# Patient Record
Sex: FEMALE | URBAN - NONMETROPOLITAN AREA
[De-identification: names, ages, dates, MRNs, and addresses within clinical notes are randomized per-mention and may not be internally consistent; named-entity substitution may affect disease eponyms.]

---

## 2017-03-03 ENCOUNTER — APPOINTMENT (RX ONLY)
Dept: URBAN - NONMETROPOLITAN AREA CLINIC 1 | Facility: CLINIC | Age: 82
Setting detail: DERMATOLOGY
End: 2017-03-03

## 2017-03-03 VITALS — HEIGHT: 63 IN | WEIGHT: 130 LBS

## 2017-03-03 DIAGNOSIS — L57.0 ACTINIC KERATOSIS: ICD-10-CM

## 2017-03-03 DIAGNOSIS — D485 NEOPLASM OF UNCERTAIN BEHAVIOR OF SKIN: ICD-10-CM

## 2017-03-03 DIAGNOSIS — L85.3 XEROSIS CUTIS: ICD-10-CM

## 2017-03-03 DIAGNOSIS — Z85.828 PERSONAL HISTORY OF OTHER MALIGNANT NEOPLASM OF SKIN: ICD-10-CM

## 2017-03-03 DIAGNOSIS — L21.8 OTHER SEBORRHEIC DERMATITIS: ICD-10-CM

## 2017-03-03 PROBLEM — F32.9 MAJOR DEPRESSIVE DISORDER, SINGLE EPISODE, UNSPECIFIED: Status: ACTIVE | Noted: 2017-03-03

## 2017-03-03 PROBLEM — D48.5 NEOPLASM OF UNCERTAIN BEHAVIOR OF SKIN: Status: ACTIVE | Noted: 2017-03-03

## 2017-03-03 PROBLEM — I10 ESSENTIAL (PRIMARY) HYPERTENSION: Status: ACTIVE | Noted: 2017-03-03

## 2017-03-03 PROCEDURE — 17003 DESTRUCT PREMALG LES 2-14: CPT

## 2017-03-03 PROCEDURE — ? BIOPSY BY PUNCH METHOD

## 2017-03-03 PROCEDURE — 99214 OFFICE O/P EST MOD 30 MIN: CPT | Mod: 25

## 2017-03-03 PROCEDURE — ? REFERRAL CORRESPONDENCE

## 2017-03-03 PROCEDURE — ? COUNSELING

## 2017-03-03 PROCEDURE — 11100: CPT | Mod: 59

## 2017-03-03 PROCEDURE — 17000 DESTRUCT PREMALG LESION: CPT

## 2017-03-03 PROCEDURE — ? LIQUID NITROGEN

## 2017-03-03 PROCEDURE — ? PRESCRIPTION

## 2017-03-03 RX ORDER — KETOCONAZOLE 20.5 MG/ML
SHAMPOO, SUSPENSION TOPICAL
Qty: 1 | Refills: 3 | Status: ERX | COMMUNITY
Start: 2017-03-03

## 2017-03-03 RX ADMIN — KETOCONAZOLE 1 APPLICATION: 20.5 SHAMPOO, SUSPENSION TOPICAL at 00:00

## 2017-03-03 ASSESSMENT — LOCATION DETAILED DESCRIPTION DERM
LOCATION DETAILED: RIGHT MID TEMPLE
LOCATION DETAILED: RIGHT INFERIOR CENTRAL MALAR CHEEK
LOCATION DETAILED: LEFT MEDIAL FRONTAL SCALP
LOCATION DETAILED: LEFT INFERIOR LATERAL MALAR CHEEK
LOCATION DETAILED: LEFT CENTRAL EYEBROW
LOCATION DETAILED: LEFT PROXIMAL DORSAL FOREARM
LOCATION DETAILED: RIGHT MEDIAL SUPERIOR CHEST
LOCATION DETAILED: LEFT DISTAL DORSAL FOREARM
LOCATION DETAILED: MIDDLE STERNUM
LOCATION DETAILED: LEFT ANTERIOR DISTAL UPPER ARM
LOCATION DETAILED: LEFT ANTERIOR PROXIMAL UPPER ARM
LOCATION DETAILED: RIGHT SUPERIOR LATERAL MALAR CHEEK
LOCATION DETAILED: EPIGASTRIC SKIN
LOCATION DETAILED: LEFT LATERAL SUPERIOR CHEST

## 2017-03-03 ASSESSMENT — LOCATION ZONE DERM
LOCATION ZONE: ARM
LOCATION ZONE: SCALP
LOCATION ZONE: TRUNK
LOCATION ZONE: FACE

## 2017-03-03 ASSESSMENT — LOCATION SIMPLE DESCRIPTION DERM
LOCATION SIMPLE: LEFT UPPER ARM
LOCATION SIMPLE: LEFT CHEEK
LOCATION SIMPLE: ABDOMEN
LOCATION SIMPLE: LEFT EYEBROW
LOCATION SIMPLE: LEFT SCALP
LOCATION SIMPLE: RIGHT CHEEK
LOCATION SIMPLE: LEFT FOREARM
LOCATION SIMPLE: RIGHT TEMPLE
LOCATION SIMPLE: CHEST

## 2017-03-03 NOTE — PROCEDURE: MIPS QUALITY
Quality 111:Pneumonia Vaccination Status For Older Adults: Pneumococcal Vaccination Previously Received
Quality 110: Preventive Care And Screening: Influenza Immunization: Influenza Immunization previously received during influenza season
Detail Level: Generalized

## 2017-03-03 NOTE — PROCEDURE: BIOPSY BY PUNCH METHOD
Body Location Override (Optional - Billing Will Still Be Based On Selected Body Map Location If Applicable): L forearm
Epidermal Sutures: 4-0 Prolene
Consent: Written consent was obtained and risks were reviewed including but not limited to scarring, infection, bleeding, scabbing, incomplete removal, nerve damage and allergy to anesthesia.
Suture Removal: 14 days
Render Post-Care Instructions In Note?: yes
Anesthesia Type: 1% lidocaine with epinephrine and a 1:10 solution of 8.4% sodium bicarbonate
Post-Care Instructions: I reviewed with the patient in detail post-care instructions. Patient is to keep the biopsy site dry overnight, and then apply Polysporin twice daily until healed. Patient may apply hydrogen peroxide soaks to remove any crusting.
Detail Level: Detailed
Lab Facility: [id number]
Bill For Surgical Tray: no
Home Suture Removal Text: Patient was provided a home suture removal kit and will remove their sutures at home.  If they have any questions or difficulties they will call the office.
Anesthesia Volume In Cc: 1
Billing Type: Third-Party Bill
X Depth Of Punch In Cm (Optional): 0
Punch Size In Mm: 5
Size Of Lesion In Cm (Optional): 0.5
Wound Care: Polysporin ointment
Lab: [id number]
Hemostasis: None
Biopsy Type: H and E
Dressing: bandage
Notification Instructions: Patient will be notified of biopsy results. However, patient instructed to call the office if not contacted within 2 weeks.

## 2017-03-03 NOTE — PROCEDURE: LIQUID NITROGEN
Consent: The patient's consent was obtained including but not limited to risks of crusting, scabbing, blistering, scarring, darker or lighter pigmentary change, recurrence, incomplete removal and infection.
Post-Care Instructions: I reviewed with the patient in detail post-care instructions. Patient is to wear sunprotection, and avoid picking at any of the treated lesions. Pt may apply Vaseline to crusted or scabbing areas.
Detail Level: Simple
Render Post-Care Instructions In Note?: yes
Duration Of Freeze Thaw-Cycle (Seconds): 0

## 2017-03-17 ENCOUNTER — APPOINTMENT (RX ONLY)
Dept: URBAN - NONMETROPOLITAN AREA CLINIC 1 | Facility: CLINIC | Age: 82
Setting detail: DERMATOLOGY
End: 2017-03-17

## 2017-03-17 VITALS — WEIGHT: 130 LBS | HEIGHT: 63 IN

## 2017-03-17 DIAGNOSIS — Z48.02 ENCOUNTER FOR REMOVAL OF SUTURES: ICD-10-CM

## 2017-03-17 PROCEDURE — ? SUTURE REMOVAL (GLOBAL PERIOD)

## 2017-03-17 ASSESSMENT — LOCATION DETAILED DESCRIPTION DERM: LOCATION DETAILED: LEFT PROXIMAL DORSAL FOREARM

## 2017-03-17 ASSESSMENT — LOCATION ZONE DERM: LOCATION ZONE: ARM

## 2017-03-17 ASSESSMENT — LOCATION SIMPLE DESCRIPTION DERM: LOCATION SIMPLE: LEFT FOREARM

## 2017-03-17 NOTE — PROCEDURE: SUTURE REMOVAL (GLOBAL PERIOD)
Add 39408 Cpt? (Important Note: In 2017 The Use Of 76815 Is Being Tracked By Cms To Determine Future Global Period Reimbursement For Global Periods): no
Detail Level: Simple

## 2017-09-14 ENCOUNTER — APPOINTMENT (RX ONLY)
Dept: URBAN - NONMETROPOLITAN AREA CLINIC 1 | Facility: CLINIC | Age: 82
Setting detail: DERMATOLOGY
End: 2017-09-14

## 2017-09-14 DIAGNOSIS — I87.2 VENOUS INSUFFICIENCY (CHRONIC) (PERIPHERAL): ICD-10-CM

## 2017-09-14 DIAGNOSIS — L81.4 OTHER MELANIN HYPERPIGMENTATION: ICD-10-CM

## 2017-09-14 DIAGNOSIS — L81.7 PIGMENTED PURPURIC DERMATOSIS: ICD-10-CM

## 2017-09-14 DIAGNOSIS — L57.0 ACTINIC KERATOSIS: ICD-10-CM

## 2017-09-14 DIAGNOSIS — L82.1 OTHER SEBORRHEIC KERATOSIS: ICD-10-CM

## 2017-09-14 DIAGNOSIS — D69.2 OTHER NONTHROMBOCYTOPENIC PURPURA: ICD-10-CM

## 2017-09-14 PROBLEM — I83.10 VARICOSE VEINS OF UNSPECIFIED LOWER EXTREMITY WITH INFLAMMATION: Status: ACTIVE | Noted: 2017-09-14

## 2017-09-14 PROCEDURE — ? OBSERVATION

## 2017-09-14 PROCEDURE — ? DEFER

## 2017-09-14 PROCEDURE — 99213 OFFICE O/P EST LOW 20 MIN: CPT | Mod: 25

## 2017-09-14 PROCEDURE — ? PRESCRIPTION

## 2017-09-14 PROCEDURE — 17003 DESTRUCT PREMALG LES 2-14: CPT

## 2017-09-14 PROCEDURE — ? COUNSELING

## 2017-09-14 PROCEDURE — 17000 DESTRUCT PREMALG LESION: CPT

## 2017-09-14 PROCEDURE — ? LIQUID NITROGEN

## 2017-09-14 RX ORDER — TRIAMCINOLONE ACETONIDE 1 MG/G
CREAM TOPICAL BID
Qty: 1 | Refills: 3 | Status: ERX | COMMUNITY
Start: 2017-09-14

## 2017-09-14 RX ADMIN — TRIAMCINOLONE ACETONIDE: 1 CREAM TOPICAL at 21:06

## 2017-09-14 ASSESSMENT — LOCATION DETAILED DESCRIPTION DERM
LOCATION DETAILED: LEFT INFERIOR FOREHEAD
LOCATION DETAILED: RIGHT SUPERIOR UPPER BACK
LOCATION DETAILED: LEFT SUPERIOR UPPER BACK
LOCATION DETAILED: RIGHT PROXIMAL RADIAL DORSAL FOREARM
LOCATION DETAILED: RIGHT CENTRAL EYEBROW

## 2017-09-14 ASSESSMENT — LOCATION SIMPLE DESCRIPTION DERM
LOCATION SIMPLE: RIGHT FOREARM
LOCATION SIMPLE: LEFT UPPER BACK
LOCATION SIMPLE: LEFT FOREHEAD
LOCATION SIMPLE: RIGHT EYEBROW
LOCATION SIMPLE: RIGHT UPPER BACK

## 2017-09-14 ASSESSMENT — LOCATION ZONE DERM
LOCATION ZONE: FACE
LOCATION ZONE: ARM
LOCATION ZONE: TRUNK

## 2017-09-14 NOTE — PROCEDURE: LIQUID NITROGEN
Duration Of Freeze Thaw-Cycle (Seconds): 8
Number Of Freeze-Thaw Cycles: 1 freeze-thaw cycle
Render Post-Care Instructions In Note?: no
Post-Care Instructions: I reviewed with the patient in detail post-care instructions. Patient is to wear sunprotection, and avoid picking at any of the treated lesions. Pt may apply Vaseline to crusted or scabbing areas.
Detail Level: Simple
Consent: The patient's consent was obtained including but not limited to risks of crusting, scabbing, blistering, scarring, darker or lighter pigmentary change, recurrence, incomplete removal and infection.

## 2019-03-25 ENCOUNTER — APPOINTMENT (RX ONLY)
Dept: URBAN - NONMETROPOLITAN AREA CLINIC 1 | Facility: CLINIC | Age: 84
Setting detail: DERMATOLOGY
End: 2019-03-25

## 2019-03-25 DIAGNOSIS — L81.7 PIGMENTED PURPURIC DERMATOSIS: ICD-10-CM

## 2019-03-25 DIAGNOSIS — L81.4 OTHER MELANIN HYPERPIGMENTATION: ICD-10-CM

## 2019-03-25 DIAGNOSIS — D18.0 HEMANGIOMA: ICD-10-CM

## 2019-03-25 DIAGNOSIS — L82.1 OTHER SEBORRHEIC KERATOSIS: ICD-10-CM

## 2019-03-25 DIAGNOSIS — D22 MELANOCYTIC NEVI: ICD-10-CM

## 2019-03-25 DIAGNOSIS — L57.0 ACTINIC KERATOSIS: ICD-10-CM

## 2019-03-25 PROBLEM — D22.5 MELANOCYTIC NEVI OF TRUNK: Status: ACTIVE | Noted: 2019-03-25

## 2019-03-25 PROBLEM — D18.01 HEMANGIOMA OF SKIN AND SUBCUTANEOUS TISSUE: Status: ACTIVE | Noted: 2019-03-25

## 2019-03-25 PROCEDURE — ? DEFER

## 2019-03-25 PROCEDURE — 17003 DESTRUCT PREMALG LES 2-14: CPT

## 2019-03-25 PROCEDURE — 99214 OFFICE O/P EST MOD 30 MIN: CPT | Mod: 25

## 2019-03-25 PROCEDURE — 17000 DESTRUCT PREMALG LESION: CPT

## 2019-03-25 PROCEDURE — ? LIQUID NITROGEN

## 2019-03-25 PROCEDURE — ? OBSERVATION

## 2019-03-25 PROCEDURE — ? COUNSELING

## 2019-03-25 ASSESSMENT — LOCATION DETAILED DESCRIPTION DERM
LOCATION DETAILED: RIGHT PROXIMAL PRETIBIAL REGION
LOCATION DETAILED: LEFT INFERIOR CENTRAL MALAR CHEEK
LOCATION DETAILED: RIGHT SUPERIOR UPPER BACK
LOCATION DETAILED: RIGHT CLAVICULAR SKIN
LOCATION DETAILED: RIGHT ANTERIOR PROXIMAL THIGH
LOCATION DETAILED: EPIGASTRIC SKIN
LOCATION DETAILED: LEFT DISTAL DORSAL FOREARM
LOCATION DETAILED: RIGHT PROXIMAL DORSAL FOREARM
LOCATION DETAILED: RIGHT SUPERIOR LATERAL BUCCAL CHEEK
LOCATION DETAILED: LEFT PROXIMAL PRETIBIAL REGION
LOCATION DETAILED: RIGHT DISTAL PRETIBIAL REGION
LOCATION DETAILED: RIGHT SUPERIOR MEDIAL BUCCAL CHEEK
LOCATION DETAILED: LEFT ANTERIOR PROXIMAL THIGH
LOCATION DETAILED: RIGHT DISTAL DORSAL FOREARM
LOCATION DETAILED: LEFT MEDIAL SUPERIOR CHEST
LOCATION DETAILED: LEFT MEDIAL SUPERIOR CHEST

## 2019-03-25 ASSESSMENT — LOCATION SIMPLE DESCRIPTION DERM
LOCATION SIMPLE: RIGHT CHEEK
LOCATION SIMPLE: LEFT CHEEK
LOCATION SIMPLE: RIGHT PRETIBIAL REGION
LOCATION SIMPLE: RIGHT UPPER BACK
LOCATION SIMPLE: LEFT THIGH
LOCATION SIMPLE: CHEST
LOCATION SIMPLE: LEFT PRETIBIAL REGION
LOCATION SIMPLE: RIGHT FOREARM
LOCATION SIMPLE: CHEST
LOCATION SIMPLE: RIGHT THIGH
LOCATION SIMPLE: LEFT FOREARM
LOCATION SIMPLE: RIGHT CLAVICULAR SKIN
LOCATION SIMPLE: ABDOMEN

## 2019-03-25 ASSESSMENT — LOCATION ZONE DERM
LOCATION ZONE: ARM
LOCATION ZONE: LEG
LOCATION ZONE: FACE
LOCATION ZONE: TRUNK
LOCATION ZONE: TRUNK

## 2021-09-29 PROBLEM — M20.42 HAMMERTOE OF LEFT FOOT: Status: ACTIVE | Noted: 2021-09-29

## 2022-05-24 PROBLEM — M48.061 LUMBAR STENOSIS: Status: ACTIVE | Noted: 2022-05-24

## 2023-04-19 PROBLEM — I48.0 PAROXYSMAL ATRIAL FIBRILLATION (HCC): Status: ACTIVE | Noted: 2023-04-19

## 2023-04-19 PROBLEM — Z95.0 PACEMAKER: Status: ACTIVE | Noted: 2023-04-19

## 2023-04-19 PROBLEM — Z98.890 S/P BREAST RECONSTRUCTION, LEFT: Status: ACTIVE | Noted: 2023-04-19

## 2023-04-19 PROBLEM — M48.07 SPINAL STENOSIS OF LUMBOSACRAL REGION: Status: ACTIVE | Noted: 2022-05-24

## 2023-04-19 PROBLEM — K57.92 DIVERTICULITIS: Status: ACTIVE | Noted: 2023-04-19

## 2023-04-19 PROBLEM — M51.37 DDD (DEGENERATIVE DISC DISEASE), LUMBOSACRAL: Status: ACTIVE | Noted: 2023-04-19

## 2023-04-19 PROBLEM — E03.9 ACQUIRED HYPOTHYROIDISM: Status: ACTIVE | Noted: 2023-04-19
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
## 2023-04-19 PROBLEM — Z96.651 S/P TOTAL KNEE REPLACEMENT, RIGHT: Status: ACTIVE | Noted: 2023-04-19

## 2023-04-19 PROBLEM — D68.318 CIRCULATING ANTICOAGULANT DISORDER (HCC): Status: ACTIVE | Noted: 2023-04-19

## 2023-04-19 PROBLEM — G62.9 NEUROPATHY: Status: ACTIVE | Noted: 2023-04-19

## 2023-04-19 PROBLEM — Z85.3 HISTORY OF BREAST CANCER: Status: ACTIVE | Noted: 2023-04-19

## 2023-04-19 PROBLEM — R41.89 COGNITIVE IMPAIRMENT: Status: ACTIVE | Noted: 2023-04-19

## 2023-04-19 PROBLEM — I10 PRIMARY HYPERTENSION: Status: ACTIVE | Noted: 2023-04-19

## 2023-04-19 PROBLEM — Z90.12 STATUS POST LEFT MASTECTOMY: Status: ACTIVE | Noted: 2023-04-19

## 2023-04-19 PROBLEM — F34.1 PERSISTENT DEPRESSIVE DISORDER: Status: ACTIVE | Noted: 2023-04-19

## 2023-05-05 ENCOUNTER — APPOINTMENT (OUTPATIENT)
Dept: RADIOLOGY | Facility: MEDICAL CENTER | Age: 88
End: 2023-05-05
Attending: EMERGENCY MEDICINE
Payer: MEDICARE

## 2023-05-05 ENCOUNTER — HOSPITAL ENCOUNTER (EMERGENCY)
Facility: MEDICAL CENTER | Age: 88
End: 2023-05-05
Attending: EMERGENCY MEDICINE
Payer: MEDICARE

## 2023-05-05 VITALS
WEIGHT: 165 LBS | HEIGHT: 64 IN | SYSTOLIC BLOOD PRESSURE: 186 MMHG | TEMPERATURE: 97.6 F | BODY MASS INDEX: 28.17 KG/M2 | RESPIRATION RATE: 16 BRPM | OXYGEN SATURATION: 92 % | DIASTOLIC BLOOD PRESSURE: 79 MMHG | HEART RATE: 65 BPM

## 2023-05-05 DIAGNOSIS — S09.90XA CLOSED HEAD INJURY, INITIAL ENCOUNTER: ICD-10-CM

## 2023-05-05 DIAGNOSIS — M25.562 ACUTE PAIN OF LEFT KNEE: ICD-10-CM

## 2023-05-05 DIAGNOSIS — S61.412A SKIN TEAR OF LEFT HAND WITHOUT COMPLICATION, INITIAL ENCOUNTER: ICD-10-CM
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
## 2023-05-05 PROCEDURE — 70450 CT HEAD/BRAIN W/O DYE: CPT

## 2023-05-05 PROCEDURE — 73562 X-RAY EXAM OF KNEE 3: CPT | Mod: RT

## 2023-05-05 PROCEDURE — 99284 EMERGENCY DEPT VISIT MOD MDM: CPT

## 2023-05-05 PROCEDURE — 700102 HCHG RX REV CODE 250 W/ 637 OVERRIDE(OP): Performed by: EMERGENCY MEDICINE

## 2023-05-05 PROCEDURE — A9270 NON-COVERED ITEM OR SERVICE: HCPCS | Performed by: EMERGENCY MEDICINE

## 2023-05-05 RX ORDER — ACETAMINOPHEN 500 MG
1000 TABLET ORAL ONCE
Status: COMPLETED | OUTPATIENT
Start: 2023-05-05 | End: 2023-05-05

## 2023-05-05 RX ADMIN — ACETAMINOPHEN 1000 MG: 500 TABLET, FILM COATED ORAL at 17:14

## 2023-05-05 ASSESSMENT — FIBROSIS 4 INDEX: FIB4 SCORE: 4.87

## 2023-05-05 NOTE — ED PROVIDER NOTES
ED Provider Note    CHIEF COMPLAINT  tbi      EXTERNAL RECORDS REVIEWED  Inpatient Notes [facility] ER on 3- seen for nausea vomiting, had blood test done and was given Zofran    HPI/ROS  LIMITATION TO HISTORY   Select: : None  OUTSIDE HISTORIAN(S):  EMS mechanical fall    Debra Rodrigues is a 88 y.o. female who presents to the ED as a mechanical fall.  The patient was walking with her walker and her walker came out, she fell back hit the left posterior aspect of her head, did not lose consciousness, she is on Eliquis for stroke.  Patient denies any numbness, tingling, weakness, chest pain, shortness of breath.  She does have a small laceration/skin tear on the left hand.  No other pain or injuries    PAST MEDICAL HISTORY   has a past medical history of Acid reflux disease, Acquired hypothyroidism (4/19/2023), Allergy, Anxiety, Arrhythmia (01/01/2009), Arthritis, At risk for sleep apnea, Blood transfusion without reported diagnosis, Bowel habit changes, Breath shortness, Cancer ([facility]) (01/01/2009), CATARACT, Depression, GERD (gastroesophageal reflux disease), Head ache, Heart burn, Heart valve disease, Hemorrhagic disorder ([facility]), Hypertension, IBD (inflammatory bowel disease), Macular degeneration, Muscle disorder, Pacemaker (01/01/2009), Stroke ([facility]), Unspecified urinary incontinence, Urinary bladder disorder, and Urinary incontinence.    SURGICAL HISTORY   has a past surgical history that includes tonsillectomy and adenoidectomy (1942); mastoidectomy (1943); hysterectomy, total abdominal (1972); knee arthroscopy (1995); julio by laparoscopy (1995); pacemaker insertion (2009); mastectomy (5/13/2009); cataract extraction with iol; breast reconstruction (4/20/2010); tissue expander place/remove (4/20/2010); breast reconstruction (11/23/2010); tissue expander place/remove (11/23/2010); and mastopexy (11/23/2010).    FAMILY HISTORY  Family History   Problem Relation Age of Onset    Heart Disease Unknown   "   Hypertension Unknown     Cancer Unknown        SOCIAL HISTORY  Social History     Tobacco Use    Smoking status: Never    Smokeless tobacco: Never   Vaping Use    Vaping Use: Never used   Substance and Sexual Activity    Alcohol use: Yes     Alcohol/week: 3.5 oz     Types: 7 Standard drinks or equivalent per week     Comment: one per day    Drug use: No    Sexual activity: Not on file       CURRENT MEDICATIONS  Home Medications    **Home medications have not yet been reviewed for this encounter**         ALLERGIES  Allergies   Allergen Reactions    Other Misc Anaphylaxis     x2 in 2005,pt tested,Cause unknown    Amoxicillin     Hydrocodone-Acetaminophen Vomiting    Sulfa Drugs Rash       PHYSICAL EXAM  VITAL SIGNS: BP (!) 186/79   Pulse 65   Temp 36.4 °C (97.6 °F) (Temporal)   Resp 16   Ht 1.613 m (5' 3.5\")   Wt 74.8 kg (165 lb)   SpO2 92%   BMI 28.77 kg/m²    Well-developed well-nourished in mild distress  The patient has slight tenderness palpation behind the left ear, no big hematoma, no bleeding  No C-spine tenderness  No chest wall tenderness, clear to auscultation  Regular rate and rhythm  Abdomen soft nontender  Extremities no deformity seen, she has a skin tear on the left dorsal thumb  Neuro awake alert speech is clear good muscle strength throughout    DIAGNOSTIC STUDIES / PROCEDURES    RADIOLOGY  I have independently interpreted the diagnostic imaging associated with this visit and am waiting the final reading from the radiologist.   My preliminary interpretation is as follows: No blood, encephalomalacia on the right temporal area from previous stroke 2:58 PM    Radiologist interpretation:   DX-KNEE 3 VIEWS RIGHT   Final Result      1.  Decreased osseous mineralization without evidence of displaced fracture.   2.  Knee arthroplasty without evidence of hardware complication.      CT-HEAD W/O   Final Result      1.  No acute intracranial abnormality.   2.  Right insular lobe encephalomalacia.   3. "  White matter disease likely representing microvascular ischemic change.   4.  Bilateral maxillary sinus disease.   5.  Atrophy. Features somewhat suggestive of normal pressure hydrocephalus. Correlate with symptoms               COURSE & MEDICAL DECISION MAKING    ED Observation Status? Yes; I am placing the patient in to an observation status due to a diagnostic uncertainty as well as therapeutic intensity. Patient placed in observation status at 3:19 PM, 5/5/2023.     Observation plan is as follows: Monitor the patient's mental status, reassess for other injuries    Upon Reevaluation, the patient's condition has: Improved; and will be discharged.    Patient discharged from ED Observation status at 400 (Time) 5/5/2023 (Date).     INITIAL ASSESSMENT, COURSE AND PLAN  Care Narrative: Status post fall, head injury, left skin tear on her hand.  We will get a CT scan of the head because the patient is on Eliquis    3:19 PM  CT scan is negative, she does have pain in the right knee, no obvious trauma, will get an x-ray    Patient is feeling improved, we Steri-Stripped the patient's skin tear.  The patient will be discharged home, have the patient return with worsening symptoms.    DISPOSITION AND DISCUSSIONS  Escalation of care considered, and ultimately not performed:blood analysis    Decision tools and prescription drugs considered including, but not limited to: Pain Medications   .    FINAL DIAGNOSIS  1. Fall, initial encounter    2. Closed head injury, initial encounter    3. Skin tear of left hand without complication, initial encounter    4. Acute pain of left knee           Electronically signed by: Gabriel Sprague M.D., 5/5/2023 2:42 PM

## 2023-05-05 NOTE — ED NOTES
Pt BIB EMS from [facility].  Pt was walking with her walker and was reaching into her closet and lost balance and fell.  Pt hit her head with pain to left side of head, and skin tear to left hand, pt also c/o right knee pain.  No LOC, on blood thinners (eliquis).  GCS 15.  Pt seen at charge desk for activation of TBI.  Pt to CT than to [facility].  Bedside report given to DAMIAN Jay.

## 2023-05-06 NOTE — ED NOTES
Son was called states he is on his way to  pt, son states to have pt in a wheel chair waiting for him at the lobby.

## 2023-08-02 PROBLEM — S91.001A OPEN WOUND OF RIGHT ANKLE: Status: ACTIVE | Noted: 2023-08-02

## 2023-08-02 PROBLEM — H35.3290 MACULAR DEGENERATION, AGE RELATED, EXUDATIVE (HCC): Status: ACTIVE | Noted: 2017-04-27

## 2023-08-02 PROBLEM — S41.111S: Status: ACTIVE | Noted: 2023-08-02

## 2023-08-02 PROBLEM — R60.0 LOWER LEG EDEMA: Status: ACTIVE | Noted: 2023-08-02

## 2023-09-13 PROBLEM — N30.00 ACUTE CYSTITIS: Status: ACTIVE | Noted: 2023-09-13

## 2023-09-14 PROBLEM — G47.09 OTHER INSOMNIA: Status: ACTIVE | Noted: 2023-09-14

## 2023-09-14 PROBLEM — N39.41 URGE INCONTINENCE OF URINE: Status: ACTIVE | Noted: 2023-09-14

## 2023-10-25 PROBLEM — Z74.09 IMPAIRED MOBILITY: Status: ACTIVE | Noted: 2023-10-25

## 2023-11-30 PROBLEM — F33.41 MDD (MAJOR DEPRESSIVE DISORDER), RECURRENT, IN PARTIAL REMISSION (HCC): Status: ACTIVE | Noted: 2023-04-19

## 2023-11-30 PROBLEM — D68.69 OTHER THROMBOPHILIA (HCC): Status: ACTIVE | Noted: 2023-04-19

## 2023-11-30 PROBLEM — G63 POLYNEUROPATHY IN OTHER DISEASES CLASSIFIED ELSEWHERE (HCC): Status: ACTIVE | Noted: 2023-04-19

## 2023-11-30 PROBLEM — G31.9 CEREBRAL ATROPHY (HCC): Status: ACTIVE | Noted: 2023-11-30

## 2023-12-08 PROBLEM — S41.111S: Status: RESOLVED | Noted: 2023-08-02 | Resolved: 2023-12-08

## 2023-12-08 PROBLEM — N30.00 ACUTE CYSTITIS: Status: RESOLVED | Noted: 2023-09-13 | Resolved: 2023-12-08

## 2023-12-08 PROBLEM — L30.9 DERMATITIS: Status: ACTIVE | Noted: 2023-12-08

## 2024-01-02 ENCOUNTER — APPOINTMENT (OUTPATIENT)
Dept: RADIOLOGY | Facility: MEDICAL CENTER | Age: 89
DRG: 193 | End: 2024-01-02
Attending: EMERGENCY MEDICINE
Payer: MEDICARE

## 2024-01-02 ENCOUNTER — HOSPITAL ENCOUNTER (INPATIENT)
Facility: MEDICAL CENTER | Age: 89
LOS: 3 days | DRG: 193 | End: 2024-01-05
Attending: EMERGENCY MEDICINE | Admitting: HOSPITALIST
Payer: MEDICARE

## 2024-01-02 ENCOUNTER — APPOINTMENT (OUTPATIENT)
Dept: RADIOLOGY | Facility: MEDICAL CENTER | Age: 89
DRG: 193 | End: 2024-01-02
Payer: MEDICARE

## 2024-01-02 DIAGNOSIS — R09.02 HYPOXIA: ICD-10-CM

## 2024-01-02 DIAGNOSIS — I50.9 CONGESTIVE HEART FAILURE, UNSPECIFIED HF CHRONICITY, UNSPECIFIED HEART FAILURE TYPE (HCC): ICD-10-CM

## 2024-01-02 DIAGNOSIS — J22 LOWER RESPIRATORY INFECTION (E.G., BRONCHITIS, PNEUMONIA, PNEUMONITIS, PULMONITIS): ICD-10-CM

## 2024-01-02 DIAGNOSIS — J96.01 ACUTE RESPIRATORY FAILURE WITH HYPOXIA (HCC): ICD-10-CM

## 2024-01-02 PROBLEM — J81.0 ACUTE PULMONARY EDEMA (HCC): Status: ACTIVE | Noted: 2024-01-02

## 2024-01-02 PROBLEM — J18.9 PNEUMONIA DUE TO INFECTIOUS ORGANISM: Status: ACTIVE | Noted: 2024-01-02

## 2024-01-02 LAB
ALBUMIN SERPL BCP-MCNC: 3.3 G/DL (ref 3.2–4.9)
ALBUMIN/GLOB SERPL: 0.8 G/DL
ALP SERPL-CCNC: 122 U/L (ref 30–99)
ALT SERPL-CCNC: 13 U/L (ref 2–50)
ANION GAP SERPL CALC-SCNC: 12 MMOL/L (ref 7–16)
APPEARANCE UR: CLEAR
AST SERPL-CCNC: 23 U/L (ref 12–45)
BACTERIA #/AREA URNS HPF: NEGATIVE /HPF
BASOPHILS # BLD AUTO: 0.4 % (ref 0–1.8)
BASOPHILS # BLD: 0.06 K/UL (ref 0–0.12)
BILIRUB SERPL-MCNC: 0.9 MG/DL (ref 0.1–1.5)
BILIRUB UR QL STRIP.AUTO: NEGATIVE
BUN SERPL-MCNC: 15 MG/DL (ref 8–22)
CALCIUM ALBUM COR SERPL-MCNC: 9.4 MG/DL (ref 8.5–10.5)
CALCIUM SERPL-MCNC: 8.8 MG/DL (ref 8.5–10.5)
CHLORIDE SERPL-SCNC: 99 MMOL/L (ref 96–112)
CO2 SERPL-SCNC: 24 MMOL/L (ref 20–33)
COLOR UR: ABNORMAL
CREAT SERPL-MCNC: 0.76 MG/DL (ref 0.5–1.4)
EKG IMPRESSION: NORMAL
EOSINOPHIL # BLD AUTO: 0 K/UL (ref 0–0.51)
EOSINOPHIL NFR BLD: 0 % (ref 0–6.9)
EPI CELLS #/AREA URNS HPF: NEGATIVE /HPF
ERYTHROCYTE [DISTWIDTH] IN BLOOD BY AUTOMATED COUNT: 46.2 FL (ref 35.9–50)
FLUAV RNA SPEC QL NAA+PROBE: NEGATIVE
FLUBV RNA SPEC QL NAA+PROBE: NEGATIVE
GFR SERPLBLD CREATININE-BSD FMLA CKD-EPI: 75 ML/MIN/1.73 M 2
GLOBULIN SER CALC-MCNC: 4.1 G/DL (ref 1.9–3.5)
GLUCOSE SERPL-MCNC: 130 MG/DL (ref 65–99)
GLUCOSE UR STRIP.AUTO-MCNC: NEGATIVE MG/DL
HCT VFR BLD AUTO: 46.1 % (ref 37–47)
HGB BLD-MCNC: 15.3 G/DL (ref 12–16)
HYALINE CASTS #/AREA URNS LPF: ABNORMAL /LPF
IMM GRANULOCYTES # BLD AUTO: 0.07 K/UL (ref 0–0.11)
IMM GRANULOCYTES NFR BLD AUTO: 0.4 % (ref 0–0.9)
KETONES UR STRIP.AUTO-MCNC: ABNORMAL MG/DL
LACTATE SERPL-SCNC: 1.3 MMOL/L (ref 0.5–2)
LACTATE SERPL-SCNC: 2.1 MMOL/L (ref 0.5–2)
LEUKOCYTE ESTERASE UR QL STRIP.AUTO: ABNORMAL
LYMPHOCYTES # BLD AUTO: 0.75 K/UL (ref 1–4.8)
LYMPHOCYTES NFR BLD: 4.5 % (ref 22–41)
MAGNESIUM SERPL-MCNC: 2 MG/DL (ref 1.5–2.5)
MCH RBC QN AUTO: 30.5 PG (ref 27–33)
MCHC RBC AUTO-ENTMCNC: 33.2 G/DL (ref 32.2–35.5)
MCV RBC AUTO: 91.8 FL (ref 81.4–97.8)
MICRO URNS: ABNORMAL
MONOCYTES # BLD AUTO: 1.39 K/UL (ref 0–0.85)
MONOCYTES NFR BLD AUTO: 8.4 % (ref 0–13.4)
NEUTROPHILS # BLD AUTO: 14.33 K/UL (ref 1.82–7.42)
NEUTROPHILS NFR BLD: 86.3 % (ref 44–72)
NITRITE UR QL STRIP.AUTO: NEGATIVE
NRBC # BLD AUTO: 0 K/UL
NRBC BLD-RTO: 0 /100 WBC (ref 0–0.2)
NT-PROBNP SERPL IA-MCNC: 1597 PG/ML (ref 0–125)
PH UR STRIP.AUTO: 6.5 [PH] (ref 5–8)
PLATELET # BLD AUTO: 243 K/UL (ref 164–446)
PMV BLD AUTO: 10.5 FL (ref 9–12.9)
POTASSIUM SERPL-SCNC: 4.9 MMOL/L (ref 3.6–5.5)
PROCALCITONIN SERPL-MCNC: 0.2 NG/ML
PROT SERPL-MCNC: 7.4 G/DL (ref 6–8.2)
PROT UR QL STRIP: 30 MG/DL
RBC # BLD AUTO: 5.02 M/UL (ref 4.2–5.4)
RBC # URNS HPF: ABNORMAL /HPF
RBC UR QL AUTO: NEGATIVE
RSV RNA SPEC QL NAA+PROBE: NEGATIVE
SARS-COV-2 RNA RESP QL NAA+PROBE: NOTDETECTED
SODIUM SERPL-SCNC: 135 MMOL/L (ref 135–145)
SP GR UR STRIP.AUTO: 1.02
TROPONIN T SERPL-MCNC: 17 NG/L (ref 6–19)
UROBILINOGEN UR STRIP.AUTO-MCNC: 1 MG/DL
WBC # BLD AUTO: 16.6 K/UL (ref 4.8–10.8)
WBC #/AREA URNS HPF: ABNORMAL /HPF

## 2024-01-02 PROCEDURE — 87086 URINE CULTURE/COLONY COUNT: CPT

## 2024-01-02 PROCEDURE — 96374 THER/PROPH/DIAG INJ IV PUSH: CPT

## 2024-01-02 PROCEDURE — A9270 NON-COVERED ITEM OR SERVICE: HCPCS | Performed by: HOSPITALIST

## 2024-01-02 PROCEDURE — 93005 ELECTROCARDIOGRAM TRACING: CPT | Performed by: EMERGENCY MEDICINE

## 2024-01-02 PROCEDURE — 71045 X-RAY EXAM CHEST 1 VIEW: CPT

## 2024-01-02 PROCEDURE — 700102 HCHG RX REV CODE 250 W/ 637 OVERRIDE(OP): Performed by: EMERGENCY MEDICINE

## 2024-01-02 PROCEDURE — 99223 1ST HOSP IP/OBS HIGH 75: CPT | Mod: A1 | Performed by: HOSPITALIST

## 2024-01-02 PROCEDURE — 770020 HCHG ROOM/CARE - TELE (206)

## 2024-01-02 PROCEDURE — 87449 NOS EACH ORGANISM AG IA: CPT

## 2024-01-02 PROCEDURE — 83605 ASSAY OF LACTIC ACID: CPT | Mod: 91

## 2024-01-02 PROCEDURE — 84484 ASSAY OF TROPONIN QUANT: CPT

## 2024-01-02 PROCEDURE — 96375 TX/PRO/DX INJ NEW DRUG ADDON: CPT

## 2024-01-02 PROCEDURE — 36415 COLL VENOUS BLD VENIPUNCTURE: CPT

## 2024-01-02 PROCEDURE — 700111 HCHG RX REV CODE 636 W/ 250 OVERRIDE (IP): Mod: JZ | Performed by: EMERGENCY MEDICINE

## 2024-01-02 PROCEDURE — 71275 CT ANGIOGRAPHY CHEST: CPT

## 2024-01-02 PROCEDURE — 99285 EMERGENCY DEPT VISIT HI MDM: CPT

## 2024-01-02 PROCEDURE — 81001 URINALYSIS AUTO W/SCOPE: CPT

## 2024-01-02 PROCEDURE — 93005 ELECTROCARDIOGRAM TRACING: CPT

## 2024-01-02 PROCEDURE — 87040 BLOOD CULTURE FOR BACTERIA: CPT | Mod: 91

## 2024-01-02 PROCEDURE — 0241U HCHG SARS-COV-2 COVID-19 NFCT DS RESP RNA 4 TRGT ED POC: CPT

## 2024-01-02 PROCEDURE — 700117 HCHG RX CONTRAST REV CODE 255: Performed by: EMERGENCY MEDICINE

## 2024-01-02 PROCEDURE — 80053 COMPREHEN METABOLIC PANEL: CPT

## 2024-01-02 PROCEDURE — 83735 ASSAY OF MAGNESIUM: CPT

## 2024-01-02 PROCEDURE — A9270 NON-COVERED ITEM OR SERVICE: HCPCS | Performed by: EMERGENCY MEDICINE

## 2024-01-02 PROCEDURE — 83880 ASSAY OF NATRIURETIC PEPTIDE: CPT

## 2024-01-02 PROCEDURE — 85025 COMPLETE CBC W/AUTO DIFF WBC: CPT

## 2024-01-02 PROCEDURE — 84145 PROCALCITONIN (PCT): CPT

## 2024-01-02 PROCEDURE — 700102 HCHG RX REV CODE 250 W/ 637 OVERRIDE(OP): Performed by: HOSPITALIST

## 2024-01-02 RX ORDER — TRAZODONE HYDROCHLORIDE 100 MG/1
50 TABLET ORAL
Status: DISCONTINUED | OUTPATIENT
Start: 2024-01-02 | End: 2024-01-05 | Stop reason: HOSPADM

## 2024-01-02 RX ORDER — LEVOTHYROXINE SODIUM 0.07 MG/1
75 TABLET ORAL DAILY
Status: DISCONTINUED | OUTPATIENT
Start: 2024-01-03 | End: 2024-01-05 | Stop reason: HOSPADM

## 2024-01-02 RX ORDER — ACETAMINOPHEN 325 MG/1
650 TABLET ORAL EVERY 6 HOURS PRN
Status: DISCONTINUED | OUTPATIENT
Start: 2024-01-02 | End: 2024-01-05 | Stop reason: HOSPADM

## 2024-01-02 RX ORDER — FAMOTIDINE 20 MG/1
20 TABLET, FILM COATED ORAL EVERY 12 HOURS
Status: DISCONTINUED | OUTPATIENT
Start: 2024-01-02 | End: 2024-01-05 | Stop reason: HOSPADM

## 2024-01-02 RX ORDER — DOXYCYCLINE 100 MG/1
100 TABLET ORAL ONCE
Status: COMPLETED | OUTPATIENT
Start: 2024-01-02 | End: 2024-01-02

## 2024-01-02 RX ORDER — METOPROLOL SUCCINATE 25 MG/1
25 TABLET, EXTENDED RELEASE ORAL DAILY
Status: DISCONTINUED | OUTPATIENT
Start: 2024-01-03 | End: 2024-01-05 | Stop reason: HOSPADM

## 2024-01-02 RX ORDER — GABAPENTIN 100 MG/1
100 CAPSULE ORAL EVERY 12 HOURS
Status: DISCONTINUED | OUTPATIENT
Start: 2024-01-02 | End: 2024-01-05 | Stop reason: HOSPADM

## 2024-01-02 RX ORDER — ONDANSETRON 2 MG/ML
4 INJECTION INTRAMUSCULAR; INTRAVENOUS EVERY 4 HOURS PRN
Status: DISCONTINUED | OUTPATIENT
Start: 2024-01-02 | End: 2024-01-05 | Stop reason: HOSPADM

## 2024-01-02 RX ORDER — ACETAMINOPHEN 325 MG/1
650 TABLET ORAL ONCE
Status: COMPLETED | OUTPATIENT
Start: 2024-01-02 | End: 2024-01-02

## 2024-01-02 RX ORDER — FUROSEMIDE 10 MG/ML
40 INJECTION INTRAMUSCULAR; INTRAVENOUS ONCE
Status: COMPLETED | OUTPATIENT
Start: 2024-01-02 | End: 2024-01-02

## 2024-01-02 RX ORDER — FLECAINIDE ACETATE 100 MG/1
100 TABLET ORAL DAILY
Status: DISCONTINUED | OUTPATIENT
Start: 2024-01-03 | End: 2024-01-05 | Stop reason: HOSPADM

## 2024-01-02 RX ORDER — GUAIFENESIN/DEXTROMETHORPHAN 100-10MG/5
10 SYRUP ORAL EVERY 6 HOURS PRN
Status: DISCONTINUED | OUTPATIENT
Start: 2024-01-02 | End: 2024-01-05 | Stop reason: HOSPADM

## 2024-01-02 RX ORDER — CEFTRIAXONE 2 G/1
2000 INJECTION, POWDER, FOR SOLUTION INTRAMUSCULAR; INTRAVENOUS ONCE
Status: COMPLETED | OUTPATIENT
Start: 2024-01-02 | End: 2024-01-02

## 2024-01-02 RX ORDER — NITROGLYCERIN 0.4 MG/1
0.4 TABLET SUBLINGUAL ONCE
Status: COMPLETED | OUTPATIENT
Start: 2024-01-02 | End: 2024-01-02

## 2024-01-02 RX ORDER — LOSARTAN POTASSIUM 50 MG/1
50 TABLET ORAL EVERY 12 HOURS
Status: DISCONTINUED | OUTPATIENT
Start: 2024-01-02 | End: 2024-01-05 | Stop reason: HOSPADM

## 2024-01-02 RX ORDER — PAROXETINE HYDROCHLORIDE 20 MG/1
10 TABLET, FILM COATED ORAL DAILY
Status: DISCONTINUED | OUTPATIENT
Start: 2024-01-03 | End: 2024-01-05 | Stop reason: HOSPADM

## 2024-01-02 RX ORDER — ONDANSETRON 4 MG/1
4 TABLET, ORALLY DISINTEGRATING ORAL EVERY 4 HOURS PRN
Status: DISCONTINUED | OUTPATIENT
Start: 2024-01-02 | End: 2024-01-05 | Stop reason: HOSPADM

## 2024-01-02 RX ORDER — DOXYCYCLINE 100 MG/1
100 TABLET ORAL EVERY 12 HOURS
Status: DISCONTINUED | OUTPATIENT
Start: 2024-01-03 | End: 2024-01-05 | Stop reason: HOSPADM

## 2024-01-02 RX ADMIN — APIXABAN 5 MG: 5 TABLET, FILM COATED ORAL at 21:38

## 2024-01-02 RX ADMIN — DOXYCYCLINE 100 MG: 100 TABLET, FILM COATED ORAL at 19:50

## 2024-01-02 RX ADMIN — ACETAMINOPHEN 650 MG: 325 TABLET, FILM COATED ORAL at 16:24

## 2024-01-02 RX ADMIN — FAMOTIDINE 20 MG: 20 TABLET ORAL at 21:39

## 2024-01-02 RX ADMIN — IOHEXOL 60 ML: 350 INJECTION, SOLUTION INTRAVENOUS at 19:15

## 2024-01-02 RX ADMIN — GABAPENTIN 100 MG: 100 CAPSULE ORAL at 21:39

## 2024-01-02 RX ADMIN — TRAZODONE HYDROCHLORIDE 50 MG: 100 TABLET ORAL at 21:39

## 2024-01-02 RX ADMIN — FUROSEMIDE 40 MG: 10 INJECTION, SOLUTION INTRAVENOUS at 19:50

## 2024-01-02 RX ADMIN — CEFTRIAXONE SODIUM 2000 MG: 2 INJECTION, POWDER, FOR SOLUTION INTRAMUSCULAR; INTRAVENOUS at 17:12

## 2024-01-02 RX ADMIN — LOSARTAN POTASSIUM 50 MG: 50 TABLET, FILM COATED ORAL at 21:39

## 2024-01-02 RX ADMIN — NITROGLYCERIN 0.4 MG: 0.4 TABLET, ORALLY DISINTEGRATING SUBLINGUAL at 19:51

## 2024-01-02 ASSESSMENT — ENCOUNTER SYMPTOMS
BRUISES/BLEEDS EASILY: 0
PHOTOPHOBIA: 0
HEARTBURN: 0
FEVER: 1
POLYDIPSIA: 0
TINGLING: 0
DIZZINESS: 0
TREMORS: 0
SINUS PAIN: 0
BLOOD IN STOOL: 0
DIARRHEA: 0
HEADACHES: 0
STRIDOR: 0
COUGH: 1
VOMITING: 0
SORE THROAT: 0
BACK PAIN: 0
PALPITATIONS: 0
EYE PAIN: 0
WEAKNESS: 0
FLANK PAIN: 0
WHEEZING: 0
HALLUCINATIONS: 0
DOUBLE VISION: 0
FALLS: 0
CHILLS: 1
ABDOMINAL PAIN: 0
HEMOPTYSIS: 0
SHORTNESS OF BREATH: 1
ORTHOPNEA: 0
NECK PAIN: 0
DEPRESSION: 0
CONSTIPATION: 0
MYALGIAS: 0
NAUSEA: 0
DIAPHORESIS: 0
PND: 0
BLURRED VISION: 0
SPUTUM PRODUCTION: 0
CLAUDICATION: 0

## 2024-01-02 ASSESSMENT — LIFESTYLE VARIABLES: SUBSTANCE_ABUSE: 0

## 2024-01-02 ASSESSMENT — CHA2DS2 SCORE
AGE 75 OR GREATER: YES
CHF OR LEFT VENTRICULAR DYSFUNCTION: NO
VASCULAR DISEASE: NO
HYPERTENSION: YES
AGE 65 TO 74: NO
CHA2DS2 VASC SCORE: 4
SEX: FEMALE
DIABETES: NO
PRIOR STROKE OR TIA OR THROMBOEMBOLISM: NO

## 2024-01-02 ASSESSMENT — FIBROSIS 4 INDEX
FIB4 SCORE: 2.31
FIB4 SCORE: 4.87

## 2024-01-02 ASSESSMENT — PAIN DESCRIPTION - PAIN TYPE: TYPE: ACUTE PAIN

## 2024-01-02 NOTE — ED TRIAGE NOTES
Chief Complaint   Patient presents with    Weakness    Flu Like Symptoms     Cough, congestion, N/V/D x 3 days     Pt BIB EMS from [facility] assisted living for above complaints. Pt staff EMS stating pt was weaker than normal and slightly confused. On ED arrival pt A/Ox3 but following commands and answering questions appropriately. RA saturation 82% and oral temp 100.6F.     Pt sepsis score of 4,  protocol ordered.

## 2024-01-02 NOTE — ED PROVIDER NOTES
ER Provider Note    Scribed for Lia Rossi M.d. by Rina Ye. 1/2/2024  3:18 PM    Primary Care Provider: Onur Musa P.A.-C.    CHIEF COMPLAINT  Chief Complaint   Patient presents with    Weakness    Flu Like Symptoms     Cough, congestion, N/V/D x 3 days     EXTERNAL RECORDS REVIEWED  Outpatient Notes shows that the patient was seen at [facility] on 11/30/23 for an annual wellness exam. She is on Eliquis for atrial fibrillation. She has a pacemaker. She has some cognitive decline. History of hypothyroidism.  She had an oxygen saturation of 90% when she was seen by her PCP on 12/6/23.     HPI/ROS  LIMITATION TO HISTORY   Select: : None  OUTSIDE HISTORIAN(S):  EMS , who was able to help contribute to the patient's history    Debra Rodrigues is a 88 y.o. female who presents to the ED via EMS complaining of acute generalized weakness with cough and congestion onset three days ago. Patient is from [facility]. She has been sick with cold like symptoms, such as a dry cough and congestion. Staff noticed today that the patient was weaker than usually with some mild confusion. EMS was called, and presented the patient to the ED today for further evaluation. Upon arrival to the ED, patient was found to be 82% on room air with a temperature of 100.6 °F. She was placed on 5 liters of oxygen, and is now satting at 91%. Currently in the ED, patient denies any pain at this time. Denies any shortness of breath, chest pain, nausea, vomiting, or diarrhea.  Patient reports some mild swelling in her legs but does not think it is any different than normal.  No alleviating or exacerbating factors reported. Drug allergies to Amoxicillin, Hydrocodone-acetaminophen, and Sulfa Drugs.     PAST MEDICAL HISTORY  Past Medical History:   Diagnosis Date    Acid reflux disease     Acquired hypothyroidism 4/19/2023    Allergy     Anxiety     Arrhythmia 01/01/2009    Atrial fibrillation, resolved     "Arthritis     At risk for sleep apnea     Blood transfusion without reported diagnosis     Bowel habit changes     diarrhea    Breath shortness     Cancer ([facility]) 01/01/2009    left breast s/p mastectomy    CATARACT     surgically corrected, bilateral    Depression     GERD (gastroesophageal reflux disease)     Head ache     Heart burn     Heart valve disease     MVP    Hemorrhagic disorder ([facility])     anticoagulated on eliquis    Hypertension     IBD (inflammatory bowel disease)     Macular degeneration     Muscle disorder     Pacemaker 01/01/2009    Stroke ([facility])     2019 - no deficits    Unspecified urinary incontinence     \"my bladder leaks when I lay down\"    Urinary bladder disorder     Urinary incontinence        SURGICAL HISTORY  Past Surgical History:   Procedure Laterality Date    BREAST RECONSTRUCTION  11/23/2010    Performed by ANNITA KELLEY at SURGERY [facility]    TISSUE EXPANDER PLACE/REMOVE  11/23/2010    Performed by ANNITA KELLEY at SURGERY [facility]    MASTOPEXY  11/23/2010    Performed by ANINTA KELLEY at SURGERY [facility]    BREAST RECONSTRUCTION  4/20/2010    Performed by ANNITA KELLEY at SURGERY [facility]    TISSUE EXPANDER PLACE/REMOVE  4/20/2010    Performed by ANNITA KELLEY at SURGERY [facility]    MASTECTOMY  5/13/2009    left    PACEMAKER INSERTION  2009    KNEE ARTHROSCOPY  1995    right    RAYNA BY LAPAROSCOPY  1995    HYSTERECTOMY, TOTAL ABDOMINAL  1972    BSO    MASTOIDECTOMY  1943    left    TONSILLECTOMY AND ADENOIDECTOMY  1942    CATARACT EXTRACTION WITH IOL      bilateral       FAMILY HISTORY  Family History   Problem Relation Age of Onset    Heart Disease Unknown     Hypertension Unknown     Cancer Unknown        SOCIAL HISTORY   reports that she has never smoked. She has never used smokeless tobacco. She reports current alcohol use of about 3.5 oz of alcohol per week. She reports that she does not use drugs.    CURRENT " "MEDICATIONS  Previous Medications    ACETAMINOPHEN (TYLENOL) 325 MG TAB    Take 2 Tablets by mouth every four hours as needed for Mild Pain or Moderate Pain.    ASCORBIC ACID (ASCORBIC ACID) 500 MG TAB    Take 1 Tablet by mouth every day.    ELIQUIS 5 MG TAB    Take 1 Tablet by mouth 2 times a day.    FAMOTIDINE (PEPCID) 20 MG TAB    Take 1 Tablet by mouth 2 times a day.    FLECAINIDE (TAMBOCOR) 100 MG TAB    Take 1 Tablet by mouth every day.    FLUTICASONE (FLONASE) 50 MCG/ACT NASAL SPRAY    Administer 2 Sprays into affected nostril(S) every day.    GABAPENTIN (NEURONTIN) 100 MG CAP    Take 1 Capsule by mouth 2 (two) times a day. In morning and early afternoon.    GABAPENTIN (NEURONTIN) 300 MG CAP    Take 1 Capsule by mouth every evening.    LEVOTHYROXINE (SYNTHROID) 75 MCG TAB    Take 1 Tablet by mouth every day.    LOSARTAN (COZAAR) 50 MG TAB    Take 1 Tablet by mouth 2 times a day.    MAGNESIUM OXIDE 400 (240 MG) MG TAB    Take 1 Tablet by mouth every day.    METOPROLOL SR (TOPROL XL) 25 MG TABLET SR 24 HR    Take 1 Tablet by mouth every day.    MULTIPLE VITAMINS-MINERALS (PRESERVISION AREDS 2) CAP    Take 1 Capsule by mouth 2 times a day for 360 days.    NYSTATIN (MYCOSTATIN) 518497 UNIT/GM CREAM TOPICAL CREAM    Apply 1 g topically 2 times a day. Apply under breasts and abdominal folds 2 times daily    PAROXETINE (PAXIL) 10 MG TAB    Take 1 Tablet by mouth every day.    POTASSIUM CHLORIDE ER (KLOR-CON) 10 MEQ TABLET    Take 1 Tablet by mouth 2 times a day.    TRAZODONE (DESYREL) 50 MG TAB    Take 1 Tablet by mouth at bedtime.    VITAMIN D3 (CHOLECALCIFEROL) 1000 UNIT (25 MCG) TAB    Take 2 Tablets by mouth every day.       ALLERGIES  Other misc, Amoxicillin, Hydrocodone-acetaminophen, and Sulfa drugs    PHYSICAL EXAM  BP (!) 181/94   Pulse 65   Temp (!) 38.1 °C (100.6 °F) (Temporal)   Resp (!) 36   Ht 1.6 m (5' 3\")   Wt 74.8 kg (165 lb)   SpO2 91%   BMI 29.23 kg/m²   Constitutional: Well developed, well " nourished; No acute distress; Non-toxic appearance. Elevated BMI.   HENT: Normocephalic, atraumatic; Bilateral external ears normal; Very dry mucous membranes;    Eyes: PERRL, EOMI, Conjunctiva normal. No discharge.   Neck:  Supple, nontender midline; No stridor; No nuchal rigidity.   Lymphatic: No cervical lymphadenopathy noted.   Cardiovascular: Regular rate and rhythm without murmurs, rubs, or gallop.   Thorax & Lungs: Decreased breath sounds at the bases, scattered crackles bilateral bases.  Nontender chest wall. No crepitus or subcutaneous air  Abdomen: Soft, nontender, bowel sounds normal. No obvious masses; No pulsatile masses; no rebound, guarding, or peritoneal signs.   Skin: Good color; warm and dry without rash or petechia.  Back: Nontender, No CVA tenderness.   Extremities: Distal radial, dorsalis pedis, posterior tibial pulses are equal bilaterally; 1+ pitting edema with right greater than left; Nontender calves or saphenous, No cyanosis, No clubbing.   Musculoskeletal: Good range of motion in all major joints. No tenderness to palpation or major deformities noted.   Neurologic: Alert & oriented x 4, clear speech    DIAGNOSTIC STUDIES    Labs:   Results for orders placed or performed during the hospital encounter of 01/02/24   Lactic acid (lactate)   Result Value Ref Range    Lactic Acid 2.1 (H) 0.5 - 2.0 mmol/L   Lactic acid (lactate): Repeat if initial lactic acid result is greater than 2   Result Value Ref Range    Lactic Acid 1.3 0.5 - 2.0 mmol/L   CBC With Differential   Result Value Ref Range    WBC 16.6 (H) 4.8 - 10.8 K/uL    RBC 5.02 4.20 - 5.40 M/uL    Hemoglobin 15.3 12.0 - 16.0 g/dL    Hematocrit 46.1 37.0 - 47.0 %    MCV 91.8 81.4 - 97.8 fL    MCH 30.5 27.0 - 33.0 pg    MCHC 33.2 32.2 - 35.5 g/dL    RDW 46.2 35.9 - 50.0 fL    Platelet Count 243 164 - 446 K/uL    MPV 10.5 9.0 - 12.9 fL    Neutrophils-Polys 86.30 (H) 44.00 - 72.00 %    Lymphocytes 4.50 (L) 22.00 - 41.00 %    Monocytes 8.40  0.00 - 13.40 %    Eosinophils 0.00 0.00 - 6.90 %    Basophils 0.40 0.00 - 1.80 %    Immature Granulocytes 0.40 0.00 - 0.90 %    Nucleated RBC 0.00 0.00 - 0.20 /100 WBC    Neutrophils (Absolute) 14.33 (H) 1.82 - 7.42 K/uL    Lymphs (Absolute) 0.75 (L) 1.00 - 4.80 K/uL    Monos (Absolute) 1.39 (H) 0.00 - 0.85 K/uL    Eos (Absolute) 0.00 0.00 - 0.51 K/uL    Baso (Absolute) 0.06 0.00 - 0.12 K/uL    Immature Granulocytes (abs) 0.07 0.00 - 0.11 K/uL    NRBC (Absolute) 0.00 K/uL   Urinalysis    Specimen: Urine   Result Value Ref Range    Color DK Yellow     Character Clear     Specific Gravity 1.024 <1.035    Ph 6.5 5.0 - 8.0    Glucose Negative Negative mg/dL    Ketones Trace (A) Negative mg/dL    Protein 30 (A) Negative mg/dL    Bilirubin Negative Negative    Urobilinogen, Urine 1.0 Negative    Nitrite Negative Negative    Leukocyte Esterase Trace (A) Negative    Occult Blood Negative Negative    Micro Urine Req Microscopic    proBrain Natriuretic Peptide, NT   Result Value Ref Range    NT-proBNP 1597 (H) 0 - 125 pg/mL   Comp Metabolic Panel   Result Value Ref Range    Sodium 135 135 - 145 mmol/L    Potassium 4.9 3.6 - 5.5 mmol/L    Chloride 99 96 - 112 mmol/L    Co2 24 20 - 33 mmol/L    Anion Gap 12.0 7.0 - 16.0    Glucose 130 (H) 65 - 99 mg/dL    Bun 15 8 - 22 mg/dL    Creatinine 0.76 0.50 - 1.40 mg/dL    Calcium 8.8 8.5 - 10.5 mg/dL    Correct Calcium 9.4 8.5 - 10.5 mg/dL    AST(SGOT) 23 12 - 45 U/L    ALT(SGPT) 13 2 - 50 U/L    Alkaline Phosphatase 122 (H) 30 - 99 U/L    Total Bilirubin 0.9 0.1 - 1.5 mg/dL    Albumin 3.3 3.2 - 4.9 g/dL    Total Protein 7.4 6.0 - 8.2 g/dL    Globulin 4.1 (H) 1.9 - 3.5 g/dL    A-G Ratio 0.8 g/dL   URINE MICROSCOPIC (W/UA)   Result Value Ref Range    WBC 0-2 /hpf    RBC 2-5 (A) /hpf    Bacteria Negative None /hpf    Epithelial Cells Negative /hpf    Hyaline Cast 0-2 /lpf   ESTIMATED GFR   Result Value Ref Range    GFR (CKD-EPI) 75 >60 mL/min/1.73 m 2   TROPONIN   Result Value Ref Range     Troponin T 17 6 - 19 ng/L   EKG   Result Value Ref Range    Report       [facility] Emergency Dept.    Test Date:  2024  Pt Name:    CHRISTINE LANDA              Department: ER  MRN:        [id number]                      Room:       RD 05  Gender:     Female                       Technician: [id number]  :        [id number]                   Requested By:ER TRIAGE PROTOCOL  Order #:    [id number]                    Reading MD: Lia Rossi    Measurements  Intervals                                Axis  Rate:       65                           P:          0  AZ:         53                           QRS:        -60  QRSD:       109                          T:          -7  QT:         436  QTc:        454    Interpretive Statements  Atrial-paced rhythm rate 65  Left axis deviation  T waves inverted III and AVF and flat V2-V6 (unchanged)  Minimal ST depression V5-V6 (unchanged)  No ST elevation or depression  Inferior infarct, old  Anterior infarct, old  Compared to ECG 2010 10:02:45  Left anterior fascicular block no longer present  Sorin cardial infarct finding still present  Electronically Signed On 2024 19:48:22 PST by Lia Rossi     POC CoV-2, FLU A/B, RSV by PCR   Result Value Ref Range    POC Influenza A RNA, PCR Negative Negative    POC Influenza B RNA, PCR Negative Negative    POC RSV, by PCR Negative Negative    POC SARS-CoV-2, PCR NotDetected         EKG:   I have independently interpreted this EKG above     Radiology:   The attending emergency physician has independently interpreted the diagnostic imaging associated with this visit and am waiting the final reading from the radiologist.     Preliminary interpretation is a follows: ER MD is reviewed the patient's chest x-ray.  She appears to have some tiny pleural effusion and some increased interstitial markings.  ER MD is also reviewed the patient's CT chest.  There appears to be some density in the left mid lung  field.    Radiologist interpretation:   CT-CTA CHEST PULMONARY ARTERY W/ RECONS   Final Result      1.  There is no CT evidence of acute pulmonary embolism.   2.  There is mosaic attenuation with minimal dependent groundglass opacity possibly representing mild edema with trace amounts of pleural fluid.   3.  Dependent lingular airspace disease could be atelectasis or pneumonitis.   4.  Nonspecific lymph nodes in the mediastinum, probably reactive.            DX-CHEST-PORTABLE (1 VIEW)   Final Result         1.  Moderate cardiomegaly.      2.  Slight blunting of the costophrenic angles suspicious for small pleural effusions.           COURSE & MEDICAL DECISION MAKING     ED Observation Status? No; Patient does not meet criteria for ED Observation.     INITIAL ASSESSMENT, COURSE AND PLAN  Care Narrative: Patient presents to the ER complaining of cough and congestion for the last 3 days.  She also complains of feeling generally weak.  She denies chest pain.  Denies headache.  Denies abdominal pain.  She presents with a low-grade temperature of 100.6.  For this she was given Tylenol.  She was hypoxic at 82% on room air.  Review of the records reveal she has no history of underlying lung disease and recent primary care office visits revealed that she has O2 sats in the 90 to 92% range typically on room air.  Patient is coughing here in the ER.  She has decreased breath sounds throughout with some crackles at the bases.  She has known atrial fibrillation and pacemaker.  Last EF on echocardiogram couple years ago was 50 to 55%.  She has an elevated BNP and what looks like some pulmonary edema and small pleural effusion on CT and chest x-ray.  There is no PE.  She is negative for COVID, flu and RSV.  EKG does not reveal any acute ST elevation or depression.  Initial troponin is normal.  At this time no notes for STEMI or non-STEMI.  She is placed on 5 L and she is saturating in the low 90s.  She is not in any respiratory  distress.  Nursing home reported she seemed a little bit more confused than normal.  However, she answers most of my questions questions accurately except she thought it was 2014.  She knows she is at the hospital here in [place].  This time I think patient likely has CHF.  She probably will need echocardiogram to further evaluate.  I gave her some Lasix and some nitro here in the ER.  Since she did have low-grade fever with an elevated white count I did some cultures and gave her dose of Rocephin and doxycycline presuming possible bacterial bronchitis/pneumonitis.  Plan is to hospitalize her as she is hypoxic with cough and congestion and what looks like CHF on imaging and lab evaluation.  Will speak with the hospitalist regarding hospitalization.    3:18 PM - Patient was seen and evaluated at bedside. Patient presents to the ED for generalized weakness for the past three days. After my exam, I discussed with the patient the plan of care, which includes treating the patient with medication for their symptoms, as well as obtaining lab work and imaging for further evaluation. Patient understands and verbalizes agreement to plan of care.     1950: [place] hospitalist    2000: Patient is on a 5 L OxyMask.  She is not in any distress.  She is breathing comfortably.  She denies shortness of breath.  Denies chest pain.  Denies abdominal pain.  She says she has been coughing for about a week.  She knows she is at [facility].  She knows it is 2024.  She denies any ill contacts.  She says she had a stroke about 10 years ago without any residual deficits.  She has no known heart failure.  She confirms she does have atrial fibrillation.    2015: Discussed with Dr. Mireles, hospitalist on-call.  He will kindly evaluate the patient hospitalization.    CRITICAL CARE  The very real possibility of a deterioration of this patient's condition required the highest level of my preparedness for sudden, emergent intervention.  I provided critical  care services, which included medication orders, frequent reevaluations of the patient's condition and response to treatment, ordering and reviewing test results, and discussing the case with various consultants.  The critical care time associated with the care of the patient was 35 minutes. Review chart for interventions. This time is exclusive of any other billable procedures.      ADDITIONAL PROBLEM LIST  Problem #1: Cough and congestion x 3 days.    DISPOSITION AND DISCUSSIONS  I have discussed management of the patient with the following physicians and JOSE ANTONIO's:  None    Discussion of management with other QHP or appropriate source(s): None     Escalation of care considered, and ultimately not performed: IV fluids.  Patient is not hypotensive.  She is not tachycardic.  She appears to be fluid overloaded.  At this time we will be judicious in any fluid in this duration.  I do not think she needs IV fluids per sepsis protocol at this time as I do not want to overload her anymore than she already is.    Barriers to care at this time, including but not limited to:  None .     Decision tools and prescription drugs considered including, but not limited to: Antibiotics patient was given Rocephin and doxycycline for what might be a bacterial bronchitis versus pneumonitis given white count of 16,000, cough, congestion and negative COVID, flu and RSV. .    FINAL DIAGNOSIS  1. Hypoxia Acute   2. Congestive heart failure, unspecified HF chronicity, unspecified heart failure type (HCC) Acute   3. Lower respiratory infection (e.g., bronchitis, pneumonia, pneumonitis, pulmonitis) Acute   The critical care time associated with the care of the patient was 35 minutes.    This dictation has been created using voice recognition software. The accuracy of the dictation is limited by the abilities of the software. I expect there may be some errors of grammar and possibly content. I made every attempt to manually correct the errors  within my dictation. However, errors related to voice recognition software may still exist and should be interpreted within the appropriate context.      I, Rina Ye (Scribe), am scribing for, and in the presence of, Lia Rossi M.D..    Electronically signed by: Rina Ye (Scribmegan), 1/2/2024    Lia BLANCA M.D. personally performed the services described in this documentation, as scribed by Rina Ye in my presence, and it is both accurate and complete.      The note accurately reflects work and decisions made by me.  Lia Rossi M.D.  1/2/2024  7:57 PM

## 2024-01-03 LAB
ALBUMIN SERPL BCP-MCNC: 3.1 G/DL (ref 3.2–4.9)
ALBUMIN/GLOB SERPL: 0.9 G/DL
ALP SERPL-CCNC: 111 U/L (ref 30–99)
ALT SERPL-CCNC: 8 U/L (ref 2–50)
ANION GAP SERPL CALC-SCNC: 13 MMOL/L (ref 7–16)
AST SERPL-CCNC: 14 U/L (ref 12–45)
BASOPHILS # BLD AUTO: 0.4 % (ref 0–1.8)
BASOPHILS # BLD: 0.07 K/UL (ref 0–0.12)
BILIRUB SERPL-MCNC: 0.8 MG/DL (ref 0.1–1.5)
BUN SERPL-MCNC: 15 MG/DL (ref 8–22)
CALCIUM ALBUM COR SERPL-MCNC: 9.2 MG/DL (ref 8.5–10.5)
CALCIUM SERPL-MCNC: 8.5 MG/DL (ref 8.5–10.5)
CHLORIDE SERPL-SCNC: 99 MMOL/L (ref 96–112)
CO2 SERPL-SCNC: 23 MMOL/L (ref 20–33)
CREAT SERPL-MCNC: 0.75 MG/DL (ref 0.5–1.4)
EOSINOPHIL # BLD AUTO: 0.03 K/UL (ref 0–0.51)
EOSINOPHIL NFR BLD: 0.2 % (ref 0–6.9)
ERYTHROCYTE [DISTWIDTH] IN BLOOD BY AUTOMATED COUNT: 47 FL (ref 35.9–50)
GFR SERPLBLD CREATININE-BSD FMLA CKD-EPI: 76 ML/MIN/1.73 M 2
GLOBULIN SER CALC-MCNC: 3.6 G/DL (ref 1.9–3.5)
GLUCOSE SERPL-MCNC: 120 MG/DL (ref 65–99)
HCT VFR BLD AUTO: 45.1 % (ref 37–47)
HGB BLD-MCNC: 14.6 G/DL (ref 12–16)
IMM GRANULOCYTES # BLD AUTO: 0.07 K/UL (ref 0–0.11)
IMM GRANULOCYTES NFR BLD AUTO: 0.4 % (ref 0–0.9)
LYMPHOCYTES # BLD AUTO: 1.49 K/UL (ref 1–4.8)
LYMPHOCYTES NFR BLD: 9.1 % (ref 22–41)
MCH RBC QN AUTO: 30.5 PG (ref 27–33)
MCHC RBC AUTO-ENTMCNC: 32.4 G/DL (ref 32.2–35.5)
MCV RBC AUTO: 94.4 FL (ref 81.4–97.8)
MONOCYTES # BLD AUTO: 1.87 K/UL (ref 0–0.85)
MONOCYTES NFR BLD AUTO: 11.4 % (ref 0–13.4)
NEUTROPHILS # BLD AUTO: 12.89 K/UL (ref 1.82–7.42)
NEUTROPHILS NFR BLD: 78.5 % (ref 44–72)
NRBC # BLD AUTO: 0 K/UL
NRBC BLD-RTO: 0 /100 WBC (ref 0–0.2)
PLATELET # BLD AUTO: 230 K/UL (ref 164–446)
PMV BLD AUTO: 9.8 FL (ref 9–12.9)
POTASSIUM SERPL-SCNC: 3.9 MMOL/L (ref 3.6–5.5)
PROT SERPL-MCNC: 6.7 G/DL (ref 6–8.2)
RBC # BLD AUTO: 4.78 M/UL (ref 4.2–5.4)
SODIUM SERPL-SCNC: 135 MMOL/L (ref 135–145)
WBC # BLD AUTO: 16.4 K/UL (ref 4.8–10.8)

## 2024-01-03 PROCEDURE — A9270 NON-COVERED ITEM OR SERVICE: HCPCS

## 2024-01-03 PROCEDURE — 700102 HCHG RX REV CODE 250 W/ 637 OVERRIDE(OP)

## 2024-01-03 PROCEDURE — 80053 COMPREHEN METABOLIC PANEL: CPT

## 2024-01-03 PROCEDURE — 97162 PT EVAL MOD COMPLEX 30 MIN: CPT

## 2024-01-03 PROCEDURE — 700111 HCHG RX REV CODE 636 W/ 250 OVERRIDE (IP): Mod: JZ

## 2024-01-03 PROCEDURE — 700105 HCHG RX REV CODE 258

## 2024-01-03 PROCEDURE — 770020 HCHG ROOM/CARE - TELE (206)

## 2024-01-03 PROCEDURE — 99232 SBSQ HOSP IP/OBS MODERATE 35: CPT | Mod: GC | Performed by: INTERNAL MEDICINE

## 2024-01-03 PROCEDURE — A9270 NON-COVERED ITEM OR SERVICE: HCPCS | Performed by: HOSPITALIST

## 2024-01-03 PROCEDURE — 700102 HCHG RX REV CODE 250 W/ 637 OVERRIDE(OP): Performed by: HOSPITALIST

## 2024-01-03 PROCEDURE — 85025 COMPLETE CBC W/AUTO DIFF WBC: CPT

## 2024-01-03 PROCEDURE — 36415 COLL VENOUS BLD VENIPUNCTURE: CPT

## 2024-01-03 RX ORDER — FUROSEMIDE 10 MG/ML
20 INJECTION INTRAMUSCULAR; INTRAVENOUS ONCE
Status: COMPLETED | OUTPATIENT
Start: 2024-01-03 | End: 2024-01-03

## 2024-01-03 RX ORDER — NYSTATIN 100000 [USP'U]/G
POWDER TOPICAL 3 TIMES DAILY
Status: DISCONTINUED | OUTPATIENT
Start: 2024-01-03 | End: 2024-01-05 | Stop reason: HOSPADM

## 2024-01-03 RX ORDER — HYDRALAZINE HYDROCHLORIDE 20 MG/ML
10 INJECTION INTRAMUSCULAR; INTRAVENOUS EVERY 4 HOURS PRN
Status: DISCONTINUED | OUTPATIENT
Start: 2024-01-03 | End: 2024-01-05 | Stop reason: HOSPADM

## 2024-01-03 RX ADMIN — APIXABAN 5 MG: 5 TABLET, FILM COATED ORAL at 16:46

## 2024-01-03 RX ADMIN — APIXABAN 5 MG: 5 TABLET, FILM COATED ORAL at 04:22

## 2024-01-03 RX ADMIN — LOSARTAN POTASSIUM 50 MG: 50 TABLET, FILM COATED ORAL at 04:22

## 2024-01-03 RX ADMIN — GABAPENTIN 100 MG: 100 CAPSULE ORAL at 16:46

## 2024-01-03 RX ADMIN — DOXYCYCLINE 100 MG: 100 TABLET, FILM COATED ORAL at 05:26

## 2024-01-03 RX ADMIN — NYSTATIN: 100000 POWDER TOPICAL at 16:46

## 2024-01-03 RX ADMIN — GABAPENTIN 100 MG: 100 CAPSULE ORAL at 04:22

## 2024-01-03 RX ADMIN — FUROSEMIDE 20 MG: 10 INJECTION, SOLUTION INTRAVENOUS at 14:40

## 2024-01-03 RX ADMIN — DOXYCYCLINE 100 MG: 100 TABLET, FILM COATED ORAL at 16:46

## 2024-01-03 RX ADMIN — FAMOTIDINE 20 MG: 20 TABLET ORAL at 16:46

## 2024-01-03 RX ADMIN — FAMOTIDINE 20 MG: 20 TABLET ORAL at 04:22

## 2024-01-03 RX ADMIN — LEVOTHYROXINE SODIUM 75 MCG: 0.07 TABLET ORAL at 05:26

## 2024-01-03 RX ADMIN — AMPICILLIN AND SULBACTAM 3 G: 1; 2 INJECTION, POWDER, FOR SOLUTION INTRAMUSCULAR; INTRAVENOUS at 16:48

## 2024-01-03 RX ADMIN — METOPROLOL SUCCINATE 25 MG: 25 TABLET, EXTENDED RELEASE ORAL at 05:26

## 2024-01-03 RX ADMIN — TRAZODONE HYDROCHLORIDE 50 MG: 100 TABLET ORAL at 20:43

## 2024-01-03 RX ADMIN — LOSARTAN POTASSIUM 50 MG: 50 TABLET, FILM COATED ORAL at 16:46

## 2024-01-03 RX ADMIN — PAROXETINE HYDROCHLORIDE 10 MG: 20 TABLET, FILM COATED ORAL at 04:25

## 2024-01-03 RX ADMIN — FLECAINIDE ACETATE 100 MG: 100 TABLET ORAL at 05:26

## 2024-01-03 ASSESSMENT — PATIENT HEALTH QUESTIONNAIRE - PHQ9
2. FEELING DOWN, DEPRESSED, IRRITABLE, OR HOPELESS: NOT AT ALL
1. LITTLE INTEREST OR PLEASURE IN DOING THINGS: NOT AT ALL
SUM OF ALL RESPONSES TO PHQ9 QUESTIONS 1 AND 2: 0

## 2024-01-03 ASSESSMENT — ENCOUNTER SYMPTOMS
WHEEZING: 0
COUGH: 1
NAUSEA: 0
FEVER: 0
DIARRHEA: 0
INSOMNIA: 0
MEMORY LOSS: 0
FOCAL WEAKNESS: 0
SPUTUM PRODUCTION: 0
CHILLS: 0
SHORTNESS OF BREATH: 0
ORTHOPNEA: 0
VOMITING: 0
ABDOMINAL PAIN: 0

## 2024-01-03 ASSESSMENT — COGNITIVE AND FUNCTIONAL STATUS - GENERAL
SUGGESTED CMS G CODE MODIFIER DAILY ACTIVITY: CL
CLIMB 3 TO 5 STEPS WITH RAILING: TOTAL
DRESSING REGULAR LOWER BODY CLOTHING: A LOT
MOVING TO AND FROM BED TO CHAIR: UNABLE
SUGGESTED CMS G CODE MODIFIER MOBILITY: CL
PERSONAL GROOMING: A LOT
HELP NEEDED FOR BATHING: A LOT
EATING MEALS: A LITTLE
SUGGESTED CMS G CODE MODIFIER MOBILITY: CM
CLIMB 3 TO 5 STEPS WITH RAILING: A LOT
MOVING FROM LYING ON BACK TO SITTING ON SIDE OF FLAT BED: A LOT
MOVING TO AND FROM BED TO CHAIR: A LOT
TURNING FROM BACK TO SIDE WHILE IN FLAT BAD: A LOT
TURNING FROM BACK TO SIDE WHILE IN FLAT BAD: A LOT
DAILY ACTIVITIY SCORE: 13
WALKING IN HOSPITAL ROOM: A LOT
STANDING UP FROM CHAIR USING ARMS: A LOT
TOILETING: A LOT
MOVING FROM LYING ON BACK TO SITTING ON SIDE OF FLAT BED: UNABLE
DRESSING REGULAR UPPER BODY CLOTHING: A LOT
STANDING UP FROM CHAIR USING ARMS: A LOT
MOBILITY SCORE: 9
MOBILITY SCORE: 12
WALKING IN HOSPITAL ROOM: A LOT

## 2024-01-03 ASSESSMENT — GAIT ASSESSMENTS
GAIT LEVEL OF ASSIST: MODERATE ASSIST
DISTANCE (FEET): 5
ASSISTIVE DEVICE: FRONT WHEEL WALKER
DEVIATION: BRADYKINETIC;DECREASED BASE OF SUPPORT

## 2024-01-03 ASSESSMENT — FIBROSIS 4 INDEX: FIB4 SCORE: 1.89

## 2024-01-03 ASSESSMENT — PAIN DESCRIPTION - PAIN TYPE: TYPE: ACUTE PAIN

## 2024-01-03 NOTE — PROGRESS NOTES
4 Eyes Skin Assessment Completed by Veronica RN and Vance RN.    Head WDL  Ears WDL  Nose WDL  Mouth WDL  Neck WDL  Breast/Chest Redness and Scar L mastectomy scar and redness under Bilat breasts   Shoulder Blades WDL  Spine WDL  (R) Arm/Elbow/Hand Bruising  (L) Arm/Elbow/Hand Redness, Bruising, and Abrasion L upper extremity large scab and bruise  Abdomen Redness MASD   Groin Redness  Scrotum/Coccyx/Buttocks Redness, Non-Blanching, and Discoloration MASD  (R) Leg Redness Lower extremity redness  (L) Leg Redness lower extremity redness  (R) Heel/Foot/Toe Redness and Blanching  (L) Heel/Foot/Toe Redness and Blanching          Devices In Places Tele Box, Blood Pressure Cuff, and Pulse Ox      Interventions In Place NC W/Ear Foams, Sacral Mepilex, Waffle Overlay, Pillows, Q2 Turns, and Low Air Loss Mattress    Possible Skin Injury Yes    Pictures Uploaded Into Epic Yes  Wound Consult Placed Yes  RN Wound Prevention Protocol Ordered Yes

## 2024-01-03 NOTE — CARE PLAN
Problem: Knowledge Deficit - Standard  Goal: Patient and family/care givers will demonstrate understanding of plan of care, disease process/condition, diagnostic tests and medications  1/3/2024 0306 by Veronica Valero R.N.  Outcome: Progressing  1/3/2024 0306 by Veronica Valero R.N.  Outcome: Progressing     Problem: Skin Integrity  Goal: Skin integrity is maintained or improved  1/3/2024 0306 by AYDEN LoveN.  Outcome: Progressing  1/3/2024 0306 by AYDEN LoveN.  Outcome: Progressing     Problem: Fall Risk  Goal: Patient will remain free from falls  1/3/2024 0306 by Veronica Valero R.N.  Outcome: Progressing  1/3/2024 0306 by AYDEN LoveN.  Outcome: Progressing     Problem: Pain - Standard  Goal: Alleviation of pain or a reduction in pain to the patient’s comfort goal  Outcome: Progressing   The patient is Watcher - Medium risk of patient condition declining or worsening         Progress made toward(s) clinical / shift goals:  wean o2    Patient is not progressing towards the following goals:

## 2024-01-03 NOTE — ED NOTES
Med rec complete per medication list from [facility]  Last doses unknown, nobody answering phone at facility    Kayy Jerez CPhT

## 2024-01-03 NOTE — WOUND TEAM
[facility]  Inpatient Services  Wound and Skin Care Brief Evaluation    Admission Date: 1/2/2024     Last order of IP CONSULT TO WOUND CARE was found on 1/3/2024 from Hospital Encounter on 1/2/2024     HPI, PMH, SH: Reviewed    Chief Complaint   Patient presents with    Weakness    Flu Like Symptoms     Cough, congestion, N/V/D x 3 days     Diagnosis: Pneumonia due to infectious organism [J18.9]    Unit where seen by Wound Team: S161/00     Wound consult placed regarding breasts, pannus, sacrum, and KAROL abrasion. Chart and images reviewed. This clinician in to assess patient. Patient pleasant and agreeable. Breasts and pannus with MASD, interdry applied. KAROL with traumatic abrasion, now scabbed, mepitel one applied to protect area.     No pressure injuries or advanced wound care needs identified. Wound consult completed. No further follow up unless indicated and consulted.     Moisture Associated Skin Damage 01/02/24 Panus;Groin;Breast (Active)   First Observed Date/First Observed Time: 01/02/24 2300   Present on Original Admission: Yes  Laterality: Bilateral  Wound Location : Panus;Groin;Breast      Assessments 1/3/2024  2:00 PM   Drainage Amount None   Periwound Assessment Red   Periwound Protectant Interdry   IAD Containment Device Purewick   WOUND NURSE ONLY - Time Spent with Patient (mins) 20       PREVENTATIVE INTERVENTIONS:    Q shift Larry - performed per nursing policy  Q shift pressure point assessments - performed per nursing policy    Mobilization      Up to chair

## 2024-01-03 NOTE — PROGRESS NOTES
2230- pt arrived to S161 with this RN. Report received from DAMIAN Wilson. Pt a&ox4. No complaints of pain 2L NC satting 93%. On tele, atrial paced PPM. VSS. Call light within reach, water provided. No new orders at this time. CHG bath and full linen change. Skin assessment completed and wound consult placed per protocol, see additional skin notes.

## 2024-01-03 NOTE — ASSESSMENT & PLAN NOTE
Repeat echocardiogram normal for age LVEF 55-60%.    -TTE overall normal for age  -lasix 20 mg IV x1 on admission, currently euvolemic we will continue to monitor volume status

## 2024-01-03 NOTE — H&P
Hospital Medicine History & Physical Note    Date of Service  1/2/2024    Primary Care Physician  Onur Musa P.A.-C.    Consultants      Specialist Names:     Code Status  Full Code    Chief Complaint  Chief Complaint   Patient presents with    Weakness    Flu Like Symptoms     Cough, congestion, N/V/D x 3 days       History of Presenting Illness  Debra Rodrigues is a 88 y.o. female who presented 1/2/2024 with past medical history of pacemaker, atrial fibrillation, hypothyroidism who presents to the hospital for cough and shortness of breath for the past 4 to 5 days.  It is associated with sore throat and nasal congestion.  Overall the patient has generalized weakness.  It is associated with fevers and chills.  She denies any nausea, vomiting or diarrhea.  The patient does complain about increased frequency of urination.  She lives in an assisted living facility.  She denies any difficulty swallowing after eating food.    Chest x-ray interpreted by me found bilateral pleural effusions and a right-sided infiltrate  CTA of the chest found bilateral pulmonary edema, bilateral pleural effusions.  It seems like small loculated effusion on the left.  EKG interpreted by me found atrial paced rhythm    I discussed the plan of care with patient.    Review of Systems  Review of Systems   Constitutional:  Positive for chills and fever. Negative for diaphoresis and malaise/fatigue.   HENT:  Negative for congestion, ear discharge, ear pain, hearing loss, nosebleeds, sinus pain, sore throat and tinnitus.    Eyes:  Negative for blurred vision, double vision, photophobia and pain.   Respiratory:  Positive for cough and shortness of breath. Negative for hemoptysis, sputum production, wheezing and stridor.    Cardiovascular:  Negative for chest pain, palpitations, orthopnea, claudication, leg swelling and PND.   Gastrointestinal:  Negative for abdominal pain, blood in stool, constipation, diarrhea, heartburn, melena, nausea and  vomiting.   Genitourinary:  Negative for dysuria, flank pain, frequency, hematuria and urgency.   Musculoskeletal:  Negative for back pain, falls, joint pain, myalgias and neck pain.   Skin:  Negative for itching and rash.   Neurological:  Negative for dizziness, tingling, tremors, weakness and headaches.   Endo/Heme/Allergies:  Negative for environmental allergies and polydipsia. Does not bruise/bleed easily.   Psychiatric/Behavioral:  Negative for depression, hallucinations, substance abuse and suicidal ideas.        Past Medical History   has a past medical history of Acid reflux disease, Acquired hypothyroidism (4/19/2023), Allergy, Anxiety, Arrhythmia (01/01/2009), Arthritis, At risk for sleep apnea, Blood transfusion without reported diagnosis, Bowel habit changes, Breath shortness, Cancer ([facility]) (01/01/2009), CATARACT, Depression, GERD (gastroesophageal reflux disease), Head ache, Heart burn, Heart valve disease, Hemorrhagic disorder ([facility]), Hypertension, IBD (inflammatory bowel disease), Macular degeneration, Muscle disorder, Pacemaker (01/01/2009), Pneumonia due to infectious organism (1/2/2024), Stroke ([facility]), Unspecified urinary incontinence, Urinary bladder disorder, and Urinary incontinence.    Surgical History   has a past surgical history that includes tonsillectomy and adenoidectomy (1942); mastoidectomy (1943); hysterectomy, total abdominal (1972); knee arthroscopy (1995); julio by laparoscopy (1995); pacemaker insertion (2009); mastectomy (5/13/2009); cataract extraction with iol; breast reconstruction (4/20/2010); tissue expander place/remove (4/20/2010); breast reconstruction (11/23/2010); tissue expander place/remove (11/23/2010); and mastopexy (11/23/2010).     Family History  family history includes Cancer in her unknown relative; Heart Disease in her unknown relative; Hypertension in her unknown relative.   Family history reviewed with patient. There is no family history that is pertinent to the  chief complaint.     Social History   reports that she has never smoked. She has never used smokeless tobacco. She reports current alcohol use of about 3.5 oz of alcohol per week. She reports that she does not use drugs.    Allergies  Allergies   Allergen Reactions    Other Misc Anaphylaxis     x2 in 2005,pt tested,Cause unknown    Amoxicillin     Hydrocodone-Acetaminophen Vomiting    Sulfa Drugs Rash       Medications  Prior to Admission Medications   Prescriptions Last Dose Informant Patient Reported? Taking?   ELIQUIS 5 MG Tab l  No No   Sig: Take 1 Tablet by mouth 2 times a day.   Multiple Vitamins-Minerals (PRESERVISION AREDS 2) Cap l  No No   Sig: Take 1 Capsule by mouth 2 times a day for 360 days.   PARoxetine (PAXIL) 10 MG Tab l  No No   Sig: Take 1 Tablet by mouth every day.   acetaminophen (TYLENOL) 325 MG Tab   No No   Sig: Take 2 Tablets by mouth every four hours as needed for Mild Pain or Moderate Pain.   ascorbic acid (ASCORBIC ACID) 500 MG Tab l  No No   Sig: Take 1 Tablet by mouth every day.   famotidine (PEPCID) 20 MG Tab l  No No   Sig: Take 1 Tablet by mouth 2 times a day.   flecainide (TAMBOCOR) 100 MG Tab l  No No   Sig: Take 1 Tablet by mouth every day.   fluticasone (FLONASE) 50 MCG/ACT nasal spray   No No   Sig: Administer 2 Sprays into affected nostril(S) every day.   gabapentin (NEURONTIN) 100 MG Cap l  No No   Sig: Take 1 Capsule by mouth 2 (two) times a day. In morning and early afternoon.   gabapentin (NEURONTIN) 300 MG Cap l  No No   Sig: Take 1 Capsule by mouth every evening.   levothyroxine (SYNTHROID) 75 MCG Tab l  No No   Sig: Take 1 Tablet by mouth every day.   losartan (COZAAR) 50 MG Tab l  No No   Sig: Take 1 Tablet by mouth 2 times a day.   magnesium oxide 400 (240 Mg) MG Tab l  No No   Sig: Take 1 Tablet by mouth every day.   metoprolol SR (TOPROL XL) 25 MG TABLET SR 24 HR l  No No   Sig: Take 1 Tablet by mouth every day.   nystatin (MYCOSTATIN) 195572 UNIT/GM Cream topical  cream   No No   Sig: Apply 1 g topically 2 times a day. Apply under breasts and abdominal folds 2 times daily   potassium chloride ER (KLOR-CON) 10 MEQ tablet l  No No   Sig: Take 1 Tablet by mouth 2 times a day.   traZODone (DESYREL) 50 MG Tab l  No No   Sig: Take 1 Tablet by mouth at bedtime.   vitamin D3 (CHOLECALCIFEROL) 1000 Unit (25 mcg) Tab l  No No   Sig: Take 2 Tablets by mouth every day.      Facility-Administered Medications: None       Physical Exam  Temp:  [36.9 °C (98.5 °F)-38.1 °C (100.6 °F)] 36.9 °C (98.5 °F)  Pulse:  [64-65] 64  Resp:  [20-36] 21  BP: (121-198)/(56-94) 123/58  SpO2:  [82 %-94 %] 91 %  Blood Pressure : 123/58   Temperature: 36.9 °C (98.5 °F)   Pulse: 64   Respiration: (!) 21   Pulse Oximetry: 91 %       Physical Exam  Vitals and nursing note reviewed.   Constitutional:       General: She is not in acute distress.     Appearance: Normal appearance. She is not ill-appearing, toxic-appearing or diaphoretic.   HENT:      Head: Normocephalic and atraumatic.      Nose: No congestion or rhinorrhea.      Mouth/Throat:      Pharynx: No oropharyngeal exudate or posterior oropharyngeal erythema.   Eyes:      General: No scleral icterus.  Neck:      Vascular: No carotid bruit or JVD.   Cardiovascular:      Rate and Rhythm: Normal rate and regular rhythm.      Pulses: Normal pulses.      Heart sounds: Normal heart sounds. No murmur heard.     No friction rub. No gallop.   Pulmonary:      Effort: Pulmonary effort is normal. No respiratory distress.      Breath sounds: No stridor. Rales present. No wheezing or rhonchi.   Abdominal:      General: Abdomen is flat. There is no distension.      Palpations: There is no mass.      Tenderness: There is no abdominal tenderness. There is no left CVA tenderness, guarding or rebound.      Hernia: No hernia is present.   Musculoskeletal:         General: No swelling. Normal range of motion.      Cervical back: No rigidity. No muscular tenderness.      Right  lower leg: No edema.      Left lower leg: No edema.   Lymphadenopathy:      Cervical: No cervical adenopathy.   Skin:     General: Skin is warm and dry.      Capillary Refill: Capillary refill takes more than 3 seconds.      Coloration: Skin is not jaundiced or pale.      Findings: Erythema present. No bruising.   Neurological:      Mental Status: She is alert.         Laboratory:  Recent Labs     01/02/24  1514   WBC 16.6*   RBC 5.02   HEMOGLOBIN 15.3   HEMATOCRIT 46.1   MCV 91.8   MCH 30.5   MCHC 33.2   RDW 46.2   PLATELETCT 243   MPV 10.5     Recent Labs     01/02/24  1630   SODIUM 135   POTASSIUM 4.9   CHLORIDE 99   CO2 24   GLUCOSE 130*   BUN 15   CREATININE 0.76   CALCIUM 8.8     Recent Labs     01/02/24  1630   ALTSGPT 13   ASTSGOT 23   ALKPHOSPHAT 122*   TBILIRUBIN 0.9   GLUCOSE 130*         Recent Labs     01/02/24  1514   NTPROBNP 1597*         Recent Labs     01/02/24  1749   TROPONINT 17       Imaging:  CT-CTA CHEST PULMONARY ARTERY W/ RECONS   Final Result      1.  There is no CT evidence of acute pulmonary embolism.   2.  There is mosaic attenuation with minimal dependent groundglass opacity possibly representing mild edema with trace amounts of pleural fluid.   3.  Dependent lingular airspace disease could be atelectasis or pneumonitis.   4.  Nonspecific lymph nodes in the mediastinum, probably reactive.            DX-CHEST-PORTABLE (1 VIEW)   Final Result         1.  Moderate cardiomegaly.      2.  Slight blunting of the costophrenic angles suspicious for small pleural effusions.      EC-ECHOCARDIOGRAM COMPLETE W/O CONT    (Results Pending)       X-Ray:  I have personally reviewed the images and compared with prior images.  EKG:  I have personally reviewed the images and compared with prior images.    Assessment/Plan:  Justification for Admission Status  I anticipate this patient will require at least two midnights for appropriate medical management, necessitating inpatient admission because  pneumonia    Patient will need a Telemetry bed on MEDICAL service .  The need is secondary to pneumonia.    * Pneumonia due to infectious organism- (present on admission)  Assessment & Plan  Found to have right sided infiltrate  Started on Cap antibiotics including (ceftriaxone and doxycycline)   Follow up sputum and blood cultures  Respiratory care protocol   Placed on o2 therapy       Acute pulmonary edema (HCC)  Assessment & Plan  Hold Lasix in setting of pneumonia  Last echo found EF of 50 to 55%  On repeat cardiac echo  Monitor on telemetry    Acute respiratory failure with hypoxia (HCC)  Assessment & Plan  On 4 L of O2 above baseline    Pacemaker- (present on admission)  Assessment & Plan  Monitor on telemetry    Acquired hypothyroidism- (present on admission)  Assessment & Plan  Continue home medications    MDD (major depressive disorder), recurrent, in partial remission (HCC)- (present on admission)  Assessment & Plan  Continue home medications    Paroxysmal atrial fibrillation (HCC)- (present on admission)  Assessment & Plan  Rate controlled  Continue metoprolol, Eliquis and flecainide        VTE prophylaxis: SCDs/TEDs

## 2024-01-03 NOTE — ASSESSMENT & PLAN NOTE
Not on home O2  2/2 to pneumonia and pulmonary edema    -treat pneumonia/volume as below  -IS  -supplemental O2 prn

## 2024-01-03 NOTE — THERAPY
Physical Therapy   Initial Evaluation     Patient Name: Debra Rodrigues  Age:  88 y.o., Sex:  female  Medical Record #: [id number]  Today's Date: 1/3/2024     Precautions  Precautions: Fall Risk    Assessment  Patient is 88 y.o. female who presented 1/2/2024 with cough and shortness of breath for the past 4 to 5 days. Patient presented with a sore throat, nasal congestion fevers, and chills.    Patient being assessed for pneumonia due to infectious organism, acute pulmonary edema, acute respiratory failure with hypoxia, and generalized weakness.    PMH: pacemaker, hypothyroidism, major depressive disorder, paroxysmal atrial fibrillation    Patient evaluated at edge of bed as detailed below. Patient was agreeable to participate in session. Patient presents with limited sitting and standing balance, transfers, and mobility limiting independent function that may be required to return to her assisted living facility. Discharge placement options were discussed with the patient. Will continued to assess in subsequent sessions.    Plan    Physical Therapy Initial Treatment Plan   Treatment Plan : Bed Mobility, Gait Training, Neuro Re-Education / Balance, Therapeutic Activities, Therapeutic Exercise  Treatment Frequency: 4 Times per Week  Duration: Until Therapy Goals Met    DC Equipment Recommendations: None  Discharge Recommendations: Other - (Patient can return back to [facility] if the staff is able to provide the required level of assistance. She may require short term rehab/ SNF placement to improve her overall functional mobility.)        Objective     01/03/24 1127   Charge Group   PT Evaluation PT Evaluation Mod   Total Time Spent   PT Total Time Yes   PT Evaluation Time Spent (Mins) 21   PT Therapeutic Activities Time Spent (Mins)    PT Total Time Spent (Calculated) 21   Initial Contact Note    Initial Contact Note Order Received and Verified, Physical Therapy Evaluation in Progress with Full Report to Follow.    Precautions   Precautions Fall Risk   Vitals   Pulse 65   Pulse Oximetry 95 %   Room Air Oximetry 84   O2 (LPM) 5   O2 Delivery Device Nasal Cannula   Vitals Comments Patient was seated at EOB with room air and SpO2 was 84. Upon placement of nasal cannula SpO2 improved to 95   Pain   Pain Scales 0 to 10 Scale    Intervention Declines   Prior Living Situation   Prior Services Continuous (24 Hour) Care Giving Per Service   Housing / Facility Assisted Living Residence   Steps Into Home 0   Steps In Home 0   Rail None   Equipment Owned 4-Wheel Walker;Single Point Cane;Wheelchair   Lives with - Patient's Self Care Capacity Attendant / Paid Care Giver   Prior Level of Functional Mobility   Bed Mobility Required Assist   Transfer Status Required Assist   Ambulation Required Assist   Ambulation Distance short household distance   Assistive Devices Used 4-Wheel Walker   Wheelchair Dependent   Stairs Unable To Determine At This Time  (Patient states there are no stairs at her assisted living home.)   Comments Patient state she consistently requires help to and from the bathroom. Patient states she is pushed in a wheelchair from her room to the dinning room for meals.   History of Falls   History of Falls Yes  (1 reported fall in past few months that resulted from the patient losing her balance and falling to the side.)   Cognition    Level of Consciousness Alert   Comments Patient reported the incorrect year but was correct in other orientation questions.   Strength Lower Body   Lower Body Strength  X   Comments Grossly >3/5 based on functional mobility   Other Treatments   Other Treatments Provided Patient education provided concerning the role of the physical therapist and the importance of performing physical activity.   Balance Assessment   Sitting Balance (Static) Poor +   Sitting Balance (Dynamic) Poor   Standing Balance (Static) Poor +   Standing Balance (Dynamic) Poor +   Weight Shift Sitting Poor   Weight Shift  Standing Poor   Comments seated edge of bed with posterior LOB >10 seconds; standing with FWW   Bed Mobility    Supine to Sit Minimal Assist   Scooting Maximal Assist  (To EOB)   Gait Analysis   Gait Level Of Assist Moderate Assist   Assistive Device Front Wheel Walker   Distance (Feet) 5   # of Times Distance was Traveled 1   Deviation Bradykinetic;Decreased Base Of Support  (small steps)   Comments Patient was slow to weight shift onto L>R. Cues were given for weight shifting, sequencing, posture, and appropriate use of FWW.   Functional Mobility   Sit to Stand Minimal Assist   Bed, Chair, Wheelchair Transfer Moderate Assist   Transfer Method Stand Step   Mobility EOB to chair with FWW   Comments Patient required cues to bring hips anteriorly to maintain balance in standing postion prior to stepping. Cues required to complete weightshifting to complete  transfers. Cues for hand placement, LE placement, sequencing.   How much difficulty does the patient currently have...   Turning over in bed (including adjusting bedclothes, sheets and blankets)? 2   Sitting down on and standing up from a chair with arms (e.g., wheelchair, bedside commode, etc.) 1   Moving from lying on back to sitting on the side of the bed? 1   How much help from another person does the patient currently need...   Moving to and from a bed to a chair (including a wheelchair)? 2   Need to walk in a hospital room? 2   Climbing 3-5 steps with a railing? 1   6 clicks Mobility Score 9   Activity Tolerance   Sitting in Chair post session   Sitting Edge of Bed pre session   Patient / Family Goals    Patient / Family Goal #1 Return to assisted living facility   Short Term Goals    Short Term Goal # 1 Patient will independently perform bed mobility with HOB flat in 6 visits   Short Term Goal # 2 Patient will perform sit to stand and transfering tasks with a FWW and supervision in 6 weeks   Short Term Goal # 3 Patient will ambulate >50 feet with FWW and  contact guard assistance in 6 visits   Education Group   Education Provided Role of Physical Therapist   Role of Physical Therapist Patient Response Patient;Acceptance;Explanation;Verbal Demonstration   Physical Therapy Initial Treatment Plan    Treatment Plan  Bed Mobility;Gait Training;Neuro Re-Education / Balance;Therapeutic Activities;Therapeutic Exercise   Treatment Frequency 4 Times per Week   Duration Until Therapy Goals Met   Problem List    Problems Impaired Bed Mobility;Impaired Transfers;Impaired Ambulation;Functional Strength Deficit;Impaired Balance;Decreased Activity Tolerance   Anticipated Discharge Equipment and Recommendations   DC Equipment Recommendations None   Discharge Recommendations Other -  (Patient can return back to [facility] if the staff is able to provide the required level of assistance. She may require short term rehab/ SNF placement to improve her overall functional mobility.)   Interdisciplinary Plan of Care Collaboration   IDT Collaboration with  Nursing   Patient Position at End of Therapy Seated;Chair Alarm On;Call Light within Reach;Tray Table within Reach   Session Information   Date / Session Number  1/3- 1(1/4, 1/9)     Lukas was supervised throughout the session. Agree with the above documentation.

## 2024-01-03 NOTE — ASSESSMENT & PLAN NOTE
Concern for possible pneumonitis on CTA chest  Query chronic aspiration?  Given SIRS with leukocytosis, tachypnea, fever at presentation treating for pneumonia  Procal wnl  Cultures NGTD  Started on C3 and doxy with no improvement in white count    -On 1/3 IV Unasyn and doxycycline initiated.  - On 1/4 patient lost IV access, will transition to oral Augmentin  -IS  -follow up cultures, NGTD  -SLP consult, appreciate assistance and recommendations for this patient   -SLP may perform diagnostic swallow eval, discharge recommendations pending

## 2024-01-04 ENCOUNTER — APPOINTMENT (OUTPATIENT)
Dept: CARDIOLOGY | Facility: MEDICAL CENTER | Age: 89
DRG: 193 | End: 2024-01-04
Attending: HOSPITALIST
Payer: MEDICARE

## 2024-01-04 ENCOUNTER — APPOINTMENT (OUTPATIENT)
Dept: RADIOLOGY | Facility: MEDICAL CENTER | Age: 89
DRG: 193 | End: 2024-01-04
Payer: MEDICARE

## 2024-01-04 LAB
BACTERIA UR CULT: NORMAL
L PNEUMO AG UR QL IA: NEGATIVE
LV EJECT FRACT  99904: 55
SIGNIFICANT IND 70042: NORMAL
SITE SITE: NORMAL
SOURCE SOURCE: NORMAL

## 2024-01-04 PROCEDURE — A9270 NON-COVERED ITEM OR SERVICE: HCPCS

## 2024-01-04 PROCEDURE — 770020 HCHG ROOM/CARE - TELE (206)

## 2024-01-04 PROCEDURE — 99232 SBSQ HOSP IP/OBS MODERATE 35: CPT | Mod: GC | Performed by: INTERNAL MEDICINE

## 2024-01-04 PROCEDURE — 99223 1ST HOSP IP/OBS HIGH 75: CPT | Performed by: PHYSICAL MEDICINE & REHABILITATION

## 2024-01-04 PROCEDURE — 700105 HCHG RX REV CODE 258

## 2024-01-04 PROCEDURE — 97166 OT EVAL MOD COMPLEX 45 MIN: CPT

## 2024-01-04 PROCEDURE — 700111 HCHG RX REV CODE 636 W/ 250 OVERRIDE (IP): Mod: JZ

## 2024-01-04 PROCEDURE — 700102 HCHG RX REV CODE 250 W/ 637 OVERRIDE(OP): Performed by: HOSPITALIST

## 2024-01-04 PROCEDURE — A9270 NON-COVERED ITEM OR SERVICE: HCPCS | Performed by: HOSPITALIST

## 2024-01-04 PROCEDURE — 93306 TTE W/DOPPLER COMPLETE: CPT | Mod: 26 | Performed by: INTERNAL MEDICINE

## 2024-01-04 PROCEDURE — 93306 TTE W/DOPPLER COMPLETE: CPT

## 2024-01-04 PROCEDURE — 97535 SELF CARE MNGMENT TRAINING: CPT

## 2024-01-04 PROCEDURE — 92610 EVALUATE SWALLOWING FUNCTION: CPT

## 2024-01-04 PROCEDURE — 700102 HCHG RX REV CODE 250 W/ 637 OVERRIDE(OP)

## 2024-01-04 RX ORDER — FUROSEMIDE 10 MG/ML
20 INJECTION INTRAMUSCULAR; INTRAVENOUS ONCE
Status: DISCONTINUED | OUTPATIENT
Start: 2024-01-04 | End: 2024-01-04

## 2024-01-04 RX ORDER — AMOXICILLIN AND CLAVULANATE POTASSIUM 875; 125 MG/1; MG/1
1 TABLET, FILM COATED ORAL EVERY 12 HOURS
Status: DISCONTINUED | OUTPATIENT
Start: 2024-01-04 | End: 2024-01-05 | Stop reason: HOSPADM

## 2024-01-04 RX ADMIN — APIXABAN 5 MG: 5 TABLET, FILM COATED ORAL at 04:11

## 2024-01-04 RX ADMIN — AMPICILLIN AND SULBACTAM 3 G: 1; 2 INJECTION, POWDER, FOR SOLUTION INTRAMUSCULAR; INTRAVENOUS at 11:54

## 2024-01-04 RX ADMIN — AMOXICILLIN AND CLAVULANATE POTASSIUM 1 TABLET: 875; 125 TABLET, FILM COATED ORAL at 17:35

## 2024-01-04 RX ADMIN — LOSARTAN POTASSIUM 50 MG: 50 TABLET, FILM COATED ORAL at 04:11

## 2024-01-04 RX ADMIN — FAMOTIDINE 20 MG: 20 TABLET ORAL at 04:11

## 2024-01-04 RX ADMIN — LOSARTAN POTASSIUM 50 MG: 50 TABLET, FILM COATED ORAL at 16:37

## 2024-01-04 RX ADMIN — FAMOTIDINE 20 MG: 20 TABLET ORAL at 16:37

## 2024-01-04 RX ADMIN — LEVOTHYROXINE SODIUM 75 MCG: 0.07 TABLET ORAL at 04:11

## 2024-01-04 RX ADMIN — AMPICILLIN AND SULBACTAM 3 G: 1; 2 INJECTION, POWDER, FOR SOLUTION INTRAMUSCULAR; INTRAVENOUS at 05:24

## 2024-01-04 RX ADMIN — FLECAINIDE ACETATE 100 MG: 100 TABLET ORAL at 04:11

## 2024-01-04 RX ADMIN — NYSTATIN: 100000 POWDER TOPICAL at 16:39

## 2024-01-04 RX ADMIN — GABAPENTIN 100 MG: 100 CAPSULE ORAL at 16:37

## 2024-01-04 RX ADMIN — PAROXETINE HYDROCHLORIDE 10 MG: 20 TABLET, FILM COATED ORAL at 04:11

## 2024-01-04 RX ADMIN — DOXYCYCLINE 100 MG: 100 TABLET, FILM COATED ORAL at 16:37

## 2024-01-04 RX ADMIN — METOPROLOL SUCCINATE 25 MG: 25 TABLET, EXTENDED RELEASE ORAL at 04:11

## 2024-01-04 RX ADMIN — AMPICILLIN AND SULBACTAM 3 G: 1; 2 INJECTION, POWDER, FOR SOLUTION INTRAMUSCULAR; INTRAVENOUS at 00:16

## 2024-01-04 RX ADMIN — GABAPENTIN 100 MG: 100 CAPSULE ORAL at 04:11

## 2024-01-04 RX ADMIN — NYSTATIN: 100000 POWDER TOPICAL at 11:55

## 2024-01-04 RX ADMIN — NYSTATIN: 100000 POWDER TOPICAL at 04:11

## 2024-01-04 RX ADMIN — APIXABAN 5 MG: 5 TABLET, FILM COATED ORAL at 16:37

## 2024-01-04 RX ADMIN — DOXYCYCLINE 100 MG: 100 TABLET, FILM COATED ORAL at 04:11

## 2024-01-04 RX ADMIN — TRAZODONE HYDROCHLORIDE 50 MG: 100 TABLET ORAL at 20:31

## 2024-01-04 ASSESSMENT — COGNITIVE AND FUNCTIONAL STATUS - GENERAL
EATING MEALS: A LITTLE
PERSONAL GROOMING: A LITTLE
DRESSING REGULAR UPPER BODY CLOTHING: A LITTLE
HELP NEEDED FOR BATHING: A LOT
DAILY ACTIVITIY SCORE: 15
SUGGESTED CMS G CODE MODIFIER DAILY ACTIVITY: CK
DRESSING REGULAR LOWER BODY CLOTHING: A LOT
TOILETING: A LOT

## 2024-01-04 ASSESSMENT — ENCOUNTER SYMPTOMS
SHORTNESS OF BREATH: 0
FOCAL WEAKNESS: 0
INSOMNIA: 0
COUGH: 1
ABDOMINAL PAIN: 0
SPUTUM PRODUCTION: 0
ORTHOPNEA: 0
DIARRHEA: 0
MEMORY LOSS: 0
VOMITING: 0
NAUSEA: 0
FEVER: 0
WHEEZING: 0
CHILLS: 0

## 2024-01-04 ASSESSMENT — ACTIVITIES OF DAILY LIVING (ADL): TOILETING: INDEPENDENT

## 2024-01-04 ASSESSMENT — PAIN DESCRIPTION - PAIN TYPE: TYPE: ACUTE PAIN

## 2024-01-04 ASSESSMENT — FIBROSIS 4 INDEX: FIB4 SCORE: 1.89

## 2024-01-04 NOTE — CARE PLAN
The patient is Stable - Low risk of patient condition declining or worsening    Shift Goals  Clinical Goals: monitor oxygen demand and saturation, IV abx, monitor labs, safety, comfort  Patient Goals: get better  Family Goals: ROB    Progress made toward(s) clinical / shift goals:      IV antibiotics given per orders. Patient tolerating well.     Very pleasant, 88 year old female complaint with plan of care. Taking pills whole, one at a time. Ate all of dinner meal and dessert. Patient slept through the shift very well.     Problem: Knowledge Deficit - Standard  Goal: Patient and family/care givers will demonstrate understanding of plan of care, disease process/condition, diagnostic tests and medications  Outcome: Progressing  Note: Plan of care discussed, including medication regimen, reason for hospitalization, continued monitoring of oxygen demand and saturation.   Patient forgetful, however, oriented and very pleasant and compliant with goals of care. All questions answered at this time during shift assessment.      Problem: Skin Integrity  Goal: Skin integrity is maintained or improved  Outcome: Progressing  Note: Nystatin powder ordered and administered per orders for patient pannus and groin area. Purewick in place to promote skin integrity. Patient on waffle overlay, and pillow support throughout shift.      Problem: Fall Risk  Goal: Patient will remain free from falls  Outcome: Progressing  Note: Bed in lowest position. Bed alarm in place and on. Patient close to RN station, room door ajar, and frequent rounding utilized throughout this shift. Patient call light within reach and patient using appropriately.      Problem: Pain - Standard  Goal: Alleviation of pain or a reduction in pain to the patient’s comfort goal  Outcome: Progressing       Patient is not progressing towards the following goals:

## 2024-01-04 NOTE — DISCHARGE PLANNING
[facility] Transitional Care Coordination    Referral from: Dr. Alexander    Insurance Provider on Facesheet: [facility]    Potential Rehab Diagnosis: Debility    Chart review indicates patient may have on going medical management and may have therapy needs to possibly meet inpatient rehab facility criteria with the goal of returning to community.    D/C support will need to be verified: Son    Physiatry consultation forwarded per protocol.  Spoke with Abhijeet BRIDGES @ [facility].  They are able to provide 1 person ADL's.       Thank you for the referral.

## 2024-01-04 NOTE — CARE PLAN
The patient is Stable - Low risk of patient condition declining or worsening    Shift Goals  Clinical Goals: monitor oxygen  Patient Goals: rest    Progress made toward(s) clinical / shift goals:    Problem: Knowledge Deficit - Standard  Goal: Patient and family/care givers will demonstrate understanding of plan of care, disease process/condition, diagnostic tests and medications  Description: Target End Date:  1-3 days or as soon as patient condition allows    Document in Patient Education    1.  Patient and family/caregiver oriented to unit, equipment, visitation policy and means for communicating concern  2.  Complete/review Learning Assessment  3.  Assess knowledge level of disease process/condition, treatment plan, diagnostic tests and medications  4.  Explain disease process/condition, treatment plan, diagnostic tests and medications  Outcome: Progressing     Problem: Skin Integrity  Goal: Skin integrity is maintained or improved  Description: Target End Date:  Prior to discharge or change in level of care    Document interventions on Skin Risk/Larry flowsheet groups and corresponding LDA    1.  Assess and monitor skin integrity, appearance and/or temperature  2.  Assess risk factors for impaired skin integrity and/or pressures ulcers  3.  Implement precautions to protect skin integrity in collaboration with interdisciplinary team  4.  Implement pressure ulcer prevention protocol if at risk for skin breakdown  5.  Confirm wound care consult if at risk for skin breakdown  6.  Ensure patient use of pressure relieving devices  (Low air loss bed, waffle overlay, heel protectors, ROHO cushion, etc)  Outcome: Progressing     Problem: Fall Risk  Goal: Patient will remain free from falls  Description: Target End Date:  Prior to discharge or change in level of care    Document interventions on the Rubina Echavarria Fall Risk Assessment    1.  Assess for fall risk factors  2.  Implement fall precautions  Outcome:  Progressing       Patient is not progressing towards the following goals:

## 2024-01-04 NOTE — HOSPITAL COURSE
Debra Rodrigues is a 88 y.o. female who presented 1/2/2024 with past medical history of pacemaker, atrial fibrillation, hypothyroidism who presents to the hospital for cough and shortness of breath for the past 4 to 5 days.  It is associated with sore throat and nasal congestion.  Overall the patient has generalized weakness.  It is associated with fevers and chills.  She denies any nausea, vomiting or diarrhea.  The patient does complain about increased frequency of urination.  She lives in an assisted living facility.  She denies any difficulty swallowing after eating food.     Chest x-ray with bilateral pleural effusions and a right-sided infiltrate  CTA of the chest found bilateral pulmonary edema, bilateral pleural effusions with small loculated effusion on the left.    She was admitted for acute hypoxic respiratory failure 2/2 to pneumonia and pulmonary edema.

## 2024-01-04 NOTE — THERAPY
"Occupational Therapy   Initial Evaluation     Patient Name: Debra Rodrigues  Age:  88 y.o., Sex:  female  Medical Record #: [id number]  Today's Date: 1/4/2024     Precautions  Precautions: Fall Risk, Swallow Precautions    Assessment    Patient is 88 y.o. female admitted with cough and SOB. Pt found to have PNA. Other pertinent medical history includes a-fib, MDD, hypothyroidism, pacemaker, cognitive impairment, and macular degeneration. Pt reported history of CVA with L deficits. Pt seen for OT evaluation and treatment. Pt donned socks with max A, performed bed mobility with min A, stood with min A, took side steps EOB with mod A, and wiped face while seated EOB with SBA. Pt was a questionable historian so information was collected from chart/family. Pt was admitted from an [facility]. Pt and family educated regarding the role of OT, d/c recs, and recommendation for pt to be up for all meals. Pt current functional performance limited by impaired activity tolerance, impaired balance, impaired cognition, and weakness. Pt will benefit from skilled OT while admitted to acute care.     Plan    Occupational Therapy Initial Treatment Plan   Treatment Interventions: Self Care / Activities of Daily Living, Adaptive Equipment, Neuro Re-Education / Balance, Therapeutic Exercises, Therapeutic Activity  Treatment Frequency: 3 Times per Week  Duration: Until Therapy Goals Met    DC Equipment Recommendations: Unable to determine at this time  Discharge Recommendations: Other - (If [facility] able to provide assist required, pt may d/c back with  OT. If not, recommend post acute placement prior to return to [facility].)     Subjective    \"I have and use a walker, but I can't remember what kind.\"     Objective     01/04/24 1005   Prior Living Situation   Prior Services Continuous (24 Hour) Care Giving Per Service   Housing / Facility Assisted Living Residence  (when asked, pt reported living in a2 story house by herself)   Steps Into Home 0   Steps " In Home 0   Rail None   Equipment Owned 4-Wheel Walker;Single Point Cane;Wheelchair   Lives with - Patient's Self Care Capacity Attendant / Paid Care Giver   Comments Pt was a questionable historian. Info from chart/family   Prior Level of ADL Function   Self Feeding Independent   Grooming / Hygiene Independent   Bathing Requires Assist   Dressing Requires Assist   Toileting Independent   Prior Level of IADL Function   Medication Management Requires Assist   Laundry Requires Assist   Home Management Requires Assist   Shopping Dependent   Prior Level Of Mobility Supervision With Device in Home  (Normally walks most of the way to the cafeteria for meals at [facility] per family)   Driving / Transportation Relatives / Others Provide Transportation   Occupation (Pre-Hospital Vocational) Retired Due To Age   History of Falls   History of Falls Yes   Date of Last Fall   (Pt reported 0-1 fall a month due to balance. Unable to provide further details.)   Precautions   Precautions Fall Risk;Swallow Precautions   Pain   Pain Scales 0 to 10 Scale    Pain 0 - 10 Group   Therapist Pain Assessment Post Activity Pain Same as Prior to Activity;Nurse Notified  (not rated, agreeable to eval)   Non Verbal Descriptors   Non Verbal Scale  Calm   Cognition    Cognition / Consciousness X   Orientation Level Oriented x 4   Level of Consciousness Alert   New Learning Impaired   Attention Impaired   Comments Very pleasant and cooperative, difficulty with history questions   Active ROM Upper Body   Comments limitations to L shoulder ROM; pt reported history of CVA with L deficits, no noted sublux, R UE WDL   Strength Upper Body   Comments R UE 4/5, L UE 3+/5   Sensation Upper Body   Upper Extremity Sensation  WDL   Comments denied numbness/tingling   Upper Body Muscle Tone   Upper Body Muscle Tone  WDL   Coordination Upper Body   Comments Limitations to FTN testing on B UE, L worse than R   Balance Assessment   Sitting Balance (Static) Fair    Sitting Balance (Dynamic) Fair   Standing Balance (Static) Fair -   Standing Balance (Dynamic) Poor +   Weight Shift Sitting Fair   Weight Shift Standing Poor   Comments w/ FWW   Bed Mobility    Supine to Sit Minimal Assist   Sit to Supine Minimal Assist   Scooting Maximal Assist   Comments HOB slighlty elevated, cues for sequencing, extra time required   ADL Assessment   Grooming Supervision;Seated  (wiped face)   Lower Body Dressing Maximal Assist  (don/doff socks, has sock aid at home per family, does not use it, receives assist for dressing at baseline)   Functional Mobility   Sit to Stand Minimal Assist   Bed, Chair, Wheelchair Transfer Moderate Assist   Transfer Method Stand Step   Mobility EOB>side steps>EOB   Comments w/ FWW   Visual Perception   Visual Perception  Not Tested   Activity Tolerance   Sitting in Chair NT   Sitting Edge of Bed EOB post   Standing <2 min total   Comments limited by weakness   Patient / Family Goals   Patient / Family Goal #1 to go home   Short Term Goals   Short Term Goal # 1 Pt will perform ADL transfer w/ supv   Short Term Goal # 2 Pt will perform standing g/h w/ supv   Short Term Goal # 3 Pt will be up EOB or in chair for 3/3 meals a day   Education Group   Education Provided Role of Occupational Therapist;Activities of Daily Living   Role of Occupational Therapist Patient Response Patient;Acceptance;Explanation;Verbal Demonstration;Family   ADL Patient Response Patient;Acceptance;Explanation;Demonstration;Verbal Demonstration;Action Demonstration;Family   Additional Comments Discussed having pt up for all meals with family   Occupational Therapy Initial Treatment Plan    Treatment Interventions Self Care / Activities of Daily Living;Adaptive Equipment;Neuro Re-Education / Balance;Therapeutic Exercises;Therapeutic Activity   Treatment Frequency 3 Times per Week   Duration Until Therapy Goals Met   Problem List   Problem List Decreased Active Daily Living Skills;Decreased  Homemaking Skills;Decreased Upper Extremity Strength Left;Decreased Upper Extremity AROM Left;Decreased Upper Extremity PROM Left;Decreased Functional Mobility;Decreased Activity Tolerance;Impaired Coordination Left Upper Extremity;Impaired Coordination Right Upper Extremity;Impaired Cognitive Function;Impaired Postural Control / Balance

## 2024-01-04 NOTE — CONSULTS
Physical Medicine and Rehabilitation Consultation              Date of initial consultation: 1/4/2024  Requesting provider: ordered by Armando Alexander M.D. at 01/04/24 4852    Consulting provider: Erlinda Yao D.O.  Reason for consultation: assess for acute inpatient rehab appropriateness  LOS: 2 Day(s)    Chief complaint: Weakness and flulike symptoms    HPI: The patient is a 88 y.o.  female with a past medical history of atrial fibrillation, pacemaker, and hypothyroidism;  who presented on 1/2/2024  2:59 PM with cough, weakness and shortness of breath x 4 days.  Patient also reported having a sore throat and nasal congestion.  Upon evaluation in the emergency department, chest x-ray showed bilateral pleural effusions, CTA of the chest showed a small loculated effusion on the left, WBC 16.6, negative for influenza and COVID.  Patient has required 4 L O2, she is not on oxygen at home.  Patient was admitted for likely pneumonia secondary to pulmonary edema.  Patient has been started on Lasix for diuresis.  Patient patient is being treated with Unasyn and doxycycline.  An echocardiogram has been obtained, results are pending.  Patient has been able to ambulate with a front wheeled walker x 5 feet, she was limited by endurance.     Patient seen and examined at bedside, patient reports that she feels better. Reports some fatigue. Is interested in getting stronger, is asking to sit up more in bed. Does not report HA, lightheadedness, SOB, CP, abdominal pain, or changes in numbness/tingling/weakness.  Reports intermittent coughing.       Social Hx:  Patient lives in assisted living  2 [address]  At prior level of function required assistance with bathing, dressing, IADLs, and is mod I with a 4 wheeled walker, however uses a wheelchair to go to the dining room per patient    Tobacco: Denies  Alcohol: Occasional  Drugs: Denies    THERAPY:  Restrictions: Fall risk, swallow precautions  PT: Functional mobility   1/3 mod  "assist with an FWW x 5 feet, min assist sit to stand, mod assist transfers    OT: ADLs  1/4 min assist bed mobility, min assist sit to stand, mod assist transfers    SLP:   1/4 regular solids and thin liquids    IMAGING:  CT-CTA CHEST PULMONARY ARTERY W/ RECONS   Final Result       1.  There is no CT evidence of acute pulmonary embolism.   2.  There is mosaic attenuation with minimal dependent groundglass opacity possibly representing mild edema with trace amounts of pleural fluid.   3.  Dependent lingular airspace disease could be atelectasis or pneumonitis.   4.  Nonspecific lymph nodes in the mediastinum, probably reactive.               DX-CHEST-PORTABLE (1 VIEW)   Final Result           1.  Moderate cardiomegaly.       2.  Slight blunting of the costophrenic angles suspicious for small pleural effusions.       PROCEDURES:  None    PMH:  Past Medical History:   Diagnosis Date    Acid reflux disease     Acquired hypothyroidism 4/19/2023    Allergy     Anxiety     Arrhythmia 01/01/2009    Atrial fibrillation, resolved    Arthritis     At risk for sleep apnea     Blood transfusion without reported diagnosis     Bowel habit changes     diarrhea    Breath shortness     Cancer (HCC) 01/01/2009    left breast s/p mastectomy    CATARACT     surgically corrected, bilateral    Depression     GERD (gastroesophageal reflux disease)     Head ache     Heart burn     Heart valve disease     MVP    Hemorrhagic disorder ([facility])     anticoagulated on eliquis    Hypertension     IBD (inflammatory bowel disease)     Macular degeneration     Muscle disorder     Pacemaker 01/01/2009    Pneumonia due to infectious organism 1/2/2024    Stroke ([facility])     2019 - no deficits    Unspecified urinary incontinence     \"my bladder leaks when I lay down\"    Urinary bladder disorder     Urinary incontinence        PSH:  Past Surgical History:   Procedure Laterality Date    BREAST RECONSTRUCTION  11/23/2010    Performed by ANNITA KELLEY at " SURGERY [facility] ORS    TISSUE EXPANDER PLACE/REMOVE  11/23/2010    Performed by ANNITA KELLEY at SURGERY [facility]    MASTOPEXY  11/23/2010    Performed by ANNITA KELLEY at SURGERY [facility]    BREAST RECONSTRUCTION  4/20/2010    Performed by ANNITA KELLEY at SURGERY [facility]    TISSUE EXPANDER PLACE/REMOVE  4/20/2010    Performed by ANNITA KELLEY at SURGERY [facility]    MASTECTOMY  5/13/2009    left    PACEMAKER INSERTION  2009    KNEE ARTHROSCOPY  1995    right    RAYNA BY LAPAROSCOPY  1995    HYSTERECTOMY, TOTAL ABDOMINAL  1972    BSO    MASTOIDECTOMY  1943    left    TONSILLECTOMY AND ADENOIDECTOMY  1942    CATARACT EXTRACTION WITH IOL      bilateral       FHX:  Family History   Problem Relation Age of Onset    Heart Disease Unknown     Hypertension Unknown     Cancer Unknown        Medications:  Current Facility-Administered Medications   Medication Dose    [Held by provider] furosemide (Lasix) injection 20 mg  20 mg    hydrALAZINE (Apresoline) injection 10 mg  10 mg    ampicillin/sulbactam (Unasyn) 3 g in  mL IVPB  3 g    nystatin (Mycostatin) powder      doxycycline monohydrate (Adoxa) tablet 100 mg  100 mg    acetaminophen (Tylenol) tablet 650 mg  650 mg    ondansetron (Zofran) syringe/vial injection 4 mg  4 mg    ondansetron (Zofran ODT) dispertab 4 mg  4 mg    guaiFENesin dextromethorphan (Robitussin DM) 100-10 MG/5ML syrup 10 mL  10 mL    apixaban (Eliquis) tablet 5 mg  5 mg    famotidine (Pepcid) tablet 20 mg  20 mg    flecainide (Tambocor) tablet 100 mg  100 mg    gabapentin (Neurontin) capsule 100 mg  100 mg    levothyroxine (Synthroid) tablet 75 mcg  75 mcg    losartan (Cozaar) tablet 50 mg  50 mg    metoprolol SR (Toprol XL) tablet 25 mg  25 mg    PARoxetine (Paxil) tablet 10 mg  10 mg    traZODone (Desyrel) tablet 50 mg  50 mg       Allergies:  Allergies   Allergen Reactions    Other Misc Anaphylaxis     x2 in 2005,pt tested,Cause unknown     "Hydrocodone-Acetaminophen Vomiting    Sulfa Drugs Rash       Physical Exam:  Vitals: BP (!) 156/83   Pulse 67   Temp 36.2 °C (97.2 °F) (Temporal)   Resp 19   Ht 1.6 m (5' 3\")   Wt 85.3 kg (188 lb 0.8 oz)   SpO2 92%   Gen: NAD, laying comfortably in bed   Head:  NC/AT  Eyes/ Nose/ Mouth: PERRLA, moist mucous membranes  Cardio: RRR, good distal perfusion, warm extremities  Pulm: normal respiratory effort, no cyanosis, on 3 L o2 , no witnessed coughing   Abd: Soft NTND, negative borborygmi   Ext: No peripheral edema. No calf tenderness. No clubbing.    Mental status:  A&Ox4 (person, place, date, situation) answers questions appropriately follows commands  Speech: fluent, no aphasia or dysarthria    CRANIAL NERVES:  2,3: visual acuity grossly intact, PERRL  3,4,6: EOMI bilaterally, no nystagmus or diplopia  5: sensation intact to light touch bilaterally and symmetric  7: no facial asymmetry  8: hearing grossly intact      Motor:      Upper Extremity  Myotome R L   Shoulder flexion C5 5/5 5/5   Elbow flexion C5 5/5 5/5   Wrist extension C6 5/5 5/5   Elbow extension C7 5/5 5/5   Finger flexion C8 5/5 5/5   Finger abduction T1 5/5 5/5     Lower Extremity Myotome R L   Hip flexion L2 4/5 4/5   Knee extension L3 5/5 5/5   Ankle dorsiflexion L4 5/5 5/5   Toe extension L5 5/5 5/5   Ankle plantarflexion S1 5/5 5/5       Negative Pronator drift bilaterally    Sensory:   intact to light touch through out      DTRs: 2+ in bilateral  biceps  No clonus at bilateral ankles  Negative [place] b/l     Tone: no spasticity noted    Coordination:   intact finger to nose bilaterally  intact fine motor with fingers bilaterally      Labs: Reviewed and significant for   Recent Labs     01/02/24  1514 01/03/24  0108   RBC 5.02 4.78   HEMOGLOBIN 15.3 14.6   HEMATOCRIT 46.1 45.1   PLATELETCT 243 230     Recent Labs     01/02/24  1630 01/03/24  0108   SODIUM 135 135   POTASSIUM 4.9 3.9   CHLORIDE 99 99   CO2 24 23   GLUCOSE 130* 120*   BUN " 15 15   CREATININE 0.76 0.75   CALCIUM 8.8 8.5     No results found for this or any previous visit (from the past 24 hour(s)).      ASSESSMENT:  Patient is a 88 y.o. female admitted with pneumonia and pulmonary edema    [facility] Code / Diagnosis to Support: 0010.9 - Pulmonary Disorders: Other Pulmonary    Rehabilitation: Impaired ADLs and mobility  Patient is a good candidate for inpatient rehab based on needs for PT, OT, see dispo details below.     Barriers to transfer include: Insurance authorization, TCCs to verify disposition, medical clearance and bed availability     Additional Recommendations:  Hypoxia  Pulmonary edema  Community-acquired pneumonia  -Originally admitted with shortness of breath and cough  - Found to be influenza and COVID-negative  - Chest x-ray showed small pleural effusions  - CTA chest negative for PE, did show evidence of mild edema with trace amount of pleural fluid  - Started on Lasix for diuresis  - Patient is on Unasyn and doxycycline for required pneumonia  - Is requiring 4 L, is not on O2 at baseline  - Continue with PT/OT    Atrial fibrillation  - Status post pacemaker  - On metoprolol, Eliquis and flecainide    Hypothyroidism  - Continue with home dose Synthroid    Hypertension  - Is on home dose losartan and metoprolol    Dispo:  - patient is currently functioning below their level of baseline, recommend post acute rehab  - recommend IRF level therapy with 3hr of therapy 5 days per week  - piror to acceptance to IRF, will need confirmation that patient can return to [facility] after rehab.   - TCC to assist with insurance auth and DC support         Medical Complexity:  Hypoxia  Pulmonary edema  Community-acquired pneumonia  Atrial fibrillation  Status post pacemaker  Impaired mobility and ADLs      DVT PPX: Eliquis      Thank you for allowing us to participate in the care of this patient.     Patient was seen for 81 minutes on unit/floor of which > 50% of time was spent on counseling  and coordination of care regarding the above, including prognosis, risk reduction, benefits of treatment, and options for next stage of care.    Erlinda Yao D.O.   Physical Medicine and Rehabilitation     Please note that this dictation was created using voice recognition software. I have made every reasonable attempt to correct obvious errors, but there may be errors of grammar and possibly content that I did not discover before finalizing the note.

## 2024-01-04 NOTE — PROGRESS NOTES
[facility] Internal Medicine Daily Progress Note    Date of Service  1/3/2024    [employer] Team: R L[facility]SE[facility] Team   Attending: Devin Castano M.d.  Senior Resident: Dr. Alvarez  Intern:  Dr. Alexander  Contact Number: [phone]    Chief Complaint  Debra Rodrigues is a 88 y.o. female admitted 1/2/2024 with sob.    Hospital Course  Debra Rodrigues is a 88 y.o. female who presented 1/2/2024 with past medical history of pacemaker, atrial fibrillation, hypothyroidism who presents to the hospital for cough and shortness of breath for the past 4 to 5 days.  It is associated with sore throat and nasal congestion.  Overall the patient has generalized weakness.  It is associated with fevers and chills.  She denies any nausea, vomiting or diarrhea.  The patient does complain about increased frequency of urination.  She lives in an assisted living facility.  She denies any difficulty swallowing after eating food.     Chest x-ray with bilateral pleural effusions and a right-sided infiltrate  CTA of the chest found bilateral pulmonary edema, bilateral pleural effusions with small loculated effusion on the left.    She was admitted for acute hypoxic respiratory failure 2/2 to pneumonia and pulmonary edema.        Interval Problem Update  NAEO  Patient on 4 L NC. She denies feeling ill or short of breath this morning. She is alert and oriented sitting up in chair at bedside.      I have discussed this patient's plan of care and discharge plan at IDT rounds today with Case Management, Nursing, Nursing leadership, and other members of the IDT team.    Consultants/Specialty  N/a    Code Status  Full Code    Disposition  The patient is not medically cleared for discharge to home or a post-acute facility.      I have placed the appropriate orders for post-discharge needs.    Review of Systems  Review of Systems   Constitutional:  Negative for chills, fever and malaise/fatigue.   HENT:  Negative for congestion.    Respiratory:  Positive for  cough. Negative for sputum production, shortness of breath and wheezing.    Cardiovascular:  Negative for chest pain, orthopnea and leg swelling.   Gastrointestinal:  Negative for abdominal pain, diarrhea, nausea and vomiting.   Genitourinary:  Negative for dysuria, frequency and urgency.   Neurological:  Negative for focal weakness.   Psychiatric/Behavioral:  Negative for memory loss. The patient does not have insomnia.         Physical Exam  Temp:  [35.8 °C (96.5 °F)-36.2 °C (97.1 °F)] 36 °C (96.8 °F)  Pulse:  [64-67] 65  Resp:  [16-22] 18  BP: (101-182)/(55-88) 138/82  SpO2:  [90 %-95 %] 95 %    Physical Exam  Vitals reviewed.   Constitutional:       General: She is not in acute distress.     Appearance: Normal appearance. She is not ill-appearing or toxic-appearing.   HENT:      Head: Normocephalic and atraumatic.      Mouth/Throat:      Mouth: Mucous membranes are moist.      Pharynx: No oropharyngeal exudate.   Eyes:      Extraocular Movements: Extraocular movements intact.      Conjunctiva/sclera: Conjunctivae normal.      Pupils: Pupils are equal, round, and reactive to light.   Neck:      Vascular: No JVD.   Cardiovascular:      Rate and Rhythm: Normal rate and regular rhythm.      Pulses: Normal pulses.      Heart sounds: Normal heart sounds. No murmur heard.     No friction rub. No gallop.   Pulmonary:      Effort: Pulmonary effort is normal. No respiratory distress.      Breath sounds: No stridor. Rales (in bilateral lower lobes) present. No wheezing or rhonchi.   Abdominal:      General: Abdomen is flat. There is no distension.      Palpations: Abdomen is soft.      Tenderness: There is no abdominal tenderness. There is no guarding.   Musculoskeletal:         General: No swelling. Normal range of motion.      Cervical back: Normal range of motion. No muscular tenderness.      Right lower leg: No edema.      Left lower leg: No edema.   Skin:     General: Skin is warm and dry.      Coloration: Skin is not  jaundiced or pale.      Findings: No bruising.   Neurological:      General: No focal deficit present.      Mental Status: She is alert and oriented to person, place, and time.   Psychiatric:         Mood and Affect: Mood normal.         Behavior: Behavior normal.         Thought Content: Thought content normal.         Judgment: Judgment normal.         Fluids    Intake/Output Summary (Last 24 hours) at 1/3/2024 2044  Last data filed at 1/3/2024 1600  Gross per 24 hour   Intake 470 ml   Output 300 ml   Net 170 ml       Laboratory  Recent Labs     01/02/24  1514 01/03/24  0108   WBC 16.6* 16.4*   RBC 5.02 4.78   HEMOGLOBIN 15.3 14.6   HEMATOCRIT 46.1 45.1   MCV 91.8 94.4   MCH 30.5 30.5   MCHC 33.2 32.4   RDW 46.2 47.0   PLATELETCT 243 230   MPV 10.5 9.8     Recent Labs     01/02/24  1630 01/03/24  0108   SODIUM 135 135   POTASSIUM 4.9 3.9   CHLORIDE 99 99   CO2 24 23   GLUCOSE 130* 120*   BUN 15 15   CREATININE 0.76 0.75   CALCIUM 8.8 8.5                   Imaging  CT-CTA CHEST PULMONARY ARTERY W/ RECONS   Final Result      1.  There is no CT evidence of acute pulmonary embolism.   2.  There is mosaic attenuation with minimal dependent groundglass opacity possibly representing mild edema with trace amounts of pleural fluid.   3.  Dependent lingular airspace disease could be atelectasis or pneumonitis.   4.  Nonspecific lymph nodes in the mediastinum, probably reactive.            DX-CHEST-PORTABLE (1 VIEW)   Final Result         1.  Moderate cardiomegaly.      2.  Slight blunting of the costophrenic angles suspicious for small pleural effusions.      EC-ECHOCARDIOGRAM COMPLETE W/O CONT    (Results Pending)        Assessment/Plan  Problem Representation:    * Acute respiratory failure with hypoxia (HCC)  Assessment & Plan  Not on home O2  2/2 to pneumonia and pulmonary edema    -treat pneumonia/volume as below  -IS  -supplemental O2 prn        Acute pulmonary edema (HCC)  Assessment & Plan  Last echo in 2022 with  mild LVH, EF 50-55%, no wall motion abnormalities  Repeat ECHO pending    -f/u echo  -lasix 20 mg IV x1    Pneumonia due to infectious organism- (present on admission)  Assessment & Plan  Concern for possible pneumonitis on CTA chest  Query chronic aspiration?  Given SIRS with leukocytosis, tachypnea, fever at presentation treating for pneumonia  Procal wnl  Cultures NGTD  Started on C3 and doxy with no improvement in white count    -switch to unasyn, continue doxy  -IS  -follow up cultures  -SLP consult for concern of chronic aspiration          Pacemaker- (present on admission)  Assessment & Plan  Monitor on telemetry    Acquired hypothyroidism- (present on admission)  Assessment & Plan  Continue home medications    MDD (major depressive disorder), recurrent, in partial remission (HCC)- (present on admission)  Assessment & Plan  Continue home medications    Paroxysmal atrial fibrillation (HCC)- (present on admission)  Assessment & Plan  Rate controlled  Continue metoprolol, Eliquis and flecainide         VTE prophylaxis: therapeutic anticoagulation with eliquis    I have performed a physical exam and reviewed and updated ROS and Plan today (1/3/2024). In review of yesterday's note (1/2/2024), there are no changes except as documented above.

## 2024-01-04 NOTE — PROGRESS NOTES
Report received from the night nurse. Assumed care at 0700. Reviewed labs, medications, and orders.     Patient in bed resting. Safety precautions in place. Bed in lowered and locked position. Call light within reach. Updated on plan of care. Hourly rounding in place. Educated the pt on calling for help if needed.

## 2024-01-04 NOTE — THERAPY
Speech Language Pathology   Clinical Swallow Evaluation     Patient Name: Debra Rodrigues  AGE:  88 y.o., SEX:  female  Medical Record #: [id number]  Date of Service: 1/4/2024      History of Present Illness  88 y.o. female who presented 1/2/2024 with cough and SOB for the past 4 to 5 days. Patient presented with a sore throat, nasal congestion fevers, and chills.      PMHx pacemaker, hypothyroidism, major depressive disorder, paroxysmal atrial fibrillation    No previous h/o SLP services at [facility]    CMHx Acute respiratory failure w/hypoxia, Acute pulmonary edema, PNA    CXR 1/2/2023  No pulmonary infiltrates or consolidations are noted. There is slight blunting of the costophrenic angles    CT-Chest 1/2/2024  1.  There is no CT evidence of acute pulmonary embolism.  2.  There is mosaic attenuation with minimal dependent groundglass opacity possibly representing mild edema with trace amounts of pleural fluid.  3.  Dependent lingular airspace disease could be atelectasis or pneumonitis.  4.  Nonspecific lymph nodes in the mediastinum, probably reactive.      General Information:  Vitals  O2 (LPM): 4  O2 Delivery Device: Nasal Cannula  Level of Consciousness: Alert, Awake  Orientation: Oriented x 4  Follows Directives: Yes      Prior Living Situation & Level of Function:  Prior Services: Continuous (24 Hour) Care Giving Per Service  Housing / Facility: Assisted Living Residence  Lives with - Patient's Self Care Capacity: Attendant / Paid Care Giver  Communication: WFL  Swallowing: Pt denies any h/o dysphagia       Oral Mechanism Evaluation:  Dentition: Good, Natural dentition   Facial Symmetry: Equal  Facial Sensation: Equal     Labial Observations: WFL   Lingual Observations: Midline             Laryngeal Function:  Secretion Management: Adequate  Voice Quality: WFL  Cough: Perceptually WNL         Subjective  Patient cleared by RN for evaluation. Pt fully cooperated with SLP tasks.      Assessment  Current Method of  Nutrition: Oral diet (RG/TN)  Positioning: Ramirez's (60-90 degrees)  Bolus Administration: Patient  O2 (LPM): 4 O2 Delivery Device: Nasal Cannula  Factor(s) Affecting Performance: None  Tracheostomy : No           Swallowing Trials:  Swallowing Trials  Thin Liquid (TN0): WFL  Liquidised (LQ3): WFL  Regular (RG7): WFL      Comments: Patient able to self-feed without difficulty. Adequate oral bolus acceptance and containment. Adequate bite with prolonged but functional mastication of solid consistencies. Pharyngeal swallow response appeared timely. No cough/throat clear appreciated with PO. Vocal quality remained clear. Single swallow completed per bolus. No signs of esophageal dysfunction. No overt s/sx of aspiration appreciated. Provided education regarding general aspiration precautions as well as signs of aspiration.       Clinical Impressions  Patient presents without clinical signs concerning for pharyngeal dysphagia. Diet modification is not indicated. Service will follow to ensure diet tolerance and monitor for changes. Given acute respiratory failure, PNA and advanced age, service will consider a diagnostic swallow study with any ongoing concerns for aspiration.        Recommendations  Diet Consistency: Regular solids, thin liquids  Instrumentation: None indicated at this time (Will consider with any ongoing concerns)  Medication: As tolerated  Supervision: Close supervision - patient may be left alone for less than 5 minutes at a time  Positioning: Fully upright and midline during oral intake, Remain upright for 30-45 minutes after oral intake  Risk Management : Small bites/sips, Slow rate of intake, Alternate bites and sips, Physical mobility, as tolerated  Oral Care: Q8h         SLP Treatment Plan  Treatment Plan: Dysphagia Treatment, Patient/Family/Caregiver Training  SLP Frequency: 3x Per Week  Estimated Duration: Until Therapy Goals Met      Anticipated Discharge Needs  Discharge Recommendations: Other  "(TBD pending clinical progress)   Therapy Recommendations Upon DC: Dysphagia Training, Patient / Family / Caregiver Education        Patient / Family Goals  Patient / Family Goal #1: \"When is breakfast?\"  Short Term Goals  Short Term Goal # 1: Pt will consume RG/TN diet with no overt s/sx of aspiration or decline in respiratory status      Annelise Roberts MS,CCC-SLP    "

## 2024-01-05 ENCOUNTER — APPOINTMENT (OUTPATIENT)
Dept: RADIOLOGY | Facility: REHABILITATION | Age: 89
DRG: 194 | End: 2024-01-05
Attending: HOSPITALIST
Payer: MEDICARE

## 2024-01-05 ENCOUNTER — HOSPITAL ENCOUNTER (INPATIENT)
Facility: REHABILITATION | Age: 89
LOS: 17 days | DRG: 194 | End: 2024-01-22
Attending: PHYSICAL MEDICINE & REHABILITATION | Admitting: PHYSICAL MEDICINE & REHABILITATION
Payer: MEDICARE

## 2024-01-05 VITALS
HEART RATE: 65 BPM | SYSTOLIC BLOOD PRESSURE: 132 MMHG | WEIGHT: 193.34 LBS | TEMPERATURE: 97.8 F | OXYGEN SATURATION: 92 % | RESPIRATION RATE: 18 BRPM | DIASTOLIC BLOOD PRESSURE: 73 MMHG | BODY MASS INDEX: 34.26 KG/M2 | HEIGHT: 63 IN

## 2024-01-05 DIAGNOSIS — I10 PRIMARY HYPERTENSION: ICD-10-CM

## 2024-01-05 DIAGNOSIS — M51.37 DDD (DEGENERATIVE DISC DISEASE), LUMBOSACRAL: ICD-10-CM

## 2024-01-05 DIAGNOSIS — K57.92 DIVERTICULITIS: ICD-10-CM

## 2024-01-05 DIAGNOSIS — F34.1 PERSISTENT DEPRESSIVE DISORDER: ICD-10-CM

## 2024-01-05 DIAGNOSIS — G62.9 NEUROPATHY: ICD-10-CM

## 2024-01-05 DIAGNOSIS — E03.9 ACQUIRED HYPOTHYROIDISM: ICD-10-CM

## 2024-01-05 DIAGNOSIS — I48.0 PAROXYSMAL ATRIAL FIBRILLATION (HCC): ICD-10-CM

## 2024-01-05 PROBLEM — R79.89 AZOTEMIA: Status: ACTIVE | Noted: 2024-01-05

## 2024-01-05 PROBLEM — J96.01 ACUTE RESPIRATORY FAILURE WITH HYPOXIA (HCC): Status: RESOLVED | Noted: 2024-01-02 | Resolved: 2024-01-05

## 2024-01-05 PROBLEM — R09.02 HYPOXIA: Status: ACTIVE | Noted: 2024-01-05

## 2024-01-05 PROBLEM — J81.0 ACUTE PULMONARY EDEMA (HCC): Status: RESOLVED | Noted: 2024-01-02 | Resolved: 2024-01-05

## 2024-01-05 PROBLEM — R14.0 ABDOMINAL DISTENSION: Status: ACTIVE | Noted: 2024-01-05

## 2024-01-05 LAB
ANION GAP SERPL CALC-SCNC: 11 MMOL/L (ref 7–16)
BASOPHILS # BLD AUTO: 0.7 % (ref 0–1.8)
BASOPHILS # BLD: 0.06 K/UL (ref 0–0.12)
BUN SERPL-MCNC: 26 MG/DL (ref 8–22)
CALCIUM SERPL-MCNC: 8.4 MG/DL (ref 8.5–10.5)
CHLORIDE SERPL-SCNC: 102 MMOL/L (ref 96–112)
CO2 SERPL-SCNC: 25 MMOL/L (ref 20–33)
CREAT SERPL-MCNC: 0.74 MG/DL (ref 0.5–1.4)
EOSINOPHIL # BLD AUTO: 0.34 K/UL (ref 0–0.51)
EOSINOPHIL NFR BLD: 3.7 % (ref 0–6.9)
ERYTHROCYTE [DISTWIDTH] IN BLOOD BY AUTOMATED COUNT: 45.1 FL (ref 35.9–50)
GFR SERPLBLD CREATININE-BSD FMLA CKD-EPI: 77 ML/MIN/1.73 M 2
GLUCOSE SERPL-MCNC: 104 MG/DL (ref 65–99)
HCT VFR BLD AUTO: 38 % (ref 37–47)
HGB BLD-MCNC: 12.4 G/DL (ref 12–16)
IMM GRANULOCYTES # BLD AUTO: 0.08 K/UL (ref 0–0.11)
IMM GRANULOCYTES NFR BLD AUTO: 0.9 % (ref 0–0.9)
LYMPHOCYTES # BLD AUTO: 1.54 K/UL (ref 1–4.8)
LYMPHOCYTES NFR BLD: 16.7 % (ref 22–41)
MCH RBC QN AUTO: 30.5 PG (ref 27–33)
MCHC RBC AUTO-ENTMCNC: 32.6 G/DL (ref 32.2–35.5)
MCV RBC AUTO: 93.4 FL (ref 81.4–97.8)
MONOCYTES # BLD AUTO: 1.25 K/UL (ref 0–0.85)
MONOCYTES NFR BLD AUTO: 13.6 % (ref 0–13.4)
NEUTROPHILS # BLD AUTO: 5.95 K/UL (ref 1.82–7.42)
NEUTROPHILS NFR BLD: 64.4 % (ref 44–72)
NRBC # BLD AUTO: 0 K/UL
NRBC BLD-RTO: 0 /100 WBC (ref 0–0.2)
PLATELET # BLD AUTO: 253 K/UL (ref 164–446)
PMV BLD AUTO: 10.9 FL (ref 9–12.9)
POTASSIUM SERPL-SCNC: 3.8 MMOL/L (ref 3.6–5.5)
RBC # BLD AUTO: 4.07 M/UL (ref 4.2–5.4)
SODIUM SERPL-SCNC: 138 MMOL/L (ref 135–145)
WBC # BLD AUTO: 9.2 K/UL (ref 4.8–10.8)

## 2024-01-05 PROCEDURE — A9270 NON-COVERED ITEM OR SERVICE: HCPCS

## 2024-01-05 PROCEDURE — A9270 NON-COVERED ITEM OR SERVICE: HCPCS | Performed by: HOSPITALIST

## 2024-01-05 PROCEDURE — 99239 HOSP IP/OBS DSCHRG MGMT >30: CPT | Mod: GC | Performed by: INTERNAL MEDICINE

## 2024-01-05 PROCEDURE — 85025 COMPLETE CBC W/AUTO DIFF WBC: CPT

## 2024-01-05 PROCEDURE — 74018 RADEX ABDOMEN 1 VIEW: CPT

## 2024-01-05 PROCEDURE — 80048 BASIC METABOLIC PNL TOTAL CA: CPT

## 2024-01-05 PROCEDURE — A9270 NON-COVERED ITEM OR SERVICE: HCPCS | Performed by: PHYSICAL MEDICINE & REHABILITATION

## 2024-01-05 PROCEDURE — 99222 1ST HOSP IP/OBS MODERATE 55: CPT | Performed by: HOSPITALIST

## 2024-01-05 PROCEDURE — 770010 HCHG ROOM/CARE - REHAB SEMI PRIVAT*

## 2024-01-05 PROCEDURE — 700102 HCHG RX REV CODE 250 W/ 637 OVERRIDE(OP)

## 2024-01-05 PROCEDURE — 700102 HCHG RX REV CODE 250 W/ 637 OVERRIDE(OP): Performed by: PHYSICAL MEDICINE & REHABILITATION

## 2024-01-05 PROCEDURE — 94760 N-INVAS EAR/PLS OXIMETRY 1: CPT

## 2024-01-05 PROCEDURE — 99223 1ST HOSP IP/OBS HIGH 75: CPT | Performed by: PHYSICAL MEDICINE & REHABILITATION

## 2024-01-05 PROCEDURE — 700111 HCHG RX REV CODE 636 W/ 250 OVERRIDE (IP): Performed by: PHYSICAL MEDICINE & REHABILITATION

## 2024-01-05 PROCEDURE — 92526 ORAL FUNCTION THERAPY: CPT

## 2024-01-05 PROCEDURE — 36415 COLL VENOUS BLD VENIPUNCTURE: CPT

## 2024-01-05 PROCEDURE — 700102 HCHG RX REV CODE 250 W/ 637 OVERRIDE(OP): Performed by: HOSPITALIST

## 2024-01-05 RX ORDER — FAMOTIDINE 20 MG/1
20 TABLET, FILM COATED ORAL EVERY 12 HOURS
Status: CANCELLED | OUTPATIENT
Start: 2024-01-05

## 2024-01-05 RX ORDER — AMOXICILLIN AND CLAVULANATE POTASSIUM 875; 125 MG/1; MG/1
1 TABLET, FILM COATED ORAL EVERY 12 HOURS
Status: COMPLETED | OUTPATIENT
Start: 2024-01-05 | End: 2024-01-08

## 2024-01-05 RX ORDER — TRAZODONE HYDROCHLORIDE 50 MG/1
50 TABLET ORAL
Status: DISCONTINUED | OUTPATIENT
Start: 2024-01-05 | End: 2024-01-22 | Stop reason: HOSPADM

## 2024-01-05 RX ORDER — LEVOTHYROXINE SODIUM 0.07 MG/1
75 TABLET ORAL DAILY
Status: CANCELLED | OUTPATIENT
Start: 2024-01-06

## 2024-01-05 RX ORDER — TRAZODONE HYDROCHLORIDE 100 MG/1
50 TABLET ORAL
Status: CANCELLED | OUTPATIENT
Start: 2024-01-05

## 2024-01-05 RX ORDER — GUAIFENESIN/DEXTROMETHORPHAN 100-10MG/5
10 SYRUP ORAL EVERY 6 HOURS PRN
Status: DISCONTINUED | OUTPATIENT
Start: 2024-01-05 | End: 2024-01-18

## 2024-01-05 RX ORDER — NYSTATIN 100000 [USP'U]/G
POWDER TOPICAL 3 TIMES DAILY
Status: DISPENSED | OUTPATIENT
Start: 2024-01-05 | End: 2024-01-10

## 2024-01-05 RX ORDER — CARBOXYMETHYLCELLULOSE SODIUM 5 MG/ML
1 SOLUTION/ DROPS OPHTHALMIC PRN
Status: DISCONTINUED | OUTPATIENT
Start: 2024-01-05 | End: 2024-01-18

## 2024-01-05 RX ORDER — LOSARTAN POTASSIUM 50 MG/1
50 TABLET ORAL EVERY 12 HOURS
Status: DISCONTINUED | OUTPATIENT
Start: 2024-01-05 | End: 2024-01-07

## 2024-01-05 RX ORDER — PAROXETINE HYDROCHLORIDE 20 MG/1
10 TABLET, FILM COATED ORAL DAILY
Status: DISCONTINUED | OUTPATIENT
Start: 2024-01-06 | End: 2024-01-22 | Stop reason: HOSPADM

## 2024-01-05 RX ORDER — HYDRALAZINE HYDROCHLORIDE 25 MG/1
25 TABLET, FILM COATED ORAL EVERY 8 HOURS PRN
Status: DISCONTINUED | OUTPATIENT
Start: 2024-01-05 | End: 2024-01-22 | Stop reason: HOSPADM

## 2024-01-05 RX ORDER — GUAIFENESIN/DEXTROMETHORPHAN 100-10MG/5
10 SYRUP ORAL EVERY 8 HOURS PRN
Qty: 210 ML | Refills: 0 | Status: ON HOLD | OUTPATIENT
Start: 2024-01-05 | End: 2024-01-22

## 2024-01-05 RX ORDER — FLECAINIDE ACETATE 100 MG/1
100 TABLET ORAL DAILY
Status: CANCELLED | OUTPATIENT
Start: 2024-01-06

## 2024-01-05 RX ORDER — ACETAMINOPHEN 325 MG/1
650 TABLET ORAL EVERY 6 HOURS PRN
Status: DISCONTINUED | OUTPATIENT
Start: 2024-01-05 | End: 2024-01-22 | Stop reason: HOSPADM

## 2024-01-05 RX ORDER — AMOXICILLIN AND CLAVULANATE POTASSIUM 875; 125 MG/1; MG/1
1 TABLET, FILM COATED ORAL EVERY 12 HOURS
Qty: 6 TABLET | Refills: 0 | Status: ON HOLD | OUTPATIENT
Start: 2024-01-05 | End: 2024-01-22

## 2024-01-05 RX ORDER — GABAPENTIN 100 MG/1
100 CAPSULE ORAL EVERY EVENING
Status: DISCONTINUED | OUTPATIENT
Start: 2024-01-05 | End: 2024-01-22 | Stop reason: HOSPADM

## 2024-01-05 RX ORDER — ACETAMINOPHEN 325 MG/1
650 TABLET ORAL EVERY 6 HOURS PRN
Status: CANCELLED | OUTPATIENT
Start: 2024-01-05

## 2024-01-05 RX ORDER — DOXYCYCLINE 100 MG/1
100 TABLET ORAL EVERY 12 HOURS
Status: CANCELLED | OUTPATIENT
Start: 2024-01-05 | End: 2024-01-10

## 2024-01-05 RX ORDER — LOSARTAN POTASSIUM 50 MG/1
50 TABLET ORAL EVERY 12 HOURS
Status: CANCELLED | OUTPATIENT
Start: 2024-01-05

## 2024-01-05 RX ORDER — LEVOTHYROXINE SODIUM 0.07 MG/1
75 TABLET ORAL DAILY
Status: DISCONTINUED | OUTPATIENT
Start: 2024-01-06 | End: 2024-01-22 | Stop reason: HOSPADM

## 2024-01-05 RX ORDER — GUAIFENESIN/DEXTROMETHORPHAN 100-10MG/5
10 SYRUP ORAL EVERY 6 HOURS PRN
Status: CANCELLED | OUTPATIENT
Start: 2024-01-05

## 2024-01-05 RX ORDER — FLECAINIDE ACETATE 100 MG/1
100 TABLET ORAL DAILY
Status: DISCONTINUED | OUTPATIENT
Start: 2024-01-06 | End: 2024-01-22 | Stop reason: HOSPADM

## 2024-01-05 RX ORDER — DOXYCYCLINE 100 MG/1
100 TABLET ORAL EVERY 12 HOURS
Qty: 6 TABLET | Refills: 0 | Status: ON HOLD | OUTPATIENT
Start: 2024-01-05 | End: 2024-01-22

## 2024-01-05 RX ORDER — PAROXETINE HYDROCHLORIDE 20 MG/1
10 TABLET, FILM COATED ORAL DAILY
Status: CANCELLED | OUTPATIENT
Start: 2024-01-06

## 2024-01-05 RX ORDER — AMOXICILLIN AND CLAVULANATE POTASSIUM 875; 125 MG/1; MG/1
1 TABLET, FILM COATED ORAL EVERY 12 HOURS
Status: CANCELLED | OUTPATIENT
Start: 2024-01-05 | End: 2024-01-08

## 2024-01-05 RX ORDER — AMOXICILLIN 250 MG
2 CAPSULE ORAL 2 TIMES DAILY
Status: DISCONTINUED | OUTPATIENT
Start: 2024-01-05 | End: 2024-01-11

## 2024-01-05 RX ORDER — CEFAZOLIN SODIUM 1 G/3ML
2 INJECTION, POWDER, FOR SOLUTION INTRAMUSCULAR; INTRAVENOUS ONCE
Status: DISCONTINUED | OUTPATIENT
Start: 2024-01-05 | End: 2024-01-05 | Stop reason: HOSPADM

## 2024-01-05 RX ORDER — ONDANSETRON 4 MG/1
4 TABLET, ORALLY DISINTEGRATING ORAL 4 TIMES DAILY PRN
Status: DISCONTINUED | OUTPATIENT
Start: 2024-01-05 | End: 2024-01-22 | Stop reason: HOSPADM

## 2024-01-05 RX ORDER — METOPROLOL SUCCINATE 25 MG/1
25 TABLET, EXTENDED RELEASE ORAL DAILY
Status: DISCONTINUED | OUTPATIENT
Start: 2024-01-06 | End: 2024-01-07

## 2024-01-05 RX ORDER — DOXYCYCLINE 100 MG/1
100 TABLET ORAL EVERY 12 HOURS
Status: COMPLETED | OUTPATIENT
Start: 2024-01-05 | End: 2024-01-09

## 2024-01-05 RX ORDER — POLYETHYLENE GLYCOL 3350 17 G/17G
1 POWDER, FOR SOLUTION ORAL
Status: DISCONTINUED | OUTPATIENT
Start: 2024-01-05 | End: 2024-01-11

## 2024-01-05 RX ORDER — FAMOTIDINE 20 MG/1
20 TABLET, FILM COATED ORAL EVERY 12 HOURS
Status: DISCONTINUED | OUTPATIENT
Start: 2024-01-05 | End: 2024-01-22 | Stop reason: HOSPADM

## 2024-01-05 RX ORDER — VITAMIN B COMPLEX
1000 TABLET ORAL DAILY
Status: DISCONTINUED | OUTPATIENT
Start: 2024-01-05 | End: 2024-01-22 | Stop reason: HOSPADM

## 2024-01-05 RX ORDER — METOPROLOL SUCCINATE 25 MG/1
25 TABLET, EXTENDED RELEASE ORAL DAILY
Status: CANCELLED | OUTPATIENT
Start: 2024-01-06

## 2024-01-05 RX ORDER — NYSTATIN 100000 [USP'U]/G
POWDER TOPICAL 3 TIMES DAILY
Status: CANCELLED | OUTPATIENT
Start: 2024-01-05 | End: 2024-01-10

## 2024-01-05 RX ORDER — ECHINACEA PURPUREA EXTRACT 125 MG
2 TABLET ORAL PRN
Status: DISCONTINUED | OUTPATIENT
Start: 2024-01-05 | End: 2024-01-18

## 2024-01-05 RX ORDER — BISACODYL 10 MG
10 SUPPOSITORY, RECTAL RECTAL
Status: DISCONTINUED | OUTPATIENT
Start: 2024-01-05 | End: 2024-01-11

## 2024-01-05 RX ORDER — ONDANSETRON 2 MG/ML
4 INJECTION INTRAMUSCULAR; INTRAVENOUS 4 TIMES DAILY PRN
Status: DISCONTINUED | OUTPATIENT
Start: 2024-01-05 | End: 2024-01-22 | Stop reason: HOSPADM

## 2024-01-05 RX ORDER — ALUMINA, MAGNESIA, AND SIMETHICONE 2400; 2400; 240 MG/30ML; MG/30ML; MG/30ML
20 SUSPENSION ORAL
Status: DISCONTINUED | OUTPATIENT
Start: 2024-01-05 | End: 2024-01-18

## 2024-01-05 RX ADMIN — NYSTATIN: 100000 POWDER TOPICAL at 05:32

## 2024-01-05 RX ADMIN — SENNOSIDES AND DOCUSATE SODIUM 2 TABLET: 50; 8.6 TABLET ORAL at 20:48

## 2024-01-05 RX ADMIN — FLECAINIDE ACETATE 100 MG: 100 TABLET ORAL at 05:33

## 2024-01-05 RX ADMIN — LEVOTHYROXINE SODIUM 75 MCG: 0.07 TABLET ORAL at 05:32

## 2024-01-05 RX ADMIN — GABAPENTIN 100 MG: 100 CAPSULE ORAL at 20:48

## 2024-01-05 RX ADMIN — HYDRALAZINE HYDROCHLORIDE 25 MG: 25 TABLET, FILM COATED ORAL at 23:55

## 2024-01-05 RX ADMIN — FAMOTIDINE 20 MG: 20 TABLET ORAL at 20:48

## 2024-01-05 RX ADMIN — TRAZODONE HYDROCHLORIDE 50 MG: 50 TABLET ORAL at 20:48

## 2024-01-05 RX ADMIN — NYSTATIN: 100000 POWDER TOPICAL at 20:50

## 2024-01-05 RX ADMIN — Medication 1000 UNITS: at 18:06

## 2024-01-05 RX ADMIN — PAROXETINE HYDROCHLORIDE 10 MG: 20 TABLET, FILM COATED ORAL at 05:32

## 2024-01-05 RX ADMIN — METOPROLOL SUCCINATE 25 MG: 25 TABLET, EXTENDED RELEASE ORAL at 05:32

## 2024-01-05 RX ADMIN — LOSARTAN POTASSIUM 50 MG: 50 TABLET, FILM COATED ORAL at 05:32

## 2024-01-05 RX ADMIN — SENNOSIDES AND DOCUSATE SODIUM 2 TABLET: 50; 8.6 TABLET ORAL at 18:06

## 2024-01-05 RX ADMIN — AMOXICILLIN AND CLAVULANATE POTASSIUM 1 TABLET: 875; 125 TABLET, FILM COATED ORAL at 05:31

## 2024-01-05 RX ADMIN — DOXYCYCLINE 100 MG: 100 TABLET, FILM COATED ORAL at 20:47

## 2024-01-05 RX ADMIN — POLYETHYLENE GLYCOL 3350 1 PACKET: 17 POWDER, FOR SOLUTION ORAL at 20:46

## 2024-01-05 RX ADMIN — LOSARTAN POTASSIUM 50 MG: 50 TABLET, FILM COATED ORAL at 18:06

## 2024-01-05 RX ADMIN — FAMOTIDINE 20 MG: 20 TABLET ORAL at 05:32

## 2024-01-05 RX ADMIN — APIXABAN 5 MG: 5 TABLET, FILM COATED ORAL at 05:31

## 2024-01-05 RX ADMIN — DOXYCYCLINE 100 MG: 100 TABLET, FILM COATED ORAL at 05:32

## 2024-01-05 RX ADMIN — AMOXICILLIN AND CLAVULANATE POTASSIUM 1 TABLET: 875; 125 TABLET, FILM COATED ORAL at 20:47

## 2024-01-05 RX ADMIN — APIXABAN 5 MG: 5 TABLET, FILM COATED ORAL at 20:50

## 2024-01-05 RX ADMIN — GABAPENTIN 100 MG: 100 CAPSULE ORAL at 05:31

## 2024-01-05 RX ADMIN — ONDANSETRON 4 MG: 4 TABLET, ORALLY DISINTEGRATING ORAL at 23:56

## 2024-01-05 ASSESSMENT — LIFESTYLE VARIABLES
TOTAL SCORE: 0
ON A TYPICAL DAY WHEN YOU DRINK ALCOHOL HOW MANY DRINKS DO YOU HAVE: 0
EVER HAD A DRINK FIRST THING IN THE MORNING TO STEADY YOUR NERVES TO GET RID OF A HANGOVER: NO
EVER_SMOKED: NEVER
CONSUMPTION TOTAL: NEGATIVE
HAVE YOU EVER FELT YOU SHOULD CUT DOWN ON YOUR DRINKING: NO
TOTAL SCORE: 0
ALCOHOL_USE: YES
EVER FELT BAD OR GUILTY ABOUT YOUR DRINKING: NO
HAVE PEOPLE ANNOYED YOU BY CRITICIZING YOUR DRINKING: NO
AVERAGE NUMBER OF DAYS PER WEEK YOU HAVE A DRINK CONTAINING ALCOHOL: 0
HOW MANY TIMES IN THE PAST YEAR HAVE YOU HAD 5 OR MORE DRINKS IN A DAY: 0
TOTAL SCORE: 0

## 2024-01-05 ASSESSMENT — ENCOUNTER SYMPTOMS
SHORTNESS OF BREATH: 0
BRUISES/BLEEDS EASILY: 0
POLYDIPSIA: 0
PALPITATIONS: 0
FEVER: 0
ROS GI COMMENTS: +BLOATING
CHILLS: 0
DEPRESSION: 1
COUGH: 0
NAUSEA: 0
MUSCULOSKELETAL NEGATIVE: 1
ABDOMINAL PAIN: 0
EYES NEGATIVE: 1
WEAKNESS: 1
VOMITING: 0

## 2024-01-05 ASSESSMENT — PATIENT HEALTH QUESTIONNAIRE - PHQ9
1. LITTLE INTEREST OR PLEASURE IN DOING THINGS: NOT AT ALL
SUM OF ALL RESPONSES TO PHQ9 QUESTIONS 1 AND 2: 0
1. LITTLE INTEREST OR PLEASURE IN DOING THINGS: NOT AT ALL
1. LITTLE INTEREST OR PLEASURE IN DOING THINGS: NOT AT ALL
2. FEELING DOWN, DEPRESSED, IRRITABLE, OR HOPELESS: NOT AT ALL

## 2024-01-05 ASSESSMENT — CHA2DS2 SCORE
SEX: FEMALE
DIABETES: NO
AGE 75 OR GREATER: YES
HYPERTENSION: YES
CHF OR LEFT VENTRICULAR DYSFUNCTION: NO
VASCULAR DISEASE: NO
PRIOR STROKE OR TIA OR THROMBOEMBOLISM: NO
AGE 65 TO 74: NO
CHA2DS2 VASC SCORE: 4

## 2024-01-05 ASSESSMENT — PAIN DESCRIPTION - PAIN TYPE
TYPE: ACUTE PAIN
TYPE: ACUTE PAIN

## 2024-01-05 ASSESSMENT — FIBROSIS 4 INDEX
FIB4 SCORE: 1.72
FIB4 SCORE: 1.72

## 2024-01-05 NOTE — FLOWSHEET NOTE
01/05/24 1459   Protocol Assessment   Initial Assessment Yes   Patient History   Pulmonary Diagnosis None   Procedures Relevant to Respiratory Status None   Home O2 No   Nocturnal CPAP No   Home Treatments/Frequency No   Protocol Pathways   Protocol Pathways None

## 2024-01-05 NOTE — DISCHARGE SUMMARY
[facility] Internal Medicine Discharge Summary    Attending: Devin Castano M.d.  Senior Resident: Dr. Alvarez  Intern:  Dr. Alexander  Contact Number: [phone]    CHIEF COMPLAINT ON ADMISSION  Chief Complaint   Patient presents with    Weakness    Flu Like Symptoms     Cough, congestion, N/V/D x 3 days       Reason for Admission  Acute hypoxic respiratory failure    Admission Date  1/2/2024    CODE STATUS  Full Code    HPI & HOSPITAL COURSE  Debra Rodrigues is a 88 y.o. female who presented 1/2/2024 with past medical history of pacemaker, atrial fibrillation, hypothyroidism who presents to the hospital for cough and shortness of breath for the past 4 to 5 days.  It is associated with sore throat and nasal congestion.  Overall the patient has generalized weakness.  It is associated with fevers and chills.  She denies any nausea, vomiting or diarrhea.  The patient does complain about increased frequency of urination.  She lives in an assisted living facility.  She denies any difficulty swallowing after eating food. Chest x-ray with bilateral pleural effusions and a right-sided infiltrate.  CTA of the chest found bilateral pulmonary edema, bilateral pleural effusions with small loculated effusion on the left.  Given leukocytosis on admission from baseline, concern for pneumonia with parapneumonic pleural effusion. She was admitted for acute hypoxic respiratory failure 2/2 to pneumonia and pulmonary edema.  Patient received IV Lasix and IV Unasyn on admission with improvement in clinical status.  Patient initially presents requiring 4 L nasal cannula from baseline without oxygen requirement.  Physical medicine evaluated patient, determined to be a good candidate for inpatient rehab facility to continue working on functional strength prior to returning to assisted living facility.  On day of discharge, patient is hemodynamically stable with improving leukocytosis, currently with minimal oxygen requirement.  Plan to  continue oral Augmentin and doxycyline until 1/8 for presumed pneumonia.  Once discharged from inpatient rehab facility, recommend close follow-up with primary care for further medical optimization.    Therefore, she is discharged in good and stable condition to an inpatient rehabilitation hospital.    The patient met 2-midnight criteria for an inpatient stay at the time of discharge.    Discharge Date  1/5/24    Physical Exam on Day of Discharge  Physical Exam  Constitutional:       General: She is not in acute distress.     Appearance: Normal appearance. She is not ill-appearing.   HENT:      Mouth/Throat:      Mouth: Mucous membranes are moist.      Pharynx: Oropharynx is clear.   Eyes:      Extraocular Movements: Extraocular movements intact.      Conjunctiva/sclera: Conjunctivae normal.      Pupils: Pupils are equal, round, and reactive to light.   Cardiovascular:      Rate and Rhythm: Normal rate and regular rhythm.      Pulses: Normal pulses.      Heart sounds: Normal heart sounds. No murmur heard.     No friction rub. No gallop.   Pulmonary:      Effort: Pulmonary effort is normal. No respiratory distress.      Breath sounds: No stridor. No wheezing.   Abdominal:      General: Abdomen is flat. Bowel sounds are normal. There is no distension.      Palpations: Abdomen is soft.      Tenderness: There is no abdominal tenderness.   Neurological:      General: No focal deficit present.      Mental Status: She is alert and oriented to person, place, and time. Mental status is at baseline.      Cranial Nerves: No cranial nerve deficit.   Psychiatric:         Mood and Affect: Mood normal.         FOLLOW UP ITEMS POST DISCHARGE  -Continue oral Augmentin morning and night to complete final dose 1/8 for presumed bacterial pneumonia    DISCHARGE DIAGNOSES  Principal Problem:    Acute respiratory failure with hypoxia (HCC) (POA: Unknown)  Active Problems:    Paroxysmal atrial fibrillation (HCC) (POA: Yes)    MDD (major  depressive disorder), recurrent, in partial remission (HCC) (POA: Yes)    Acquired hypothyroidism (POA: Yes)    Pacemaker (POA: Yes)    Pneumonia due to infectious organism (POA: Yes)    Acute pulmonary edema (HCC) (POA: Unknown)  Resolved Problems:    * No resolved hospital problems. *      FOLLOW UP  No future appointments.  No follow-up provider specified.    MEDICATIONS ON DISCHARGE     Medication List        ASK your doctor about these medications        Instructions   acetaminophen 325 MG Tabs  Commonly known as: Tylenol   Take 2 Tablets by mouth every four hours as needed for Mild Pain or Moderate Pain.  Dose: 650 mg     ascorbic acid 500 MG Tabs  Commonly known as: Ascorbic Acid   Take 1 Tablet by mouth every day.  Dose: 500 mg     Eliquis 5mg Tabs  Generic drug: apixaban   Take 1 Tablet by mouth 2 times a day.  Dose: 5 mg     famotidine 20 MG Tabs  Commonly known as: Pepcid   Take 1 Tablet by mouth 2 times a day.  Dose: 20 mg     flecainide 100 MG Tabs  Commonly known as: Tambocor   Take 1 Tablet by mouth every day.  Dose: 100 mg     fluticasone 50 MCG/ACT nasal spray  Commonly known as: Flonase   Administer 2 Sprays into affected nostril(S) every day.  Dose: 2 Spray     * gabapentin 100 MG Caps  Commonly known as: Neurontin   Take 1 Capsule by mouth 2 (two) times a day. In morning and early afternoon.  Dose: 100 mg     * gabapentin 300 MG Caps  Commonly known as: Neurontin   Take 1 Capsule by mouth every evening.  Dose: 300 mg     levothyroxine 75 MCG Tabs  Commonly known as: Synthroid   Take 1 Tablet by mouth every day.  Dose: 75 mcg     losartan 50 MG Tabs  Commonly known as: Cozaar   Take 1 Tablet by mouth 2 times a day.  Dose: 50 mg     magnesium oxide 400 (240 Mg) MG Tabs   Take 1 Tablet by mouth every day.  Dose: 400 mg     metoprolol SR 25 MG Tb24  Commonly known as: Toprol XL   Take 1 Tablet by mouth every day.  Dose: 25 mg     nystatin 679841 UNIT/GM Crea topical cream  Commonly known as:  Mycostatin   Apply 1 g topically 2 times a day. Apply under breasts and abdominal folds 2 times daily  Dose: 1 Application     PARoxetine 10 MG Tabs  Commonly known as: Paxil   Take 1 Tablet by mouth every day.  Dose: 10 mg     potassium chloride ER 10 MEQ tablet  Commonly known as: Klor-Con   Take 1 Tablet by mouth 2 times a day.  Dose: 10 mEq     PreserVision AREDS 2 Caps   Take 1 Capsule by mouth 2 times a day for 360 days.  Dose: 1 Capsule     traZODone 50 MG Tabs  Commonly known as: Desyrel   Take 1 Tablet by mouth at bedtime.  Dose: 50 mg     vitamin D3 1000 Unit (25 mcg) Tabs  Commonly known as: Cholecalciferol   Take 2 Tablets by mouth every day.  Dose: 2,000 Units           * This list has 2 medication(s) that are the same as other medications prescribed for you. Read the directions carefully, and ask your doctor or other care provider to review them with you.                  Allergies  Allergies   Allergen Reactions    Other Misc Anaphylaxis     x2 in 2005,pt tested,Cause unknown    Hydrocodone-Acetaminophen Vomiting    Sulfa Drugs Rash       DIET  Orders Placed This Encounter   Procedures    Diet Order Diet: Regular     Standing Status:   Standing     Number of Occurrences:   1     Order Specific Question:   Diet:     Answer:   Regular [1]       ACTIVITY  As tolerated and directed by rehab.  Weight bearing as tolerated    CONSULTATIONS  None     PROCEDURES  None    LABORATORY  Lab Results   Component Value Date    SODIUM 138 01/05/2024    POTASSIUM 3.8 01/05/2024    CHLORIDE 102 01/05/2024    CO2 25 01/05/2024    GLUCOSE 104 (H) 01/05/2024    BUN 26 (H) 01/05/2024    CREATININE 0.74 01/05/2024    GLOMRATE 85 07/01/2022        Lab Results   Component Value Date    WBC 9.2 01/05/2024    HEMOGLOBIN 12.4 01/05/2024    HEMATOCRIT 38.0 01/05/2024    PLATELETCT 253 01/05/2024        Total time of the discharge process exceeds 50 minutes.

## 2024-01-05 NOTE — THERAPY
"Speech Language Pathology   Daily Treatment     Patient Name: Debra Rodrigues  AGE:  88 y.o., SEX:  female  Medical Record #: [id number]  Date of Service: 1/5/2024      Precautions:  Precautions: Fall Risk, Swallow Precautions         Subjective  Patient cleared by RN for session. Pt seen on this date for dysphagia management. Pt and RN endorsed tolerance of current diet, pt stated, \"it's going good.\"      Assessment  Patient seen at end of regular/thins breakfast meal. Pt consuming bites of sausage, diced fruit and juice. Able to self-feed without difficulty. Adequate oral bolus acceptance/bite/mastication/containment. Pharyngeal swallow response appeared timely. No overt s/sx of aspiration. No cough/throat clear appreciated. Provided education regarding general aspiration precautions as well as signs of aspiration, pt verbalized understanding/agreement.      Clinical Impressions  Patient presents with a functional swallow and appears appropriate to continue with regular/thins diet. HOLD PO with any difficulty and contact SLP.      Recommendations  Treatment Completed: Dysphagia Treatment       Dysphagia Treatment  Diet Consistency: Regular solids, thin liquids  Instrumentation: None indicated at this time  Medication: As tolerated  Supervision: Close supervision - patient may be left alone for less than 5 minutes at a time  Positioning: Fully upright and midline during oral intake, Meals sitting upright in a chair, as tolerated, Remain upright for 30-45 minutes after oral intake  Risk Management : Small bites/sips, Alternate bites and sips, Slow rate of intake, Physical mobility, as tolerated  Oral Care: Q8h                     SLP Treatment Plan  Treatment Plan: Dysphagia Treatment, Patient/Family/Caregiver Training  SLP Frequency: 3x Per Week  Estimated Duration: Until Therapy Goals Met      Anticipated Discharge Needs  Discharge Recommendations: Anticipate that the patient will have no further speech therapy " "needs after discharge from the hospital  Therapy Recommendations Upon DC: Patient / Family / Caregiver Education      Patient / Family Goals  Patient / Family Goal #1: \"When is breakfast?\"  Goal #1 Outcome: Progressing as expected  Short Term Goals  Short Term Goal # 1: Pt will consume RG/TN diet with no overt s/sx of aspiration or decline in respiratory status  Goal Outcome # 1: Progressing as expected      Annelise Roberts MS,CCC-SLP    "

## 2024-01-05 NOTE — CARE PLAN
The patient is Stable - Low risk of patient condition declining or worsening    Shift Goals  Clinical Goals: monitor oxygen saturation and wean as tolerated  Patient Goals: rest  Family Goals: ROB    Progress made toward(s) clinical / shift goals:        Problem: Knowledge Deficit - Standard  Goal: Patient and family/care givers will demonstrate understanding of plan of care, disease process/condition, diagnostic tests and medications  Description: Target End Date:  1-3 days or as soon as patient condition allows    Document in Patient Education    1.  Patient and family/caregiver oriented to unit, equipment, visitation policy and means for communicating concern  2.  Complete/review Learning Assessment  3.  Assess knowledge level of disease process/condition, treatment plan, diagnostic tests and medications  4.  Explain disease process/condition, treatment plan, diagnostic tests and medications  Outcome: Progressing     Problem: Skin Integrity  Goal: Skin integrity is maintained or improved  Description: Target End Date:  Prior to discharge or change in level of care    Document interventions on Skin Risk/Larry flowsheet groups and corresponding LDA    1.  Assess and monitor skin integrity, appearance and/or temperature  2.  Assess risk factors for impaired skin integrity and/or pressures ulcers  3.  Implement precautions to protect skin integrity in collaboration with interdisciplinary team  4.  Implement pressure ulcer prevention protocol if at risk for skin breakdown  5.  Confirm wound care consult if at risk for skin breakdown  6.  Ensure patient use of pressure relieving devices  (Low air loss bed, waffle overlay, heel protectors, ROHO cushion, etc)  Outcome: Progressing     Problem: Fall Risk  Goal: Patient will remain free from falls  Description: Target End Date:  Prior to discharge or change in level of care    Document interventions on the Rubina Echavarria Fall Risk Assessment    1.  Assess for fall risk  factors  2.  Implement fall precautions  Outcome: Progressing       Patient is not progressing towards the following goals:

## 2024-01-05 NOTE — PROGRESS NOTES
[facility] Internal Medicine Daily Progress Note    Date of Service  1/4/2024    [facility] Team: [facility] Team   Attending: Devin Castano M.d.  Senior Resident: Dr. Alvarez  Intern:  Dr. Alexander  Contact Number: [phone]    Chief Complaint  Debra Rodrigues is a 88 y.o. female admitted 1/2/2024 with sob.    Hospital Course  Debra Rodrigues is a 88 y.o. female who presented 1/2/2024 with past medical history of pacemaker, atrial fibrillation, hypothyroidism who presents to the hospital for cough and shortness of breath for the past 4 to 5 days.  It is associated with sore throat and nasal congestion.  Overall the patient has generalized weakness.  It is associated with fevers and chills.  She denies any nausea, vomiting or diarrhea.  The patient does complain about increased frequency of urination.  She lives in an assisted living facility.  She denies any difficulty swallowing after eating food.     Chest x-ray with bilateral pleural effusions and a right-sided infiltrate  CTA of the chest found bilateral pulmonary edema, bilateral pleural effusions with small loculated effusion on the left.    She was admitted for acute hypoxic respiratory failure 2/2 to pneumonia and pulmonary edema.      Interval Problem Update  No acute events overnight.  Patient resting comfortably, states that she is beginning to feel improved since admission.  Weaning oxygen as tolerated back to baseline oxygen requirement.  PMR evaluated patient and patient good candidate for postacute rehab on discharge likely 1/5.  In afternoon, patient lost peripheral IV access.  Patient is notably difficult stick.  Unable to place midline due to poor vasculature.  Transition antibiotics to oral at this time we will continue to monitor.  Can place central line if required, however given patient symptomatically improving and medically stable will defer at this time.    I have discussed this patient's plan of care and discharge plan at IDT rounds today  with Case Management, Nursing, Nursing leadership, and other members of the IDT team.    Consultants/Specialty  N/a    Code Status  Full Code    Disposition  The patient is not medically cleared for discharge to home or a post-acute facility.      I have placed the appropriate orders for post-discharge needs.    Review of Systems  Review of Systems   Constitutional:  Negative for chills, fever and malaise/fatigue.   HENT:  Negative for congestion.    Respiratory:  Positive for cough. Negative for sputum production, shortness of breath and wheezing.    Cardiovascular:  Negative for chest pain, orthopnea and leg swelling.   Gastrointestinal:  Negative for abdominal pain, diarrhea, nausea and vomiting.   Genitourinary:  Negative for dysuria, frequency and urgency.   Neurological:  Negative for focal weakness.   Psychiatric/Behavioral:  Negative for memory loss. The patient does not have insomnia.         Physical Exam  Temp:  [35.9 °C (96.7 °F)-36.8 °C (98.2 °F)] 36.8 °C (98.2 °F)  Pulse:  [65-70] 70  Resp:  [18-20] 19  BP: (129-179)/(45-83) 158/81  SpO2:  [91 %-97 %] 93 %    Physical Exam  Vitals reviewed.   Constitutional:       General: She is not in acute distress.     Appearance: Normal appearance. She is not ill-appearing or toxic-appearing.   HENT:      Head: Normocephalic and atraumatic.      Mouth/Throat:      Mouth: Mucous membranes are moist.      Pharynx: No oropharyngeal exudate.   Eyes:      Extraocular Movements: Extraocular movements intact.      Conjunctiva/sclera: Conjunctivae normal.      Pupils: Pupils are equal, round, and reactive to light.   Neck:      Vascular: No JVD.   Cardiovascular:      Rate and Rhythm: Normal rate and regular rhythm.      Pulses: Normal pulses.      Heart sounds: Normal heart sounds. No murmur heard.     No friction rub. No gallop.   Pulmonary:      Effort: Pulmonary effort is normal. No respiratory distress.      Breath sounds: No stridor. No wheezing, rhonchi or rales  (in bilateral lower lobes).   Abdominal:      General: Abdomen is flat. There is no distension.      Palpations: Abdomen is soft.      Tenderness: There is no abdominal tenderness. There is no guarding.   Musculoskeletal:         General: No swelling. Normal range of motion.      Cervical back: Normal range of motion. No muscular tenderness.      Right lower leg: No edema.      Left lower leg: No edema.   Skin:     General: Skin is warm and dry.      Coloration: Skin is not jaundiced or pale.      Findings: No bruising.   Neurological:      General: No focal deficit present.      Mental Status: She is alert and oriented to person, place, and time.   Psychiatric:         Mood and Affect: Mood normal.         Behavior: Behavior normal.         Thought Content: Thought content normal.         Judgment: Judgment normal.         Fluids    Intake/Output Summary (Last 24 hours) at 1/4/2024 1653  Last data filed at 1/4/2024 1524  Gross per 24 hour   Intake 1035 ml   Output 300 ml   Net 735 ml         Laboratory  Recent Labs     01/02/24  1514 01/03/24  0108   WBC 16.6* 16.4*   RBC 5.02 4.78   HEMOGLOBIN 15.3 14.6   HEMATOCRIT 46.1 45.1   MCV 91.8 94.4   MCH 30.5 30.5   MCHC 33.2 32.4   RDW 46.2 47.0   PLATELETCT 243 230   MPV 10.5 9.8       Recent Labs     01/02/24  1630 01/03/24  0108   SODIUM 135 135   POTASSIUM 4.9 3.9   CHLORIDE 99 99   CO2 24 23   GLUCOSE 130* 120*   BUN 15 15   CREATININE 0.76 0.75   CALCIUM 8.8 8.5                     Imaging  EC-ECHOCARDIOGRAM COMPLETE W/O CONT   Final Result      CT-CTA CHEST PULMONARY ARTERY W/ RECONS   Final Result      1.  There is no CT evidence of acute pulmonary embolism.   2.  There is mosaic attenuation with minimal dependent groundglass opacity possibly representing mild edema with trace amounts of pleural fluid.   3.  Dependent lingular airspace disease could be atelectasis or pneumonitis.   4.  Nonspecific lymph nodes in the mediastinum, probably reactive.             DX-CHEST-PORTABLE (1 VIEW)   Final Result         1.  Moderate cardiomegaly.      2.  Slight blunting of the costophrenic angles suspicious for small pleural effusions.           Assessment/Plan  Problem Representation:    * Acute respiratory failure with hypoxia (HCC)  Assessment & Plan  Not on home O2  2/2 to pneumonia and pulmonary edema    -treat pneumonia/volume as below  -IS  -supplemental O2 prn        Acute pulmonary edema (HCC)  Assessment & Plan  Repeat echocardiogram normal for age LVEF 55-60%.    -TTE overall normal for age  -lasix 20 mg IV x1 on admission, currently euvolemic we will continue to monitor volume status    Pneumonia due to infectious organism- (present on admission)  Assessment & Plan  Concern for possible pneumonitis on CTA chest  Query chronic aspiration?  Given SIRS with leukocytosis, tachypnea, fever at presentation treating for pneumonia  Procal wnl  Cultures NGTD  Started on C3 and doxy with no improvement in white count    -On 1/3 IV Unasyn and doxycycline initiated.  - On 1/4 patient lost IV access, will transition to oral Augmentin  -IS  -follow up cultures, NGTD  -SLP consult, appreciate assistance and recommendations for this patient   -SLP may perform diagnostic swallow eval, discharge recommendations pending          Pacemaker- (present on admission)  Assessment & Plan  Monitor on telemetry    Acquired hypothyroidism- (present on admission)  Assessment & Plan  Continue home medications    MDD (major depressive disorder), recurrent, in partial remission (HCC)- (present on admission)  Assessment & Plan  Continue home medications    Paroxysmal atrial fibrillation (HCC)- (present on admission)  Assessment & Plan  Rate controlled  Continue metoprolol, Eliquis and flecainide         VTE prophylaxis: therapeutic anticoagulation with eliquis    I have performed a physical exam and reviewed and updated ROS and Plan today (1/4/2024). In review of yesterday's note (1/3/2024), there are  no changes except as documented above.

## 2024-01-05 NOTE — PREADMISSION SCREENING NOTE
Pre-Admission Screening Form    Patient Information:   Name: Debra Rodrigues     MRN: [id number]       : 1935      Age: 88 y.o.   Gender: female      Race: White [7]       Marital Status:  [2]  Family Contact: JONAH MUÑIZ Greg        Relationship: Son [15]  Son [15]  Home Phone:              Cell Phone: [phone]  Advanced Directives: None  Code Status:  FULL  Current Attending Provider: Devin Castano M.D.  Referring Physician: Dr. Alexander  Physiatrist Consult: Dr. Yao   Referral Date: 23  Primary Payor Source:  MEDICARE  Secondary Payor Source:      Medical Information:   Date of Admission to Acute Care [place]2024  Room Number: S161/00  Rehabilitation Diagnosis: 0010.9 - Pulmonary Disorders: Other Pulmonary  Immunization History   Administered Date(s) Administered    Influenza Seasonal Injectable - Historical Data 2011, 10/15/2014, 10/27/2020, 2021, 2022    Influenza Vaccine Adult HD 10/10/2018, 2019, 10/27/2020, 2021    Influenza Vaccine Quad Inj (Pf) 10/06/2016, 10/04/2017    Influenza, Unspecified - HISTORICAL DATA 10/15/2014, 09/10/2015    PFIZER BIVALENT SARS-COV-2 VACCINE (12+) 2022    PFIZER PURPLE CAP SARS-COV-2 VACCINATION (12+) 2021, 2021, 2021    Pneumococcal Conjugate Vaccine (PCV20) 2022    Pneumococcal Conjugate Vaccine (Prevnar/PCV-13) 09/10/2015    Pneumococcal polysaccharide vaccine (PPSV-23) 2015    TD Vaccine 1998    Tdap Vaccine 2012, 2018    Zoster Vaccine Live (ZVL) (Zostavax) - HISTORICAL DATA 10/15/2014    Zoster Vaccine Recombinant (RZV) (SHINGRIX) 10/27/2020, 2022     Allergies   Allergen Reactions    Other Misc Anaphylaxis     x2 in ,pt tested,Cause unknown    Hydrocodone-Acetaminophen Vomiting    Sulfa Drugs Rash     Past Medical History:   Diagnosis Date    Acid reflux disease     Acquired hypothyroidism 2023    Allergy      "Anxiety     Arrhythmia 01/01/2009    Atrial fibrillation, resolved    Arthritis     At risk for sleep apnea     Blood transfusion without reported diagnosis     Bowel habit changes     diarrhea    Breath shortness     Cancer (HCC) 01/01/2009    left breast s/p mastectomy    CATARACT     surgically corrected, bilateral    Depression     GERD (gastroesophageal reflux disease)     Head ache     Heart burn     Heart valve disease     MVP    Hemorrhagic disorder (HCC)     anticoagulated on eliquis    Hypertension     IBD (inflammatory bowel disease)     Macular degeneration     Muscle disorder     Pacemaker 01/01/2009    Pneumonia due to infectious organism 1/2/2024    Stroke ([facility])     2019 - no deficits    Unspecified urinary incontinence     \"my bladder leaks when I lay down\"    Urinary bladder disorder     Urinary incontinence      Past Surgical History:   Procedure Laterality Date    BREAST RECONSTRUCTION  11/23/2010    Performed by ANNITA KELLEY at SURGERY [facility]    TISSUE EXPANDER PLACE/REMOVE  11/23/2010    Performed by ANNITA KELLEY at SURGERY [facility]    MASTOPEXY  11/23/2010    Performed by ANNITA KELLEY at SURGERY [facility]    BREAST RECONSTRUCTION  4/20/2010    Performed by ANNITA KELLEY at SURGERY [facility]    TISSUE EXPANDER PLACE/REMOVE  4/20/2010    Performed by ANNITA KELLEY at SURGERY [facility]    MASTECTOMY  5/13/2009    left    PACEMAKER INSERTION  2009    KNEE ARTHROSCOPY  1995    right    RAYNA BY LAPAROSCOPY  1995    HYSTERECTOMY, TOTAL ABDOMINAL  1972    BSO    MASTOIDECTOMY  1943    left    TONSILLECTOMY AND ADENOIDECTOMY  1942    CATARACT EXTRACTION WITH IOL      bilateral       History Leading to Admission, Conditions that Caused the Need for Rehab (CMS):     Dr. Mireles. H. H&P:  Chief Complaint  Patient presents with   Weakness   Flu Like Symptoms      Cough, congestion, N/V/D x 3 days     History of Presenting Illness  Debra Gallagher" Lilia is a 88 y.o. female who presented 1/2/2024 with past medical history of pacemaker, atrial fibrillation, hypothyroidism who presents to the hospital for cough and shortness of breath for the past 4 to 5 days.  It is associated with sore throat and nasal congestion.  Overall the patient has generalized weakness.  It is associated with fevers and chills.  She denies any nausea, vomiting or diarrhea.  The patient does complain about increased frequency of urination.  She lives in an assisted living facility.  She denies any difficulty swallowing after eating food.     Chest x-ray interpreted by me found bilateral pleural effusions and a right-sided infiltrate  CTA of the chest found bilateral pulmonary edema, bilateral pleural effusions.  It seems like small loculated effusion on the left.  EKG interpreted by me found atrial paced rhythm    Assessment/Plan:  Justification for Admission Status  I anticipate this patient will require at least two midnights for appropriate medical management, necessitating inpatient admission because pneumonia     Patient will need a Telemetry bed on MEDICAL service .  The need is secondary to pneumonia.     * Pneumonia due to infectious organism- (present on admission)  Assessment & Plan  Found to have right sided infiltrate  Started on Cap antibiotics including (ceftriaxone and doxycycline)   Follow up sputum and blood cultures  Respiratory care protocol   Placed on o2 therapy      Acute pulmonary edema (HCC)  Assessment & Plan  Hold Lasix in setting of pneumonia  Last echo found EF of 50 to 55%  On repeat cardiac echo  Monitor on telemetry     Acute respiratory failure with hypoxia (HCC)  Assessment & Plan  On 4 L of O2 above baseline     Pacemaker- (present on admission)  Assessment & Plan  Monitor on telemetry     Acquired hypothyroidism- (present on admission)  Assessment & Plan  Continue home medications     MDD (major depressive disorder), recurrent, in partial remission  (HCC)- (present on admission)  Assessment & Plan  Continue home medications     Paroxysmal atrial fibrillation (HCC)- (present on admission)  Assessment & Plan  Rate controlled  Continue metoprolol, Eliquis and flecainide     VTE prophylaxis: SCDs/TEDs    Dr. Yao (Physiatry) recommendations:  ASSESSMENT:  Patient is a 88 y.o. female admitted with pneumonia and pulmonary edema     [facility] Code / Diagnosis to Support: 0010.9 - Pulmonary Disorders: Other Pulmonary     Rehabilitation: Impaired ADLs and mobility  Patient is a good candidate for inpatient rehab based on needs for PT, OT, see dispo details below.      Barriers to transfer include: Insurance authorization, TCCs to verify disposition, medical clearance and bed availability      Additional Recommendations:  Hypoxia  Pulmonary edema  Community-acquired pneumonia  -Originally admitted with shortness of breath and cough  - Found to be influenza and COVID-negative  - Chest x-ray showed small pleural effusions  - CTA chest negative for PE, did show evidence of mild edema with trace amount of pleural fluid  - Started on Lasix for diuresis  - Patient is on Unasyn and doxycycline for required pneumonia  - Is requiring 4 L, is not on O2 at baseline  - Continue with PT/OT     Atrial fibrillation  - Status post pacemaker  - On metoprolol, Eliquis and flecainide     Hypothyroidism  - Continue with home dose Synthroid     Hypertension  - Is on home dose losartan and metoprolol     Dispo:  - patient is currently functioning below their level of baseline, recommend post acute rehab  - recommend IRF level therapy with 3hr of therapy 5 days per week  - piror to acceptance to IRF, will need confirmation that patient can return to [facility] skilled nursing after rehab.   - TCC to assist with insurance auth and DC support      Medical Complexity:  Hypoxia  Pulmonary edema  Community-acquired pneumonia  Atrial fibrillation  Status post pacemaker  Impaired mobility and ADLs     DVT PPX:  Eliquis    Date of Service  1/4/2024     [facility] Team: [facility] Team   Attending: Devin Castano M.d.  Senior Resident: Dr. Alvarez  Intern:  Dr. Alexander  Contact Number: [phone]     Chief Complaint  Debra Rodrigues is a 88 y.o. female admitted 1/2/2024 with sob.     Hospital Course  Debra Rodrigues is a 88 y.o. female who presented 1/2/2024 with past medical history of pacemaker, atrial fibrillation, hypothyroidism who presents to the hospital for cough and shortness of breath for the past 4 to 5 days.  It is associated with sore throat and nasal congestion.  Overall the patient has generalized weakness.  It is associated with fevers and chills.  She denies any nausea, vomiting or diarrhea.  The patient does complain about increased frequency of urination.  She lives in an assisted living facility.  She denies any difficulty swallowing after eating food.     Chest x-ray with bilateral pleural effusions and a right-sided infiltrate  CTA of the chest found bilateral pulmonary edema, bilateral pleural effusions with small loculated effusion on the left.     She was admitted for acute hypoxic respiratory failure 2/2 to pneumonia and pulmonary edema.     Interval Problem Update  No acute events overnight.  Patient resting comfortably, states that she is beginning to feel improved since admission.  Weaning oxygen as tolerated back to baseline oxygen requirement.  PMR evaluated patient and patient good candidate for postacute rehab on discharge likely 1/5.  In afternoon, patient lost peripheral IV access.  Patient is notably difficult stick.  Unable to place midline due to poor vasculature.  Transition antibiotics to oral at this time we will continue to monitor.  Can place central line if required, however given patient symptomatically improving and medically stable will defer at this time.     I have discussed this patient's plan of care and discharge plan at IDT rounds today with Case Management, Nursing,  Nursing leadership, and other members of the IDT team.     Consultants/Specialty  N/a     Code Status  Full Code     Disposition  The patient is not medically cleared for discharge to home or a post-acute facility.     I have placed the appropriate orders for post-discharge needs.     Review of Systems  Review of Systems   Constitutional:  Negative for chills, fever and malaise/fatigue.   HENT:  Negative for congestion.    Respiratory:  Positive for cough. Negative for sputum production, shortness of breath and wheezing.    Cardiovascular:  Negative for chest pain, orthopnea and leg swelling.   Gastrointestinal:  Negative for abdominal pain, diarrhea, nausea and vomiting.   Genitourinary:  Negative for dysuria, frequency and urgency.   Neurological:  Negative for focal weakness.   Psychiatric/Behavioral:  Negative for memory loss. The patient does not have insomnia.       Physical Exam  Temp:  [35.9 °C (96.7 °F)-36.8 °C (98.2 °F)] 36.8 °C (98.2 °F)  Pulse:  [65-70] 70  Resp:  [18-20] 19  BP: (129-179)/(45-83) 158/81  SpO2:  [91 %-97 %] 93 %     Physical Exam  Vitals reviewed.   Constitutional:       General: She is not in acute distress.     Appearance: Normal appearance. She is not ill-appearing or toxic-appearing.   HENT:      Head: Normocephalic and atraumatic.      Mouth/Throat:      Mouth: Mucous membranes are moist.      Pharynx: No oropharyngeal exudate.   Eyes:      Extraocular Movements: Extraocular movements intact.      Conjunctiva/sclera: Conjunctivae normal.      Pupils: Pupils are equal, round, and reactive to light.   Neck:      Vascular: No JVD.   Cardiovascular:      Rate and Rhythm: Normal rate and regular rhythm.      Pulses: Normal pulses.      Heart sounds: Normal heart sounds. No murmur heard.     No friction rub. No gallop.   Pulmonary:      Effort: Pulmonary effort is normal. No respiratory distress.      Breath sounds: No stridor. No wheezing, rhonchi or rales (in bilateral lower lobes).    Abdominal:      General: Abdomen is flat. There is no distension.      Palpations: Abdomen is soft.      Tenderness: There is no abdominal tenderness. There is no guarding.   Musculoskeletal:         General: No swelling. Normal range of motion.      Cervical back: Normal range of motion. No muscular tenderness.      Right lower leg: No edema.      Left lower leg: No edema.   Skin:     General: Skin is warm and dry.      Coloration: Skin is not jaundiced or pale.      Findings: No bruising.   Neurological:      General: No focal deficit present.      Mental Status: She is alert and oriented to person, place, and time.   Psychiatric:         Mood and Affect: Mood normal.         Behavior: Behavior normal.         Thought Content: Thought content normal.         Judgment: Judgment normal.      Fluids     Intake/Output Summary (Last 24 hours) at 1/4/2024 1653  Last data filed at 1/4/2024 1524  Gross per 24 hour  Intake 1035 ml  Output 300 ml  Net 735 ml     Laboratory  Recent Labs    01/02/24  1514 01/03/24  0108  WBC 16.6* 16.4*  RBC 5.02 4.78  HEMOGLOBIN 15.3 14.6  HEMATOCRIT 46.1 45.1  MCV 91.8 94.4  MCH 30.5 30.5  MCHC 33.2 32.4  RDW 46.2 47.0  PLATELETCT 243 230  MPV 10.5 9.8     Recent Labs    01/02/24  1630 01/03/24  0108  SODIUM 135 135  POTASSIUM 4.9 3.9  CHLORIDE 99 99  CO2 24 23  GLUCOSE 130* 120*  BUN 15 15  CREATININE 0.76 0.75  CALCIUM 8.8 8.5     Imaging  EC-ECHOCARDIOGRAM COMPLETE W/O CONT  Final Result     CT-CTA CHEST PULMONARY ARTERY W/ RECONS  Final Result     1.  There is no CT evidence of acute pulmonary embolism.  2.  There is mosaic attenuation with minimal dependent groundglass opacity possibly representing mild edema with trace amounts of pleural fluid.  3.  Dependent lingular airspace disease could be atelectasis or pneumonitis.  4.  Nonspecific lymph nodes in the mediastinum, probably reactive.     DX-CHEST-PORTABLE (1 VIEW)  Final Result     1.  Moderate cardiomegaly.     2.  Slight  blunting of the costophrenic angles suspicious for small pleural effusions.     Assessment/Plan  Problem Representation:     * Acute respiratory failure with hypoxia (HCC)  Assessment & Plan  Not on home O2  2/2 to pneumonia and pulmonary edema     -treat pneumonia/volume as below  -IS  -supplemental O2 prn     Acute pulmonary edema (HCC)  Assessment & Plan  Repeat echocardiogram normal for age LVEF 55-60%.     -TTE overall normal for age  -lasix 20 mg IV x1 on admission, currently euvolemic we will continue to monitor volume status     Pneumonia due to infectious organism- (present on admission)  Assessment & Plan  Concern for possible pneumonitis on CTA chest  Query chronic aspiration?  Given SIRS with leukocytosis, tachypnea, fever at presentation treating for pneumonia  Procal wnl  Cultures NGTD  Started on C3 and doxy with no improvement in white count     -On 1/3 IV Unasyn and doxycycline initiated.  - On 1/4 patient lost IV access, will transition to oral Augmentin  -IS  -follow up cultures, NGTD  -SLP consult, appreciate assistance and recommendations for this patient         -SLP may perform diagnostic swallow eval, discharge recommendations pending    Pacemaker- (present on admission)  Assessment & Plan  Monitor on telemetry     Acquired hypothyroidism- (present on admission)  Assessment & Plan  Continue home medications     MDD (major depressive disorder), recurrent, in partial remission (HCC)- (present on admission)  Assessment & Plan  Continue home medications     Paroxysmal atrial fibrillation (HCC)- (present on admission)  Assessment & Plan  Rate controlled  Continue metoprolol, Eliquis and flecainide     VTE prophylaxis: therapeutic anticoagulation with eliquis    Co-morbidities:  See PMH  Potential Risk - Complications: Cognitive Impairment, Contractures, Deep Vein Thrombosis, Incontinence, Malnutrition, Pain, Pneumonia, Pressure Ulcer, and Urinary Tract Infection  Level of Risk: High    Ongoing  "Medical Management Needed (Medical/Nursing Needs):   Patient Active Problem List    Diagnosis Date Noted    Pneumonia due to infectious organism 01/02/2024    Acute respiratory failure with hypoxia ([facility]) 01/02/2024    Acute pulmonary edema ([facility]) 01/02/2024    Dermatitis 12/08/2023    Cerebral atrophy ([facility]) 11/30/2023    BMI 29.0-29.9,adult 11/30/2023    Impaired mobility 10/25/2023    Urge incontinence of urine 09/14/2023    Other insomnia 09/14/2023    Open wound of right ankle 08/02/2023    Lower leg edema 08/02/2023    Paroxysmal atrial fibrillation ([facility]) 04/19/2023    Primary hypertension 04/19/2023    DDD (degenerative disc disease), lumbosacral 04/19/2023    Diverticulitis 04/19/2023    Polyneuropathy in other diseases classified elsewhere ([facility]) 04/19/2023    Other thrombophilia ([facility]) 04/19/2023    MDD (major depressive disorder), recurrent, in partial remission ([facility]) 04/19/2023    Cognitive impairment 04/19/2023    Falls 04/19/2023    Acquired hypothyroidism 04/19/2023    Pacemaker 04/19/2023    History of breast cancer 04/19/2023    Status post left mastectomy 04/19/2023    S/P breast reconstruction, left 04/19/2023    S/P total knee replacement, right 04/19/2023    Spinal stenosis of lumbosacral region 05/24/2022    Hammertoe of left foot 09/29/2021    Macular degeneration, 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75 0.74   ALBUMIN  --  3.3 3.1*  --    GLUCOSE  --  130* 120* " 104*     Additional Labs: Not Applicable    Prior Living Situation:   Housing / Facility: Assisted Living Residence (when asked, pt reported living in a2 story house by herself)  Steps Into Home: 0  Steps In Home: 0  Lives with - Patient's Self Care Capacity: Attendant / Paid Care Giver  Equipment Owned: 4-Wheel Walker, Single Point Cane, Wheelchair    Prior Level of Function / Living Situation:   Physical Therapy: Prior Services: Continuous (24 Hour) Care Giving Per Service  Housing / Facility: Assisted Living Residence (when asked, pt reported living in a2 story house by herself)  Steps Into Home: 0  Steps In Home: 0  Rail: None  Equipment Owned: 4-Wheel Walker, Single Point Cane, Wheelchair  Lives with - Patient's Self Care Capacity: Attendant / Paid Care Giver  Bed Mobility: Required Assist  Transfer Status: Required Assist  Ambulation: Required Assist  Assistive Devices Used: 4-Wheel Walker  Wheelchair: Dependent  Stairs: Unable To Determine At This Time (Patient states there are no stairs at her assisted living home.)  Current Level of Function:   Gait Level Of Assist: Moderate Assist  Assistive Device: Front Wheel Walker  Distance (Feet): 5  Deviation: Bradykinetic, Decreased Base Of Support (small steps)  Supine to Sit: Minimal Assist  Sit to Supine: Minimal Assist  Scooting: Maximal Assist  Comments: HOB slighlty elevated, cues for sequencing, extra time required  Sit to Stand: Minimal Assist  Bed, Chair, Wheelchair Transfer: Moderate Assist  Transfer Method: Stand Step  Sitting in Chair: NT  Sitting Edge of Bed: EOB post  Standing: <2 min total  Occupational Therapy:   Self Feeding: Independent  Grooming / Hygiene: Independent  Bathing: Requires Assist  Dressing: Requires Assist  Toileting: Independent  Medication Management: Requires Assist  Laundry: Requires Assist  Home Management: Requires Assist  Shopping: Dependent  Prior Level Of Mobility: Supervision With Device in Home (Normally walks most of the  way to the cafeteria for meals at [facility] per family)  Driving / Transportation: Relatives / Others Provide Transportation  Prior Services: Continuous (24 Hour) Care Giving Per Service  Housing / Facility: Assisted Living Residence (when asked, pt reported living in a2 story house by herself)  Occupation (Pre-Hospital Vocational): Retired Due To Age  Current Level of Function:   Lower Body Dressing: Maximal Assist (don/doff socks, has sock aid at home per family, does not use it, receives assist for dressing at baseline)  Speech Language Pathology:      Rehabilitation Prognosis/Potential: Good  Estimated Length of Stay: 10-12 days    Nursing:      Incontinent-external cath in place.    Scope/Intensity of Services Recommended:  Physical Therapy: 1.5 hr / day  5 days / week. Therapeutic Interventions Required: Maximize Endurance, Mobility, Strength, and Safety  Occupational Therapy: 1.5 hr / day 5 days / week. Therapeutic Interventions Required: Maximize Self Care, ADLs, IADLs, and Energy Conservation  Rehabilitation Nursin/. Therapeutic Interventions Required: Monitor Pain, Skin, Vital Signs, Intake and Output, Labs, Safety, and Family Training  Rehabilitation Physician: 3 - 5 days / week. Therapeutic Interventions Required: Medical Management  Respiratory Care: Pulmonary Toileting. Therapeutic Interventions Required: Pulmonary Toileting and O2 Weaning    She requires 24-hour rehabilitation nursing to manage bowel and bladder function, skin care, nutrition and fluid intake, pulmonary hygiene, pain control, safety, medication management, and patient/family goals. In addition, rehabilitation nursing will reiterate and reinforce therapy skills and equipment use, including ADLs, as well as provide education to the patient and family. Debra Rodrigues is willing to participate in and is able to tolerate the proposed plan of care.    Rehabilitation Goals and Plan (Expected frequency & duration of treatment in the  IRF):   Return to the Community, Modified Independent Level of Care, and Outpatient Support  Anticipated Date of Rehabilitation Admission: 01-05-23  Patient/Family oriented IRF level of care/facility/plan: Yes  Patient/Family willing to participate in IRF care/facility/plan: Yes  Patient able to tolerate IRF level of care proposed: Yes  Patient has potential to benefit IRF level of care proposed: Yes  Comments: Not Applicable    Special Needs or Precautions - Medical Necessity:  Safety Concerns/Precautions:  Fall Risk / High Risk for Falls, Balance, and Bed / Chair Alarm  Pain Management  Requires Oxygen  Current Medications:    Current Facility-Administered Medications Ordered in Epic   Medication Dose Route Frequency Provider Last Rate Last Admin    amoxicillin-clavulanate (Augmentin) 875-125 MG per tablet 1 Tablet  1 Tablet Oral Q12HRS Zo Alvarez M.D.   1 Tablet at 01/05/24 0531    hydrALAZINE (Apresoline) injection 10 mg  10 mg Intravenous Q4HRS PRN Devin Castano M.D.        nystatin (Mycostatin) powder   Topical TID Armando Alexander M.D.   Given at 01/05/24 0532    doxycycline monohydrate (Adoxa) tablet 100 mg  100 mg Oral Q12HRS Selvin Mireles M.D.   100 mg at 01/05/24 0532    acetaminophen (Tylenol) tablet 650 mg  650 mg Oral Q6HRS PRN Selvin Mireles M.D.        ondansetron (Zofran) syringe/vial injection 4 mg  4 mg Intravenous Q4HRS PRN Selvin Mireles M.D.        ondansetron (Zofran ODT) dispertab 4 mg  4 mg Oral Q4HRS PRN Selvin Mireles M.D.        guaiFENesin dextromethorphan (Robitussin DM) 100-10 MG/5ML syrup 10 mL  10 mL Oral Q6HRS PRN Selvin Mireles M.D.        apixaban (Eliquis) tablet 5 mg  5 mg Oral BID Selvin Mireles M.D.   5 mg at 01/05/24 0531    famotidine (Pepcid) tablet 20 mg  20 mg Oral Q12HRS Selvin Mireles M.D.   20 mg at 01/05/24 0532    flecainide (Tambocor) tablet 100 mg  100 mg Oral DAILY Selvin Mireles M.D.   100 mg at 01/05/24 0533    gabapentin (Neurontin) capsule 100 mg  100 mg  Oral Q12HRS Selvin Mireles M.D.   100 mg at 01/05/24 0531    levothyroxine (Synthroid) tablet 75 mcg  75 mcg Oral DAILY Sylvainad Chi M.D.   75 mcg at 01/05/24 0532    losartan (Cozaar) tablet 50 mg  50 mg Oral Q12HRS Hamkamryn Mireles M.D.   50 mg at 01/05/24 0532    metoprolol SR (Toprol XL) tablet 25 mg  25 mg Oral DAILY Selvin Mireles M.D.   25 mg at 01/05/24 0532    PARoxetine (Paxil) tablet 10 mg  10 mg Oral DAILY Selvin Mireles, M.D.   10 mg at 01/05/24 0532    traZODone (Desyrel) tablet 50 mg  50 mg Oral QHS Selvin Mireles M.D.   50 mg at 01/04/24 2031     No current Epic-ordered outpatient medications on file.     Diet:   DIET ORDERS (From admission to next 24h)       Start     Ordered    01/02/24 2040  Diet Order Diet: Regular  ALL MEALS        Question:  Diet:  Answer:  Regular    01/02/24 2039                    Anticipated Discharge Destination / Patient/Family Goal:  Destination: Assisted Living Support System: Attendant  Anticipated home health services: OT and PT  Previously used HH service/ provider: Not Applicable  Anticipated DME Needs: Oxygen, Walker, and Life Line  Outpatient Services: OT and PT  Alternative resources to address additional identified needs:   No future appointments.   Pre-Screen Completed: 1/5/2024 9:55 AM Isac Kwon L.P.N.

## 2024-01-05 NOTE — PROGRESS NOTES
Telemetry Monitor Reports    Rhythm: PACED  Hx: PVC  HR range: 65-67    .24/.09/.40    *per telemetry monitor tech

## 2024-01-05 NOTE — FLOWSHEET NOTE
01/05/24 1458   Events/Summary/Plan   Events/Summary/Plan RT Assessment   Vital Signs   Pulse 66   Respiration 16   Pulse Oximetry 95 %   $ Pulse Oximetry (Spot Check) Yes   Respiratory Assessment   Respiratory Pattern Within Normal Limits   Level of Consciousness Alert   Chest Exam   Work Of Breathing / Effort Within Normal Limits   Breath Sounds   RUL Breath Sounds Clear   RML Breath Sounds Clear   RLL Breath Sounds Crackles   ANGÉLICA Breath Sounds Clear   LLL Breath Sounds Crackles   Secretions   Cough Strong;Non Productive   Oxygen   O2 (LPM) 1   O2 Delivery Device Nasal Cannula   Smoking History   Have you ever smoked Never

## 2024-01-05 NOTE — ASSESSMENT & PLAN NOTE
Echo EF 55%  S/P PPM  On Flecainide  [facility]  Card[facility] F/U

## 2024-01-05 NOTE — CARE PLAN
The patient is Stable - Low risk of patient condition declining or worsening    Shift Goals  Clinical Goals: monitor oxygen demand and saturation, safety, skin care, oral abx, rest  Patient Goals: rest, get better  Family Goals: ROB    Progress made toward(s) clinical / shift goals:      Patient weaned to 1.5 L NC this shift with oxygen saturation of 92% and 93% at rest. Will attempt to wean patient more during waking hours later this morning.   Less coughing notes this shift compared to previous night.     Problem: Knowledge Deficit - Standard  Goal: Patient and family/care givers will demonstrate understanding of plan of care, disease process/condition, diagnostic tests and medications  Outcome: Progressing  Note: Plan of care discussed with patient, including medication regimen, transition to oral antibiotics from IV due to loss of IV access and inability to obtain access. Patient also updated on continued monitoring of oxygen demands and saturation and attempt to wean oxygen.      Problem: Skin Integrity  Goal: Skin integrity is maintained or improved  Outcome: Progressing  Note: Purewick in place, nystatin powder applied to pannus and groin to promote healing and prevent further skin breakdown. Waffle overlay on bed. And wound management in place for abrasion of left upper arm.      Problem: Fall Risk  Goal: Patient will remain free from falls  Outcome: Progressing  Note: Bed in lowest position, patient room near nurses station, room door ajar, and bed alarm in place and on. Frequent rounding utilized throughout this shift as well to promote patient safety.      Problem: Pain - Standard  Goal: Alleviation of pain or a reduction in pain to the patient’s comfort goal  Outcome: Progressing  Note: No complaints of pain this shift.       Patient is not progressing towards the following goals:

## 2024-01-05 NOTE — CONSULTS
HOSPITAL MEDICINE CONSULTATION    Requesting Physician:  Dr. Ruiz    Reason for Consult:  Hypertension    History of Present Illness:  The patient is an 88-year-old  female with past medical history significant for atrial fibrillation status post permanent pacemaker on Eliquis, hypertension, hypothyroidism, and depression.  She was admitted to [facility] on 1/2/24 for malaise and hypoxia.  She was diagnosed with pulmonary edema and pneumonia per CTA, and treated with diuretics and antibiotics.  Due to her ongoing functional debility, the patient was transferred to [facility] on 1/5/24.  Hospital Medicine consultation is requested to assist in the management of this patient's HTN.  She is also noted to have azotemia.    Review of Systems:  Review of Systems   Constitutional:  Negative for chills and fever.   HENT: Negative.     Eyes: Negative.    Respiratory:  Negative for cough and shortness of breath.    Cardiovascular:  Negative for chest pain and palpitations.   Gastrointestinal:  Negative for abdominal pain, nausea and vomiting.        +bloating   Genitourinary: Negative.    Musculoskeletal: Negative.    Skin:  Negative for itching and rash.   Neurological:  Positive for weakness.   Endo/Heme/Allergies:  Negative for polydipsia. Does not bruise/bleed easily.   Psychiatric/Behavioral:  Positive for depression.    All other systems reviewed and are negative.      Allergies:  Allergies   Allergen Reactions    Other Misc Anaphylaxis     x2 in 2005,pt tested,Cause unknown    Hydrocodone-Acetaminophen Vomiting    Sulfa Drugs Rash       Medications:    Current Facility-Administered Medications:     Respiratory Therapy Consult, , Nebulization, Continuous RT, Eugenia Ruiz D.O.    Pharmacy Consult Request ...Pain Management Review 1 Each, 1 Each, Other, PHARMACY TO DOSE, Eugenia Ruiz D.O.    hydrALAZINE (Apresoline) tablet 25 mg, 25 mg, Oral, Q8HRS PRN,  Eugenia Ruiz D.O.    senna-docusate (Pericolace Or Senokot S) 8.6-50 MG per tablet 2 Tablet, 2 Tablet, Oral, BID **AND** polyethylene glycol/lytes (Miralax) Packet 1 Packet, 1 Packet, Oral, QDAY PRN **AND** magnesium hydroxide (Milk Of Magnesia) suspension 30 mL, 30 mL, Oral, QDAY PRN **AND** bisacodyl (Dulcolax) suppository 10 mg, 10 mg, Rectal, QDAY PRN, Eugenia Ruiz D.O.    carboxymethylcellulose (Refresh Tears) 0.5 % ophthalmic drops 1 Drop, 1 Drop, Both Eyes, PRN, Eugenia Ruiz D.O.    benzocaine-menthol (Cepacol) lozenge 1 Lozenge, 1 Lozenge, Mouth/Throat, Q2HRS PRN, Eugenia Ruiz D.O.    mag hydrox-al hydrox-simeth (Maalox Plus Es Or Mylanta Ds) suspension 20 mL, 20 mL, Oral, Q2HRS PRN, Eugenia Ruiz D.O.    ondansetron (Zofran ODT) dispertab 4 mg, 4 mg, Oral, 4X/DAY PRN **OR** ondansetron (Zofran) syringe/vial injection 4 mg, 4 mg, Intramuscular, 4X/DAY PRN, Eugenia Ruiz D.O.    sodium chloride (Ocean) 0.65 % nasal spray 2 Spray, 2 Spray, Nasal, PRN, Eugenia Ruiz D.O.    acetaminophen (Tylenol) tablet 650 mg, 650 mg, Oral, Q6HRS PRN, Eugenia Ruiz D.O.    guaiFENesin dextromethorphan (Robitussin DM) 100-10 MG/5ML syrup 10 mL, 10 mL, Oral, Q6HRS PRN, Eugenia Ruiz D.O.    nystatin (Mycostatin) powder, , Topical, TID, Eugenia Ruiz D.O.    amoxicillin-clavulanate (Augmentin) 875-125 MG per tablet 1 Tablet, 1 Tablet, Oral, Q12HRS, Eugenia Ruiz D.O.    apixaban (Eliquis) tablet 5 mg, 5 mg, Oral, BID, Eugenia Ruiz D.O.    doxycycline monohydrate (Adoxa) tablet 100 mg, 100 mg, Oral, Q12HRS, Eugenia Ruiz D.O.    famotidine (Pepcid) tablet 20 mg, 20 mg, Oral, Q12HRS, Eugenia Ruiz D.O.    [START ON 1/6/2024] flecainide (Tambocor) tablet 100 mg, 100 mg, Oral, DAILY, Eugenia Ruiz D.O.    traZODone (Desyrel) tablet 50 mg, 50 mg, Oral, QHS, Eugenia Ruiz D.O.    [START ON 1/6/2024] PARoxetine (Paxil) tablet 10 mg, 10 mg, Oral, DAILY,  "Eugenia Ruiz D.O.    [START ON 1/6/2024] metoprolol SR (Toprol XL) tablet 25 mg, 25 mg, Oral, DAILY, Eugenia Ruiz D.O.    losartan (Cozaar) tablet 50 mg, 50 mg, Oral, Q12HRS, Eugenia Ruiz D.O.    [START ON 1/6/2024] levothyroxine (Synthroid) tablet 75 mcg, 75 mcg, Oral, DAILY, Eugenia Ruiz D.O.    gabapentin (Neurontin) capsule 100 mg, 100 mg, Oral, Q EVENING, Eugenia Ruiz D.O.    Past Medical/Surgical History:  Past Medical History:   Diagnosis Date    Acid reflux disease     Acquired hypothyroidism 4/19/2023    Allergy     Anxiety     Arrhythmia 01/01/2009    Atrial fibrillation, resolved    Arthritis     At risk for sleep apnea     Blood transfusion without reported diagnosis     Bowel habit changes     diarrhea    Breath shortness     Cancer (HCC) 01/01/2009    left breast s/p mastectomy    CATARACT     surgically corrected, bilateral    Depression     GERD (gastroesophageal reflux disease)     Head ache     Heart burn     Heart valve disease     MVP    Hemorrhagic disorder ([facility])     anticoagulated on eliquis    Hypertension     IBD (inflammatory bowel disease)     Macular degeneration     Muscle disorder     Pacemaker 01/01/2009    Pneumonia due to infectious organism 1/2/2024    Stroke ([facility])     2019 - no deficits    Unspecified urinary incontinence     \"my bladder leaks when I lay down\"    Urinary bladder disorder     Urinary incontinence      Past Surgical History:   Procedure Laterality Date    BREAST RECONSTRUCTION  11/23/2010    Performed by ANNITA KELLEY at [facility]    TISSUE EXPANDER PLACE/REMOVE  11/23/2010    Performed by ANNITA KELLEY at [facility]    MASTOPEXY  11/23/2010    Performed by ANNITA KELLEY at [facility]    BREAST RECONSTRUCTION  4/20/2010    Performed by ANNITA KELLEY at [facility]    TISSUE EXPANDER PLACE/REMOVE  4/20/2010    Performed by ANNITA KELLEY at [facility] " "   MASTECTOMY  5/13/2009    left    PACEMAKER INSERTION  2009    KNEE ARTHROSCOPY  1995    right    RAYNA BY LAPAROSCOPY  1995    HYSTERECTOMY, TOTAL ABDOMINAL  1972    BSO    MASTOIDECTOMY  1943    left    TONSILLECTOMY AND ADENOIDECTOMY  1942    CATARACT EXTRACTION WITH IOL      bilateral       Social History:  Social History     Socioeconomic History    Marital status:      Spouse name: Not on file    Number of children: Not on file    Years of education: Not on file    Highest education level: Not on file   Occupational History    Not on file   Tobacco Use    Smoking status: Never    Smokeless tobacco: Never   Vaping Use    Vaping Use: Never used   Substance and Sexual Activity    Alcohol use: Yes     Alcohol/week: 3.5 oz     Types: 7 Standard drinks or equivalent per week     Comment: one per day    Drug use: No    Sexual activity: Not on file   Other Topics Concern    Not on file   Social History Narrative    Not on file     Social Determinants of Health     Financial Resource Strain: Not on file   Food Insecurity: Not on file   Transportation Needs: Not on file   Physical Activity: Not on file   Stress: Not on file   Social Connections: Not on file   Intimate Partner Violence: Not on file   Housing Stability: Not on file       Family History:  Family History   Problem Relation Age of Onset    Heart Disease Unknown     Hypertension Unknown     Cancer Unknown        Physical Examination:   Vitals:    01/05/24 1215   BP: (!) 163/81   Pulse: 65   Resp: 16   Temp: 37.1 °C (98.8 °F)   TempSrc: Oral   SpO2: 95%   Weight: 89 kg (196 lb 3.4 oz)   Height: 1.676 m (5' 6\")       Physical Exam  Vitals reviewed.   Constitutional:       General: She is not in acute distress.     Appearance: Normal appearance. She is not ill-appearing.   HENT:      Head: Normocephalic and atraumatic.      Right Ear: External ear normal.      Left Ear: External ear normal.      Nose: Nose normal.      Mouth/Throat:      Pharynx: " Oropharynx is clear.   Eyes:      General:         Right eye: No discharge.         Left eye: No discharge.      Extraocular Movements: Extraocular movements intact.      Conjunctiva/sclera: Conjunctivae normal.   Cardiovascular:      Rate and Rhythm: Normal rate and regular rhythm.   Pulmonary:      Effort: Pulmonary effort is normal. No respiratory distress.      Breath sounds: Normal breath sounds. No wheezing.   Abdominal:      General: Bowel sounds are normal. There is distension.      Palpations: Abdomen is soft.      Tenderness: There is no abdominal tenderness. There is no guarding or rebound.   Musculoskeletal:      Cervical back: Normal range of motion and neck supple.      Right lower leg: No edema.      Left lower leg: No edema.   Skin:     General: Skin is warm and dry.   Neurological:      Mental Status: She is alert and oriented to person, place, and time.         Laboratory Data:  Recent Labs     01/02/24  1514 01/03/24  0108 01/05/24  0306   WBC 16.6* 16.4* 9.2   RBC 5.02 4.78 4.07*   HEMOGLOBIN 15.3 14.6 12.4   HEMATOCRIT 46.1 45.1 38.0   MCV 91.8 94.4 93.4   MCH 30.5 30.5 30.5   MCHC 33.2 32.4 32.6   RDW 46.2 47.0 45.1   PLATELETCT 243 230 253   MPV 10.5 9.8 10.9     Recent Labs     01/02/24  1630 01/03/24  0108 01/05/24  0306   SODIUM 135 135 138   POTASSIUM 4.9 3.9 3.8   CHLORIDE 99 99 102   CO2 24 23 25   GLUCOSE 130* 120* 104*   BUN 15 15 26*   CREATININE 0.76 0.75 0.74   CALCIUM 8.8 8.5 8.4*       Imaging:      Impressions/Recommendations:  Primary hypertension  Observe blood pressure trends on Losartan and Toprol    Paroxysmal atrial fibrillation (HCC)  Echo EF 55%  S/P PPM  On Flecainide  Anticoagulated on Eliquis  Cardiology F/U    Acquired hypothyroidism  Check TFT's on Synthroid    MDD (major depressive disorder), recurrent, in partial remission (HCC)  On Paxil    Azotemia  S/P diuresis at [facility]  Encourage PO fluids  Check F/U labs in AM    Hypoxia  S/P IV Lasix at [facility] for pulm  edema  Completing Augmentin and Doxy for PNA  RT protocol    Abdominal distension  Check KUB    Full Code    Thank you for the opportunity to assist in this patient's care.  We will continue to follow along with you.

## 2024-01-05 NOTE — FLOWSHEET NOTE
01/05/24 1548   Events/Summary/Plan   Events/Summary/Plan Placed back on 1 lpm oxygen   Oxygen   Room Air Challenge Fail  (Room air SpO2=87%)

## 2024-01-05 NOTE — DISCHARGE INSTRUCTIONS
You are hospitalized for pulmonary edema also known as fluid on the lungs.  Along with elevated markers of inflammation concerning for pneumonia or bacterial infection of the lungs.  You improved clinically with decreasing inflammatory markers on IV antibiotics and safety transition to oral Augmentin twice a day to complete on 1/8.  Physical therapy evaluated you during her stay and recommended continued rehabilitation at the inpatient rehab facility through the physical medicine department prior to return to assisted living facility.

## 2024-01-05 NOTE — PROGRESS NOTES
Monitor summary  Rhythm:  paced  Ectopy:   HR:  65-79  .14/.10/.41  Per strip from monitor room

## 2024-01-05 NOTE — H&P
Physical Medicine & Rehabilitation  History and Physical (H&P)  &     Post Admission Physician Evaluation (AQUILES)       Date of Admission: 1/5/2024   Date of Service: 1/5/2024   Debra Rodrigues  Room/bed 09/01    [facility] Code to Support Admission: 0010.9 - Pulmonary Disorders: Other Pulmonary  Etiologic Diagnosis: Pneumonia due to infectious organism    _______________________________________________    Chief Complaint: Pneumonia    History of Present Illness:    Debra Rodrigues is an 88-year-old female with past medical history significant for depression, atrial fibrillation, PPM, hypothyroidism, hypertension, chronic neuropathy who presented to the emergency department at [facility] on January 2, 2024 with chief complaint of generalized weakness, cough, congestion which has been ongoing since approximately 3 days prior.  She resides at [facility] and staff noticed that she was weaker with some confusion.  Upon arrival to the emergency department she was satting at 82% on room air with a temperature of 100.6 °F.  She was placed on 5 L of oxygen.  She had some mild swelling in her legs, patient reported not any different than usual.  Chest x-ray showed bilateral pleural effusions and right-sided infiltrate.  CTA of the chest was negative for pulmonary embolism but did show pulmonary edema and pleural effusions.  Echocardiogram was normal for age.  Patient diagnosed with community-acquired pneumonia and started on antibiotics.  Patient was found to be functioning below her baseline per PT/OT and acute rehabilitation was recommended.  Patient admitted to ARU 1/5/2024.    On admission to ARU patient reports she is tired. Her son and DIL are at bedside. Patient was able to go to the bathroom on her own. She had started to sleep in her recliner, it was safer than the bed. Family notes she had been slowly declining, doing less exercise, and gaining some weight. Patient has painful  "neuropathy which is chronic. Had been on Gabapentin.    Review of Systems:     14 point ROS reviewed and negative except as stated above.      Past Medical History:  Past Medical History:   Diagnosis Date    Acid reflux disease     Acquired hypothyroidism 4/19/2023    Allergy     Anxiety     Arrhythmia 01/01/2009    Atrial fibrillation, resolved    Arthritis     At risk for sleep apnea     Blood transfusion without reported diagnosis     Bowel habit changes     diarrhea    Breath shortness     Cancer (HCC) 01/01/2009    left breast s/p mastectomy    CATARACT     surgically corrected, bilateral    Depression     GERD (gastroesophageal reflux disease)     Head ache     Heart burn     Heart valve disease     MVP    Hemorrhagic disorder ([facility])     anticoagulated on eliquis    Hypertension     IBD (inflammatory bowel disease)     Macular degeneration     Muscle disorder     Pacemaker 01/01/2009    Pneumonia due to infectious organism 1/2/2024    Stroke ([facility])     2019 - no deficits    Unspecified urinary incontinence     \"my bladder leaks when I lay down\"    Urinary bladder disorder     Urinary incontinence        Past Surgical History:  Past Surgical History:   Procedure Laterality Date    BREAST RECONSTRUCTION  11/23/2010    Performed by ANNITA KELLEY at SURGERY [facility]    TISSUE EXPANDER PLACE/REMOVE  11/23/2010    Performed by ANNITA KELLEY at SURGERY [facility]    MASTOPEXY  11/23/2010    Performed by ANNITA KELLEY at SURGERY [facility]    BREAST RECONSTRUCTION  4/20/2010    Performed by ANNITA KELLEY at [facility]    TISSUE EXPANDER PLACE/REMOVE  4/20/2010    Performed by ANNITA KELLEY at [facility]    MASTECTOMY  5/13/2009    left    PACEMAKER INSERTION  2009    KNEE ARTHROSCOPY  1995    right    RAYNA BY LAPAROSCOPY  1995    HYSTERECTOMY, TOTAL ABDOMINAL  1972    BSO    MASTOIDECTOMY  1943    left    TONSILLECTOMY AND ADENOIDECTOMY  1942    " CATARACT EXTRACTION WITH IOL      bilateral       Family History:  Family History   Problem Relation Age of Onset    Heart Disease Unknown     Hypertension Unknown     Cancer Unknown        Medications:  Current Facility-Administered Medications   Medication Dose    Respiratory Therapy Consult      Pharmacy Consult Request ...Pain Management Review 1 Each  1 Each    hydrALAZINE (Apresoline) tablet 25 mg  25 mg    senna-docusate (Pericolace Or Senokot S) 8.6-50 MG per tablet 2 Tablet  2 Tablet    And    polyethylene glycol/lytes (Miralax) Packet 1 Packet  1 Packet    And    magnesium hydroxide (Milk Of Magnesia) suspension 30 mL  30 mL    And    bisacodyl (Dulcolax) suppository 10 mg  10 mg    carboxymethylcellulose (Refresh Tears) 0.5 % ophthalmic drops 1 Drop  1 Drop    benzocaine-menthol (Cepacol) lozenge 1 Lozenge  1 Lozenge    mag hydrox-al hydrox-simeth (Maalox Plus Es Or Mylanta Ds) suspension 20 mL  20 mL    ondansetron (Zofran ODT) dispertab 4 mg  4 mg    Or    ondansetron (Zofran) syringe/vial injection 4 mg  4 mg    sodium chloride (Ocean) 0.65 % nasal spray 2 Spray  2 Spray    acetaminophen (Tylenol) tablet 650 mg  650 mg    guaiFENesin dextromethorphan (Robitussin DM) 100-10 MG/5ML syrup 10 mL  10 mL    nystatin (Mycostatin) powder      amoxicillin-clavulanate (Augmentin) 875-125 MG per tablet 1 Tablet  1 Tablet    apixaban (Eliquis) tablet 5 mg  5 mg    doxycycline monohydrate (Adoxa) tablet 100 mg  100 mg    famotidine (Pepcid) tablet 20 mg  20 mg    [START ON 1/6/2024] flecainide (Tambocor) tablet 100 mg  100 mg    traZODone (Desyrel) tablet 50 mg  50 mg    [START ON 1/6/2024] PARoxetine (Paxil) tablet 10 mg  10 mg    [START ON 1/6/2024] metoprolol SR (Toprol XL) tablet 25 mg  25 mg    losartan (Cozaar) tablet 50 mg  50 mg    [START ON 1/6/2024] levothyroxine (Synthroid) tablet 75 mcg  75 mcg    gabapentin (Neurontin) capsule 100 mg  100 mg       Allergies:  Other misc, Hydrocodone-acetaminophen, and  Sulfa drugs    Psychosocial History:  Housing / Facility: Assisted Living Residence (when asked, pt reported living in a2 story house by herself)  Steps Into Home: 0  Steps In Home: 0  Lives with - Patient's Self Care Capacity: Attendant / Paid Care Giver  Equipment Owned: 4-Wheel Walker, Single Point Cane, Wheelchair    Level of Function Prior to Disability:  Housing / Facility: Assisted Living Residence (when asked, pt reported living in a2 story house by herself)  Steps Into Home: 0  Steps In Home: 0  Rail: None  Equipment Owned: 4-Wheel Walker, Single Point Cane, Wheelchair  Lives with - Patient's Self Care Capacity: Attendant / Paid Care Giver  Bed Mobility: Required Assist  Transfer Status: Required Assist  Ambulation: Required Assist  Assistive Devices Used: 4-Wheel Walker  Wheelchair: Dependent  Stairs: Unable To Determine At This Time (Patient states there are no stairs at her assisted living home.)    Self Feeding: Independent  Grooming / Hygiene: Independent  Bathing: Requires Assist  Dressing: Requires Assist  Toileting: Independent  Medication Management: Requires Assist  Laundry: Requires Assist  Home Management: Requires Assist  Shopping: Dependent  Prior Level Of Mobility: Supervision With Device in Home (Normally walks most of the way to the cafeteria for meals at [facility] per family)  Driving / Transportation: Relatives / Others Provide Transportation  Prior Services: Continuous (24 Hour) Care Giving Per Service  Housing / Facility: Assisted Living Residence (when asked, pt reported living in a2 story house by herself)  Occupation (Pre-Hospital Vocational): Retired Due To Age    Level of Function Prior to Admission to [facility]:  Gait Level Of Assist: Moderate Assist  Assistive Device: Front Wheel Walker  Distance (Feet): 5  Deviation: Bradykinetic, Decreased Base Of Support (small steps)  Supine to Sit: Minimal Assist  Sit to Supine: Minimal Assist  Scooting: Maximal  "Assist  Comments: HOB slighlty elevated, cues for sequencing, extra time required  Sit to Stand: Minimal Assist  Bed, Chair, Wheelchair Transfer: Moderate Assist  Transfer Method: Stand Step  Sitting in Chair: NT  Sitting Edge of Bed: EOB post  Standing: <2 min total    Lower Body Dressing: Maximal Assist (don/doff socks, has sock aid at home per family, does not use it, receives assist for dressing at baseline)     CURRENT LEVEL OF FUNCTION:   Same as level of function prior to admission to [facility]    Physical Examination:     VITAL SIGNS:   height is 1.676 m (5' 6\") and weight is 89 kg (196 lb 3.4 oz). Her oral temperature is 37.1 °C (98.8 °F). Her blood pressure is 163/81 (abnormal) and her pulse is 65. Her respiration is 16 and oxygen saturation is 95%.   GENERAL: No apparent distress  HEENT: Normocephalic/atraumatic and EOMI  CARDIAC: EXT WWP. Chronic vascular skin changes BLE  LUNGS: Breathing non labored. requiring supplemental O2  ABDOMINAL: bowel sounds present and soft    EXTREMITIES: no spasticity  NEURO:  Mental status:  A&Ox4 (person, place, date, situation) answers questions appropriately follows commands  Speech: fluent, no aphasia or dysarthria    Motor Exam Lower Extremities  ? Myotome R L   Hip flexion L2 3 3   Knee extension L3 3 3   Ankle dorsiflexion L4 4 3   Toe extension L5 4 2   Ankle plantarflexion S1 4 2       Sensory: intact to light touch through out  DTRs: 2+ in bilateral biceps and patellar tendons  Negative babinski b/l  Negative Vidales b/l   Tone: no spasticity noted    Radiology:  CTA chest 1/2/2024  1.  There is no CT evidence of acute pulmonary embolism.  2.  There is mosaic attenuation with minimal dependent groundglass opacity possibly representing mild edema with trace amounts of pleural fluid.  3.  Dependent lingular airspace disease could be atelectasis or pneumonitis.  4.  Nonspecific lymph nodes in the mediastinum, probably reactive.    Echocardiogram " 1/4/2024  CONCLUSIONS  Normal transthoracic echocardiogram for age.   Left Ventricle  Normal left ventricular chamber size. Moderate concentric left   ventricular hypertrophy. Normal left ventricular systolic function. The   left ventricular ejection fraction is visually estimated to be 55 -   60%. No regional wall motion abnormalities.       Laboratory Values:  Recent Labs     01/02/24  1630 01/03/24  0108 01/05/24  0306   SODIUM 135 135 138   POTASSIUM 4.9 3.9 3.8   CHLORIDE 99 99 102   CO2 24 23 25   GLUCOSE 130* 120* 104*   BUN 15 15 26*   CREATININE 0.76 0.75 0.74   CALCIUM 8.8 8.5 8.4*     Recent Labs     01/02/24  1514 01/03/24  0108 01/05/24  0306   WBC 16.6* 16.4* 9.2   RBC 5.02 4.78 4.07*   HEMOGLOBIN 15.3 14.6 12.4   HEMATOCRIT 46.1 45.1 38.0   MCV 91.8 94.4 93.4   MCH 30.5 30.5 30.5   MCHC 33.2 32.4 32.6   RDW 46.2 47.0 45.1   PLATELETCT 243 230 253   MPV 10.5 9.8 10.9           Primary Rehabilitation Diagnosis:    This patient is a 88 y.o. female admitted for acute inpatient rehabilitation with Pneumonia due to infectious organism.    Impairments:   Cognitive  ADLs/IADLs  Mobility  Swallow    Secondary Diagnosis/Medical Co-morbidities Affecting Function  Leukocytosis, impaired ADLs, impaired mobility, pulmonary edema, hypoxia, atrial fibrillation, hypothyroidism, depression    Relevant Changes Since Preadmission Evaluation:    Status unchanged    The patient's rehabilitation potential is Very Good  The patient's medical prognosis is good    Rehabilitation Plan:   Discussion and Recommendations:   1. The patient requires an acute inpatient rehabilitation program with a coordinated program of care at an intensity and frequency not available at a lower level of care. This recommendation is substantiated by the patient's medical physicians who recommend that the patient's intervention and assessment of medical issues needs to be done at an acute level of care for patient's safety and maximum outcome.   2. A  coordinated program of care will be supplied by an interdisciplinary team of physical therapy, occupational therapy, rehab physician, rehab nursing, and, if needed, speech therapy and rehab psychology. Rehab team presents a patient-specific rehabilitation and education program concentrating on prevention of future problems related to accessibility, mobility, skin, bowel, bladder, sexuality, and psychosocial and medical/surgical problems.   3. Need for Rehabilitation Physician: The rehab physician will be evaluating the patient on a multi-weekly basis to help coordinate the program of care. The rehab physician communicates between medical physicians, therapists, and nurses to maximize the patient's potential outcome. Specific areas in which the rehab physician will be providing daily assessment include the following:   A. Assessing the patient's heart rate and blood pressure response (vitals monitoring) to activity and making adjustments in medications or conservative measures as needed.   B. The rehab physician will be assessing the frequency at which the program can be increased to allow the patient to reach optimal functional outcome.   C. The rehab physician will also provide assessments in daily skin care, especially in light of patient's impairments in mobility.   D. The rehab physician will provide special expertise in understanding how to work with functional impairment and recommend appropriate interventions, compensatory techniques, and education that will facilitate the patient's outcome.   4. Rehab R.N.   The rehab RN will be working with patient to carry over in room mobility and activities of daily living when the patient is not in 3 hours of skilled therapy. Rehab nursing will be working in conjunction with rehab physician to address all the medical issues above and continue to assess laboratory work and discuss abnormalities with the treating physicians, assess vitals, and response to activity, and  discuss and report abnormalities with the rehab physician. Rehab RN will also continue daily skin care, supervise bladder/bowel program, instruct in medication administration, and ensure patient safety.   5. Rehab Therapy: Therapies to treat at intensity and frequency of (may change after completion of evaluation by all therapeutic disciplines):       PT:  Physical therapy to address mobility, transfer, gait training and evaluation for adaptive equipment needs 1 hour/day at least 5 days/week for the duration of the ELOS (see below)       OT:  Occupational therapy to address ADLs, self-care, home management training, functional mobility/transfers and assistive device evaluation, and community re-integration 1  hour/day at least 5 days/week for the duration of the ELOS (see below).        ST/Dysphagia:  Speech therapy to address speech, language, and cognitive deficits as well as swallowing difficulties with retraining/dysphagia management and community re-integration with comprehension, expression, cognitive training 1  hour/day at least 5 days/week for the duration of the ELOS (see below).     Medical management / Rehabilitation Issues/ Adverse Potential as part of rehabilitation plan     Rehabilitation Issues/Adverse Potential  1.  Debility secondary to pneumonia: Patient demonstrates functional deficits in strength, balance, coordination, and ADL's. Patient is admitted to [facility] for comprehensive rehabilitation therapy as described below.   Rehabilitation nursing monitors bowel and bladder control, educates on medication administration, co-morbidities and monitors patient safety.    2.  Neurostimulants: None at this time but continue to assess daily for need to initiate should status change.    3.  DVT prophylaxis:  Patient is on Eliquis for anticoagulation upon transfer. Encourage OOB. Monitor daily for signs and symptoms of DVT including but not limited to swelling and pain to prevent the  development of DVT that may interfere with therapies.    4.  GI prophylaxis:  On prilosec to prevent gastritis/dyspepsia which may interfere with therapies.    5.  Pain: Controlled with Tylenol    6.  Nutrition/Dysphagia: Dietician monitors nutrient intake, recommend supplements prn and provide nutrition education to pt/family to promote optimal nutrition for wound healing/recovery.     7.  Bladder/bowel:  Start bowel and bladder program as described below, to prevent constipation, urinary retention (which may lead to UTI), and urinary incontinence (which will impact upon pt's functional independence).   - TV Q3h while awake with post void bladder scans, I&O cath for PVRs >400  - up to commode after meal     8.  Skin/dermal ulcer prophylaxis: Monitor for new skin conditions with q.2 h. turns as required to prevent the development of skin breakdown.     9.  Cognition/Behavior: As needed psychologist provides adjustment counseling to illness and psychosocial barriers that may be potential barriers to rehabilitation.     10. Respiratory therapy: RT performs O2 management prn, breathing retraining, pulmonary hygiene and bronchospasm management prn to optimize participation in therapies.     Medical Co-Morbidities/Adverse Potential Affecting Function:    Community-acquired pneumonia  Flu, RSV, COVID-negative  Respiratory cultures PENDING (unclear if collected at [facility])  Blood cultures collected 1/2/2024 no growth to date  PT and OT for mobility and ADLs. Per guidelines, 15 hours per week between PT, OT and/or SLP.  Follow-up geriatrician  Continue Augmentin through 1/8  Continue doxycycline through 1/9    Atrial fibrillation  Permanent pacemaker  Recently referred to cardiology for local establishment (had been in [facility])  Continue Eliquis 5 mg twice daily  Continue flecainide 100 mg daily  Continue with oral 25 mg XL daily    History of Stroke  Left Hemiparesis  Monitor    Impaired cognition  ?  Encephalopathy secondary  to ammonia  SLP to establish baseline    Hypertension  Continue Cozaar 50 mg every 12 hours  Continue Toprol XL 25 mg daily    Hypothyroidism  Continue Synthroid 75 mcg daily    Depression  Insomnia  Continue trazodone 50 mg nightly (QTc 454)  Continue Paxil 10 mg daily    Pain  Tylenol as needed  Chronic neuropathy - Gabapentin at night    Skin  Patient at risk for skin breakdown due to debility in areas including sacrum, achilles, elbows and head in addition to other sites. Nursing to assess skin daily.     GI Ppx  Patient on Prilosec for GERD prophylaxis.     Bowel   Patient on Senna-docusate for constipation prophylaxis.     Bladder  TV/PVR/BS PRN    DVT PROPHYLAXIS: On Eliquis for atrial fibrillation    HOSPITALIST FOLLOWING: YES - 1/5    CODE STATUS: Reviewed note from geriatrics 11/30/2023 CODE STATUS DNAR/DNI - d/w patient and family and confirm this code status.    DISPO: Home to [facility]    JEANINE: TBD    ADDITIONAL MEDICAL NEEDS ON D/C (IV abx, O2, etc): To be determined    MEDS SENT TO: TBD    DISCHARGE SPECIALIST FOLLOW UP: Patient to scheduled follow up with their PCP within 2 weeks from discharge from the [facility].     I personally performed a complete drug regimen review and no potential clinically significant medication issues were identified.     Goals/Expected Level of Function Based on Current Medical and Functional Status:  (may change based on patient's medical status and rate of impairment recovery)  Transfers:   Modified Independent  Mobility/Gait:   Modified Independent  ADL's:   Modified Independent  Cognition:  Independent   Swallowing:  Independent    DISPOSITION: Discharge to pre-morbid independent living setting with the supportive care of patient's community resources.    ELOS: 7-14 days  ____________________________________    Dr. Eugenia Ruiz DO, MS  [facility] - Physical Medicine & Rehabilitation   ____________________________________    Pt was seen today  for 75 min, and entire time spent in face-to-face contact was >50% in counseling and coordination of care as detailed in A/P above.

## 2024-01-05 NOTE — ASSESSMENT & PLAN NOTE
S/P IV Lasix at [facility] for pulm edema  S/P Augmentin and Doxy for PNA  RT protocol

## 2024-01-05 NOTE — PROGRESS NOTES
Patient was explained discharge paperwork. Patient understands that she is going to [facility] rehab. Patient states the only belongings she has with her is her phone and her plant. The patient verbalizes understanding of discharge information, follow up appointments, and medications she will be continuing at [facility]. The patient was dressed in a new gown and was toileted prior to transport arriving to take her to Rehab. The patients son Michael was called and notified that the patient would be [facility] at noon, he verbalized understanding denied any questions.

## 2024-01-06 ENCOUNTER — APPOINTMENT (OUTPATIENT)
Dept: OCCUPATIONAL THERAPY | Facility: REHABILITATION | Age: 89
DRG: 194 | End: 2024-01-06
Attending: PHYSICAL MEDICINE & REHABILITATION
Payer: MEDICARE

## 2024-01-06 ENCOUNTER — APPOINTMENT (OUTPATIENT)
Dept: PHYSICAL THERAPY | Facility: REHABILITATION | Age: 89
DRG: 194 | End: 2024-01-06
Attending: PHYSICAL MEDICINE & REHABILITATION
Payer: MEDICARE

## 2024-01-06 ENCOUNTER — APPOINTMENT (OUTPATIENT)
Dept: SPEECH THERAPY | Facility: REHABILITATION | Age: 89
DRG: 194 | End: 2024-01-06
Attending: PHYSICAL MEDICINE & REHABILITATION
Payer: MEDICARE

## 2024-01-06 LAB
ALBUMIN SERPL BCP-MCNC: 2.5 G/DL (ref 3.2–4.9)
ALBUMIN/GLOB SERPL: 0.7 G/DL
ALP SERPL-CCNC: 91 U/L (ref 30–99)
ALT SERPL-CCNC: 8 U/L (ref 2–50)
ANION GAP SERPL CALC-SCNC: 11 MMOL/L (ref 7–16)
AST SERPL-CCNC: 11 U/L (ref 12–45)
BASOPHILS # BLD AUTO: 1.1 % (ref 0–1.8)
BASOPHILS # BLD: 0.08 K/UL (ref 0–0.12)
BILIRUB SERPL-MCNC: 0.5 MG/DL (ref 0.1–1.5)
BUN SERPL-MCNC: 21 MG/DL (ref 8–22)
CALCIUM ALBUM COR SERPL-MCNC: 9.7 MG/DL (ref 8.5–10.5)
CALCIUM SERPL-MCNC: 8.5 MG/DL (ref 8.5–10.5)
CHLORIDE SERPL-SCNC: 101 MMOL/L (ref 96–112)
CO2 SERPL-SCNC: 24 MMOL/L (ref 20–33)
CREAT SERPL-MCNC: 0.59 MG/DL (ref 0.5–1.4)
EOSINOPHIL # BLD AUTO: 0.2 K/UL (ref 0–0.51)
EOSINOPHIL NFR BLD: 2.8 % (ref 0–6.9)
ERYTHROCYTE [DISTWIDTH] IN BLOOD BY AUTOMATED COUNT: 45.1 FL (ref 35.9–50)
GFR SERPLBLD CREATININE-BSD FMLA CKD-EPI: 86 ML/MIN/1.73 M 2
GLOBULIN SER CALC-MCNC: 3.4 G/DL (ref 1.9–3.5)
GLUCOSE SERPL-MCNC: 99 MG/DL (ref 65–99)
HCT VFR BLD AUTO: 38.3 % (ref 37–47)
HGB BLD-MCNC: 12.6 G/DL (ref 12–16)
IMM GRANULOCYTES # BLD AUTO: 0.05 K/UL (ref 0–0.11)
IMM GRANULOCYTES NFR BLD AUTO: 0.7 % (ref 0–0.9)
LYMPHOCYTES # BLD AUTO: 1.4 K/UL (ref 1–4.8)
LYMPHOCYTES NFR BLD: 19.6 % (ref 22–41)
MAGNESIUM SERPL-MCNC: 2 MG/DL (ref 1.5–2.5)
MCH RBC QN AUTO: 30.5 PG (ref 27–33)
MCHC RBC AUTO-ENTMCNC: 32.9 G/DL (ref 32.2–35.5)
MCV RBC AUTO: 92.7 FL (ref 81.4–97.8)
MONOCYTES # BLD AUTO: 1.09 K/UL (ref 0–0.85)
MONOCYTES NFR BLD AUTO: 15.2 % (ref 0–13.4)
NEUTROPHILS # BLD AUTO: 4.33 K/UL (ref 1.82–7.42)
NEUTROPHILS NFR BLD: 60.6 % (ref 44–72)
NRBC # BLD AUTO: 0 K/UL
NRBC BLD-RTO: 0 /100 WBC (ref 0–0.2)
PHOSPHATE SERPL-MCNC: 3.2 MG/DL (ref 2.5–4.5)
PLATELET # BLD AUTO: 251 K/UL (ref 164–446)
PMV BLD AUTO: 10.5 FL (ref 9–12.9)
POTASSIUM SERPL-SCNC: 3.8 MMOL/L (ref 3.6–5.5)
PROT SERPL-MCNC: 5.9 G/DL (ref 6–8.2)
RBC # BLD AUTO: 4.13 M/UL (ref 4.2–5.4)
SODIUM SERPL-SCNC: 136 MMOL/L (ref 135–145)
TSH SERPL DL<=0.005 MIU/L-ACNC: 1.38 UIU/ML (ref 0.38–5.33)
WBC # BLD AUTO: 7.2 K/UL (ref 4.8–10.8)

## 2024-01-06 PROCEDURE — 700102 HCHG RX REV CODE 250 W/ 637 OVERRIDE(OP): Performed by: HOSPITALIST

## 2024-01-06 PROCEDURE — 85025 COMPLETE CBC W/AUTO DIFF WBC: CPT

## 2024-01-06 PROCEDURE — 770010 HCHG ROOM/CARE - REHAB SEMI PRIVAT*

## 2024-01-06 PROCEDURE — A9270 NON-COVERED ITEM OR SERVICE: HCPCS | Performed by: PHYSICAL MEDICINE & REHABILITATION

## 2024-01-06 PROCEDURE — 36415 COLL VENOUS BLD VENIPUNCTURE: CPT

## 2024-01-06 PROCEDURE — 83735 ASSAY OF MAGNESIUM: CPT

## 2024-01-06 PROCEDURE — 97162 PT EVAL MOD COMPLEX 30 MIN: CPT

## 2024-01-06 PROCEDURE — 80053 COMPREHEN METABOLIC PANEL: CPT

## 2024-01-06 PROCEDURE — 97530 THERAPEUTIC ACTIVITIES: CPT

## 2024-01-06 PROCEDURE — 99232 SBSQ HOSP IP/OBS MODERATE 35: CPT | Performed by: HOSPITALIST

## 2024-01-06 PROCEDURE — 700102 HCHG RX REV CODE 250 W/ 637 OVERRIDE(OP): Performed by: PHYSICAL MEDICINE & REHABILITATION

## 2024-01-06 PROCEDURE — 84100 ASSAY OF PHOSPHORUS: CPT

## 2024-01-06 PROCEDURE — A9270 NON-COVERED ITEM OR SERVICE: HCPCS | Performed by: HOSPITALIST

## 2024-01-06 PROCEDURE — 97166 OT EVAL MOD COMPLEX 45 MIN: CPT

## 2024-01-06 PROCEDURE — 99232 SBSQ HOSP IP/OBS MODERATE 35: CPT | Performed by: GENERAL PRACTICE

## 2024-01-06 PROCEDURE — 97535 SELF CARE MNGMENT TRAINING: CPT

## 2024-01-06 PROCEDURE — 92523 SPEECH SOUND LANG COMPREHEN: CPT

## 2024-01-06 PROCEDURE — 94760 N-INVAS EAR/PLS OXIMETRY 1: CPT

## 2024-01-06 PROCEDURE — 84443 ASSAY THYROID STIM HORMONE: CPT

## 2024-01-06 RX ORDER — SIMETHICONE 125 MG
125 TABLET,CHEWABLE ORAL
Status: DISCONTINUED | OUTPATIENT
Start: 2024-01-06 | End: 2024-01-17

## 2024-01-06 RX ADMIN — FAMOTIDINE 20 MG: 20 TABLET ORAL at 08:15

## 2024-01-06 RX ADMIN — GABAPENTIN 100 MG: 100 CAPSULE ORAL at 21:17

## 2024-01-06 RX ADMIN — DOXYCYCLINE 100 MG: 100 TABLET, FILM COATED ORAL at 21:17

## 2024-01-06 RX ADMIN — NYSTATIN: 100000 POWDER TOPICAL at 09:42

## 2024-01-06 RX ADMIN — SENNOSIDES AND DOCUSATE SODIUM 2 TABLET: 50; 8.6 TABLET ORAL at 08:15

## 2024-01-06 RX ADMIN — SIMETHICONE 125 MG: 125 TABLET, CHEWABLE ORAL at 11:42

## 2024-01-06 RX ADMIN — SIMETHICONE 125 MG: 125 TABLET, CHEWABLE ORAL at 16:04

## 2024-01-06 RX ADMIN — LOSARTAN POTASSIUM 50 MG: 50 TABLET, FILM COATED ORAL at 05:17

## 2024-01-06 RX ADMIN — METOPROLOL SUCCINATE 25 MG: 25 TABLET, EXTENDED RELEASE ORAL at 05:17

## 2024-01-06 RX ADMIN — MAGNESIUM HYDROXIDE 30 ML: 400 SUSPENSION ORAL at 21:17

## 2024-01-06 RX ADMIN — NYSTATIN: 100000 POWDER TOPICAL at 21:18

## 2024-01-06 RX ADMIN — APIXABAN 5 MG: 5 TABLET, FILM COATED ORAL at 08:15

## 2024-01-06 RX ADMIN — DOXYCYCLINE 100 MG: 100 TABLET, FILM COATED ORAL at 08:15

## 2024-01-06 RX ADMIN — AMOXICILLIN AND CLAVULANATE POTASSIUM 1 TABLET: 875; 125 TABLET, FILM COATED ORAL at 08:15

## 2024-01-06 RX ADMIN — LOSARTAN POTASSIUM 50 MG: 50 TABLET, FILM COATED ORAL at 17:13

## 2024-01-06 RX ADMIN — FLECAINIDE ACETATE 100 MG: 100 TABLET ORAL at 09:42

## 2024-01-06 RX ADMIN — SENNOSIDES AND DOCUSATE SODIUM 2 TABLET: 50; 8.6 TABLET ORAL at 21:17

## 2024-01-06 RX ADMIN — APIXABAN 5 MG: 5 TABLET, FILM COATED ORAL at 21:17

## 2024-01-06 RX ADMIN — POLYETHYLENE GLYCOL 3350 1 PACKET: 17 POWDER, FOR SOLUTION ORAL at 16:04

## 2024-01-06 RX ADMIN — NYSTATIN: 100000 POWDER TOPICAL at 16:04

## 2024-01-06 RX ADMIN — SIMETHICONE 125 MG: 125 TABLET, CHEWABLE ORAL at 21:16

## 2024-01-06 RX ADMIN — FAMOTIDINE 20 MG: 20 TABLET ORAL at 21:17

## 2024-01-06 RX ADMIN — Medication 1000 UNITS: at 08:15

## 2024-01-06 RX ADMIN — TRAZODONE HYDROCHLORIDE 50 MG: 50 TABLET ORAL at 21:17

## 2024-01-06 RX ADMIN — PAROXETINE HYDROCHLORIDE 10 MG: 20 TABLET, FILM COATED ORAL at 08:15

## 2024-01-06 RX ADMIN — AMOXICILLIN AND CLAVULANATE POTASSIUM 1 TABLET: 875; 125 TABLET, FILM COATED ORAL at 21:17

## 2024-01-06 RX ADMIN — LEVOTHYROXINE SODIUM 75 MCG: 0.07 TABLET ORAL at 08:15

## 2024-01-06 ASSESSMENT — BRIEF INTERVIEW FOR MENTAL STATUS (BIMS)
BIMS SUMMARY SCORE: 14
WHAT YEAR IS IT: CORRECT
WHAT DAY OF THE WEEK IS IT: INCORRECT
INITIAL REPETITION OF BED BLUE SOCK - FIRST ATTEMPT: 3
WHAT MONTH IS IT: ACCURATE WITHIN 5 DAYS
ASKED TO RECALL BLUE: YES, NO CUE REQUIRED
WHAT DAY OF THE WEEK IS IT: CORRECT
ASKED TO RECALL BED: YES, NO CUE REQUIRED
ASKED TO RECALL SOCK: YES, AFTER CUEING (SOMETHING TO WEAR")"
ASKED TO RECALL BLUE: YES, NO CUE REQUIRED
BIMS SUMMARY SCORE: 13
ASKED TO RECALL SOCK: YES, NO CUE REQUIRED
INITIAL REPETITION OF BED BLUE SOCK - FIRST ATTEMPT: 3
WHAT YEAR IS IT: CORRECT
ASKED TO RECALL BED: YES, AFTER CUEING (A PIECE OF FURNITURE")"
WHAT MONTH IS IT: ACCURATE WITHIN 5 DAYS

## 2024-01-06 ASSESSMENT — PAIN DESCRIPTION - PAIN TYPE
TYPE: ACUTE PAIN

## 2024-01-06 ASSESSMENT — GAIT ASSESSMENTS: GAIT LEVEL OF ASSIST: UNABLE TO PARTICIPATE

## 2024-01-06 ASSESSMENT — ENCOUNTER SYMPTOMS
ABDOMINAL PAIN: 0
CHILLS: 0
MUSCULOSKELETAL NEGATIVE: 1
BRUISES/BLEEDS EASILY: 0
NAUSEA: 0
VOMITING: 0
PALPITATIONS: 0
WEAKNESS: 1
POLYDIPSIA: 0
EYES NEGATIVE: 1
COUGH: 1
FEVER: 0
SHORTNESS OF BREATH: 0

## 2024-01-06 ASSESSMENT — ACTIVITIES OF DAILY LIVING (ADL)
TOILETING: REQUIRES ASSIST
BED_CHAIR_WHEELCHAIR_TRANSFER_DESCRIPTION: INCREASED TIME;SET-UP OF EQUIPMENT;SQUAT PIVOT TRANSFER TO WHEELCHAIR;SUPERVISION FOR SAFETY;VERBAL CUEING

## 2024-01-06 NOTE — CARE PLAN
The patient is Stable - Low risk of patient condition declining or worsening         Problem: Knowledge Deficit - Standard  Goal: Patient and family/care givers will demonstrate understanding of plan of care, disease process/condition, diagnostic tests and medications  Note: -pt verbalized understanding of rehab program/schedule on Initial Assessment

## 2024-01-06 NOTE — FLOWSHEET NOTE
01/06/24 1048   Events/Summary/Plan   Events/Summary/Plan 02 pulse ox check   Vital Signs   Pulse 65   Respiration 18   Pulse Oximetry 95 %   $ Pulse Oximetry (Spot Check) Yes   Respiratory Assessment   Level of Consciousness Alert   Chest Exam   Work Of Breathing / Effort Within Normal Limits   Oxygen   O2 (LPM) 2  (titrated to 1L)   O2 Delivery Device Silicone Nasal Cannula

## 2024-01-06 NOTE — PROGRESS NOTES
[facility] Medicine Daily Progress Note      Chief Complaint  Hypertension    Interval Problem Update  No chest pain, shortness of breath, or palpitations.  But c/o dry cough.  Lab and imaging results reviewed.    Review of Systems  Review of Systems   Constitutional:  Negative for chills and fever.   HENT: Negative.     Eyes: Negative.    Respiratory:  Positive for cough. Negative for shortness of breath.    Cardiovascular:  Negative for chest pain and palpitations.   Gastrointestinal:  Negative for abdominal pain, nausea and vomiting.   Genitourinary: Negative.    Musculoskeletal: Negative.    Skin:  Negative for itching and rash.   Neurological:  Positive for weakness.   Endo/Heme/Allergies:  Negative for polydipsia. Does not bruise/bleed easily.        Physical Exam  Temp:  [36.7 °C (98 °F)-37.1 °C (98.8 °F)] 36.7 °C (98.1 °F)  Pulse:  [60-67] 65  Resp:  [12-20] 18  BP: (127-180)/(56-90) 127/57  SpO2:  [88 %-96 %] 95 %    Physical Exam  Vitals reviewed.   Constitutional:       General: She is not in acute distress.     Appearance: Normal appearance. She is not ill-appearing.   HENT:      Head: Normocephalic and atraumatic.      Right Ear: External ear normal.      Left Ear: External ear normal.      Nose: Nose normal.      Mouth/Throat:      Pharynx: Oropharynx is clear.   Eyes:      General:         Right eye: No discharge.         Left eye: No discharge.      Extraocular Movements: Extraocular movements intact.      Conjunctiva/sclera: Conjunctivae normal.   Cardiovascular:      Rate and Rhythm: Normal rate and regular rhythm.   Pulmonary:      Effort: Pulmonary effort is normal. No respiratory distress.      Breath sounds: Normal breath sounds. No wheezing.   Abdominal:      General: Bowel sounds are normal. There is distension.      Palpations: Abdomen is soft.      Tenderness: There is no abdominal tenderness. There is no guarding or rebound.   Musculoskeletal:      Cervical back: Normal range of motion and  neck supple.      Right lower leg: No edema.      Left lower leg: No edema.   Skin:     General: Skin is warm and dry.   Neurological:      Mental Status: She is alert and oriented to person, place, and time.         Fluids    Intake/Output Summary (Last 24 hours) at 1/6/2024 1110  Last data filed at 1/6/2024 0900  Gross per 24 hour   Intake 960 ml   Output --   Net 960 ml       Laboratory  Recent Labs     01/05/24  0306 01/06/24  0542   WBC 9.2 7.2   RBC 4.07* 4.13*   HEMOGLOBIN 12.4 12.6   HEMATOCRIT 38.0 38.3   MCV 93.4 92.7   MCH 30.5 30.5   MCHC 32.6 32.9   RDW 45.1 45.1   PLATELETCT 253 251   MPV 10.9 10.5     Recent Labs     01/05/24  0306 01/06/24  0542   SODIUM 138 136   POTASSIUM 3.8 3.8   CHLORIDE 102 101   CO2 25 24   GLUCOSE 104* 99   BUN 26* 21   CREATININE 0.74 0.59   CALCIUM 8.4* 8.5                   Assessment/Plan  Abdominal distension  Assessment & Plan  KUB gaseous colonic distension  Start Simethicone    Azotemia  Assessment & Plan  S/P diuresis at [facility]  Encourage PO fluids  Renal function resolved    Hypoxia- (present on admission)  Assessment & Plan  S/P IV Lasix at [facility] for pulm edema  Completing Augmentin and Doxy for PNA  Guaifenesin for cough  RT protocol    Acquired hypothyroidism- (present on admission)  Assessment & Plan  TFT's pending  Continue Synthroid    MDD (major depressive disorder), recurrent, in partial remission (HCC)- (present on admission)  Assessment & Plan  On Paxil    Primary hypertension- (present on admission)  Assessment & Plan  Observe blood pressure trends on Losartan and Toprol    Paroxysmal atrial fibrillation (HCC)- (present on admission)  Assessment & Plan  Echo EF 55%  S/P PPM  On Flecainide  Anticoagulated on Eliquis  Cardiology F/U    DNR

## 2024-01-06 NOTE — FLOWSHEET NOTE
01/06/24 1214   Events/Summary/Plan   Events/Summary/Plan RA pulse ox check   Vital Signs   Pulse 65   Respiration 16   Pulse Oximetry 91 %   $ Pulse Oximetry (Spot Check) Yes   Respiratory Assessment   Level of Consciousness Alert   Chest Exam   Work Of Breathing / Effort Within Normal Limits   Oxygen   O2 Delivery Device Room air w/o2 available

## 2024-01-06 NOTE — FLOWSHEET NOTE
01/06/24 0711   Events/Summary/Plan   Events/Summary/Plan 02 pulse ox check   Vital Signs   Pulse 65   Respiration 16   Pulse Oximetry 93 %   $ Pulse Oximetry (Spot Check) Yes   Respiratory Assessment   Level of Consciousness Alert   Chest Exam   Work Of Breathing / Effort Within Normal Limits   Oxygen   O2 (LPM) 2   O2 Delivery Device Silicone Nasal Cannula

## 2024-01-06 NOTE — THERAPY
Physical Therapy   Initial Evaluation     Patient Name: Debra Rodrigues  Age:  88 y.o., Sex:  female  Medical Record #: [id number]  Today's Date: 1/6/2024     Subjective    Pt was seated in w/c upon arrival and agreeable to treatment.  Pt reported fatigue.      Objective       01/06/24 1231   PT Charge Group   PT Therapeutic Activities (Units) 1   PT Evaluation PT Evaluation Mod   PT Total Time Spent   PT Individual Total Time Spent (Mins) 60   Prior Living Situation   Prior Services Continuous (24 Hour) Care Giving Per Service   Housing / Facility Assisted Living Residence   Steps Into Home 0   Steps In Home 0   Equipment Owned 4-Wheel Walker;Wheelchair   Lives with - Patient's Self Care Capacity Attendant / Paid Care Giver   Comments Pt is a poor historian-would benefit from asking family about PLOF and services at [facility].  Pt stated she was ambulatory short HH distances and was wheeled in w/c to dining room   Prior Level of Functional Mobility   Bed Mobility Required Assist   Transfer Status Required Assist   Ambulation Required Assist   Distance Ambulation (Feet)   (Short house hold distances)   Assistive Devices Used 4-Wheel Walker   Wheelchair Dependent   Stairs Dependent   Prior Functioning: Everyday Activities   Self Care Needed some help   Indoor Mobility (Ambulation) Needed some help   Stairs Needed some help   Functional Cognition Needed some help   Prior Device Use Walker   Vitals   Pulse 65   Room Air Oximetry 86   O2 Delivery Device Room air w/o2 available   Vitals Comments Pt then placed on 1L with O2 stats at 91%   Pain 0 - 10 Group   Therapist Pain Assessment Nurse Notified;Post Activity Pain Same as Prior to Activity;0   Cognition    Level of Consciousness Alert   ABS (Agitated Behavior Scale)   Agitated Behavior Scale Performed No   Passive ROM Lower Body   Passive ROM Lower Body WDL   Active ROM Lower Body    Active ROM Lower Body  WDL   Strength Lower Body   Rt Hip Flexion Strength 4 (G)   Rt Knee  Flexion Strength 4 (G)   Rt Knee Extension Strength 4 (G)   Rt Ankle Dorsiflexion Strength 5 (N)   Lt Hip Flexion Strength 4- (G-)   Lt Knee Flexion Strength 4- (G-)   Lt Knee Extension Strength 4 (G)   Lt Ankle Dorsiflexion Strength 4 (G)   Sensation Lower Body   Lower Extremity Sensation   WDL   Comments Intact light touch sensation BLE, hypersensitivity d/t neuropathy   Balance Assessment   Sitting Balance (Static) Poor   Sitting Balance (Dynamic) Poor -   Standing Balance (Static) Trace +   Standing Balance (Dynamic) Dependent   Weight Shift Sitting Poor   Weight Shift Standing Absent   Comments R  lateral lean in sitting   Bed Mobility    Supine to Sit Maximal Assist   Sit to Supine Total Assist   Sit to Stand Total Assist X 2   Scooting Maximal Assist   Rolling Maximal Assist to Rt.;Maximum Assist to Lt.   Roll Left and Right   Assistance Needed Physical assistance   Physical Assistance Level 76% or more   CARE Score - Roll Left and Right 2   Roll Left and Right Discharge Goal   Discharge Goal 6   Sit to Lying   Assistance Needed Physical assistance   Physical Assistance Level 76% or more   CARE Score - Sit to Lying 2   Sit to Lying Discharge Goal   Discharge Goal 6   Lying to Sitting on Side of Bed   Assistance Needed Physical assistance   Physical Assistance Level 76% or more   CARE Score - Lying to Sitting on Side of Bed 2   Lying to Sitting on Side of Bed Discharge Goal   Discharge Goal 6   Sit to Stand   Assistance Needed Physical assistance   Physical Assistance Level 76% or more   CARE Score - Sit to Stand 2   Sit to Stand Discharge Goal   Discharge Goal 4   Chair/Bed-to-Chair Transfer   Assistance Needed Physical assistance   Physical Assistance Level Total assistance   CARE Score - Chair/Bed-to-Chair Transfer 1   Chair/Bed-to-Chair Transfer Discharge Goal   Discharge Goal 4   Car Transfer   Reason if not Attempted Safety concerns   CARE Score - Car Transfer 88   Car Transfer Discharge Goal   Discharge  Goal 4   Walk 10 Feet   Reason if not Attempted Safety concerns   CARE Score - Walk 10 Feet 88   Walk 10 Feet Discharge Goal   Discharge Goal 4   Walk 50 Feet with Two Turns   Reason if not Attempted Safety concerns   CARE Score - Walk 50 Feet with Two Turns 88   Walk 50 Feet with Two Turns Discharge Goal   Discharge Goal 4   Walk 150 Feet   Reason if not Attempted Activity not applicable   CARE Score - Walk 150 Feet 9   Walk 150 Feet Discharge Goal   Discharge Goal 9   Walking 10 Feet on Uneven Surfaces   Reason if not Attempted Activity not applicable   CARE Score - Walking 10 Feet on Uneven Surfaces 9   Walking 10 Feet on Uneven Surfaces Discharge Goal   Discharge Goal 9   1 Step (Curb)   Reason if not Attempted Activity not applicable   CARE Score - 1 Step (Curb) 9   1 Step (Curb) Discharge Goal   Discharge Goal 9   4 Steps   Reason if not Attempted Activity not applicable   CARE Score - 4 Steps 9   4 Steps Discharge Goal   Discharge Goal 9   12 Steps   Reason if not Attempted Activity not applicable   CARE Score - 12 Steps 9   12 Steps Discharge Goal   Discharge Goal 9   Picking Up Object   Reason if not Attempted Safety concerns   CARE Score - Picking Up Object 88   Picking Up Object Discharge Goal   Discharge Goal 3   Wheel 50 Feet with Two Turns   Reason if not Attempted Medical concerns   CARE Score - Wheel 50 Feet with Two Turns 88   Wheel 50 Feet with Two Turns Discharge Goal   Discharge Goal 4   Wheel 150 Feet   Reason if not Attempted Medical concerns   CARE Score - Wheel 150 Feet 88   Wheel 150 Feet Discharge Goal   Discharge Goal 4   Gait Functional Level of Assist    Gait Level Of Assist Unable to Participate   Wheelchair Functional Level of Assist   Wheelchair Assist Minimal Assist   Distance Wheelchair (Feet or Distance) 5   Wheelchair Description Extra time;Limited by fatigue;Safety concerns;Supervision for safety;Verbal cueing   Stairs Functional Level of Assist   Level of Assist with Stairs  Unable to Participate   Transfer Functional Level of Assist   Bed, Chair, Wheelchair Transfer Total Assist X 2   Bed Chair Wheelchair Transfer Description Increased time;Set-up of equipment;Squat pivot transfer to wheelchair;Supervision for safety;Verbal cueing   Problem List    Problems Impaired Bed Mobility;Impaired Transfers;Impaired Ambulation;Functional ROM Deficit;Functional Strength Deficit;Impaired Balance;Impaired Coordination;Decreased Activity Tolerance;Safety Awareness Deficits / Cognition   Precautions   Precautions Fall Risk;Other (See Comments)   Comments Maculary degeneration, hx of CVA   Current Discharge Plan   Current Discharge Plan Return to Prior Living Situation   Interdisciplinary Plan of Care Collaboration   IDT Collaboration with  Certified Nursing Assistant;Occupational Therapist   Patient Position at End of Therapy In Bed;Bed Alarm On;Call Light within Reach;Tray Table within Reach;Phone within Reach   Collaboration Comments CLOF   PT DME Recommendations   Wheelchair   (TBD)   Assistive Device   (TBD)   Physical Therapist Assigned   Assigned PT / Treatment Time / Comments 60   Benefit   Therapy Benefit Patient Would Benefit from Inpatient Rehabilitation Physical Therapy to Maximize Functional [place] with ADLs, IADLs and Mobility.   Strengths & Barriers   Strengths Able to follow instructions;Manages pain appropriately;Motivated for self care and independence;Pleasant and cooperative;Willingly participates in therapeutic activities   Barriers Decreased endurance;Confused;Fatigue;Generalized weakness;Impaired activity tolerance;Impaired balance;Limited mobility     Pt educated on PT role, PT POC, rehab orientation.     Assessment  Patient is 88 y.o. female with a diagnosis of debility s/p pneumonia.  Additional factors influencing patient status / progress (ie: cognitive factors, co-morbidities, social support, etc): required assist with PLOF, good social support, PMH of depression,  atrial fibrillation, PPM, hypothyroidism, hypertension, chronic neuropathy.      Plan  Recommend Physical Therapy  minutes per day 5-7 days per week for 10-14 days for the following treatments:  PT Group Therapy, PT E Stim Attended, PT Gait Training, PT Therapeutic Exercises, PT Neuro Re-Ed/Balance, PT Aquatic Therapy, PT Therapeutic Activity, PT Manual Therapy, and PT Evaluation.    Passport items to be completed:  Get in/out of bed safely, in/out of a vehicle, safely use mobility device, walk or wheel around home/community, navigate up and down stairs, show how to get up/down from the ground, ensure home is accessible, demonstrate HEP, complete caregiver training    Goals:  Long term and short term goals have been discussed with patient and they are in agreement.    Physical Therapy Problems (Active)       Problem: Mobility       Dates: Start:  01/06/24         Goal: STG-Within one week, patient will propel wheelchair household distances x 25 feet with min A       Dates: Start:  01/06/24            Goal: STG-Within one week, patient will ambulate household distance x 10 feet in // bars with mod A        Dates: Start:  01/06/24               Problem: Mobility Transfers       Dates: Start:  01/06/24         Goal: STG-Within one week, patient will perform bed mobility with mod A       Dates: Start:  01/06/24            Goal: STG-Within one week, patient will transfer bed to chair with max A x 1 person SQPT       Dates: Start:  01/06/24               Problem: PT-Long Term Goals       Dates: Start:  01/06/24         Goal: LTG-By discharge, patient will propel wheelchair x 150 feet with SPV       Dates: Start:  01/06/24            Goal: LTG-By discharge, patient will ambulate x 30 feet with FWW and CGA       Dates: Start:  01/06/24            Goal: LTG-By discharge, patient will transfer one surface to another with FWW and CGA       Dates: Start:  01/06/24            Goal: LTG-By discharge, patient will transfer  in/out of a car with FWW and CGA       Dates: Start:  01/06/24

## 2024-01-06 NOTE — PROGRESS NOTES
NURSING DAILY NOTE    Name: Debra Rodrigues   Date of Admission: 1/5/2024   Admitting Diagnosis: Pneumonia due to infectious organism  Attending Physician: Eugenia Ruiz D.o.  Allergies: Other misc, Hydrocodone-acetaminophen, and Sulfa drugs    Safety  Patient Assist  max 2  Patient Precautions     Precaution Comments     Bed Transfer Status     Toilet Transfer Status      Assistive Devices  Rails, Wheelchair  Oxygen  Nasal Cannula  Diet/Therapeutic Dining  Current Diet Order   Procedures    Diet Order Diet: Regular     Pill Administration  whole, one at a time , and crush big pills  Agitated Behavioral Scale     ABS Level of Severity       Fall Risk  Has the patient had a fall this admission?   No  Rubina Echavarria Fall Risk Scoring  12, MODERATE RISK  Fall Risk Safety Measures  bed alarm and chair alarm    Vitals  Temperature: 37.1 °C (98.7 °F)  Temp src: Oral  Pulse: 67  Respiration: 20  Blood Pressure : (!) 151/73  Blood Pressure MAP (Calculated): 99 MM HG  BP Location: Right, Upper Arm  Patient BP Position: Supine     Oxygen  Pulse Oximetry: 96 %  O2 (LPM): 2  O2 Delivery Device: Nasal Cannula    Bowel and Bladder  Last Bowel Movement  01/03/24  Stool Type     Bowel Device  Bathroom, Diaper  Continent  Bladder: Did not void   Bowel: No movement  Bladder Function  Urine Void (mL):  (inc.)  Number of Times Incontinent of Urine: 1  Genitourinary Assessment   Bladder Assessment (WDL):  WDL Except  Cortes Catheter: Not Applicable  Urinary Elimination: Incontinence  Number of Bladder Accidents: 1  Total Number of Bladder of Accidents in Last 7 Days: 1  Number of Times Incontinent of Urine: 1  Bladder Device: Absorbent Brief (Pull Up)  Time Void: No  Bladder Scan: Post Void  $ Bladder Scan Results (mL): 77    Skin  Larry Score   16  Sensory Interventions   Bed Types: Standard/Trauma Mattress  Skin Preventative Measures: Pillows in Use for Support /  Positioning  Moisture Interventions  Moisturizers/Barriers: Barrier Cream      Pain  Pain Rating Scale  0 - No Pain  Pain Location     Pain Location Orientation     Pain Interventions   Declines    ADLs    Bathing      Linen Change      Personal Hygiene  Perineal Care, Moist Mamie Wipes  Chlorhexidine Bath      Oral Care     Teeth/Dentures     Shave     Nutrition Percentage Eaten  Dinner, Less than 25% Consumed  Environmental Precautions     Patient Turns/Positioning  Patient Turns Self from Side to Side  Patient Turns Assistance/Tolerance     Bed Positions     Head of Bed Elevated         Psychosocial/Neurologic Assessment  Psychosocial Assessment  Psychosocial (WDL):  Within Defined Limits  Neurologic Assessment  Neuro (WDL): Exceptions to WDL  Level of Consciousness: Alert  Orientation Level: Oriented to place  Motor Function/Sensation Assessment: Sensation  RUE Sensation: Full sensation  LUE Sensation: Full sensation  RLE Sensation: Numbness, Tingling  LLE Sensation: Numbness, Tingling       Cardio/Pulmonary Assessment  Edema      Respiratory Breath Sounds  RUL Breath Sounds: Clear  RML Breath Sounds: Clear  RLL Breath Sounds: Diminished  ANGÉLICA Breath Sounds: Clear  LLL Breath Sounds: Diminished  Cardiac Assessment   Cardiac (WDL):  WDL Except (Hx Afib, htn)

## 2024-01-06 NOTE — PROGRESS NOTES
"  Physical Medicine & Rehabilitation Progress Note    Encounter Date: 1/6/2024    Chief Complaint: Pneumonia due to infectious organism [J18.9]  Hypoxia [R09.02]    Interval Events (Subjective):  Acute problems/issues reported by staff: none    Acute problems/issues reported by patient: none    Participating in therapy. Pain controlled. PO tolerant.      ROS: Denies fever, chill, headache, dizziness, blurry vision, cough, shortness of breath, chest pain, nausea, vomiting, bowel changes, or urinary changes.      Medications:  Scheduled Medications   Medication Dose Frequency    Pharmacy Consult Request  1 Each PHARMACY TO DOSE    senna-docusate  2 Tablet BID    nystatin   TID    amoxicillin-clavulanate  1 Tablet Q12HRS    apixaban  5 mg BID    doxycycline monohydrate  100 mg Q12HRS    famotidine  20 mg Q12HRS    flecainide  100 mg DAILY    traZODone  50 mg QHS    PARoxetine  10 mg DAILY    metoprolol SR  25 mg DAILY    losartan  50 mg Q12HRS    levothyroxine  75 mcg DAILY    gabapentin  100 mg Q EVENING    vitamin D3  1,000 Units DAILY     PRN medications: Respiratory Therapy Consult, hydrALAZINE, senna-docusate **AND** polyethylene glycol/lytes **AND** magnesium hydroxide **AND** bisacodyl, carboxymethylcellulose, benzocaine-menthol, mag hydrox-al hydrox-simeth, ondansetron **OR** ondansetron, sodium chloride, acetaminophen, guaiFENesin dextromethorphan    Diet:  Current Diet Order   Procedures    Diet Order Diet: Regular       Objective:  VITAL SIGNS: /57   Pulse 65   Temp 36.7 °C (98.1 °F) (Oral)   Resp 18   Ht 1.676 m (5' 6\")   Wt 89 kg (196 lb 3.4 oz)   SpO2 95%   BMI 31.67 kg/m²     General:  No acute distress.     HEENT: normocephalic, atraumatic, sclera and conjunctiva normal, normal neck ROM   Respiratory: Required supplemental oxygen non-labored respirations. full sentences. clear to auscultation bilaterally   Cardiovascular:  Regular rate and rhythm. peripheral perfusion normal   Abdomen: " soft, non-tender, non distended, bowel sounds normal   Extremities: no edema; no bilateral calf pain.    Mental status/ Cognition:  Alert. Appropriate responses    Psychiatric: normal affect.  Cooperative.        Laboratory Values:  Recent Results (from the past 72 hour(s))   EC-ECHOCARDIOGRAM COMPLETE W/O CONT    Collection Time: 01/04/24  2:40 PM   Result Value Ref Range    Left Ventrical Ejection Fraction 55    Basic Metabolic Panel    Collection Time: 01/05/24  3:06 AM   Result Value Ref Range    Sodium 138 135 - 145 mmol/L    Potassium 3.8 3.6 - 5.5 mmol/L    Chloride 102 96 - 112 mmol/L    Co2 25 20 - 33 mmol/L    Glucose 104 (H) 65 - 99 mg/dL    Bun 26 (H) 8 - 22 mg/dL    Creatinine 0.74 0.50 - 1.40 mg/dL    Calcium 8.4 (L) 8.5 - 10.5 mg/dL    Anion Gap 11.0 7.0 - 16.0   CBC WITH DIFFERENTIAL    Collection Time: 01/05/24  3:06 AM   Result Value Ref Range    WBC 9.2 4.8 - 10.8 K/uL    RBC 4.07 (L) 4.20 - 5.40 M/uL    Hemoglobin 12.4 12.0 - 16.0 g/dL    Hematocrit 38.0 37.0 - 47.0 %    MCV 93.4 81.4 - 97.8 fL    MCH 30.5 27.0 - 33.0 pg    MCHC 32.6 32.2 - 35.5 g/dL    RDW 45.1 35.9 - 50.0 fL    Platelet Count 253 164 - 446 K/uL    MPV 10.9 9.0 - 12.9 fL    Neutrophils-Polys 64.40 44.00 - 72.00 %    Lymphocytes 16.70 (L) 22.00 - 41.00 %    Monocytes 13.60 (H) 0.00 - 13.40 %    Eosinophils 3.70 0.00 - 6.90 %    Basophils 0.70 0.00 - 1.80 %    Immature Granulocytes 0.90 0.00 - 0.90 %    Nucleated RBC 0.00 0.00 - 0.20 /100 WBC    Neutrophils (Absolute) 5.95 1.82 - 7.42 K/uL    Lymphs (Absolute) 1.54 1.00 - 4.80 K/uL    Monos (Absolute) 1.25 (H) 0.00 - 0.85 K/uL    Eos (Absolute) 0.34 0.00 - 0.51 K/uL    Baso (Absolute) 0.06 0.00 - 0.12 K/uL    Immature Granulocytes (abs) 0.08 0.00 - 0.11 K/uL    NRBC (Absolute) 0.00 K/uL   ESTIMATED GFR    Collection Time: 01/05/24  3:06 AM   Result Value Ref Range    GFR (CKD-EPI) 77 >60 mL/min/1.73 m 2   CBC with Differential    Collection Time: 01/06/24  5:42 AM   Result Value  Ref Range    WBC 7.2 4.8 - 10.8 K/uL    RBC 4.13 (L) 4.20 - 5.40 M/uL    Hemoglobin 12.6 12.0 - 16.0 g/dL    Hematocrit 38.3 37.0 - 47.0 %    MCV 92.7 81.4 - 97.8 fL    MCH 30.5 27.0 - 33.0 pg    MCHC 32.9 32.2 - 35.5 g/dL    RDW 45.1 35.9 - 50.0 fL    Platelet Count 251 164 - 446 K/uL    MPV 10.5 9.0 - 12.9 fL    Neutrophils-Polys 60.60 44.00 - 72.00 %    Lymphocytes 19.60 (L) 22.00 - 41.00 %    Monocytes 15.20 (H) 0.00 - 13.40 %    Eosinophils 2.80 0.00 - 6.90 %    Basophils 1.10 0.00 - 1.80 %    Immature Granulocytes 0.70 0.00 - 0.90 %    Nucleated RBC 0.00 0.00 - 0.20 /100 WBC    Neutrophils (Absolute) 4.33 1.82 - 7.42 K/uL    Lymphs (Absolute) 1.40 1.00 - 4.80 K/uL    Monos (Absolute) 1.09 (H) 0.00 - 0.85 K/uL    Eos (Absolute) 0.20 0.00 - 0.51 K/uL    Baso (Absolute) 0.08 0.00 - 0.12 K/uL    Immature Granulocytes (abs) 0.05 0.00 - 0.11 K/uL    NRBC (Absolute) 0.00 K/uL   Comp Metabolic Panel (CMP)    Collection Time: 01/06/24  5:42 AM   Result Value Ref Range    Sodium 136 135 - 145 mmol/L    Potassium 3.8 3.6 - 5.5 mmol/L    Chloride 101 96 - 112 mmol/L    Co2 24 20 - 33 mmol/L    Anion Gap 11.0 7.0 - 16.0    Glucose 99 65 - 99 mg/dL    Bun 21 8 - 22 mg/dL    Creatinine 0.59 0.50 - 1.40 mg/dL    Calcium 8.5 8.5 - 10.5 mg/dL    Correct Calcium 9.7 8.5 - 10.5 mg/dL    AST(SGOT) 11 (L) 12 - 45 U/L    ALT(SGPT) 8 2 - 50 U/L    Alkaline Phosphatase 91 30 - 99 U/L    Total Bilirubin 0.5 0.1 - 1.5 mg/dL    Albumin 2.5 (L) 3.2 - 4.9 g/dL    Total Protein 5.9 (L) 6.0 - 8.2 g/dL    Globulin 3.4 1.9 - 3.5 g/dL    A-G Ratio 0.7 g/dL   Magnesium    Collection Time: 01/06/24  5:42 AM   Result Value Ref Range    Magnesium 2.0 1.5 - 2.5 mg/dL   Phosphorus    Collection Time: 01/06/24  5:42 AM   Result Value Ref Range    Phosphorus 3.2 2.5 - 4.5 mg/dL   TSH with Reflex to FT4    Collection Time: 01/06/24  5:42 AM   Result Value Ref Range    TSH 1.380 0.380 - 5.330 uIU/mL   ESTIMATED GFR    Collection Time: 01/06/24  5:42 AM    Result Value Ref Range    GFR (CKD-EPI) 86 >60 mL/min/1.73 m 2       Imaging:  None    Medical Decision Making and Plan:  Reviewed primary care team's plan.      Rehab - Functional status reviewed briefly and progressing towards goals. Cont therapy plan     Pain - will monitor pain levels and adjust medications accordingly     Neuro -  monitor for neurological deficits and continue management plan of primary care team     Ortho -  continue management plan of primary care team to include pain management plan     Cardiac/Pulm - monitor clinical presentation and vitals; continue management plan of primary care team   Blood pressure stable and monitored by internist  Continue Cozaar 50 mg every 12 hours  Continue Toprol XL 25 mg daily    Community-acquired pneumonia  Flu, RSV, COVID-negative  Respiratory cultures PENDING (unclear if collected at [facility])  Blood cultures collected 1/2/2024 no growth to date  PT and OT for mobility and ADLs. Per guidelines, 15 hours per week between PT, OT and/or SLP.  Follow-up geriatrician  Continue Augmentin through 1/8  Continue doxycycline through 1/9    FEN  1/6 stable    GI -will monitor bowel movements and adjust medications accordingly     - will monitor voiding and adjust accordingly    DVT px - cont prophylaxis     Dispo - per primary team       I, Real Bentley DO, personally performed a complete drug regimen review and no potential clinically significant medication issues were identified.  ____________________________________    Real Bentley DO  Physical Medicine and Rehabilitation  [facility]  ____________________________________

## 2024-01-06 NOTE — PROGRESS NOTES
0100. Re- check BP-142/56, pulse-67. HOB kept 40 degrees. O2 sat. 88% with O2 at 1 lpm n/c while asleep. O2 sat 90% with O2 at 2 lpm n/c while asleep. CN notified. Will monitor.

## 2024-01-06 NOTE — PROGRESS NOTES
Patient admitted to facility at 1210 via transport; accompanied by hospital transport.  Patient assisted to room and positioned in bed for comfort and safety; call light within reach.  Patient assisted with stowing belongings and oriented to room and facility.  Admission assessment performed and documented in computer. Patient received and reviewed education binder. Admission paperwork completed; signed copies placed in chart.  Will continue to monitor.

## 2024-01-06 NOTE — CARE PLAN
"  Problem: Fall Risk - Rehab  Goal: Patient will remain free from falls  Note: Rubina Echavarria Fall risk Assessment Score: 12      Moderate fall risk Interventions  - Bed and strip alarm   - Yellow sign by the door   - Yellow wrist band \"Fall risk\"  - Room near to the nurse station  - Do not leave patient unattended in the bathroom  - Fall risk education provided      Problem: Respiratory  Goal: Patient will understand use and administration of respiratory medications to improve respiratory function  Note: O2 at 1 lpm n/c with O2 saturation 90%, patient sometimes taking n/c off with O2 saturation 88% RA. Will monitor.     Problem: Bowel Elimination  Goal: Patient will participate in bowel management program  Note: PRN Miralax given for last BM 1/3/24. CN notified.     Problem: Infection  Goal: Patient will remain free from infection  Note: ABT po x 2, emesis x1 approx. 5- 10 ml. Patient said that she vomited because she cough. V/S taken with high BP, prn Hydralazine and Zofran given. CN notified. Will monitor.         The patient is Watcher - Medium risk of patient condition declining or worsening         "

## 2024-01-06 NOTE — CARE PLAN
Problem: Fall Risk - Rehab  Goal: Patient will remain free from falls  1/6/2024 1038 by Caroline Torres RFelixNFelix  Outcome: Progressing - bed in low position, alarms on       Problem: Pain - Standard  Goal: Alleviation of pain or a reduction in pain to the patient’s comfort goal  Outcome: Progressing - patient is able to verbalize pain management needs. None at this time.

## 2024-01-06 NOTE — THERAPY
"Speech Language Pathology   Initial Assessment     Patient Name: Debra Rdorigues  AGE:  88 y.o., SEX:  female  Medical Record #: [id number]  Today's Date: 1/6/2024     Subjective    Per Dr. Ruiz' H&P: \"Debra Rodrigues is an 88-year-old female with past medical history significant for depression, atrial fibrillation, PPM, hypothyroidism, hypertension, chronic neuropathy who presented to the emergency department at [facility] on January 2, 2024 with chief complaint of generalized weakness, cough, congestion which has been ongoing since approximately 3 days prior.  She resides at [facility] and staff noticed that she was weaker with some confusion.  Upon arrival to the emergency department she was satting at 82% on room air with a temperature of 100.6 °F.  She was placed on 5 L of oxygen.  She had some mild swelling in her legs, patient reported not any different than usual.  Chest x-ray showed bilateral pleural effusions and right-sided infiltrate.  CTA of the chest was negative for pulmonary embolism but did show pulmonary edema and pleural effusions.  Echocardiogram was normal for age.  Patient diagnosed with community-acquired pneumonia and started on antibiotics.  Patient was found to be functioning below her baseline per PT/OT and acute rehabilitation was recommended.  Patient admitted to ARU 1/5/2024.\"    Patient pleasant and agreeable to SLP cognitive-linguistic evaluation this date. Patient reports requiring assistance with all ADLs and IADLs prior to this hospitalization. She resides at [facility], and the facility provides assiance with medication management. The patient's son, Maurice, has assumed responsibilities of the pt's finances.     Objective       01/06/24 0831   Evaluation Charges   Charges Yes   SLP Speech Language Evaluation Speech Sound Language Comprehension   SLP Total Time Spent   SLP Individual Total Time Spent (Mins) 60   Precautions   Precautions Fall " "Risk;Other (See Comments)   Comments Maculary degeneration, hx of CVA   Prior Living Situation   Prior Services Continuous (24 Hour) Care Giving Per Service   Housing / Facility Assisted Living Residence   Lives with - Patient's Self Care Capacity Attendant / Paid Care Giver   Prior Level Of Function   Communication Unknown   Swallow Unknown   Dentition Intact   Hearing Within Functional Limits for Evaluation   Hearing Aid None   Vision Wears Corrective Lenses;Within Functional Limits for Evaluation   Patient's Primary Language English   Education Completed College   Occupation (Pre-Hospital Vocational) Retired Due To Age   Prior Functioning: Everyday Activities   Self Care Needed some help   Indoor Mobility (Ambulation) Needed some help   Stairs Needed some help   Functional Cognition Needed some help   Prior Device Use Walker   Receptive Language / Auditory Comprehension   Receptive Language / Auditory Comprehension X   Expressive Language   Expressive Language (WDL) X   Reading Comprehension    Reading Comprehension (WDL) X   Written Language Expression   Written Language Expression (WDL) WDL   Cognition   Cognitive-Linguistic (WDL) X   ABS (Agitated Behavior Scale)   Agitated Behavior Scale Performed No   Cognitive Pattern Assessment   Cognitive Pattern Assessment Used BIMS   Brief Interview for Mental Status (BIMS)   Repetition of Three Words (First Attempt) 3   Temporal Orientation: Year Correct   Temporal Orientation: Month Accurate within 5 days   Temporal Orientation: Day Incorrect   Recall: \"Sock\" Yes, no cue required   Recall: \"Blue\" Yes, no cue required   Recall: \"Bed\" Yes, after cueing (\"a piece of furniture\")   BIMS Summary Score 13   Confusion Assessment Method (CAM)   Is there evidence of an acute change in mental status from the patient's baseline? No   Inattention Behavior not present   Disorganized thinking Behavior not present   Altered level of consciousness Behavior not present   Social / " Pragmatic Communication   Social / Pragmatic Communication WDL   Tracheostomy   Tracheostomy No   Speech Mechanisms / Voice Production   Speech Mechanisms / Voice Production (WDL) WDL   Functional Level of Assist   Eating Supervision   Eating Description Set-up of equipment or meal/tube feeding   Comprehension Moderate Assist   Comprehension Description Glasses;Verbal cues   Expression Minimal Assist   Expression Description Verbal cueing   Social Interaction Modified Independent   Social Interaction Description Schedule;Verbal cues;Increased time   Problem Solving Moderate Assist   Problem Solving Description Bed/chair alarm;Increased time;Seat belt;Supervision;Therapy schedule;Verbal cueing   Memory Moderate Assist   Memory Description Bed/chair alarm;Increased time;Seat belt;Supervision;Therapy schedule;Verbal cueing   Outcome Measures   Outcome Measures Utilized SCCAN   SCCAN (Scales of Cognitive and Communicative Ability for Neurorehabilitation)   Oral Expression - Raw Score 14   Oral Expression - Scale Performance Score 74   Orientation - Raw Score 11   Orientation - Scale Performance Score 92   Memory - Raw Score 7   Memory - Scale Performance Score 37   Speech Comprehension - Raw Score 8   Speech Comprehension - Scale Performance Score 62   Reading Comprehension - Raw Score 5   Reading Comprehension - Scale Performance Score 42   Writing - Raw Score 6   Writing - Scale Performance Score 86   Attention - Raw Score 6   Attention - Scale Performance Score 38   Problem Solving - Raw Score 11   Problem Solving - Scale Performance Score 48   SCCAN Total Raw Score 56   SCCAN Degree of Severity Moderate Impairment   Problem List   Problem List Cognitive-Linguistic Deficits;Attention Deficit;Reading Comprehension Deficit;Memory Deficit;Verbal Problem Solving Deficits;Executive Function Deficit;Impaired Safety;Impaired Judgement;Numerical Function Deficit   Current Discharge Plan   Current Discharge Plan Return to  "Prior Living Situation   Benefit   Therapy Benefit Patient would benefit from Inpatient Rehab Speech-Language Pathology to address above identified deficits.   Interdisciplinary Plan of Care Collaboration   Patient Position at End of Therapy Seated;Chair Alarm On;Call Light within Reach;Tray Table within Reach;Phone within Reach   Strengths & Barriers   Strengths Effective communication skills;Pleasant and cooperative;Willingly participates in therapeutic activities   Barriers Confused;Difficulty following instructions;Generalized weakness;Impaired functional cognition;Visual impairment   Speech Language Pathologist Assigned   Assigned SLP / Treatment Time / Comments LM / 30 mins / Cog only     Assessment    Patient is 88 y.o. female with a diagnosis of Pneumonia due to infectious organism .  Additional factors influencing patient status/progress (ie: cognitive factors, co-morbidities, social support, etc):   Past Medical History:   Diagnosis Date    Acid reflux disease     Acquired hypothyroidism 4/19/2023    Allergy     Anxiety     Arrhythmia 01/01/2009    Atrial fibrillation, resolved    Arthritis     At risk for sleep apnea     Blood transfusion without reported diagnosis     Bowel habit changes     diarrhea    Breath shortness     Cancer ([facility]) 01/01/2009    left breast s/p mastectomy    CATARACT     surgically corrected, bilateral    Depression     GERD (gastroesophageal reflux disease)     Head ache     Heart burn     Heart valve disease     MVP    Hemorrhagic disorder ([facility])     anticoagulated on eliquis    Hypertension     IBD (inflammatory bowel disease)     Macular degeneration     Muscle disorder     Pacemaker 01/01/2009    Pneumonia due to infectious organism 1/2/2024    Stroke ([facility])     2019 - no deficits    Unspecified urinary incontinence     \"my bladder leaks when I lay down\"    Urinary bladder disorder     Urinary incontinence      The Scales of Cognitive and Communicative Ability for " Neurorehabilitation (SCCAN) assesses cognitive-communicative deficits and functional ability in patients in rehabilitation hospitals, clinics, and skilled nursing facilities. The SCCAN is appropriate for a broad range of neurological patients, provides a measure of both impairment and functional ability.     SCCAN (Scales of Cognitive and Communicative Ability for Neurorehabilitation)  Oral Expression - Raw Score: 14  Oral Expression - Scale Performance Score: 74  Orientation - Raw Score: 11  Orientation - Scale Performance Score: 92  Memory - Raw Score: 7  Memory - Scale Performance Score: 37  Speech Comprehension - Raw Score: 8  Speech Comprehension - Scale Performance Score: 62  Reading Comprehension - Raw Score: 5  Reading Comprehension - Scale Performance Score: 42  Writing - Raw Score: 6  Writing - Scale Performance Score: 86  Attention - Raw Score: 6  Attention - Scale Performance Score: 38  Problem Solving - Raw Score: 11  Problem Solving - Scale Performance Score: 48  SCCAN Total Raw Score: 56  SCCAN Degree of Severity: Moderate Impairment  .    Overall, the patient presents with moderate cognitive-linguistic impairments characterized by challenges in oral expression, memory, speech comprehension, reading comprehension, attention, and problem solving. The patient demonstrated relative strengths in the areas of orientation and writing; however, legibility of pt's writing was poor. Patient denies any new changed to her thinking/cognition since this hospitalization; assumed to be performing near cognitive baseline. Recommend 30 min/day of ST to address safety awareness, problem solving, expressive and receptive language.    Plan  Recommend Speech Therapy  30  minutes per day 5-6 days per week for 10-14 days for the following treatments:  SLP Cognitive Skill Development.    Passport items to be completed:  Express basic needs, understand food/liquid recommendations, consistently follow swallow precautions,  manage finances, manage medications, arrive to therapy appointments on time, complete daily memory log entries, solve problems related to safety situations, review education related to hospitalization, complete caregiver training     Goals:  Long term and short term goals have been discussed with patient and they are in agreement.    Speech Therapy Problems (Active)       There are no active problems.

## 2024-01-06 NOTE — THERAPY
"Occupational Therapy   Initial Evaluation     Patient Name: Debra Rodrigues  Age:  88 y.o., Sex:  female  Medical Record #: [id number]  Today's Date: 1/6/2024     Subjective    \"I live in by myself in [place].\"  (Patient lives in [facility] in [place])      Objective       01/06/24 0701   OT Charge Group   Charges Yes   OT Self Care / ADL (Units) 1   OT Evaluation OT Evaluation Mod   OT Total Time Spent   OT Individual Total Time Spent (Mins) 60   Prior Living Situation   Prior Services Continuous (24 Hour) Care Giving Per Service   Housing / Facility Assisted Living Residence   Steps Into Home 0   Steps In Home 0   Bathroom Set up Walk In Shower;Shower Chair   Equipment Owned 4-Wheel Walker;Single Point Cane;Wheelchair   Lives with - Patient's Self Care Capacity Attendant / Paid Care Giver   Comments Per chart, patient ambulatory with 4[facility] over community distances. Per patient, she lives by herself in a single level home in [place].   Prior Level of ADL Function   Self Feeding Independent   Grooming / Hygiene Independent   Bathing Requires Assist   Dressing Requires Assist   Toileting Requires Assist   Prior Level of IADL Function   Medication Management Requires Assist   Laundry Requires Assist   Kitchen Mobility Requires Assist   Finances Requires Assist   Home Management Requires Assist   Shopping Requires Assist   Prior Level Of Mobility Supervision With Device in Home   Driving / Transportation Relatives / Others Provide Transportation   Occupation (Pre-Hospital Vocational) Retired Due To Age   Leisure Interests Television   Comments Per chart   Prior Functioning: Everyday Activities   Self Care Needed some help   Indoor Mobility (Ambulation) Needed some help   Stairs Needed some help   Functional Cognition Needed some help   Prior Device Use Walker   Cognition    Cognition / Consciousness X   Level of Consciousness Alert   Cognitive Pattern Assessment   Cognitive Pattern Assessment Used BIMS   Brief Interview for " "Mental Status (BIMS)   Repetition of Three Words (First Attempt) 3   Temporal Orientation: Year Correct   Temporal Orientation: Month Accurate within 5 days   Temporal Orientation: Day Correct   Recall: \"Sock\" Yes, after cueing (\"something to wear\")   Recall: \"Blue\" Yes, no cue required   Recall: \"Bed\" Yes, no cue required   BIMS Summary Score 14   Confusion Assessment Method (CAM)   Is there evidence of an acute change in mental status from the patient's baseline? No   Inattention Behavior not present   Disorganized thinking Behavior not present   Altered level of consciousness Behavior not present   Vision Screen   Vision Not tested   Visual Acuity Able to read employee name badge without difficulty  (with reading glasses in place)   Visual Screen Results   (Macular degeneration)   Strength Upper Body   Upper Body Strength  X   Gross Strength Generalized Weakness, Equal Bilaterally.    Balance Assessment   Sitting Balance (Static) Poor   Sitting Balance (Dynamic) Poor -   Standing Balance (Static) Trace +   Standing Balance (Dynamic) Dependent   Weight Shift Sitting Poor   Weight Shift Standing Absent   Bed Mobility    Supine to Sit Maximal Assist   Sit to Stand Maximal Assist   Scooting Maximal Assist   Eating   Assistance Needed Set-up / clean-up   CARE Score - Eating 5   Eating Discharge Goal   Discharge Goal 6   Oral Hygiene   Assistance Needed Set-up / clean-up   CARE Score - Oral Hygiene 5   Oral Hygiene Discharge Goal   Discharge Goal 6   Shower/Bathe Self   Reason if not Attempted Safety concerns   CARE Score - Shower/Bathe Self 88   Shower/Bathe Self Discharge Goal   Discharge Goal 3   Upper Body Dressing   Assistance Needed Physical assistance   Physical Assistance Level 76% or more   CARE Score - Upper Body Dressing 2   Upper Body Dressing Discharge Goal   Discharge Goal 4   Lower Body Dressing   Assistance Needed Physical assistance   Physical Assistance Level Total assistance   CARE Score - Lower Body " Dressing 1   Lower Body Dressing Discharge Goal   Discharge Goal 4   Putting On/Taking Off Footwear   Assistance Needed Physical assistance   Physical Assistance Level Total assistance   CARE Score - Putting On/Taking Off Footwear 1   Putting On/Taking Off Footwear Discharge Goal   Discharge Goal 4   Toileting Hygiene   Assistance Needed Physical assistance   Physical Assistance Level Total assistance   CARE Score - Toileting Hygiene 1   Toileting Hygiene Discharge Goal   Discharge Goal 4   Toilet Transfer   Assistance Needed Physical assistance   Physical Assistance Level 76% or more   CARE Score - Toilet Transfer 2   Toilet Transfer Discharge Goal   Discharge Goal 4   Hearing, Speech, and Vision   Ability to Hear Adequate   Ability to See in Adequate Light Adequate   Expression of Ideas and Wants Without difficulty   Understanding Verbal and Non-Verbal Content Understands   Functional Level of Assist   Eating Supervision   Eating Description Set-up of equipment or meal/tube feeding   Grooming Standby Assist   Grooming Description Seated in wheelchair at sink   Upper Body Dressing Maximal Assist   Upper Body Dressing Description Increased time;Assist with pulling shirt over head;Assit with threading arms through sleeves   Lower Body Dressing Total Assist   Lower Body Dressing Description Assist with threading into pant leg;Increased time   Toileting Total Assist   Toileting Description Assist for hygiene;Assist to pull pants up;Assist to pull pants down;Assist for standing balance   Bed, Chair, Wheelchair Transfer Maximal Assist  (two person assist)   Bed Chair Wheelchair Transfer Description Requires lift;Assist with two limbs;Increased time;Squat pivot transfer to wheelchair;Supervision for safety;Verbal cueing   Toilet Transfers Maximal Assist  (two person assist)   Toilet Transfer Description Grab bar;Assist with two limbs;Increased time;Requires lift   Problem List   Problem List Decreased Active Daily Living  Skills;Decreased Homemaking Skills;Decreased Functional Mobility;Decreased Activity Tolerance;Impaired Cognitive Function;Impaired Postural Control / Balance;Impaired Vision   Precautions   Precautions Fall Risk;Other (See Comments)   Comments Macular degeneration   Current Discharge Plan   Current Discharge Plan Return to Prior Living Situation   Benefit    Therapy Benefit Patient Would Benefit from Inpatient Rehab Occupational Therapy to Maximize Miner with ADLs, IADLs and Functional Mobility.   Interdisciplinary Plan of Care Collaboration   IDT Collaboration with  Nursing;Certified Nursing Assistant;Physical Therapist   Patient Position at End of Therapy Seated;Chair Alarm On;Self Releasing Lap Belt Applied;Tray Table within Reach;Call Light within Reach;Phone within Reach   Collaboration Comments CLOF; Lift assist for tx   Strengths & Barriers   Strengths Alert and oriented;Able to follow instructions;Pleasant and cooperative;Willingly participates in therapeutic activities   Barriers Decreased endurance;Dementia;Generalized weakness;Impaired activity tolerance;Impaired balance;Limited mobility;Visual impairment       Assessment  Patient is 88 y.o. female with a diagnosis of pneumonia due to infectious organism.  Additional factors influencing patient status / progress (ie: cognitive factors, co-morbidities, social support, etc):  history significant for depression, atrial fibrillation, PPM, hypothyroidism, hypertension, chronic neuropathy.      Patient required Max/Total A for bed mobility and functional transfers secondary to  Poor seated balance with tendency to list towards right side with poor self-righting.  Shower activity deferred secondary to fall concern.  Patient appears to confabulate home set-up but had no difficulty with BIMS activity.       Plan  Recommend Occupational Therapy  minutes per day 5-7 days per week for 10-14 days for the following treatments:  OT Self Care/ADL, OT  Cognitive Skill Dev, OT Community Reintegration, OT Neuro Re-Ed/Balance, OT Therapeutic Activity, OT Evaluation, and OT Therapeutic Exercise.    Passport items to be completed:  Perform bathroom transfers, complete dressing, complete feeding, get ready for the day, prepare a simple meal, participate in household tasks, adapt home for safety needs, demonstrate home exercise program, complete caregiver training     Goals:  Long term and short term goals have been discussed with patient and they are in agreement.    Occupational Therapy Goals (Active)       Problem: Dressing       Dates: Start:  01/06/24         Goal: STG-Within one week, patient will dress UB at a Min A level.        Dates: Start:  01/06/24            Goal: STG-Within one week, patient will dress LB at a Mod A level with AE/AD PRN.        Dates: Start:  01/06/24               Problem: Functional Transfers       Dates: Start:  01/06/24         Goal: STG-Within one week, patient will transfer to toilet at a Mod A level with AD/DME PRN.        Dates: Start:  01/06/24               Problem: OT Long Term Goals       Dates: Start:  01/06/24         Goal: LTG-By discharge, patient will complete basic self care tasks at a SUP to Mod A level with AE/AD/DME PRN.        Dates: Start:  01/06/24            Goal: LTG-By discharge, patient will perform bathroom transfers at a SUP to Min A level with LRD and DME PRN.        Dates: Start:  01/06/24               Problem: Toileting       Dates: Start:  01/06/24         Goal: STG-Within one week, patient will complete toileting tasks at a Mod A level with AE/AD PRN.        Dates: Start:  01/06/24

## 2024-01-06 NOTE — PROGRESS NOTES
4 Eyes Skin Assessment Completed by DAMIAN Thurston and DAMIAN River.    Head WDL  Ears WDL  Nose WDL  Mouth WDL  Neck WDL  Breast/Chest WDL  Shoulder Blades WDL  Spine WDL  (R) Arm/Elbow/Hand Bruising  (L) Arm/Elbow/Hand Bruising and Abrasion skin tear  Abdomen Redness and Bruising  Groin Redness  Scrotum/Coccyx/Buttocks Redness  (R) Leg Bruising  (L) Leg Bruising  (R) Heel/Foot/Toe WDL  (L) Heel/Foot/Toe WDL          Devices In Places Oxy Mask and Nasal Cannula      Interventions In Place Gray Ear Foams, Waffle Overlay, Pillows, Barrier Cream, and Pressure Redistribution Mattress    Possible Skin Injury Yes    Pictures Uploaded Into Epic Yes  Wound Consult Placed N/A  RN Wound Prevention Protocol Ordered Yes

## 2024-01-07 LAB
BACTERIA BLD CULT: NORMAL
BACTERIA BLD CULT: NORMAL
SIGNIFICANT IND 70042: NORMAL
SIGNIFICANT IND 70042: NORMAL
SITE SITE: NORMAL
SITE SITE: NORMAL
SOURCE SOURCE: NORMAL
SOURCE SOURCE: NORMAL

## 2024-01-07 PROCEDURE — 770010 HCHG ROOM/CARE - REHAB SEMI PRIVAT*

## 2024-01-07 PROCEDURE — A9270 NON-COVERED ITEM OR SERVICE: HCPCS | Performed by: HOSPITALIST

## 2024-01-07 PROCEDURE — A9270 NON-COVERED ITEM OR SERVICE: HCPCS | Performed by: PHYSICAL MEDICINE & REHABILITATION

## 2024-01-07 PROCEDURE — 99232 SBSQ HOSP IP/OBS MODERATE 35: CPT | Performed by: HOSPITALIST

## 2024-01-07 PROCEDURE — 99231 SBSQ HOSP IP/OBS SF/LOW 25: CPT | Performed by: GENERAL PRACTICE

## 2024-01-07 PROCEDURE — 700102 HCHG RX REV CODE 250 W/ 637 OVERRIDE(OP): Performed by: PHYSICAL MEDICINE & REHABILITATION

## 2024-01-07 PROCEDURE — 94760 N-INVAS EAR/PLS OXIMETRY 1: CPT

## 2024-01-07 PROCEDURE — 700102 HCHG RX REV CODE 250 W/ 637 OVERRIDE(OP): Performed by: HOSPITALIST

## 2024-01-07 RX ORDER — METOPROLOL SUCCINATE 25 MG/1
12.5 TABLET, EXTENDED RELEASE ORAL DAILY
Status: DISCONTINUED | OUTPATIENT
Start: 2024-01-08 | End: 2024-01-22 | Stop reason: HOSPADM

## 2024-01-07 RX ADMIN — APIXABAN 5 MG: 5 TABLET, FILM COATED ORAL at 20:52

## 2024-01-07 RX ADMIN — GABAPENTIN 100 MG: 100 CAPSULE ORAL at 20:51

## 2024-01-07 RX ADMIN — FAMOTIDINE 20 MG: 20 TABLET ORAL at 20:51

## 2024-01-07 RX ADMIN — APIXABAN 5 MG: 5 TABLET, FILM COATED ORAL at 08:11

## 2024-01-07 RX ADMIN — PAROXETINE HYDROCHLORIDE 10 MG: 20 TABLET, FILM COATED ORAL at 08:11

## 2024-01-07 RX ADMIN — LOSARTAN POTASSIUM 75 MG: 50 TABLET, FILM COATED ORAL at 17:26

## 2024-01-07 RX ADMIN — FLECAINIDE ACETATE 100 MG: 100 TABLET ORAL at 08:11

## 2024-01-07 RX ADMIN — TRAZODONE HYDROCHLORIDE 50 MG: 50 TABLET ORAL at 20:51

## 2024-01-07 RX ADMIN — Medication 1000 UNITS: at 08:11

## 2024-01-07 RX ADMIN — DOXYCYCLINE 100 MG: 100 TABLET, FILM COATED ORAL at 20:51

## 2024-01-07 RX ADMIN — SENNOSIDES AND DOCUSATE SODIUM 2 TABLET: 50; 8.6 TABLET ORAL at 08:11

## 2024-01-07 RX ADMIN — NYSTATIN: 100000 POWDER TOPICAL at 11:21

## 2024-01-07 RX ADMIN — LEVOTHYROXINE SODIUM 75 MCG: 0.07 TABLET ORAL at 08:10

## 2024-01-07 RX ADMIN — METOPROLOL SUCCINATE 25 MG: 25 TABLET, EXTENDED RELEASE ORAL at 05:49

## 2024-01-07 RX ADMIN — AMOXICILLIN AND CLAVULANATE POTASSIUM 1 TABLET: 875; 125 TABLET, FILM COATED ORAL at 08:11

## 2024-01-07 RX ADMIN — DOXYCYCLINE 100 MG: 100 TABLET, FILM COATED ORAL at 08:11

## 2024-01-07 RX ADMIN — SIMETHICONE 125 MG: 125 TABLET, CHEWABLE ORAL at 20:50

## 2024-01-07 RX ADMIN — NYSTATIN: 100000 POWDER TOPICAL at 15:00

## 2024-01-07 RX ADMIN — AMOXICILLIN AND CLAVULANATE POTASSIUM 1 TABLET: 875; 125 TABLET, FILM COATED ORAL at 20:51

## 2024-01-07 RX ADMIN — LOSARTAN POTASSIUM 50 MG: 50 TABLET, FILM COATED ORAL at 05:49

## 2024-01-07 RX ADMIN — SIMETHICONE 125 MG: 125 TABLET, CHEWABLE ORAL at 11:20

## 2024-01-07 RX ADMIN — SIMETHICONE 125 MG: 125 TABLET, CHEWABLE ORAL at 08:11

## 2024-01-07 RX ADMIN — SIMETHICONE 125 MG: 125 TABLET, CHEWABLE ORAL at 17:26

## 2024-01-07 RX ADMIN — FAMOTIDINE 20 MG: 20 TABLET ORAL at 08:11

## 2024-01-07 RX ADMIN — NYSTATIN: 100000 POWDER TOPICAL at 20:59

## 2024-01-07 RX ADMIN — SENNOSIDES AND DOCUSATE SODIUM 2 TABLET: 50; 8.6 TABLET ORAL at 20:52

## 2024-01-07 SDOH — ECONOMIC STABILITY: TRANSPORTATION INSECURITY
IN THE PAST 12 MONTHS, HAS LACK OF RELIABLE TRANSPORTATION KEPT YOU FROM MEDICAL APPOINTMENTS, MEETINGS, WORK OR FROM GETTING THINGS NEEDED FOR DAILY LIVING?: NO

## 2024-01-07 SDOH — ECONOMIC STABILITY: TRANSPORTATION INSECURITY
IN THE PAST 12 MONTHS, HAS THE LACK OF TRANSPORTATION KEPT YOU FROM MEDICAL APPOINTMENTS OR FROM GETTING MEDICATIONS?: NO

## 2024-01-07 ASSESSMENT — ENCOUNTER SYMPTOMS
NAUSEA: 0
ABDOMINAL PAIN: 0
COUGH: 1
WEAKNESS: 1
SHORTNESS OF BREATH: 0
CHILLS: 0
VOMITING: 0
FEVER: 0
MUSCULOSKELETAL NEGATIVE: 1
EYES NEGATIVE: 1
PALPITATIONS: 0
BRUISES/BLEEDS EASILY: 0
POLYDIPSIA: 0

## 2024-01-07 ASSESSMENT — FIBROSIS 4 INDEX: FIB4 SCORE: 1.36

## 2024-01-07 ASSESSMENT — PAIN DESCRIPTION - PAIN TYPE: TYPE: ACUTE PAIN

## 2024-01-07 NOTE — CARE PLAN
"  Problem: Fall Risk - Rehab  Goal: Patient will remain free from falls  Note: Rubina Echavarria Fall risk Assessment Score: 19    High fall risk Interventions   - Alarming seatbelt  - Wander guard  - Bed and strip alarm   - Yellow sign by the door   - Yellow wrist band \"Fall risk\"  - Room near to the nurse station  - Do not leave patient unattended in the bathroom  - Fall risk education provided      Problem: Respiratory  Goal: Patient will understand use and administration of respiratory medications to improve respiratory function  Note: O2 at 1 lpm n/c.      Problem: Bowel Elimination  Goal: Patient will participate in bowel management program  Note: PRN MOM given for no BM result from prn Miralax. CN notified.     Problem: Infection  Goal: Patient will remain free from infection  Note: Continues ABT x 2 and Nystatin powder to right under breast, panus and groin. Will monitor.      The patient is Watcher - Medium risk of patient condition declining or worsening           "

## 2024-01-07 NOTE — PROGRESS NOTES
NURSING DAILY NOTE    Name: Debra Rodrigues   Date of Admission: 1/5/2024   Admitting Diagnosis: Pneumonia due to infectious organism  Attending Physician: Eugenia Ruiz D.o.  Allergies: Other misc, Hydrocodone-acetaminophen, and Sulfa drugs    Safety  Patient Assist  Max 2  Patient Precautions  Fall Risk, Other (See Comments)  Precaution Comments  Maculary degeneration, hx of CVA  Bed Transfer Status  Total Assist X 2  Toilet Transfer Status   Maximal Assist (two person assist)  Assistive Devices  Rails  Oxygen  Nasal Cannula  Diet/Therapeutic Dining  Current Diet Order   Procedures    Diet Order Diet: Regular     Pill Administration  whole and one at a time   Agitated Behavioral Scale     ABS Level of Severity       Fall Risk  Has the patient had a fall this admission?   No  Rubina Echavarria Fall Risk Scoring  19, HIGH RISK  Fall Risk Safety Measures  bed alarm and chair alarm    Vitals  Temperature: 36.6 °C (97.9 °F)  Temp src: Oral  Pulse: 66  Respiration: 18  Blood Pressure : 117/52  Blood Pressure MAP (Calculated): 74 MM HG  BP Location: Right, Upper Arm  Patient BP Position: Ramirez's Position     Oxygen  Pulse Oximetry: 90 % (asleep)  O2 (LPM): 1  O2 Delivery Device: Nasal Cannula    Bowel and Bladder  Last Bowel Movement  01/07/24  Stool Type  Type 6: Fluffy pieces with ragged edges, a mushy stool  Bowel Device  Bathroom, Diaper  Continent  Bladder: Did not void   Bowel: No movement  Bladder Function  Urine Void (mL):  (large inc,)  Number of Times Incontinent of Urine: 1  Genitourinary Assessment   Bladder Assessment (WDL):  WDL Except  Cortes Catheter: Not Applicable  Urinary Elimination: Incontinence  Number of Bladder Accidents: 1  Total Number of Bladder of Accidents in Last 7 Days: 1  Number of Times Incontinent of Urine: 1  Bladder Device: Absorbent Brief (Pull Up)  Time Void: No  Bladder Scan: Post Void  $ Bladder Scan Results (mL):  85    Skin  Larry Score   16  Sensory Interventions   Bed Types: Standard/Trauma Mattress  Skin Preventative Measures: Pillows in Use for Support / Positioning  Moisture Interventions  Moisturizers/Barriers: Barrier Cream      Pain  Pain Rating Scale  0 - No Pain  Pain Location     Pain Location Orientation     Pain Interventions   Declines    ADLs    Bathing      Linen Change      Personal Hygiene  Perineal Care, Moist Mamie Wipes  Chlorhexidine Bath      Oral Care  Brushed Teeth  Teeth/Dentures     Shave     Nutrition Percentage Eaten  Lunch, Between % Consumed  Environmental Precautions     Patient Turns/Positioning  Patient Turns Self from Side to Side  Patient Turns Assistance/Tolerance     Bed Positions     Head of Bed Elevated         Psychosocial/Neurologic Assessment  Psychosocial Assessment  Psychosocial (WDL):  Within Defined Limits  Neurologic Assessment  Neuro (WDL): Exceptions to WDL  Level of Consciousness: Alert  Orientation Level: Oriented to place  Motor Function/Sensation Assessment: Sensation  RUE Sensation: Full sensation  LUE Sensation: Full sensation  RLE Sensation: Numbness, Tingling  LLE Sensation: Numbness, Tingling       Cardio/Pulmonary Assessment  Edema   RLE Edema: Generalized  LLE Edema: Generalized  Respiratory Breath Sounds  RUL Breath Sounds: Clear  RML Breath Sounds: Clear  RLL Breath Sounds: Diminished  ANGÉLICA Breath Sounds: Clear  LLL Breath Sounds: Diminished  Cardiac Assessment   Cardiac (WDL):  WDL Except (Hx Afib, htn)

## 2024-01-07 NOTE — CARE PLAN
Problem: Self Care  Goal: Patient will have the ability to perform ADLs independently or with assistance  Outcome: Progressing  Note: Patient able to perform regular activities this shift.  Pain controlled this shift.  Pain management includes PRN pain meds as well as non-pharmacological measures such as emotional support, rest, and repositioning.  Will continue to monitor.   The patient is Stable - Low risk of patient condition declining or worsening         Progress made toward(s) clinical / shift goals:  pt participates with therapy and is cooperative with nursing    Patient is not progressing towards the following goals:

## 2024-01-07 NOTE — PROGRESS NOTES
"  Physical Medicine & Rehabilitation Progress Note    Encounter Date: 1/7/2024    Chief Complaint: Pneumonia due to infectious organism [J18.9]  Hypoxia [R09.02]    Interval Events (Subjective):    No acute changes from yesterday    Acute problems/issues reported by staff: none    Acute problems/issues reported by patient: none    Participating in therapy. Pain controlled. PO tolerant.      ROS: Denies fever, chill, headache, dizziness, blurry vision, cough, shortness of breath, chest pain, nausea, vomiting, bowel changes, or urinary changes.      Medications:  Scheduled Medications   Medication Dose Frequency    [START ON 1/8/2024] metoprolol SR  12.5 mg DAILY    losartan  75 mg Q12HRS    simethicone  125 mg 4X/DAY WITH MEALS + NIGHTLY    Pharmacy Consult Request  1 Each PHARMACY TO DOSE    senna-docusate  2 Tablet BID    nystatin   TID    amoxicillin-clavulanate  1 Tablet Q12HRS    apixaban  5 mg BID    doxycycline monohydrate  100 mg Q12HRS    famotidine  20 mg Q12HRS    flecainide  100 mg DAILY    traZODone  50 mg QHS    PARoxetine  10 mg DAILY    levothyroxine  75 mcg DAILY    gabapentin  100 mg Q EVENING    vitamin D3  1,000 Units DAILY     PRN medications: Respiratory Therapy Consult, hydrALAZINE, senna-docusate **AND** polyethylene glycol/lytes **AND** magnesium hydroxide **AND** bisacodyl, carboxymethylcellulose, benzocaine-menthol, mag hydrox-al hydrox-simeth, ondansetron **OR** ondansetron, sodium chloride, acetaminophen, guaiFENesin dextromethorphan    Diet:  Current Diet Order   Procedures    Diet Order Diet: Regular       Objective:  VITAL SIGNS: BP (!) 141/63   Pulse 65   Temp 36.8 °C (98.2 °F) (Oral)   Resp 18   Ht 1.676 m (5' 6\")   Wt 90 kg (198 lb 8 oz)   SpO2 91%   BMI 32.04 kg/m²     No acute changes from yesterday    General:  No acute distress.     HEENT: normocephalic, atraumatic, sclera and conjunctiva normal, normal neck ROM   Respiratory: Required supplemental oxygen non-labored " respirations. full sentences.    Cardiovascular:  Regular rate and rhythm. peripheral perfusion normal   Abdomen: soft, non-tender, non distended, bowel sounds normal   Extremities: no edema; no bilateral calf pain.    Mental status/ Cognition:  Alert. Appropriate responses    Psychiatric: normal affect.  Cooperative.        Laboratory Values:  Recent Results (from the past 72 hour(s))   EC-ECHOCARDIOGRAM COMPLETE W/O CONT    Collection Time: 01/04/24  2:40 PM   Result Value Ref Range    Left Ventrical Ejection Fraction 55    Basic Metabolic Panel    Collection Time: 01/05/24  3:06 AM   Result Value Ref Range    Sodium 138 135 - 145 mmol/L    Potassium 3.8 3.6 - 5.5 mmol/L    Chloride 102 96 - 112 mmol/L    Co2 25 20 - 33 mmol/L    Glucose 104 (H) 65 - 99 mg/dL    Bun 26 (H) 8 - 22 mg/dL    Creatinine 0.74 0.50 - 1.40 mg/dL    Calcium 8.4 (L) 8.5 - 10.5 mg/dL    Anion Gap 11.0 7.0 - 16.0   CBC WITH DIFFERENTIAL    Collection Time: 01/05/24  3:06 AM   Result Value Ref Range    WBC 9.2 4.8 - 10.8 K/uL    RBC 4.07 (L) 4.20 - 5.40 M/uL    Hemoglobin 12.4 12.0 - 16.0 g/dL    Hematocrit 38.0 37.0 - 47.0 %    MCV 93.4 81.4 - 97.8 fL    MCH 30.5 27.0 - 33.0 pg    MCHC 32.6 32.2 - 35.5 g/dL    RDW 45.1 35.9 - 50.0 fL    Platelet Count 253 164 - 446 K/uL    MPV 10.9 9.0 - 12.9 fL    Neutrophils-Polys 64.40 44.00 - 72.00 %    Lymphocytes 16.70 (L) 22.00 - 41.00 %    Monocytes 13.60 (H) 0.00 - 13.40 %    Eosinophils 3.70 0.00 - 6.90 %    Basophils 0.70 0.00 - 1.80 %    Immature Granulocytes 0.90 0.00 - 0.90 %    Nucleated RBC 0.00 0.00 - 0.20 /100 WBC    Neutrophils (Absolute) 5.95 1.82 - 7.42 K/uL    Lymphs (Absolute) 1.54 1.00 - 4.80 K/uL    Monos (Absolute) 1.25 (H) 0.00 - 0.85 K/uL    Eos (Absolute) 0.34 0.00 - 0.51 K/uL    Baso (Absolute) 0.06 0.00 - 0.12 K/uL    Immature Granulocytes (abs) 0.08 0.00 - 0.11 K/uL    NRBC (Absolute) 0.00 K/uL   ESTIMATED GFR    Collection Time: 01/05/24  3:06 AM   Result Value Ref Range     GFR (CKD-EPI) 77 >60 mL/min/1.73 m 2   CBC with Differential    Collection Time: 01/06/24  5:42 AM   Result Value Ref Range    WBC 7.2 4.8 - 10.8 K/uL    RBC 4.13 (L) 4.20 - 5.40 M/uL    Hemoglobin 12.6 12.0 - 16.0 g/dL    Hematocrit 38.3 37.0 - 47.0 %    MCV 92.7 81.4 - 97.8 fL    MCH 30.5 27.0 - 33.0 pg    MCHC 32.9 32.2 - 35.5 g/dL    RDW 45.1 35.9 - 50.0 fL    Platelet Count 251 164 - 446 K/uL    MPV 10.5 9.0 - 12.9 fL    Neutrophils-Polys 60.60 44.00 - 72.00 %    Lymphocytes 19.60 (L) 22.00 - 41.00 %    Monocytes 15.20 (H) 0.00 - 13.40 %    Eosinophils 2.80 0.00 - 6.90 %    Basophils 1.10 0.00 - 1.80 %    Immature Granulocytes 0.70 0.00 - 0.90 %    Nucleated RBC 0.00 0.00 - 0.20 /100 WBC    Neutrophils (Absolute) 4.33 1.82 - 7.42 K/uL    Lymphs (Absolute) 1.40 1.00 - 4.80 K/uL    Monos (Absolute) 1.09 (H) 0.00 - 0.85 K/uL    Eos (Absolute) 0.20 0.00 - 0.51 K/uL    Baso (Absolute) 0.08 0.00 - 0.12 K/uL    Immature Granulocytes (abs) 0.05 0.00 - 0.11 K/uL    NRBC (Absolute) 0.00 K/uL   Comp Metabolic Panel (CMP)    Collection Time: 01/06/24  5:42 AM   Result Value Ref Range    Sodium 136 135 - 145 mmol/L    Potassium 3.8 3.6 - 5.5 mmol/L    Chloride 101 96 - 112 mmol/L    Co2 24 20 - 33 mmol/L    Anion Gap 11.0 7.0 - 16.0    Glucose 99 65 - 99 mg/dL    Bun 21 8 - 22 mg/dL    Creatinine 0.59 0.50 - 1.40 mg/dL    Calcium 8.5 8.5 - 10.5 mg/dL    Correct Calcium 9.7 8.5 - 10.5 mg/dL    AST(SGOT) 11 (L) 12 - 45 U/L    ALT(SGPT) 8 2 - 50 U/L    Alkaline Phosphatase 91 30 - 99 U/L    Total Bilirubin 0.5 0.1 - 1.5 mg/dL    Albumin 2.5 (L) 3.2 - 4.9 g/dL    Total Protein 5.9 (L) 6.0 - 8.2 g/dL    Globulin 3.4 1.9 - 3.5 g/dL    A-G Ratio 0.7 g/dL   Magnesium    Collection Time: 01/06/24  5:42 AM   Result Value Ref Range    Magnesium 2.0 1.5 - 2.5 mg/dL   Phosphorus    Collection Time: 01/06/24  5:42 AM   Result Value Ref Range    Phosphorus 3.2 2.5 - 4.5 mg/dL   TSH with Reflex to FT4    Collection Time: 01/06/24  5:42 AM    Result Value Ref Range    TSH 1.380 0.380 - 5.330 uIU/mL   ESTIMATED GFR    Collection Time: 01/06/24  5:42 AM   Result Value Ref Range    GFR (CKD-EPI) 86 >60 mL/min/1.73 m 2       Imaging:  None    Medical Decision Making and Plan:    No acute changes from yesterday    Reviewed primary care team's plan.      Rehab - Functional status reviewed briefly and progressing towards goals. Cont therapy plan     Pain - will monitor pain levels and adjust medications accordingly     Neuro -  monitor for neurological deficits and continue management plan of primary care team     Ortho -  continue management plan of primary care team to include pain management plan     Cardiac/Pulm - monitor clinical presentation and vitals; continue management plan of primary care team   Blood pressure stable and monitored by internist  Continue Cozaar 50 mg every 12 hours  Continue Toprol XL 25 mg daily    Community-acquired pneumonia  Flu, RSV, COVID-negative  Respiratory cultures PENDING (unclear if collected at [facility])  Blood cultures collected 1/2/2024 no growth to date  PT and OT for mobility and ADLs. Per guidelines, 15 hours per week between PT, OT and/or SLP.  Follow-up geriatrician  Continue Augmentin through 1/8  Continue doxycycline through 1/9    FEN  1/6 stable    GI -will monitor bowel movements and adjust medications accordingly     - will monitor voiding and adjust accordingly    DVT px - cont prophylaxis     Dispo - per primary team       I, Real Bentley DO, personally performed a complete drug regimen review and no potential clinically significant medication issues were identified.  ____________________________________    Real Bentlye DO  Physical Medicine and Rehabilitation  [facility]  ____________________________________

## 2024-01-07 NOTE — PROGRESS NOTES
[facility] Medicine Daily Progress Note      Chief Complaint  Hypertension    Interval Problem Update  Blood pressures elevated and heart rates downtrending; pt asymptomatic.    Review of Systems  Review of Systems   Constitutional:  Negative for chills and fever.   HENT: Negative.     Eyes: Negative.    Respiratory:  Positive for cough. Negative for shortness of breath.    Cardiovascular:  Negative for chest pain and palpitations.   Gastrointestinal:  Negative for abdominal pain, nausea and vomiting.   Genitourinary: Negative.    Musculoskeletal: Negative.    Skin:  Negative for itching and rash.   Neurological:  Positive for weakness.   Endo/Heme/Allergies:  Negative for polydipsia. Does not bruise/bleed easily.        Physical Exam  Temp:  [36.6 °C (97.9 °F)-36.8 °C (98.2 °F)] 36.8 °C (98.2 °F)  Pulse:  [60-68] 65  Resp:  [16-18] 16  BP: (117-164)/(52-80) 147/73  SpO2:  [90 %-95 %] 90 %    Physical Exam  Vitals reviewed.   Constitutional:       General: She is not in acute distress.     Appearance: Normal appearance. She is not ill-appearing.   HENT:      Head: Normocephalic and atraumatic.      Right Ear: External ear normal.      Left Ear: External ear normal.      Nose: Nose normal.      Mouth/Throat:      Pharynx: Oropharynx is clear.   Eyes:      General:         Right eye: No discharge.         Left eye: No discharge.      Extraocular Movements: Extraocular movements intact.      Conjunctiva/sclera: Conjunctivae normal.   Cardiovascular:      Rate and Rhythm: Normal rate and regular rhythm.   Pulmonary:      Effort: Pulmonary effort is normal. No respiratory distress.      Breath sounds: Normal breath sounds. No wheezing.   Abdominal:      General: Bowel sounds are normal. There is no distension.      Palpations: Abdomen is soft.      Tenderness: There is no abdominal tenderness. There is no guarding or rebound.   Musculoskeletal:      Cervical back: Normal range of motion and neck supple.      Right lower  leg: No edema.      Left lower leg: No edema.   Skin:     General: Skin is warm and dry.   Neurological:      Mental Status: She is alert and oriented to person, place, and time.         Fluids    Intake/Output Summary (Last 24 hours) at 1/7/2024 0942  Last data filed at 1/7/2024 0520  Gross per 24 hour   Intake 800 ml   Output --   Net 800 ml       Laboratory  Recent Labs     01/05/24  0306 01/06/24  0542   WBC 9.2 7.2   RBC 4.07* 4.13*   HEMOGLOBIN 12.4 12.6   HEMATOCRIT 38.0 38.3   MCV 93.4 92.7   MCH 30.5 30.5   MCHC 32.6 32.9   RDW 45.1 45.1   PLATELETCT 253 251   MPV 10.9 10.5     Recent Labs     01/05/24  0306 01/06/24  0542   SODIUM 138 136   POTASSIUM 3.8 3.8   CHLORIDE 102 101   CO2 25 24   GLUCOSE 104* 99   BUN 26* 21   CREATININE 0.74 0.59   CALCIUM 8.4* 8.5                   Assessment/Plan  Abdominal distension  Assessment & Plan  KUB gaseous colonic distension  Continue Simethicone    Azotemia  Assessment & Plan  S/P diuresis at [facility]  Encourage PO fluids  Renal function resolved    Hypoxia- (present on admission)  Assessment & Plan  S/P IV Lasix at [facility] for pulm edema  Completing Augmentin and Doxy for PNA  Guaifenesin for cough  RT protocol    Acquired hypothyroidism- (present on admission)  Assessment & Plan  Euthyroid on Synthroid    MDD (major depressive disorder), recurrent, in partial remission (HCC)- (present on admission)  Assessment & Plan  On Paxil    Primary hypertension- (present on admission)  Assessment & Plan  Decrease Toprol for bradycardia  Increase Losartan for hypertension    Paroxysmal atrial fibrillation (HCC)- (present on admission)  Assessment & Plan  Echo EF 55%  S/P PPM  On Flecainide  Anticoagulated on Eliquis  Cardiology F/U    DNR

## 2024-01-07 NOTE — PROGRESS NOTES
NURSING DAILY NOTE    Name: Debra Rodrigues   Date of Admission: 1/5/2024   Admitting Diagnosis: Pneumonia due to infectious organism  Attending Physician: Eugenia Ruiz D.o.  Allergies: Other misc, Hydrocodone-acetaminophen, and Sulfa drugs    Safety  Patient Assist  Max 2  Patient Precautions  Fall Risk, Other (See Comments)  Precaution Comments  Maculary degeneration, hx of CVA  Bed Transfer Status  Total Assist X 2  Toilet Transfer Status   Maximal Assist (two person assist)  Assistive Devices  Rails, Wheelchair  Oxygen  Nasal Cannula  Diet/Therapeutic Dining  Current Diet Order   Procedures    Diet Order Diet: Regular     Pill Administration  crushed and one at a time . Crushed if large  Agitated Behavioral Scale     ABS Level of Severity       Fall Risk  Has the patient had a fall this admission?   No  Rubina Echavarria Fall Risk Scoring  19, HIGH RISK  Fall Risk Safety Measures  bed alarm and chair alarm    Vitals  Temperature: 36.6 °C (97.9 °F)  Temp src: Oral  Pulse: 66  Respiration: 18  Blood Pressure : (!) 163/80 (RN notified )  Blood Pressure MAP (Calculated): 108 MM HG  BP Location: Left, Upper Arm  Patient BP Position: Ramirez's Position     Oxygen  Pulse Oximetry: 94 %  O2 (LPM): 1  O2 Delivery Device: Nasal Cannula    Bowel and Bladder  Last Bowel Movement  01/03/24  Stool Type     Bowel Device  Bathroom, Diaper  Continent  Bladder: Did not void   Bowel: No movement  Bladder Function  Urine Void (mL):  (inc.)  Number of Times Incontinent of Urine: 1  Genitourinary Assessment   Bladder Assessment (WDL):  WDL Except  Cortes Catheter: Not Applicable  Urinary Elimination: Incontinence  Number of Bladder Accidents: 1  Total Number of Bladder of Accidents in Last 7 Days: 1  Number of Times Incontinent of Urine: 1  Bladder Device: Absorbent Brief (Pull Up)  Time Void: No  Bladder Scan: Post Void  $ Bladder Scan Results (mL): 77    Skin  Larry  Score   16  Sensory Interventions   Bed Types: Standard/Trauma Mattress  Skin Preventative Measures: Pillows in Use for Support / Positioning  Moisture Interventions  Moisturizers/Barriers: Barrier Cream      Pain  Pain Rating Scale  0 - No Pain  Pain Location     Pain Location Orientation     Pain Interventions   Declines    ADLs    Bathing      Linen Change      Personal Hygiene  Perineal Care, Moist Mamie Wipes  Chlorhexidine Bath      Oral Care  Brushed Teeth  Teeth/Dentures     Shave     Nutrition Percentage Eaten  Lunch, Between % Consumed  Environmental Precautions     Patient Turns/Positioning  Patient Turns Self from Side to Side  Patient Turns Assistance/Tolerance     Bed Positions     Head of Bed Elevated         Psychosocial/Neurologic Assessment  Psychosocial Assessment  Psychosocial (WDL):  Within Defined Limits  Neurologic Assessment  Neuro (WDL): Exceptions to WDL  Level of Consciousness: Alert  Orientation Level: Oriented to place  Motor Function/Sensation Assessment: Sensation  RUE Sensation: Full sensation  LUE Sensation: Full sensation  RLE Sensation: Numbness, Tingling  LLE Sensation: Numbness, Tingling       Cardio/Pulmonary Assessment  Edema   RLE Edema: Generalized  LLE Edema: Generalized  Respiratory Breath Sounds  RUL Breath Sounds: Clear  RML Breath Sounds: Clear  RLL Breath Sounds: Diminished  ANGÉLICA Breath Sounds: Clear  LLL Breath Sounds: Diminished  Cardiac Assessment   Cardiac (WDL):  WDL Except (Hx Afib, htn)

## 2024-01-07 NOTE — DISCHARGE PLANNING
CASE MANAGEMENT INITIAL ASSESSMENT    Admit Date:  1/5/2024     Case Management has reviewed the medical chart and will meet with patient to discuss role of case management / discharge planning / team conference.   Patient is a  88 y.o. female transferred from [facility].    Attending physician: Dr. Ruiz  PCP: Foster Ruiz NP    Diagnosis: Pneumonia due to infectious organism [J18.9]  Hypoxia [R09.02]    Co-morbidities:   Patient Active Problem List    Diagnosis Date Noted    Hypoxia 01/05/2024    Azotemia 01/05/2024    Abdominal distension 01/05/2024    Pneumonia due to infectious organism 01/02/2024    Dermatitis 12/08/2023    Cerebral atrophy (HCC) 11/30/2023    BMI 29.0-29.9,adult 11/30/2023    Impaired mobility 10/25/2023    Urge incontinence of urine 09/14/2023    Other insomnia 09/14/2023    Open wound of right ankle 08/02/2023    Lower leg edema 08/02/2023    Paroxysmal atrial fibrillation (HCC) 04/19/2023    Primary hypertension 04/19/2023    DDD (degenerative disc disease), lumbosacral 04/19/2023    Diverticulitis 04/19/2023    Polyneuropathy in other diseases classified elsewhere ([facility]) 04/19/2023    Other thrombophilia ([facility]) 04/19/2023    MDD (major depressive disorder), recurrent, in partial remission (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) 04/27/2017     Prior Living Situation:  Housing / Facility: Assisted Living Residence  Lives with - Patient's Self Care Capacity: Attendant / Paid Care Giver    Prior Level of Function:  Medication Management: Requires Assist  Finances: Requires Assist  Home  Management: Requires Assist  Shopping: Requires Assist  Prior Level Of Mobility: Supervision With Device in Home  Driving / Transportation: Relatives / Others Provide Transportation    Support Systems:  Primary : Michael Alcantara (son)    Previous Services Utilized:   Equipment Owned: 4-Wheel Walker, Wheelchair  Prior Services: Continuous (24 Hour) Care Giving Per Service    Other Information:  Occupation (Pre-Hospital Vocational): Retired Due To Age  Primary Payor Source: Medicare A, Medicare B  Secondary Payor Source: Other (Comments) (anthem)  Primary Care Practitioner : Foster Ruiz NP    Patient / Family Goal:  Patient / Family Goal:  (cm to discuss goals with patient)    Plan:  1. Continue to follow patient through hospitalization and provide discharge planning in collaboration with patient, family, physicians and ancillary services.     2. Utilize community resources to ensure a safe discharge.

## 2024-01-07 NOTE — FLOWSHEET NOTE
01/07/24 0714   Events/Summary/Plan   Events/Summary/Plan 02 pulse ox check   Vital Signs   Pulse 65   Respiration 16   Pulse Oximetry 90 %   $ Pulse Oximetry (Spot Check) Yes   Respiratory Assessment   Level of Consciousness Alert   Chest Exam   Work Of Breathing / Effort Within Normal Limits   Oxygen   O2 (LPM) 1   O2 Delivery Device Silicone Nasal Cannula

## 2024-01-08 ENCOUNTER — APPOINTMENT (OUTPATIENT)
Dept: PHYSICAL THERAPY | Facility: REHABILITATION | Age: 89
DRG: 194 | End: 2024-01-08
Attending: PHYSICAL MEDICINE & REHABILITATION
Payer: MEDICARE

## 2024-01-08 ENCOUNTER — APPOINTMENT (OUTPATIENT)
Dept: OCCUPATIONAL THERAPY | Facility: REHABILITATION | Age: 89
DRG: 194 | End: 2024-01-08
Attending: PHYSICAL MEDICINE & REHABILITATION
Payer: MEDICARE

## 2024-01-08 ENCOUNTER — APPOINTMENT (OUTPATIENT)
Dept: SPEECH THERAPY | Facility: REHABILITATION | Age: 89
DRG: 194 | End: 2024-01-08
Attending: PHYSICAL MEDICINE & REHABILITATION
Payer: MEDICARE

## 2024-01-08 PROCEDURE — 700102 HCHG RX REV CODE 250 W/ 637 OVERRIDE(OP): Performed by: HOSPITALIST

## 2024-01-08 PROCEDURE — 770010 HCHG ROOM/CARE - REHAB SEMI PRIVAT*

## 2024-01-08 PROCEDURE — 97535 SELF CARE MNGMENT TRAINING: CPT | Mod: CO

## 2024-01-08 PROCEDURE — 99232 SBSQ HOSP IP/OBS MODERATE 35: CPT | Performed by: PHYSICAL MEDICINE & REHABILITATION

## 2024-01-08 PROCEDURE — 97116 GAIT TRAINING THERAPY: CPT

## 2024-01-08 PROCEDURE — 99232 SBSQ HOSP IP/OBS MODERATE 35: CPT | Performed by: HOSPITALIST

## 2024-01-08 PROCEDURE — 97110 THERAPEUTIC EXERCISES: CPT

## 2024-01-08 PROCEDURE — 700102 HCHG RX REV CODE 250 W/ 637 OVERRIDE(OP): Performed by: PHYSICAL MEDICINE & REHABILITATION

## 2024-01-08 PROCEDURE — 97129 THER IVNTJ 1ST 15 MIN: CPT

## 2024-01-08 PROCEDURE — 97130 THER IVNTJ EA ADDL 15 MIN: CPT

## 2024-01-08 PROCEDURE — A9270 NON-COVERED ITEM OR SERVICE: HCPCS | Performed by: PHYSICAL MEDICINE & REHABILITATION

## 2024-01-08 PROCEDURE — 97530 THERAPEUTIC ACTIVITIES: CPT

## 2024-01-08 PROCEDURE — 94760 N-INVAS EAR/PLS OXIMETRY 1: CPT

## 2024-01-08 PROCEDURE — A9270 NON-COVERED ITEM OR SERVICE: HCPCS | Performed by: HOSPITALIST

## 2024-01-08 RX ORDER — AMLODIPINE BESYLATE 5 MG/1
5 TABLET ORAL
Status: DISCONTINUED | OUTPATIENT
Start: 2024-01-08 | End: 2024-01-15

## 2024-01-08 RX ORDER — LOSARTAN POTASSIUM 50 MG/1
50 TABLET ORAL EVERY 12 HOURS
Status: DISCONTINUED | OUTPATIENT
Start: 2024-01-08 | End: 2024-01-10

## 2024-01-08 RX ADMIN — APIXABAN 5 MG: 5 TABLET, FILM COATED ORAL at 07:55

## 2024-01-08 RX ADMIN — FAMOTIDINE 20 MG: 20 TABLET ORAL at 19:51

## 2024-01-08 RX ADMIN — METOPROLOL SUCCINATE 12.5 MG: 25 TABLET, EXTENDED RELEASE ORAL at 05:17

## 2024-01-08 RX ADMIN — NYSTATIN: 100000 POWDER TOPICAL at 17:14

## 2024-01-08 RX ADMIN — SIMETHICONE 125 MG: 125 TABLET, CHEWABLE ORAL at 19:51

## 2024-01-08 RX ADMIN — FAMOTIDINE 20 MG: 20 TABLET ORAL at 07:55

## 2024-01-08 RX ADMIN — LOSARTAN POTASSIUM 75 MG: 50 TABLET, FILM COATED ORAL at 05:18

## 2024-01-08 RX ADMIN — SIMETHICONE 125 MG: 125 TABLET, CHEWABLE ORAL at 17:14

## 2024-01-08 RX ADMIN — FLECAINIDE ACETATE 100 MG: 100 TABLET ORAL at 07:57

## 2024-01-08 RX ADMIN — Medication 1000 UNITS: at 07:55

## 2024-01-08 RX ADMIN — PAROXETINE HYDROCHLORIDE 10 MG: 20 TABLET, FILM COATED ORAL at 07:54

## 2024-01-08 RX ADMIN — NYSTATIN: 100000 POWDER TOPICAL at 19:57

## 2024-01-08 RX ADMIN — DOXYCYCLINE 100 MG: 100 TABLET, FILM COATED ORAL at 19:51

## 2024-01-08 RX ADMIN — SIMETHICONE 125 MG: 125 TABLET, CHEWABLE ORAL at 07:55

## 2024-01-08 RX ADMIN — SENNOSIDES AND DOCUSATE SODIUM 2 TABLET: 50; 8.6 TABLET ORAL at 07:55

## 2024-01-08 RX ADMIN — TRAZODONE HYDROCHLORIDE 50 MG: 50 TABLET ORAL at 19:51

## 2024-01-08 RX ADMIN — GABAPENTIN 100 MG: 100 CAPSULE ORAL at 19:51

## 2024-01-08 RX ADMIN — HYDRALAZINE HYDROCHLORIDE 25 MG: 25 TABLET, FILM COATED ORAL at 05:17

## 2024-01-08 RX ADMIN — SIMETHICONE 125 MG: 125 TABLET, CHEWABLE ORAL at 11:40

## 2024-01-08 RX ADMIN — LOSARTAN POTASSIUM 50 MG: 50 TABLET, FILM COATED ORAL at 17:14

## 2024-01-08 RX ADMIN — AMLODIPINE BESYLATE 5 MG: 5 TABLET ORAL at 11:40

## 2024-01-08 RX ADMIN — APIXABAN 5 MG: 5 TABLET, FILM COATED ORAL at 19:51

## 2024-01-08 RX ADMIN — AMOXICILLIN AND CLAVULANATE POTASSIUM 1 TABLET: 875; 125 TABLET, FILM COATED ORAL at 07:54

## 2024-01-08 RX ADMIN — DOXYCYCLINE 100 MG: 100 TABLET, FILM COATED ORAL at 07:55

## 2024-01-08 RX ADMIN — LEVOTHYROXINE SODIUM 75 MCG: 0.07 TABLET ORAL at 07:56

## 2024-01-08 ASSESSMENT — ENCOUNTER SYMPTOMS
FEVER: 0
SHORTNESS OF BREATH: 0
ABDOMINAL PAIN: 0
WEAKNESS: 1
COUGH: 0
MUSCULOSKELETAL NEGATIVE: 1
CHILLS: 0
NAUSEA: 0
PALPITATIONS: 0
POLYDIPSIA: 0
EYES NEGATIVE: 1
VOMITING: 0
BRUISES/BLEEDS EASILY: 0

## 2024-01-08 ASSESSMENT — ACTIVITIES OF DAILY LIVING (ADL)
TOILETING_LEVEL_OF_ASSIST_DESCRIPTION: ASSIST FOR HYGIENE;ASSIST TO PULL PANTS UP;ASSIST TO PULL PANTS DOWN;ASSIST FOR STANDING BALANCE;GRAB BAR;INCREASED TIME

## 2024-01-08 ASSESSMENT — GAIT ASSESSMENTS
DISTANCE (FEET): 6
GAIT LEVEL OF ASSIST: TOTAL ASSIST
ASSISTIVE DEVICE: PARALLEL BARS
DEVIATION: SHUFFLED GAIT;DECREASED HEEL STRIKE;DECREASED TOE OFF;BRADYKINETIC

## 2024-01-08 ASSESSMENT — PATIENT HEALTH QUESTIONNAIRE - PHQ9
SUM OF ALL RESPONSES TO PHQ9 QUESTIONS 1 AND 2: 0
1. LITTLE INTEREST OR PLEASURE IN DOING THINGS: NOT AT ALL
2. FEELING DOWN, DEPRESSED, IRRITABLE, OR HOPELESS: NOT AT ALL

## 2024-01-08 ASSESSMENT — PAIN DESCRIPTION - PAIN TYPE: TYPE: ACUTE PAIN

## 2024-01-08 NOTE — THERAPY
Occupational Therapy  Daily Treatment     Patient Name: Debra Rodrigues  Age:  88 y.o., Sex:  female  Medical Record #: [id number]  Today's Date: 1/8/2024     Precautions  Precautions: Fall Risk, Other (See Comments)  Comments: Maculary degeneration, hx of CVA         Subjective    Pt was seated in w/c upon arrival and agreeable for therapy.      Objective       01/08/24 0930   OT Charge Group   OT Self Care / ADL (Units) 4   OT Total Time Spent   OT Individual Total Time Spent (Mins) 60   Functional Level of Assist   Grooming Standby Assist   Grooming Description Seated in wheelchair at sink;Increased time;Verbal cueing   Upper Body Dressing Moderate Assist   Upper Body Dressing Description Assist with closures;Assist with pulling shirt over head;Increased time   Lower Body Dressing Total Assist   Lower Body Dressing Description Assist with closures;Assist with threading into pant leg;Increased time;Set-up of equipment;Verbal cueing   Toileting Total Assist   Toileting Description Assist for hygiene;Assist to pull pants up;Assist to pull pants down;Assist for standing balance;Grab bar;Increased time   Toilet Transfers Maximal Assist   Toilet Transfer Description Grab bar;Assist with one limb;Increased time;Set-up of equipment;Verbal cueing   Interdisciplinary Plan of Care Collaboration   Patient Position at End of Therapy Seated;Chair Alarm On;Call Light within Reach;Tray Table within Reach;Phone within Reach     Needed increase time for toileting tasks d/t BM. Pt had a soiled brief and needed assistance w/ changing while on toilet.     Assessment    Pt tolerated session fairly w/ focus on ADLs. Needed VC for sequencing during all dressing tasks as well as all STS w/ transfers. Pt was able to perform frontal mal care, however needed assistance w/ posterior d/t decrease standing balance and activity tolerance.     Strengths: Alert and oriented, Able to follow instructions, Pleasant and cooperative, Willingly  participates in therapeutic activities  Barriers: Decreased endurance, Dementia, Generalized weakness, Impaired activity tolerance, Impaired balance, Limited mobility, Visual impairment    Plan    ADLs  Standing balance/ tolerance  Functional transfers   Strengthening/endurance     DME       Passport items to be completed:  Perform bathroom transfers, complete dressing, complete feeding, get ready for the day, prepare a simple meal, participate in household tasks, adapt home for safety needs, demonstrate home exercise program, complete caregiver training     Occupational Therapy Goals (Active)       Problem: Dressing       Dates: Start:  01/06/24         Goal: STG-Within one week, patient will dress UB at a Min A level.        Dates: Start:  01/06/24            Goal: STG-Within one week, patient will dress LB at a Mod A level with AE/AD PRN.        Dates: Start:  01/06/24               Problem: Functional Transfers       Dates: Start:  01/06/24         Goal: STG-Within one week, patient will transfer to toilet at a Mod A level with AD/DME PRN.        Dates: Start:  01/06/24               Problem: OT Long Term Goals       Dates: Start:  01/06/24         Goal: LTG-By discharge, patient will complete basic self care tasks at a SUP to Mod A level with AE/AD/DME PRN.        Dates: Start:  01/06/24            Goal: LTG-By discharge, patient will perform bathroom transfers at a SUP to Min A level with LRD and DME PRN.        Dates: Start:  01/06/24               Problem: Toileting       Dates: Start:  01/06/24         Goal: STG-Within one week, patient will complete toileting tasks at a Mod A level with AE/AD PRN.        Dates: Start:  01/06/24

## 2024-01-08 NOTE — PROGRESS NOTES
NURSING DAILY NOTE    Name: Debra Rodrigues   Date of Admission: 1/5/2024   Admitting Diagnosis: Pneumonia due to infectious organism  Attending Physician: Eugenia Ruiz D.o.  Allergies: Other misc, Hydrocodone-acetaminophen, and Sulfa drugs    Safety  Patient Assist  max 2  Patient Precautions  Fall Risk, Other (See Comments)  Precaution Comments  Maculary degeneration, hx of CVA  Bed Transfer Status  Total Assist X 2  Toilet Transfer Status   Maximal Assist (two person assist)  Assistive Devices  Rails, Wheelchair  Oxygen  Silicone Nasal Cannula  Diet/Therapeutic Dining  Current Diet Order   Procedures    Diet Order Diet: Regular     Pill Administration  whole and float larger pills in applesauce  Agitated Behavioral Scale     ABS Level of Severity       Fall Risk  Has the patient had a fall this admission?   No  Rubina Echavarria Fall Risk Scoring  21, HIGH RISK  Fall Risk Safety Measures  bed alarm, chair alarm, seatbelt alarm, and poor balance    Vitals  Temperature: 36.8 °C (98.3 °F)  Temp src: Oral  Pulse: 65  Respiration: 18  Blood Pressure : (!) 154/73  Blood Pressure MAP (Calculated): 100 MM HG  BP Location: Right, Upper Arm  Patient BP Position: Sitting     Oxygen  Pulse Oximetry: 92 %  O2 (LPM): 1  O2 Delivery Device: Silicone Nasal Cannula    Bowel and Bladder  Last Bowel Movement  01/07/24  Stool Type  Type 6: Fluffy pieces with ragged edges, a mushy stool  Bowel Device  Bathroom, Diaper  Continent  Bladder: Did not void   Bowel: No movement  Bladder Function  Urine Void (mL):  (large inc,)  Urine Color: Unable To Evaluate  Number of Times Incontinent of Urine: 1  Genitourinary Assessment   Bladder Assessment (WDL):  WDL Except  Cortes Catheter: Not Applicable  Urinary Elimination: Incontinence  Urine Color: Unable To Evaluate  Number of Bladder Accidents: 1  Total Number of Bladder of Accidents in Last 7 Days: 1  Number of Times Incontinent  of Urine: 1  Bladder Device: Bathroom, Diaper  Time Void: No  Bladder Scan: Post Void  $ Bladder Scan Results (mL): 119    Skin  Larry Score   16  Sensory Interventions   Bed Types: Standard/Trauma Mattress  Skin Preventative Measures: Pillows in Use for Support / Positioning  Moisture Interventions  Moisturizers/Barriers: Barrier Wipes      Pain  Pain Rating Scale  0 - No Pain  Pain Location     Pain Location Orientation     Pain Interventions   Declines    ADLs    Bathing      Linen Change      Personal Hygiene  Perineal Care, Moist Mamie Wipes  Chlorhexidine Bath      Oral Care  Brushed Teeth  Teeth/Dentures     Shave     Nutrition Percentage Eaten  *  * Meal *  *, Lunch, Between 25-50% Consumed  Environmental Precautions     Patient Turns/Positioning  Left Side  Patient Turns Assistance/Tolerance     Bed Positions  Bed Controls On  Head of Bed Elevated  Self regulated      Psychosocial/Neurologic Assessment  Psychosocial Assessment  Psychosocial (WDL):  Within Defined Limits  Neurologic Assessment  Neuro (WDL): Exceptions to WDL  Level of Consciousness: Alert  Orientation Level: Oriented to place, Oriented to person, Oriented to situation  Cognition: Follows commands  Speech: Clear  Motor Function/Sensation Assessment: Sensation  RUE Sensation: Full sensation  LUE Sensation: Full sensation  RLE Sensation: Numbness, Tingling  LLE Sensation: Numbness, Tingling  EENT (WDL):  WDL Except    Cardio/Pulmonary Assessment  Edema   RLE Edema: Generalized  LLE Edema: Generalized  Respiratory Breath Sounds  RUL Breath Sounds: Clear  RML Breath Sounds: Clear  RLL Breath Sounds: Diminished  ANGÉLICA Breath Sounds: Clear  LLL Breath Sounds: Diminished  Cardiac Assessment   Cardiac (WDL):  WDL Except (hx htn, pacer, afib)

## 2024-01-08 NOTE — PROGRESS NOTES
NURSING DAILY NOTE    Name: Debra Rodrigues   Date of Admission: 1/5/2024   Admitting Diagnosis: Pneumonia due to infectious organism  Attending Physician: Eugenia Ruiz D.o.  Allergies: Other misc, Hydrocodone-acetaminophen, and Sulfa drugs    Safety  Patient Assist  max 2  Patient Precautions  Fall Risk, Other (See Comments)  Precaution Comments  Maculary degeneration, hx of CVA  Bed Transfer Status  Total Assist X 2  Toilet Transfer Status   Maximal Assist (two person assist)  Assistive Devices  Rails  Oxygen  Nasal Cannula  Diet/Therapeutic Dining  Current Diet Order   Procedures    Diet Order Diet: Regular     Pill Administration  whole  Agitated Behavioral Scale     ABS Level of Severity       Fall Risk  Has the patient had a fall this admission?   No  Rubina Echavarria Fall Risk Scoring  21, HIGH RISK  Fall Risk Safety Measures  bed alarm, chair alarm, seatbelt alarm, and poor balance    Vitals  Temperature: 36.8 °C (98.2 °F)  Temp src: Oral  Pulse: 65  Respiration: 18  Blood Pressure : (!) 183/80  Blood Pressure MAP (Calculated): 114 MM HG  BP Location: Right, Upper Arm  Patient BP Position: Ramirez's Position     Oxygen  Pulse Oximetry: 92 %  O2 (LPM): 1  O2 Delivery Device: Nasal Cannula    Bowel and Bladder  Last Bowel Movement  01/07/24  Stool Type  Type 6: Fluffy pieces with ragged edges, a mushy stool  Bowel Device  Bathroom, Diaper  Continent  Bladder: Did not void   Bowel: No movement  Bladder Function  Urine Void (mL):  (large inc,)  Number of Times Voided: 1  Urine Color: Unable To Evaluate  Number of Times Incontinent of Urine: 1  Wet Diaper Count: 1  Genitourinary Assessment   Bladder Assessment (WDL):  WDL Except  Cortes Catheter: Not Applicable  Urinary Elimination: Incontinence  Urine Color: Unable To Evaluate  Number of Bladder Accidents: 1  Total Number of Bladder of Accidents in Last 7 Days: 1  Number of Times Incontinent of Urine:  1  Bladder Device: Bathroom, Diaper  Time Void: No  Bladder Scan: Post Void  $ Bladder Scan Results (mL): 9    Skin  Larry Score   16  Sensory Interventions   Bed Types: Standard/Trauma Mattress  Skin Preventative Measures: Pillows in Use for Support / Positioning  Moisture Interventions  Moisturizers/Barriers: Barrier Wipes      Pain  Pain Rating Scale  0 - No Pain  Pain Location     Pain Location Orientation     Pain Interventions   Declines    ADLs    Bathing      Linen Change      Personal Hygiene  Perineal Care, Moist Mamie Wipes  Chlorhexidine Bath      Oral Care  Brushed Teeth  Teeth/Dentures     Shave     Nutrition Percentage Eaten  *  * Meal *  *, Lunch, Between 25-50% Consumed  Environmental Precautions     Patient Turns/Positioning  Patient Turns Self from Side to Side  Patient Turns Assistance/Tolerance     Bed Positions  Bed Controls On  Head of Bed Elevated  Self regulated      Psychosocial/Neurologic Assessment  Psychosocial Assessment  Psychosocial (WDL):  Within Defined Limits  Neurologic Assessment  Neuro (WDL): Exceptions to WDL  Level of Consciousness: Alert  Orientation Level: Oriented to place, Oriented to person, Oriented to situation  Cognition: Follows commands  Speech: Clear  Motor Function/Sensation Assessment: Sensation  RUE Sensation: Full sensation  LUE Sensation: Full sensation  RLE Sensation: Numbness, Tingling  LLE Sensation: Numbness, Tingling  EENT (WDL):  WDL Except    Cardio/Pulmonary Assessment  Edema   RLE Edema: Generalized  LLE Edema: Generalized  Respiratory Breath Sounds  RUL Breath Sounds: Clear  RML Breath Sounds: Clear  RLL Breath Sounds: Diminished  ANGÉLICA Breath Sounds: Clear  LLL Breath Sounds: Diminished  Cardiac Assessment   Cardiac (WDL):  WDL Except (hx htn, pacer, afib)

## 2024-01-08 NOTE — CARE PLAN
Problem: VTE Prevention  Goal: Patient will remain free from venous thromboembolism (VTE)  Outcome: Progressing  Note: Pt is up and walking, participates in therapies, and up to dinning room for all meals.   The patient is Stable - Low risk of patient condition declining or worsening         Progress made toward(s) clinical / shift goals:  no s/s dvt    Patient is not progressing towards the following goals:

## 2024-01-08 NOTE — THERAPY
Speech Language Pathology  Daily Treatment     Patient Name: Debra Rodrigues  Age:  88 y.o., Sex:  female  Medical Record #: [id number]  Today's Date: 1/8/2024     Precautions  Precautions: Fall Risk, Other (See Comments)  Comments: Maculary degeneration, hx of CVA    Subjective    Patient agreeable to therapy.     Objective       01/08/24 0735   Treatment Charges   SLP Cognitive Skill Development First 15 Minutes 1   SLP Cognitive Skill Development Additional 15 Minutes 1   SLP Total Time Spent   SLP Individual Total Time Spent (Mins) 30         Assessment    Patient asked for goal of rehab therapies.  She expressed that she would like to be able to get in and out of bed.  She reports that she lives in assisted living, that she gets assistance getting in and out of bed, and For ADL's and IADL's.  She reports that she is currently hospitalized for a fall.  Patient record indicated patient is hospitalized with PNX with sequelae of confusion.  Patient was asked to identify what makes it difficult to transfer currently.  She was not able to indicate. Patient educated on  strategies for optimizing use of strength and positioning to improve engergy conservation and body mechanics during transfer.    Strengths: Effective communication skills, Pleasant and cooperative, Willingly participates in therapeutic activities  Barriers: Confused, Difficulty following instructions, Generalized weakness, Impaired functional cognition, Visual impairment    Plan    Target safety planning and problem solving and express self .    Passport items to be completed:  Express basic needs, understand food/liquid recommendations, consistently follow swallow precautions, manage finances, manage medications, arrive to therapy appointments on time, complete daily memory log entries, solve problems related to safety situations, review education related to hospitalization, complete caregiver training     Speech Therapy Problems (Active)        Problem: Comprehension STGs       Dates: Start:  01/06/24         Goal: STG-Within one week, patient will comprehend both auditory and written instructions with 75% accuracy provided mod assist.       Dates: Start:  01/06/24               Problem: Expression STGs       Dates: Start:  01/06/24         Goal: STG-Within one week, patient will express wants and needs effectively with 80% accuracy provided min cues.       Dates: Start:  01/06/24               Problem: Memory STGs       Dates: Start:  01/08/24         Goal: STG-Within one week, patient will remember       Dates: Start:  01/08/24               Problem: Problem Solving STGs       Dates: Start:  01/06/24         Goal: STG-Within one week, patient will solve basic problems related to safety/sequencing with 75% accuracy provided mod assist.       Dates: Start:  01/06/24            Goal: STG-Within one week, patient will solve basic problems       Dates: Start:  01/08/24               Problem: Speech/Swallowing LTGs       Dates: Start:  01/06/24         Goal: LTG-By discharge, patient will solve basic problems related to safety to facilitate safe d/c to a functional living environment.       Dates: Start:  01/06/24            Goal: LTG-By discharge, patient will solve basic problems       Dates: Start:  01/08/24

## 2024-01-08 NOTE — PROGRESS NOTES
Hospital Medicine Daily Progress Note      Chief Complaint  Hypertension    Interval Problem Update  Doing well, denies complaints.  Blood pressures still high.    Review of Systems  Review of Systems   Constitutional:  Negative for chills and fever.   HENT: Negative.     Eyes: Negative.    Respiratory:  Negative for cough and shortness of breath.    Cardiovascular:  Negative for chest pain and palpitations.   Gastrointestinal:  Negative for abdominal pain, nausea and vomiting.   Genitourinary: Negative.    Musculoskeletal: Negative.    Skin:  Negative for itching and rash.   Neurological:  Positive for weakness.   Endo/Heme/Allergies:  Negative for polydipsia. Does not bruise/bleed easily.        Physical Exam  Temp:  [36.8 °C (98.2 °F)-36.8 °C (98.3 °F)] 36.8 °C (98.2 °F)  Pulse:  [65-67] 67  Resp:  [18] 18  BP: (141-183)/(63-92) 168/92  SpO2:  [91 %-92 %] 92 %    Physical Exam  Vitals reviewed.   Constitutional:       General: She is not in acute distress.     Appearance: Normal appearance. She is not ill-appearing.   HENT:      Head: Normocephalic and atraumatic.      Right Ear: External ear normal.      Left Ear: External ear normal.      Nose: Nose normal.      Mouth/Throat:      Pharynx: Oropharynx is clear.   Eyes:      General:         Right eye: No discharge.         Left eye: No discharge.      Extraocular Movements: Extraocular movements intact.      Conjunctiva/sclera: Conjunctivae normal.   Cardiovascular:      Rate and Rhythm: Normal rate and regular rhythm.   Pulmonary:      Effort: Pulmonary effort is normal. No respiratory distress.      Breath sounds: Normal breath sounds. No wheezing.   Abdominal:      General: Bowel sounds are normal. There is no distension.      Palpations: Abdomen is soft.      Tenderness: There is no abdominal tenderness. There is no guarding or rebound.   Musculoskeletal:      Cervical back: Normal range of motion and neck supple.      Right lower leg: No edema.      Left  lower leg: No edema.   Skin:     General: Skin is warm and dry.   Neurological:      Mental Status: She is alert and oriented to person, place, and time.         Fluids    Intake/Output Summary (Last 24 hours) at 1/8/2024 1017  Last data filed at 1/8/2024 0854  Gross per 24 hour   Intake 480 ml   Output --   Net 480 ml       Laboratory  Recent Labs     01/06/24  0542   WBC 7.2   RBC 4.13*   HEMOGLOBIN 12.6   HEMATOCRIT 38.3   MCV 92.7   MCH 30.5   MCHC 32.9   RDW 45.1   PLATELETCT 251   MPV 10.5     Recent Labs     01/06/24  0542   SODIUM 136   POTASSIUM 3.8   CHLORIDE 101   CO2 24   GLUCOSE 99   BUN 21   CREATININE 0.59   CALCIUM 8.5                   Assessment/Plan  Abdominal distension  Assessment & Plan  KUB gaseous colonic distension  Continue Simethicone    Azotemia  Assessment & Plan  S/P diuresis at [facility]  Encourage PO fluids  Renal function resolved    Hypoxia- (present on admission)  Assessment & Plan  S/P IV Lasix at [facility] for pulm edema  Completing Augmentin and Doxy for PNA  Guaifenesin for cough  RT protocol    Acquired hypothyroidism- (present on admission)  Assessment & Plan  Euthyroid on Synthroid    MDD (major depressive disorder), recurrent, in partial remission (HCC)- (present on admission)  Assessment & Plan  On Paxil    Primary hypertension- (present on admission)  Assessment & Plan  Decreased Toprol for bradycardia  Continue Losartan  Add Norvasc for uncontrolled blood pressures    Paroxysmal atrial fibrillation (HCC)- (present on admission)  Assessment & Plan  Echo EF 55%  S/P PPM  On Flecainide  Anticoagulated on Eliquis  Cardiology F/U    DNR

## 2024-01-08 NOTE — CARE PLAN
"  Problem: Fall Risk - Rehab  Goal: Patient will remain free from falls  Note: Rubina Echavarria Fall risk Assessment Score: 21    High fall risk Interventions   - Alarming seatbelt  - Wander guard  - Bed and strip alarm   - Yellow sign by the door   - Yellow wrist band \"Fall risk\"  - Room near to the nurse station  - Do not leave patient unattended in the bathroom  - Fall risk education provided      Problem: Respiratory  Goal: Patient will understand use and administration of respiratory medications to improve respiratory function  Note: O2 at 1 lpm n/c.     Problem: Infection  Goal: Patient will remain free from infection  Note: Nystatin powder to right under breast, panus and bilateral groin. ABT x2 po, encouraged increase fluids intake. Will monitor.         The patient is Watcher - Medium risk of patient condition declining or worsening           "

## 2024-01-08 NOTE — PROGRESS NOTES
"  Physical Medicine & Rehabilitation Progress Note    Encounter Date: 1/8/2024    Chief Complaint: Doing well, weekend was uneventful    Interval Events (Subjective):  Vital signs: Elevated blood pressure  Bowel movement 1/7  Voiding with low PVRs    Patient seen and examined in her room.  States that she had an uneventful weekend.  Everything seems to be going okay.  She still feels weak.      ROS: 14 point ROS negative unless otherwise specified in the HPI    Objective:  VITAL SIGNS: /70   Pulse 65   Temp 37 °C (98.6 °F) (Oral)   Resp 18   Ht 1.676 m (5' 6\")   Wt 90 kg (198 lb 8 oz)   SpO2 92%   BMI 32.04 kg/m²     GEN: No apparent distress  HEENT: Head normocephalic, atraumatic.  Sclera nonicteric bilaterally, no ocular discharge appreciated bilaterally.  CV: Extremities warm and well-perfused, no peripheral edema appreciated bilaterally.  PULMONARY: Breathing nonlabored on room air, no respiratory accessory muscle use.  Not requiring supplemental oxygen.  Small cough appreciated.  ABD: Soft, nontender.  SKIN: No appreciable skin breakdown on exposed areas of skin.  PSYCH: Mood and affect within normal limits.  NEURO: Awake alert.  Conversational.  Logical thought content.      Laboratory Values:  Recent Results (from the past 72 hour(s))   CBC with Differential    Collection Time: 01/06/24  5:42 AM   Result Value Ref Range    WBC 7.2 4.8 - 10.8 K/uL    RBC 4.13 (L) 4.20 - 5.40 M/uL    Hemoglobin 12.6 12.0 - 16.0 g/dL    Hematocrit 38.3 37.0 - 47.0 %    MCV 92.7 81.4 - 97.8 fL    MCH 30.5 27.0 - 33.0 pg    MCHC 32.9 32.2 - 35.5 g/dL    RDW 45.1 35.9 - 50.0 fL    Platelet Count 251 164 - 446 K/uL    MPV 10.5 9.0 - 12.9 fL    Neutrophils-Polys 60.60 44.00 - 72.00 %    Lymphocytes 19.60 (L) 22.00 - 41.00 %    Monocytes 15.20 (H) 0.00 - 13.40 %    Eosinophils 2.80 0.00 - 6.90 %    Basophils 1.10 0.00 - 1.80 %    Immature Granulocytes 0.70 0.00 - 0.90 %    Nucleated RBC 0.00 0.00 - 0.20 /100 WBC    " Neutrophils (Absolute) 4.33 1.82 - 7.42 K/uL    Lymphs (Absolute) 1.40 1.00 - 4.80 K/uL    Monos (Absolute) 1.09 (H) 0.00 - 0.85 K/uL    Eos (Absolute) 0.20 0.00 - 0.51 K/uL    Baso (Absolute) 0.08 0.00 - 0.12 K/uL    Immature Granulocytes (abs) 0.05 0.00 - 0.11 K/uL    NRBC (Absolute) 0.00 K/uL   Comp Metabolic Panel (CMP)    Collection Time: 01/06/24  5:42 AM   Result Value Ref Range    Sodium 136 135 - 145 mmol/L    Potassium 3.8 3.6 - 5.5 mmol/L    Chloride 101 96 - 112 mmol/L    Co2 24 20 - 33 mmol/L    Anion Gap 11.0 7.0 - 16.0    Glucose 99 65 - 99 mg/dL    Bun 21 8 - 22 mg/dL    Creatinine 0.59 0.50 - 1.40 mg/dL    Calcium 8.5 8.5 - 10.5 mg/dL    Correct Calcium 9.7 8.5 - 10.5 mg/dL    AST(SGOT) 11 (L) 12 - 45 U/L    ALT(SGPT) 8 2 - 50 U/L    Alkaline Phosphatase 91 30 - 99 U/L    Total Bilirubin 0.5 0.1 - 1.5 mg/dL    Albumin 2.5 (L) 3.2 - 4.9 g/dL    Total Protein 5.9 (L) 6.0 - 8.2 g/dL    Globulin 3.4 1.9 - 3.5 g/dL    A-G Ratio 0.7 g/dL   Magnesium    Collection Time: 01/06/24  5:42 AM   Result Value Ref Range    Magnesium 2.0 1.5 - 2.5 mg/dL   Phosphorus    Collection Time: 01/06/24  5:42 AM   Result Value Ref Range    Phosphorus 3.2 2.5 - 4.5 mg/dL   TSH with Reflex to FT4    Collection Time: 01/06/24  5:42 AM   Result Value Ref Range    TSH 1.380 0.380 - 5.330 uIU/mL   ESTIMATED GFR    Collection Time: 01/06/24  5:42 AM   Result Value Ref Range    GFR (CKD-EPI) 86 >60 mL/min/1.73 m 2       Medications:  Scheduled Medications   Medication Dose Frequency    losartan  50 mg Q12HRS    amLODIPine  5 mg Q DAY    metoprolol SR  12.5 mg DAILY    simethicone  125 mg 4X/DAY WITH MEALS + NIGHTLY    Pharmacy Consult Request  1 Each PHARMACY TO DOSE    senna-docusate  2 Tablet BID    nystatin   TID    apixaban  5 mg BID    doxycycline monohydrate  100 mg Q12HRS    famotidine  20 mg Q12HRS    flecainide  100 mg DAILY    traZODone  50 mg QHS    PARoxetine  10 mg DAILY    levothyroxine  75 mcg DAILY    gabapentin   100 mg Q EVENING    vitamin D3  1,000 Units DAILY     PRN medications: Respiratory Therapy Consult, hydrALAZINE, senna-docusate **AND** polyethylene glycol/lytes **AND** magnesium hydroxide **AND** bisacodyl, carboxymethylcellulose, benzocaine-menthol, mag hydrox-al hydrox-simeth, ondansetron **OR** ondansetron, sodium chloride, acetaminophen, guaiFENesin dextromethorphan    Diet:  Current Diet Order   Procedures    Diet Order Diet: Regular       Medical Decision Making and Plan:  Community-acquired pneumonia  Flu, RSV, COVID-negative  Respiratory cultures PENDING (unclear if collected at [facility])  Blood cultures collected 1/2/2024 no growth to date  PT and OT for mobility and ADLs. Per guidelines, 15 hours per week between PT, OT and/or SLP.  Follow-up geriatrician  Continue Augmentin through 1/8 -completed  Continue doxycycline through 1/9     Atrial fibrillation  Permanent pacemaker  Recently referred to cardiology for local establishment (had been in [facility])  Continue Eliquis 5 mg twice daily  Continue flecainide 100 mg daily  Continue with oral 25 mg XL daily--> 12.5 mg 1/8     History of Stroke  Left Hemiparesis  Monitor     Impaired cognition  ?  Encephalopathy secondary to ammonia  SLP to establish baseline     Hypertension  Continue Cozaar 50 mg every 12 hours  Continue Toprol XL 25 mg daily -reduced due to bradycardia 1/8  Norvasc added 1/8     Hypothyroidism  Continue Synthroid 75 mcg daily     Depression  Insomnia  Continue trazodone 50 mg nightly (QTc 454)  Continue Paxil 10 mg daily     Pain  Tylenol as needed  Chronic neuropathy - Gabapentin at night     Skin  Patient at risk for skin breakdown due to debility in areas including sacrum, achilles, elbows and head in addition to other sites. Nursing to assess skin daily.      GI Ppx  Patient on Prilosec for GERD prophylaxis.      Bowel   Patient on Senna-docusate for constipation prophylaxis.      Bladder  TV/PVR/BS PRN     DVT PROPHYLAXIS: On Eliquis for  atrial fibrillation     HOSPITALIST FOLLOWING: YES - 1/8     CODE STATUS: Reviewed note from geriatrics 11/30/2023 CODE STATUS DNAR/DNI - d/w patient and family and confirm this code status.     DISPO: Home to senior care at [facility]     JEANINE: TBD     ADDITIONAL MEDICAL NEEDS ON D/C (IV abx, O2, etc): To be determined     MEDS SENT TO: TBD     DISCHARGE SPECIALIST FOLLOW UP: Patient to scheduled follow up with their PCP within 2 weeks from discharge from the [facility].      ____________________________________    Dr. Eugenia Ruiz DO, MS  [facility] - Physical Medicine & Rehabilitation   ____________________________________

## 2024-01-09 ENCOUNTER — APPOINTMENT (OUTPATIENT)
Dept: PHYSICAL THERAPY | Facility: REHABILITATION | Age: 89
DRG: 194 | End: 2024-01-09
Attending: PHYSICAL MEDICINE & REHABILITATION
Payer: MEDICARE

## 2024-01-09 ENCOUNTER — APPOINTMENT (OUTPATIENT)
Dept: OCCUPATIONAL THERAPY | Facility: REHABILITATION | Age: 89
DRG: 194 | End: 2024-01-09
Attending: PHYSICAL MEDICINE & REHABILITATION
Payer: MEDICARE

## 2024-01-09 ENCOUNTER — APPOINTMENT (OUTPATIENT)
Dept: SPEECH THERAPY | Facility: REHABILITATION | Age: 89
DRG: 194 | End: 2024-01-09
Attending: PHYSICAL MEDICINE & REHABILITATION
Payer: MEDICARE

## 2024-01-09 PROCEDURE — 97112 NEUROMUSCULAR REEDUCATION: CPT

## 2024-01-09 PROCEDURE — 99232 SBSQ HOSP IP/OBS MODERATE 35: CPT | Performed by: HOSPITALIST

## 2024-01-09 PROCEDURE — 97112 NEUROMUSCULAR REEDUCATION: CPT | Mod: CO

## 2024-01-09 PROCEDURE — 97130 THER IVNTJ EA ADDL 15 MIN: CPT

## 2024-01-09 PROCEDURE — 97530 THERAPEUTIC ACTIVITIES: CPT

## 2024-01-09 PROCEDURE — A9270 NON-COVERED ITEM OR SERVICE: HCPCS | Performed by: PHYSICAL MEDICINE & REHABILITATION

## 2024-01-09 PROCEDURE — 700102 HCHG RX REV CODE 250 W/ 637 OVERRIDE(OP): Performed by: PHYSICAL MEDICINE & REHABILITATION

## 2024-01-09 PROCEDURE — 97129 THER IVNTJ 1ST 15 MIN: CPT

## 2024-01-09 PROCEDURE — 97116 GAIT TRAINING THERAPY: CPT

## 2024-01-09 PROCEDURE — 700102 HCHG RX REV CODE 250 W/ 637 OVERRIDE(OP): Performed by: HOSPITALIST

## 2024-01-09 PROCEDURE — 770010 HCHG ROOM/CARE - REHAB SEMI PRIVAT*

## 2024-01-09 PROCEDURE — 99231 SBSQ HOSP IP/OBS SF/LOW 25: CPT | Performed by: PHYSICAL MEDICINE & REHABILITATION

## 2024-01-09 PROCEDURE — 97110 THERAPEUTIC EXERCISES: CPT

## 2024-01-09 PROCEDURE — 97535 SELF CARE MNGMENT TRAINING: CPT | Mod: CO

## 2024-01-09 PROCEDURE — A9270 NON-COVERED ITEM OR SERVICE: HCPCS | Performed by: HOSPITALIST

## 2024-01-09 RX ADMIN — LOSARTAN POTASSIUM 50 MG: 50 TABLET, FILM COATED ORAL at 05:39

## 2024-01-09 RX ADMIN — APIXABAN 5 MG: 5 TABLET, FILM COATED ORAL at 07:53

## 2024-01-09 RX ADMIN — NYSTATIN: 100000 POWDER TOPICAL at 21:00

## 2024-01-09 RX ADMIN — PAROXETINE HYDROCHLORIDE 10 MG: 20 TABLET, FILM COATED ORAL at 07:52

## 2024-01-09 RX ADMIN — TRAZODONE HYDROCHLORIDE 50 MG: 50 TABLET ORAL at 19:38

## 2024-01-09 RX ADMIN — DOXYCYCLINE 100 MG: 100 TABLET, FILM COATED ORAL at 19:38

## 2024-01-09 RX ADMIN — FAMOTIDINE 20 MG: 20 TABLET ORAL at 19:38

## 2024-01-09 RX ADMIN — DOXYCYCLINE 100 MG: 100 TABLET, FILM COATED ORAL at 07:52

## 2024-01-09 RX ADMIN — SIMETHICONE 125 MG: 125 TABLET, CHEWABLE ORAL at 17:05

## 2024-01-09 RX ADMIN — FAMOTIDINE 20 MG: 20 TABLET ORAL at 07:52

## 2024-01-09 RX ADMIN — GABAPENTIN 100 MG: 100 CAPSULE ORAL at 19:38

## 2024-01-09 RX ADMIN — SIMETHICONE 125 MG: 125 TABLET, CHEWABLE ORAL at 19:38

## 2024-01-09 RX ADMIN — LEVOTHYROXINE SODIUM 75 MCG: 0.07 TABLET ORAL at 07:52

## 2024-01-09 RX ADMIN — METOPROLOL SUCCINATE 12.5 MG: 25 TABLET, EXTENDED RELEASE ORAL at 05:38

## 2024-01-09 RX ADMIN — APIXABAN 5 MG: 5 TABLET, FILM COATED ORAL at 19:38

## 2024-01-09 RX ADMIN — NYSTATIN: 100000 POWDER TOPICAL at 15:00

## 2024-01-09 RX ADMIN — Medication 1000 UNITS: at 07:52

## 2024-01-09 RX ADMIN — SENNOSIDES AND DOCUSATE SODIUM 2 TABLET: 50; 8.6 TABLET ORAL at 19:38

## 2024-01-09 RX ADMIN — AMLODIPINE BESYLATE 5 MG: 5 TABLET ORAL at 05:38

## 2024-01-09 RX ADMIN — FLECAINIDE ACETATE 100 MG: 100 TABLET ORAL at 07:54

## 2024-01-09 RX ADMIN — NYSTATIN: 100000 POWDER TOPICAL at 07:59

## 2024-01-09 RX ADMIN — LOSARTAN POTASSIUM 50 MG: 50 TABLET, FILM COATED ORAL at 17:05

## 2024-01-09 RX ADMIN — SIMETHICONE 125 MG: 125 TABLET, CHEWABLE ORAL at 07:53

## 2024-01-09 ASSESSMENT — ENCOUNTER SYMPTOMS
ABDOMINAL PAIN: 0
CHILLS: 0
SHORTNESS OF BREATH: 0
NERVOUS/ANXIOUS: 0
FEVER: 0
DIARRHEA: 0
VOMITING: 0
NAUSEA: 0

## 2024-01-09 ASSESSMENT — ACTIVITIES OF DAILY LIVING (ADL)
BED_CHAIR_WHEELCHAIR_TRANSFER_DESCRIPTION: ADAPTIVE EQUIPMENT;INCREASED TIME;INITIAL PREPARATION FOR TASK;SET-UP OF EQUIPMENT;VERBAL CUEING
BED_CHAIR_WHEELCHAIR_TRANSFER_DESCRIPTION: INCREASED TIME;INITIAL PREPARATION FOR TASK;VERBAL CUEING;SET-UP OF EQUIPMENT
TUB_SHOWER_TRANSFER_DESCRIPTION: GRAB BAR;SHOWER BENCH;INCREASED TIME;SET-UP OF EQUIPMENT;VERBAL CUEING

## 2024-01-09 ASSESSMENT — GAIT ASSESSMENTS
DISTANCE (FEET): 5
GAIT LEVEL OF ASSIST: TOTAL ASSIST
ASSISTIVE DEVICE: FRONT WHEEL WALKER

## 2024-01-09 ASSESSMENT — PAIN DESCRIPTION - PAIN TYPE: TYPE: ACUTE PAIN

## 2024-01-09 ASSESSMENT — PATIENT HEALTH QUESTIONNAIRE - PHQ9
1. LITTLE INTEREST OR PLEASURE IN DOING THINGS: NOT AT ALL
2. FEELING DOWN, DEPRESSED, IRRITABLE, OR HOPELESS: NOT AT ALL
SUM OF ALL RESPONSES TO PHQ9 QUESTIONS 1 AND 2: 0

## 2024-01-09 NOTE — PROGRESS NOTES
Hospital Medicine Daily Progress Note      Chief Complaint  Hypertension  Afib    Interval Problem Update  Discussed about his BP labile and sometimes elevated but will monitor another day before adjusting meds.    Review of Systems  Review of Systems   Constitutional:  Negative for chills and fever.   Respiratory:  Negative for shortness of breath.    Cardiovascular:  Negative for chest pain.   Gastrointestinal:  Negative for abdominal pain, diarrhea, nausea and vomiting.   Psychiatric/Behavioral:  The patient is not nervous/anxious.         Physical Exam  Temp:  [36.4 °C (97.5 °F)-37.2 °C (99 °F)] 37.2 °C (99 °F)  Pulse:  [65-73] 73  Resp:  [16-18] 18  BP: (108-184)/(70-82) 108/73  SpO2:  [91 %-94 %] 91 %    Physical Exam  Vitals and nursing note reviewed.   Constitutional:       Appearance: Normal appearance.   HENT:      Head: Atraumatic.   Eyes:      Conjunctiva/sclera: Conjunctivae normal.      Pupils: Pupils are equal, round, and reactive to light.   Cardiovascular:      Rate and Rhythm: Normal rate and regular rhythm.      Heart sounds: No murmur heard.  Pulmonary:      Effort: Pulmonary effort is normal.      Breath sounds: No stridor. No wheezing or rales.   Abdominal:      General: There is no distension.      Palpations: Abdomen is soft.      Tenderness: There is no abdominal tenderness.   Musculoskeletal:      Cervical back: Normal range of motion and neck supple.      Right lower leg: No edema.      Left lower leg: No edema.   Skin:     General: Skin is warm and dry.      Findings: No rash.   Neurological:      Mental Status: She is alert and oriented to person, place, and time.   Psychiatric:         Mood and Affect: Mood normal.         Behavior: Behavior normal.         Fluids    Intake/Output Summary (Last 24 hours) at 1/9/2024 0838  Last data filed at 1/8/2024 0854  Gross per 24 hour   Intake 240 ml   Output --   Net 240 ml         Laboratory                            Assessment/Plan  Abdominal  distension  Assessment & Plan  AXR: shows gaseous colonic distension  Cont Simethicone    Hypoxia- (present on admission)  Assessment & Plan  Resolved (from [facility])  S/P IV Lasix at [facility] for pulm edema  S/P Augmentin & Doxy for PNA    Acquired hypothyroidism- (present on admission)  Assessment & Plan  Cont Synthroid    MDD (major depressive disorder), recurrent, in partial remission (HCC)- (present on admission)  Assessment & Plan  Cont Paxil    Primary hypertension- (present on admission)  Assessment & Plan  BP labile with -184  Cont Toprol XL -- dose recently decreased for bradycardia  Cont Cozaar  Cont Norvasc (started 1/8)  Cont to monitor    Paroxysmal atrial fibrillation (HCC)- (present on admission)  Assessment & Plan  HR ok  S/P PPM  Cont Tambacor  Cont Eliquis  Cont to monitor

## 2024-01-09 NOTE — CARE PLAN
Problem: Skin Integrity  Goal: Patient's skin integrity will be maintained or improve  Outcome: Progressing  Note: Patient's skin remains intact and free from new or accidental injury this shift.  Will continue to monitor.   The patient is Stable - Low risk of patient condition declining or worsening    Shift Goals  Clinical Goals: safety, BP control  Patient Goals: sleep well, safety    Progress made toward(s) clinical / shift goals:  pt skin intact, no new injury or breakdown    Patient is not progressing towards the following goals:

## 2024-01-09 NOTE — FLOWSHEET NOTE
01/08/24 1530   Events/Summary/Plan   Events/Summary/Plan SpO2 check, DC oxygen per protocol.   Vital Signs   Pulse 70   Respiration 16   Pulse Oximetry 91 %   $ Pulse Oximetry (Spot Check) Yes   Respiratory Assessment   Respiratory Pattern Within Normal Limits   Level of Consciousness Alert   Chest Exam   Work Of Breathing / Effort Within Normal Limits   Oxygen   O2 Delivery Device None - Room Air   Room Air Challenge Pass  (Room air SpO2=91%)

## 2024-01-09 NOTE — THERAPY
Physical Therapy   Daily Treatment     Patient Name: Debra Rodrigues  Age:  88 y.o., Sex:  female  Medical Record #: [id number]  Today's Date: 1/8/2024     Precautions  Precautions: Fall Risk, Other (See Comments)  Comments: Maculary degeneration, hx of CVA    Subjective    AM and PM- patient was in her room seated in her w/c and agreeable to the treatments (seen 1100-11:30 and 12:30-13:30)     Objective       01/08/24 1101 01/08/24 1231   PT Charge Group   Charges Yes  --    PT Gait Training (Units) 1  --    PT Therapeutic Activities (Units) 1  --    PT Total Time Spent   PT Individual Total Time Spent (Mins) 30  --    Vitals   Pulse 65  --    Patient BP Position Sitting  --    Pulse Oximetry 92 %  --    Gait Functional Level of Assist    Gait Level Of Assist Total Assist  (ModA<>MaxA x 1 for facilitation of functional WS, incidental blocking of each knee, cues for hand placement + w/c follow)  --    Assistive Device Parallel Bars  --    Distance (Feet) 6  --    # of Times Distance was Traveled 3  --    Deviation Shuffled Gait;Decreased Heel Strike;Decreased Toe Off;Bradykinetic  --    Wheelchair Functional Level of Assist   Wheelchair Assist Stand by Assist  --    Distance Wheelchair (Feet or Distance) 25  --    Wheelchair Description Extra time;Impaired coordination;Supervision for safety;Verbal cueing;Requires incidental assist;Assistance with steering  (BUE propulsion method)  --    Transfer Functional Level of Assist   Toilet Transfers  --  Maximal Assist   Toilet Transfer Description  --  Initial preparation for task;Increased time;Grab bar;Set-up of equipment;Supervision for safety;Verbal cueing   Bed Mobility    Sit to Stand Maximal Assist  (lifting, steadying, lowering assist, cues for initiation and sequencing) Maximal Assist   Neuro-Muscular Treatments   Neuro-Muscular Treatments Sequencing;Tactile Cuing;Verbal Cuing;Weight Shift Right;Weight Shift Left;Anterior weight shift Weight Shift Right;Weight  Shift Left;Postural Changes;Sequencing;Verbal Cuing;Other (See Comments)   Comments c/ STS and gait training as above Side stepping to the R and L x 1 in each direction; seated stepping on tagets with L LE only to work on training the L LE to raise higher; Standing marches 2x10; standing R and L UE reaches to a taget x 5 in each direction   Interdisciplinary Plan of Care Collaboration   IDT Collaboration with  Other (See Comments)  (PT student)  --    Patient Position at End of Therapy Seated;Chair Alarm On;Self Releasing Lap Belt Applied;Other (Comments)  (in dining room for lunch) Seated;Chair Alarm On;Self Releasing Lap Belt Applied;Call Light within Reach;Tray Table within Reach   Collaboration Comments assist c/ w/c follow, incidental guarding  --    Physical Therapist Assigned   Assigned PT / Treatment Time / Comments Roxann/Claudia. TechA if no student. 30-60 okay. Needs breaks after 60 min.  --      AM session- progressed gait training in parallel bar  PM session- continued pre gait activities in the parallel bar; increased time spent for tolieting (pt assist for rear mal care and pulling pants up)    Assessment    She had a forward leaning posture while ambulating and required constant tactile and verbal cues to correct her posture. She relies heavily on BUE for support during standing and ambulation activities. She demonstrates a decrease in L toe off during walking possibly d/t L LE weakness. She is able to follow 1 step commands with multiple repetitions. She requires increased time for initiating of activities and poor sequencing. She responds well to verbal and tactile cueing.      Strengths: Able to follow instructions, Manages pain appropriately, Motivated for self care and independence, Pleasant and cooperative, Willingly participates in therapeutic activities  Barriers: Decreased endurance, Confused, Fatigue, Generalized weakness, Impaired activity tolerance, Impaired balance, Limited  mobility    Plan    STS from an elevated surface w/ walker  Gastroc, hamstring, DF strengthening   Increase her cardiovascular endurance/mobility with nustep, walking   Pushing and pulling with a W/C   Seated-->standing reaching with a card matching game  Visually clear tasks and instructions     DME  PT DME Recommendations  Wheelchair:  (TBD)  Assistive Device:  (TBD)    Passport items to be completed:  Get in/out of bed safely, in/out of a vehicle, safely use mobility device, walk or wheel around home/community, navigate up and down stairs, show how to get up/down from the ground, ensure home is accessible, demonstrate HEP, complete caregiver training    Physical Therapy Problems (Active)       Problem: Mobility       Dates: Start:  01/06/24         Goal: STG-Within one week, patient will propel wheelchair household distances x 25 feet with min A       Dates: Start:  01/06/24            Goal: STG-Within one week, patient will ambulate household distance x 10 feet in // bars with mod A        Dates: Start:  01/06/24               Problem: Mobility Transfers       Dates: Start:  01/06/24         Goal: STG-Within one week, patient will perform bed mobility with mod A       Dates: Start:  01/06/24            Goal: STG-Within one week, patient will transfer bed to chair with max A x 1 person SQPT       Dates: Start:  01/06/24               Problem: PT-Long Term Goals       Dates: Start:  01/06/24         Goal: LTG-By discharge, patient will propel wheelchair x 150 feet with SPV       Dates: Start:  01/06/24            Goal: LTG-By discharge, patient will ambulate x 30 feet with FWW and CGA       Dates: Start:  01/06/24            Goal: LTG-By discharge, patient will transfer one surface to another with FWW and CGA       Dates: Start:  01/06/24            Goal: LTG-By discharge, patient will transfer in/out of a car with FWW and CGA       Dates: Start:  01/06/24

## 2024-01-09 NOTE — PROGRESS NOTES
NURSING DAILY NOTE    Name: Debra Rodrigues  Date of Admission: 1/5/2024  Admitting Diagnosis: Pneumonia due to infectious organism  Attending Physician: Eugenia Ruiz D.o.  Allergies: Other misc, Hydrocodone-acetaminophen, and Sulfa drugs    Safety  Patient Assist  omax 1-2  Patient Precautions  oFall Risk, Other (See Comments)  Precaution Comments  oMaculary degeneration, hx of CVA  Bed Transfer Status  oTotal Assist X 2  Toilet Transfer Status  oMaximal Assist  Assistive Devices  oRails, Wheelchair  Oxygen  oNone - Room Air  Diet/Therapeutic Dining  o  Current Diet Order   Procedures    Diet Order Diet: Regular     Pill Administration  ocrushed  Agitated Behavioral Scale  o   ABS Level of Severity  o     Fall Risk  Has the patient had a fall this admission?  Colleen  Rubina Echavarria Fall Risk Scoring  o21, HIGH RISK  Fall Risk Safety Measures  jacquelin alarm and chair alarm    Vitals  Temperature: 37.2 °C (99 °F)  Temp src: Oral  Pulse: 73  Respiration: 18  Blood Pressure : 108/73  Blood Pressure MAP (Calculated): 85 MM HG  BP Location: Right, Forearm  Patient BP Position: Sitting    Oxygen  Pulse Oximetry: 91 %  O2 (LPM): 1  O2 Delivery Device: None - Room Air    Bowel and Bladder  Last Bowel Movement  o01/09/24  Stool Type  oType 6: Fluffy pieces with ragged edges, a mushy stool  Bowel Device  oBathroom, Diaper  Continent  oBladder: Did not void  oBowel: No movement  Bladder Function  oUrine Void (mL):  (large inc,)  Number of Times Voided: 1  Urine Color: Unable To Evaluate  Number of Times Incontinent of Urine: 1  Wet Diaper Count: 1  Genitourinary Assessment  oBladder Assessment (WDL):  WDL Except  Cortes Catheter: Not Applicable  Urinary Elimination: Incontinence  Urine Color: Unable To Evaluate  Number of Bladder Accidents: 1  Total Number of Bladder of Accidents in Last 7 Days: 1  Number of Times Incontinent of Urine: 1  Bladder Device: Bathroom, Diaper  Time Void: No  Bladder Scan: Post Void  $ Bladder  Scan Results (mL): 80    Skin  Larry Score  o 16  Sensory Interventions  o Bed Types: Standard/Trauma Mattress  Skin Preventative Measures: Pillows in Use for Support / Positioning  Moisture Interventions  oMoisturizers/Barriers: Barrier Wipes      Pain  Pain Rating Scale  o0 - No Pain  Pain Location  o   Pain Location Orientation  o   Pain Interventions  oDeclines    ADLs    Bathing  o   Linen Change  o   Personal Hygiene  oPerineal Care, Moist Mamie Wipes  Chlorhexidine Bath  o   Oral Care  oBrushed Teeth  Teeth/Dentures  o   Shave  o   Nutrition Percentage Eaten  oBreakfast, Between 25-50% Consumed  Environmental Precautions  o   Patient Turns/Positioning  oPatient Turns Self from Side to Side  Patient Turns Assistance/Tolerance  o   Bed Positions  Alexus Controls On  Head of Bed Elevated  oSelf regulated      Psychosocial/Neurologic Assessment  Psychosocial Assessment  oPsychosocial (WDL):  Within Defined Limits  Neurologic Assessment  oNeuro (WDL): Exceptions to WDL  Level of Consciousness: Alert  Orientation Level: Oriented to place, Oriented to person, Oriented to situation  Cognition: Follows commands  Speech: Clear  Motor Function/Sensation Assessment: Sensation  RUE Sensation: Full sensation  LUE Sensation: Full sensation  RLE Sensation: Numbness, Tingling  LLE Sensation: Numbness, Tingling  oEENT (WDL):  WDL Except    Cardio/Pulmonary Assessment  Edema  oRLE Edema: Generalized  LLE Edema: Generalized  Respiratory Breath Sounds  oRUL Breath Sounds: Clear  RML Breath Sounds: Clear  RLL Breath Sounds: Diminished  ANGÉLICA Breath Sounds: Clear  LLL Breath Sounds: Diminished  Cardiac Assessment  oCardiac (WDL):  WDL Except

## 2024-01-09 NOTE — PROGRESS NOTES
"  Physical Medicine & Rehabilitation Progress Note    Encounter Date: 1/9/2024    Chief Complaint: Tired    Interval Events (Subjective):  Vital signs: Systolic ranging from 108-168  Bowel movement 1/9  Voiding volitionally in adult briefs    Patient seen and examined in her room.  Son Maurice and daughter-in-law Helga are present as well.  They have some questions regarding her function and plan of care.  They are wondering if her diet can be changed.      ROS: 14 point ROS negative unless otherwise specified in the HPI    Objective:  VITAL SIGNS: /73   Pulse 73   Temp 37.2 °C (99 °F) (Oral)   Resp 18   Ht 1.676 m (5' 6\")   Wt 90 kg (198 lb 8 oz)   SpO2 91%   BMI 32.04 kg/m²     GEN: No apparent distress  HEENT: Head normocephalic, atraumatic.  Sclera nonicteric bilaterally, no ocular discharge appreciated bilaterally.  CV: Extremities warm and well-perfused, no peripheral edema appreciated bilaterally.  PULMONARY: Breathing nonlabored on room air, no respiratory accessory muscle use.  Not requiring supplemental oxygen.  Small cough appreciated.  ABD: Soft, nontender.  SKIN: No appreciable skin breakdown on exposed areas of skin.  PSYCH: Mood and affect within normal limits.  NEURO: Awake alert.  Conversational.  Logical thought content.      Laboratory Values:  No results found for this or any previous visit (from the past 72 hour(s)).      Medications:  Scheduled Medications   Medication Dose Frequency    losartan  50 mg Q12HRS    amLODIPine  5 mg Q DAY    metoprolol SR  12.5 mg DAILY    simethicone  125 mg 4X/DAY WITH MEALS + NIGHTLY    Pharmacy Consult Request  1 Each PHARMACY TO DOSE    senna-docusate  2 Tablet BID    nystatin   TID    apixaban  5 mg BID    doxycycline monohydrate  100 mg Q12HRS    famotidine  20 mg Q12HRS    flecainide  100 mg DAILY    traZODone  50 mg QHS    PARoxetine  10 mg DAILY    levothyroxine  75 mcg DAILY    gabapentin  100 mg Q EVENING    vitamin D3  1,000 Units DAILY "     PRN medications: Respiratory Therapy Consult, hydrALAZINE, senna-docusate **AND** polyethylene glycol/lytes **AND** magnesium hydroxide **AND** bisacodyl, carboxymethylcellulose, benzocaine-menthol, mag hydrox-al hydrox-simeth, ondansetron **OR** ondansetron, sodium chloride, acetaminophen, guaiFENesin dextromethorphan    Diet:  Current Diet Order   Procedures    Diet Order Diet: Regular       Medical Decision Making and Plan:  Community-acquired pneumonia  Flu, RSV, COVID-negative  Respiratory cultures PENDING (unclear if collected at [facility])  Blood cultures collected 1/2/2024 no growth to date  PT and OT for mobility and ADLs. Per guidelines, 15 hours per week between PT, OT and/or SLP.  Follow-up geriatrician  Continue Augmentin through 1/8 -completed  Continue doxycycline through 1/9     Atrial fibrillation  Permanent pacemaker  Recently referred to cardiology for local establishment (had been in [facility])  Continue Eliquis 5 mg twice daily  Continue flecainide 100 mg daily  Continue with oral 25 mg XL daily--> 12.5 mg 1/8     History of Stroke  Left Hemiparesis  Monitor     Impaired cognition  ?  Encephalopathy secondary to ammonia  SLP to establish baseline     Hypertension  Continue Cozaar 50 mg every 12 hours  Continue Toprol XL 25 mg daily -reduced due to bradycardia 1/8  Norvasc added 1/8 1/9 blood pressure improved with systolic in the 130s, evening of 1/8 had systolic at 168.  Is recently on Norvasc.  Continue to monitor.     Hypothyroidism  Continue Synthroid 75 mcg daily     Depression  Insomnia  Continue trazodone 50 mg nightly (QTc 454)  Continue Paxil 10 mg daily     Pain  Tylenol as needed  Chronic neuropathy - Gabapentin at night     Skin  Patient at risk for skin breakdown due to debility in areas including sacrum, achilles, elbows and head in addition to other sites. Nursing to assess skin daily.      GI Ppx  Patient on Prilosec for GERD prophylaxis.    1/9 change diet to soft and bite sized as  patient having difficulty with regular consistencies due to dentures.  Add Ensure supplement.     Bowel   Patient on Senna-docusate for constipation prophylaxis.      Bladder  TV/PVR/BS PRN      Screening labs 1/10: CBC and BMP       DVT PROPHYLAXIS: On Eliquis for atrial fibrillation     HOSPITALIST FOLLOWING: YES - 1/9     CODE STATUS: Reviewed note from geriatrics 11/30/2023 CODE STATUS DNAR/DNI - d/w patient and family and confirm this code status.     DISPO: Home to [facility]     JEANINE: TBD     ADDITIONAL MEDICAL NEEDS ON D/C (IV abx, O2, etc): To be determined     MEDS SENT TO: TBD     DISCHARGE SPECIALIST FOLLOW UP: Patient to scheduled follow up with their PCP within 2 weeks from discharge from the [facility].      ____________________________________    Dr. Eugenia Ruiz DO, MS  [facility] - Physical Medicine & Rehabilitation   ____________________________________

## 2024-01-09 NOTE — PROGRESS NOTES
NURSING DAILY NOTE    Name: Debra Rodrigues   Date of Admission: 1/5/2024   Admitting Diagnosis: Pneumonia due to infectious organism  Attending Physician: Eugenia Ruiz D.o.  Allergies: Other misc, Hydrocodone-acetaminophen, and Sulfa drugs    Safety  Patient Assist  max 1-2  Patient Precautions  Fall Risk, Other (See Comments)  Precaution Comments  Maculary degeneration, hx of CVA  Bed Transfer Status  Total Assist X 2  Toilet Transfer Status   Maximal Assist  Assistive Devices  Rails, Wheelchair  Oxygen  None - Room Air  Diet/Therapeutic Dining  Current Diet Order   Procedures    Diet Order Diet: Regular     Pill Administration  whole and needs potassium floated in applesauce  Agitated Behavioral Scale     ABS Level of Severity       Fall Risk  Has the patient had a fall this admission?   No  Rubina Echavarria Fall Risk Scoring  21, HIGH RISK  Fall Risk Safety Measures  bed alarm, chair alarm, and poor balance    Vitals  Temperature: 36.4 °C (97.5 °F)  Temp src: Oral  Pulse: 65  Respiration: 18  Blood Pressure : (!) 168/81  Blood Pressure MAP (Calculated): 110 MM HG  BP Location: Right, Upper Arm  Patient BP Position: Supine     Oxygen  Pulse Oximetry: 94 %  O2 (LPM): 1  O2 Delivery Device: None - Room Air    Bowel and Bladder  Last Bowel Movement  01/07/24  Stool Type  Type 6: Fluffy pieces with ragged edges, a mushy stool  Bowel Device  Bathroom, Diaper  Continent  Bladder: Did not void   Bowel: No movement  Bladder Function  Urine Void (mL):  (large inc,)  Number of Times Voided: 1  Urine Color: Unable To Evaluate  Number of Times Incontinent of Urine: 1  Wet Diaper Count: 1  Genitourinary Assessment   Bladder Assessment (WDL):  WDL Except  Cortes Catheter: Not Applicable  Urinary Elimination: Incontinence  Urine Color: Unable To Evaluate  Number of Bladder Accidents: 1  Total Number of Bladder of Accidents in Last 7 Days: 1  Number of Times  Incontinent of Urine: 1  Bladder Device: Bathroom, Diaper  Time Void: No  Bladder Scan: Post Void  $ Bladder Scan Results (mL): 9    Skin  Larry Score   16  Sensory Interventions   Bed Types: Standard/Trauma Mattress  Skin Preventative Measures: Pillows in Use for Support / Positioning  Moisture Interventions  Moisturizers/Barriers: Barrier Wipes      Pain  Pain Rating Scale  0 - No Pain  Pain Location     Pain Location Orientation     Pain Interventions   Declines    ADLs    Bathing      Linen Change      Personal Hygiene  Perineal Care, Moist Mamie Wipes  Chlorhexidine Bath      Oral Care  Brushed Teeth  Teeth/Dentures     Shave     Nutrition Percentage Eaten  Breakfast, Between 25-50% Consumed  Environmental Precautions     Patient Turns/Positioning  Patient Turns Self from Side to Side  Patient Turns Assistance/Tolerance     Bed Positions  Bed Controls On  Head of Bed Elevated  Self regulated      Psychosocial/Neurologic Assessment  Psychosocial Assessment  Psychosocial (WDL):  Within Defined Limits  Neurologic Assessment  Neuro (WDL): Exceptions to WDL  Level of Consciousness: Alert  Orientation Level: Oriented to place, Oriented to person, Oriented to situation  Cognition: Follows commands  Speech: Clear  Motor Function/Sensation Assessment: Sensation  RUE Sensation: Full sensation  LUE Sensation: Full sensation  RLE Sensation: Numbness, Tingling  LLE Sensation: Numbness, Tingling  EENT (WDL):  WDL Except    Cardio/Pulmonary Assessment  Edema   RLE Edema: Generalized  LLE Edema: Generalized  Respiratory Breath Sounds  RUL Breath Sounds: Clear  RML Breath Sounds: Clear  RLL Breath Sounds: Diminished  ANGÉLICA Breath Sounds: Clear  LLL Breath Sounds: Diminished  Cardiac Assessment   Cardiac (WDL):  WDL Except (hx htn, pacer, afib)

## 2024-01-09 NOTE — THERAPY
Speech Language Pathology  Daily Treatment     Patient Name: Debra Rodrigues  Age:  88 y.o., Sex:  female  Medical Record #: [id number]  Today's Date: 1/9/2024     Precautions  Precautions: Fall Risk  Comments: Maculary degeneration, hx of CVA    Subjective    Patient was agreeable to therapy up in wheelchair.     Objective       01/09/24 1031   Treatment Charges   SLP Cognitive Skill Development First 15 Minutes 1   SLP Cognitive Skill Development Additional 15 Minutes 1   SLP Total Time Spent   SLP Individual Total Time Spent (Mins) 30   Cognition   Simple Attention Minimal (4)   Prospective Memory Severe (2)   Functional Memory Activities Moderate (3)   Functional Problem Solving Moderate (3)         Assessment    Patient needed mod A to recall safety sequencing and transfer technique. Patient needed mod A to recognize safety problems related to her own deficits.     Strengths: Effective communication skills, Pleasant and cooperative, Willingly participates in therapeutic activities  Barriers: Confused, Difficulty following instructions, Generalized weakness, Impaired functional cognition, Visual impairment    Plan    Target recall, safety, planning, problem solving, and sequencing.     Passport items to be completed:  Express basic needs, understand food/liquid recommendations, consistently follow swallow precautions, manage finances, manage medications, arrive to therapy appointments on time, complete daily memory log entries, solve problems related to safety situations, review education related to hospitalization, complete caregiver training     Speech Therapy Problems (Active)       Problem: Comprehension STGs       Dates: Start:  01/06/24         Goal: STG-Within one week, patient will comprehend both auditory and written instructions with 75% accuracy provided mod assist.       Dates: Start:  01/06/24               Problem: Expression STGs       Dates: Start:  01/06/24         Goal: STG-Within one week,  patient will express wants and needs effectively with 80% accuracy provided min cues.       Dates: Start:  01/06/24               Problem: Memory STGs       Dates: Start:  01/08/24            Problem: Problem Solving STGs       Dates: Start:  01/06/24         Goal: STG-Within one week, patient will solve basic problems related to safety/sequencing with 75% accuracy provided mod assist.       Dates: Start:  01/06/24               Problem: Speech/Swallowing LTGs       Dates: Start:  01/06/24         Goal: LTG-By discharge, patient will solve basic problems related to safety to facilitate safe d/c to a functional living environment.       Dates: Start:  01/06/24

## 2024-01-09 NOTE — THERAPY
Occupational Therapy  Daily Treatment     Patient Name: Debra Rodrigues  Age:  88 y.o., Sex:  female  Medical Record #: [id number]  Today's Date: 1/9/2024     Precautions  Precautions: Fall Risk, Other (See Comments)  Comments: Maculary degeneration, hx of CVA         Subjective    Pt was seated in w/c upon arrival and agreeable for OT session.      Objective       01/09/24 0931   OT Charge Group   OT Self Care / ADL (Units) 3   OT Neuromuscular Re-education / Balance (Units) 1   Functional Level of Assist   Grooming Standby Assist   Grooming Description Seated in wheelchair at sink;Set-up of equipment;Verbal cueing   Bathing Moderate Assist   Bathing Description Grab bar;Hand held shower;Tub bench;Assit with back;Assit wtih lower extremities;Assit with perineal;Increased time;Set-up of equipment;Verbal cueing   Upper Body Dressing Moderate Assist   Upper Body Dressing Description Assist with closures;Assist with pulling shirt over head;Increased time   Lower Body Dressing Maximal Assist   Lower Body Dressing Description Reacher;Assist with threading into pant leg;Increased time;Initial preparation for task;Set-up of equipment;Assist with closures   Tub / Shower Transfers Maximal Assist   Tub Shower Transfer Description Grab bar;Shower bench;Increased time;Set-up of equipment;Verbal cueing   Neuro-Muscular Treatments   Neuro-Muscular Treatments Postural Facilitation;Sensory Stimuli;Sequencing;Tactile Cuing;Verbal Cuing;Weight Shift Right;Weight Shift Left   Comments STS w/ lateral weight shifting 10x each side 3x w/ VC to maintian upright posture   Balance   Sitting Balance (Static) Poor +   Sitting Balance (Dynamic) Poor   Interdisciplinary Plan of Care Collaboration   IDT Collaboration with  Therapy Tech   Patient Position at End of Therapy Seated;Tray Table within Reach;Call Light within Reach;Phone within Reach;Chair Alarm On   Collaboration Comments assist c/ w/c follow, incidental guarding          Assessment    Pt tolerated session fairly w/ focus on ADLs and standing balance using // bars to increase ind in functional tasks.   ADLs: Needed frequent verbal and tactile cues for all transfers as well as UB/LB sequencing during bathing and drying tasks. Observed pt have a right lean during bathing and neuro re-ed tasks and needed frequent VC for seated/standing upright posture. Introduced w/ Edu and demonstration on AE for LB dressing, however pt was unable to re-demo d/t poor attention.   Neuro re-ed/balance: Needed VC for STS sequencing using // bars w/ initially max A than progressed to Mod by the last STS. Pt was able to complete lateral weight shifting 10x each side, however needed encouragement to complete task at hand. Pt has low activity tolerance throughout session.      Strengths: Alert and oriented, Able to follow instructions, Pleasant and cooperative, Willingly participates in therapeutic activities  Barriers: Decreased endurance, Dementia, Generalized weakness, Impaired activity tolerance, Impaired balance, Limited mobility, Visual impairment    Plan    ADLs  Standing balance/ tolerance  Functional transfers   Strengthening/endurance     DME       Passport items to be completed:  Perform bathroom transfers, complete dressing, complete feeding, get ready for the day, prepare a simple meal, participate in household tasks, adapt home for safety needs, demonstrate home exercise program, complete caregiver training     Occupational Therapy Goals (Active)       Problem: Dressing       Dates: Start:  01/06/24         Goal: STG-Within one week, patient will dress UB at a Min A level.        Dates: Start:  01/06/24            Goal: STG-Within one week, patient will dress LB at a Mod A level with AE/AD PRN.        Dates: Start:  01/06/24               Problem: Functional Transfers       Dates: Start:  01/06/24         Goal: STG-Within one week, patient will transfer to toilet at a Mod A level with  AD/DME PRN.        Dates: Start:  01/06/24               Problem: OT Long Term Goals       Dates: Start:  01/06/24         Goal: LTG-By discharge, patient will complete basic self care tasks at a SUP to Mod A level with AE/AD/DME PRN.        Dates: Start:  01/06/24            Goal: LTG-By discharge, patient will perform bathroom transfers at a SUP to Min A level with LRD and DME PRN.        Dates: Start:  01/06/24               Problem: Toileting       Dates: Start:  01/06/24         Goal: STG-Within one week, patient will complete toileting tasks at a Mod A level with AE/AD PRN.        Dates: Start:  01/06/24

## 2024-01-09 NOTE — CARE PLAN
The patient is Stable - Low risk of patient condition declining or worsening    Shift Goals  Clinical Goals: safety, BP control  Patient Goals: sleep well, safety    Progress made toward(s) clinical / shift goals:    Problem: Fall Risk - Rehab  Goal: Patient will remain free from falls  Outcome: Progressing. Patient bed in lowest locked position with bed alarm on and call light within reach. Patient does not utilize call light appropriately over night for any needs. Patient has remained free from falls and has not attempted to self transfer over shift.      Problem: Bladder / Voiding  Goal: Patient will establish and maintain regular urinary output  Outcome: Progressing. Patient is incontinent of bladder over shift, wears brief in bed, does not call for assistance to use bathroom, but waits for staff to clean patient after incontinent episodes. Patient pvr low 80 overnight, voiding well, denies any pain with urination.

## 2024-01-10 ENCOUNTER — APPOINTMENT (OUTPATIENT)
Dept: SPEECH THERAPY | Facility: REHABILITATION | Age: 89
DRG: 194 | End: 2024-01-10
Attending: PHYSICAL MEDICINE & REHABILITATION
Payer: MEDICARE

## 2024-01-10 ENCOUNTER — APPOINTMENT (OUTPATIENT)
Dept: OCCUPATIONAL THERAPY | Facility: REHABILITATION | Age: 89
DRG: 194 | End: 2024-01-10
Attending: PHYSICAL MEDICINE & REHABILITATION
Payer: MEDICARE

## 2024-01-10 ENCOUNTER — APPOINTMENT (OUTPATIENT)
Dept: PHYSICAL THERAPY | Facility: REHABILITATION | Age: 89
DRG: 194 | End: 2024-01-10
Attending: PHYSICAL MEDICINE & REHABILITATION
Payer: MEDICARE

## 2024-01-10 PROBLEM — E87.6 HYPOKALEMIA: Status: ACTIVE | Noted: 2024-01-10

## 2024-01-10 LAB
ANION GAP SERPL CALC-SCNC: 13 MMOL/L (ref 7–16)
BASOPHILS # BLD AUTO: 1.3 % (ref 0–1.8)
BASOPHILS # BLD: 0.09 K/UL (ref 0–0.12)
BUN SERPL-MCNC: 15 MG/DL (ref 8–22)
CALCIUM SERPL-MCNC: 8.4 MG/DL (ref 8.5–10.5)
CHLORIDE SERPL-SCNC: 102 MMOL/L (ref 96–112)
CO2 SERPL-SCNC: 24 MMOL/L (ref 20–33)
CREAT SERPL-MCNC: 0.63 MG/DL (ref 0.5–1.4)
EOSINOPHIL # BLD AUTO: 0.27 K/UL (ref 0–0.51)
EOSINOPHIL NFR BLD: 4 % (ref 0–6.9)
ERYTHROCYTE [DISTWIDTH] IN BLOOD BY AUTOMATED COUNT: 44.7 FL (ref 35.9–50)
GFR SERPLBLD CREATININE-BSD FMLA CKD-EPI: 85 ML/MIN/1.73 M 2
GLUCOSE SERPL-MCNC: 102 MG/DL (ref 65–99)
HCT VFR BLD AUTO: 42.4 % (ref 37–47)
HGB BLD-MCNC: 14 G/DL (ref 12–16)
IMM GRANULOCYTES # BLD AUTO: 0.08 K/UL (ref 0–0.11)
IMM GRANULOCYTES NFR BLD AUTO: 1.2 % (ref 0–0.9)
LYMPHOCYTES # BLD AUTO: 1.5 K/UL (ref 1–4.8)
LYMPHOCYTES NFR BLD: 22 % (ref 22–41)
MCH RBC QN AUTO: 30.6 PG (ref 27–33)
MCHC RBC AUTO-ENTMCNC: 33 G/DL (ref 32.2–35.5)
MCV RBC AUTO: 92.6 FL (ref 81.4–97.8)
MONOCYTES # BLD AUTO: 0.87 K/UL (ref 0–0.85)
MONOCYTES NFR BLD AUTO: 12.8 % (ref 0–13.4)
NEUTROPHILS # BLD AUTO: 4 K/UL (ref 1.82–7.42)
NEUTROPHILS NFR BLD: 58.7 % (ref 44–72)
NRBC # BLD AUTO: 0 K/UL
NRBC BLD-RTO: 0 /100 WBC (ref 0–0.2)
PLATELET # BLD AUTO: 296 K/UL (ref 164–446)
PMV BLD AUTO: 10.3 FL (ref 9–12.9)
POTASSIUM SERPL-SCNC: 3.2 MMOL/L (ref 3.6–5.5)
RBC # BLD AUTO: 4.58 M/UL (ref 4.2–5.4)
SODIUM SERPL-SCNC: 139 MMOL/L (ref 135–145)
WBC # BLD AUTO: 6.8 K/UL (ref 4.8–10.8)

## 2024-01-10 PROCEDURE — 97116 GAIT TRAINING THERAPY: CPT

## 2024-01-10 PROCEDURE — 97110 THERAPEUTIC EXERCISES: CPT

## 2024-01-10 PROCEDURE — 36415 COLL VENOUS BLD VENIPUNCTURE: CPT

## 2024-01-10 PROCEDURE — 770010 HCHG ROOM/CARE - REHAB SEMI PRIVAT*

## 2024-01-10 PROCEDURE — 97129 THER IVNTJ 1ST 15 MIN: CPT

## 2024-01-10 PROCEDURE — 85025 COMPLETE CBC W/AUTO DIFF WBC: CPT

## 2024-01-10 PROCEDURE — 97110 THERAPEUTIC EXERCISES: CPT | Mod: CO

## 2024-01-10 PROCEDURE — A9270 NON-COVERED ITEM OR SERVICE: HCPCS | Performed by: PHYSICAL MEDICINE & REHABILITATION

## 2024-01-10 PROCEDURE — 80048 BASIC METABOLIC PNL TOTAL CA: CPT

## 2024-01-10 PROCEDURE — A9270 NON-COVERED ITEM OR SERVICE: HCPCS | Performed by: HOSPITALIST

## 2024-01-10 PROCEDURE — 97130 THER IVNTJ EA ADDL 15 MIN: CPT

## 2024-01-10 PROCEDURE — 700102 HCHG RX REV CODE 250 W/ 637 OVERRIDE(OP): Performed by: HOSPITALIST

## 2024-01-10 PROCEDURE — 700102 HCHG RX REV CODE 250 W/ 637 OVERRIDE(OP): Performed by: PHYSICAL MEDICINE & REHABILITATION

## 2024-01-10 PROCEDURE — 97530 THERAPEUTIC ACTIVITIES: CPT | Mod: CO

## 2024-01-10 PROCEDURE — 99233 SBSQ HOSP IP/OBS HIGH 50: CPT | Performed by: PHYSICAL MEDICINE & REHABILITATION

## 2024-01-10 PROCEDURE — 99232 SBSQ HOSP IP/OBS MODERATE 35: CPT | Performed by: HOSPITALIST

## 2024-01-10 PROCEDURE — 97530 THERAPEUTIC ACTIVITIES: CPT

## 2024-01-10 RX ORDER — LOSARTAN POTASSIUM 25 MG/1
25 TABLET ORAL ONCE
Status: COMPLETED | OUTPATIENT
Start: 2024-01-10 | End: 2024-01-10

## 2024-01-10 RX ORDER — POTASSIUM CHLORIDE 20 MEQ/1
20 TABLET, EXTENDED RELEASE ORAL DAILY
Status: DISCONTINUED | OUTPATIENT
Start: 2024-01-11 | End: 2024-01-16

## 2024-01-10 RX ORDER — POTASSIUM CHLORIDE 20 MEQ/1
40 TABLET, EXTENDED RELEASE ORAL ONCE
Status: COMPLETED | OUTPATIENT
Start: 2024-01-10 | End: 2024-01-10

## 2024-01-10 RX ADMIN — AMLODIPINE BESYLATE 5 MG: 5 TABLET ORAL at 05:04

## 2024-01-10 RX ADMIN — Medication 1000 UNITS: at 08:06

## 2024-01-10 RX ADMIN — FAMOTIDINE 20 MG: 20 TABLET ORAL at 08:06

## 2024-01-10 RX ADMIN — LEVOTHYROXINE SODIUM 75 MCG: 0.07 TABLET ORAL at 08:05

## 2024-01-10 RX ADMIN — POTASSIUM CHLORIDE 40 MEQ: 1500 TABLET, EXTENDED RELEASE ORAL at 11:37

## 2024-01-10 RX ADMIN — LOSARTAN POTASSIUM 50 MG: 50 TABLET, FILM COATED ORAL at 05:03

## 2024-01-10 RX ADMIN — SIMETHICONE 125 MG: 125 TABLET, CHEWABLE ORAL at 20:07

## 2024-01-10 RX ADMIN — TRAZODONE HYDROCHLORIDE 50 MG: 50 TABLET ORAL at 20:07

## 2024-01-10 RX ADMIN — APIXABAN 5 MG: 5 TABLET, FILM COATED ORAL at 08:04

## 2024-01-10 RX ADMIN — LOSARTAN POTASSIUM 25 MG: 25 TABLET, FILM COATED ORAL at 09:58

## 2024-01-10 RX ADMIN — FAMOTIDINE 20 MG: 20 TABLET ORAL at 20:07

## 2024-01-10 RX ADMIN — APIXABAN 5 MG: 5 TABLET, FILM COATED ORAL at 20:07

## 2024-01-10 RX ADMIN — SIMETHICONE 125 MG: 125 TABLET, CHEWABLE ORAL at 08:04

## 2024-01-10 RX ADMIN — HYDRALAZINE HYDROCHLORIDE 25 MG: 25 TABLET, FILM COATED ORAL at 05:03

## 2024-01-10 RX ADMIN — SENNOSIDES AND DOCUSATE SODIUM 2 TABLET: 50; 8.6 TABLET ORAL at 08:04

## 2024-01-10 RX ADMIN — NYSTATIN: 100000 POWDER TOPICAL at 09:57

## 2024-01-10 RX ADMIN — GABAPENTIN 100 MG: 100 CAPSULE ORAL at 20:07

## 2024-01-10 RX ADMIN — FLECAINIDE ACETATE 100 MG: 100 TABLET ORAL at 08:04

## 2024-01-10 RX ADMIN — METOPROLOL SUCCINATE 12.5 MG: 25 TABLET, EXTENDED RELEASE ORAL at 05:03

## 2024-01-10 RX ADMIN — PAROXETINE HYDROCHLORIDE 10 MG: 20 TABLET, FILM COATED ORAL at 08:05

## 2024-01-10 RX ADMIN — SIMETHICONE 125 MG: 125 TABLET, CHEWABLE ORAL at 17:47

## 2024-01-10 RX ADMIN — LOSARTAN POTASSIUM 75 MG: 50 TABLET, FILM COATED ORAL at 17:47

## 2024-01-10 RX ADMIN — SIMETHICONE 125 MG: 125 TABLET, CHEWABLE ORAL at 11:37

## 2024-01-10 ASSESSMENT — ENCOUNTER SYMPTOMS
HALLUCINATIONS: 0
HEADACHES: 0
FEVER: 0
DIZZINESS: 0
SHORTNESS OF BREATH: 0
NAUSEA: 0
BLURRED VISION: 0
VOMITING: 0
PALPITATIONS: 0

## 2024-01-10 ASSESSMENT — GAIT ASSESSMENTS
GAIT LEVEL OF ASSIST: TOTAL ASSIST X 2
ASSISTIVE DEVICE: FRONT WHEEL WALKER

## 2024-01-10 ASSESSMENT — PATIENT HEALTH QUESTIONNAIRE - PHQ9
1. LITTLE INTEREST OR PLEASURE IN DOING THINGS: NOT AT ALL
SUM OF ALL RESPONSES TO PHQ9 QUESTIONS 1 AND 2: 0
2. FEELING DOWN, DEPRESSED, IRRITABLE, OR HOPELESS: NOT AT ALL

## 2024-01-10 ASSESSMENT — ACTIVITIES OF DAILY LIVING (ADL): BED_CHAIR_WHEELCHAIR_TRANSFER_DESCRIPTION: INCREASED TIME;INITIAL PREPARATION FOR TASK;SET-UP OF EQUIPMENT;VERBAL CUEING

## 2024-01-10 ASSESSMENT — PAIN DESCRIPTION - PAIN TYPE
TYPE: ACUTE PAIN
TYPE: ACUTE PAIN

## 2024-01-10 NOTE — THERAPY
"Physical Therapy   Daily Treatment     Patient Name: Debra Rodrigues  Age:  88 y.o., Sex:  female  Medical Record #: [id number]  Today's Date: 1/10/2024     Precautions  Precautions: Fall Risk  Comments: Maculary degeneration, hx of CVA    Subjective    Pt was sitting in her w/c in her room and was agreeable to treatment. \" I am tired\"     Objective       01/10/24 1301   Transfer Functional Level of Assist   Bed, Chair, Wheelchair Transfer Total Assist   Bed Chair Wheelchair Transfer Description Increased time;Initial preparation for task;Set-up of equipment;Verbal cueing  (Max A x 1 for: Verbal and tactile cues for sequencing, R and L weight shifts for pivoting; 2nd person stand by safety, incidental steadying)   Sitting Lower Body Exercises   Sitting Lower Body Exercises Yes   Ankle Pumps 1 set of 10   Hip Flexion 1 set of 10   Long Arc Quad 1 set of 10   Hamstring Curl 1 set of 10;Light Resistance Theraband   Bed Mobility    Sit to Supine Maximal Assist  (initiation of task, sequencing, assist at LE to raise up into bed, increased time, verbal cues)   Scooting Maximal Assist  (Max A to lift pt's pelvis off the bed to scoot to R, verbal cues, incidental steadying, sequencing, Total A for boosting in bed)   Rolling Minimal Assist to Rt.  (assist to LE to kept knees bent, verbal cues, sequencing)   Interdisciplinary Plan of Care Collaboration   IDT Collaboration with  Nursing, MD, POC for PRAFO    Patient Position at End of Therapy In Bed;Bed Alarm On;Call Light within Reach  (Pt was seen by nursing after the session)   Physical Therapist Assigned   Assigned PT / Treatment Time / Comments Roxann/Claudia. Jana if no student. No 30 min treat and no 0830; 60 okay. Needs breaks after 60 min.     Observation: L foot is supinated  L foot has increased stiffness compared to the R; firm end feel; estimated loss of 15 deg of D,PROM eversion is about 0 deg; initiated of PRAFO at night     Assessment    Pt. L foot had " increased stiffness which limits her ability to to clear her foot during gait. She requires manual facilitation at her pelvis to perform R and L weight shifts to help with unloading her LE to help with limb progression. She continues to requires constant verbal and tactile cues for initiation and sequencing of tasks 90% of the time.     Strengths: Able to follow instructions, Manages pain appropriately, Motivated for self care and independence, Pleasant and cooperative, Willingly participates in therapeutic activities  Barriers: Decreased endurance, Confused, Fatigue, Generalized weakness, Impaired activity tolerance, Impaired balance, Limited mobility    Plan    Clarify level of assist available for D/C to inform goals and POC   Improve DF through seated stretches (in a w/c or with gait belt)    Continue seated LE exercises (gastroc, quads, hip flexors, hamstrings)   STS from an elevated table   FWW with targets for each step during walking  Standing R and L weight shifts with targets  Standing marches onto a step in //    Seated cross body reaches to the L     DME  PT DME Recommendations  Wheelchair:  (TBD)  Assistive Device:  (TBD)    Passport items to be completed:  Get in/out of bed safely, in/out of a vehicle, safely use mobility device, walk or wheel around home/community, navigate up and down stairs, show how to get up/down from the ground, ensure home is accessible, demonstrate HEP, complete caregiver training    Physical Therapy Problems (Active)       Problem: Mobility       Dates: Start:  01/10/24         Goal: STG-Within one week, patient will ambulate x 10' with FWW and Mod A        Dates: Start:  01/10/24         Goal Note filed on 01/10/24 1247 by Claudia Cherry, Student       Pt ambulated 10' with FWW and Max A but required breaks to complete the activity                  Problem: Mobility Transfers       Dates: Start:  01/06/24         Goal: STG-Within one week, patient will perform bed mobility  with mod A       Dates: Start:  01/06/24         Goal Note filed on 01/10/24 1247 by Claudia Cherry, Student       Pt was Bhavin LOPEZ and required verbal and tactile cueing for sequencing and initiating task                 Problem: PT-Long Term Goals       Dates: Start:  01/06/24         Goal: LTG-By discharge, patient will propel wheelchair x 150 feet with SPV       Dates: Start:  01/06/24            Goal: LTG-By discharge, patient will ambulate x 30 feet with FWW and CGA       Dates: Start:  01/06/24            Goal: LTG-By discharge, patient will transfer one surface to another with FWW and CGA       Dates: Start:  01/06/24            Goal: LTG-By discharge, patient will transfer in/out of a car with FWW and CGA       Dates: Start:  01/06/24

## 2024-01-10 NOTE — THERAPY
Physical Therapy   Daily Treatment     Patient Name: Debra Rodrigues  Age:  88 y.o., Sex:  female  Medical Record #: [id number]  Today's Date: 1/9/2024     Precautions  Precautions: Fall Risk  Comments: Maculary degeneration, hx of CVA    Subjective    Pt was seen at [id number] and [id number]. She was agreeable to both treatment sessions.  AM- pt was seated in her bed with the her breakfast and the HOB raised.   PM- pt was seated in her w/c watching TV.      Objective       01/09/24 0831 01/09/24 1401   Precautions   Precautions Fall Risk Fall Risk   Gait Functional Level of Assist    Gait Level Of Assist  --  Total Assist  (1 person Max A,1 person w/c follow)   Assistive Device  --  Front Wheel Walker   Distance (Feet)  --  5   # of Times Distance was Traveled  --  3   Deviation  --  Shuffled Gait;Bradykinetic;Increased Base Of Support;Decreased Toe Off;Decreased Heel Strike;Other (Comment)   Transfer Functional Level of Assist   Bed, Chair, Wheelchair Transfer Maximal Assist Maximal Assist   Bed Chair Wheelchair Transfer Description Adaptive equipment;Increased time;Initial preparation for task;Set-up of equipment;Verbal cueing Increased time;Initial preparation for task;Verbal cueing;Set-up of equipment  (Tactile cueing)   Toilet Transfers Total Assist  --    Toilet Transfer Description Grab bar;Initial preparation for task;Set-up of equipment;Verbal cueing, 2nd persons needed for incidental steadying and pant management   --    Supine Lower Body Exercise   Supine Lower Body Exercises Yes  --    Hip Flexion 2 sets of 10;Bilateral  (verbal and tactile cueing to flex the knee; 1x10 each side; 1x10 alternating legs)  --    Sitting Lower Body Exercises   Sitting Lower Body Exercises  --  Yes   Ankle Pumps  --  1 set of 10;Bilateral   Hip Flexion  --  2 sets of 10;Left   Long Arc Quad  --  2 sets of 10;Bilateral   Sit to Stand  --    (1x3, 1x5,elevated surface, mod A,increased time, initiation, sequencing, verbal and  tactile cueing, facilitation of L body shift when performing exercise)   Bed Mobility    Supine to Sit Maximal Assist  intiating task, HOB angle increased, sequencing, increased time, verbal and tactile cues)  --    Sit to Stand Maximal Assist  (initiating task, sequencing, increased time, tactile and verbal cues) Maximal Assist   Neuro-Muscular Treatments   Neuro-Muscular Treatments  --  Weight Shift Left;Weight Shift Right;Verbal Cuing;Postural Facilitation   Comments  --  seated on bench with no back support with mirror to provide a visual feedback of pt posture, 2x30 sec   Interdisciplinary Plan of Care Collaboration   Patient Position at End of Therapy Seated;Chair Alarm On;Self Releasing Lap Belt Applied;Tray Table within Reach;Call Light within Reach Seated;Chair Alarm On;Self Releasing Lap Belt Applied;Call Light within Reach;Tray Table within Reach;Other (Comments)  (seated in w/c)   Physical Therapist Assigned   Assigned PT / Treatment Time / Comments Roxann/Anthonie. TechA if no student. No 30 min treat and no 0830; 60 okay. Needs breaks after 60 min. Roxann/Anthonie. TechA if no student. No 30 min treat and no 0830; 60 okay. Needs breaks after 60 min.     Gait training: training on using a FWW to ambulate. Verbal cues for initiating and sequencing steps, tactile cueing for anterior pelvic tilt, manual facilitation of L weight shift during L stance to encourage weight acceptance on L LE.     Assessment    Pt demonstrates a R body lean when seated but in able to self correct with constant verbal and visual cues. She requires verbal and tactile cueing for initiating and sequencing during activities. Due to her R body lean she is predominately using her R LE to perform the stand portion of the STS. She has decreased confidence in her ambulation abilities, but can cover short distances with FWW and w/c follow.     Strengths: Able to follow instructions, Manages pain appropriately, Motivated for self care and  independence, Pleasant and cooperative, Willingly participates in therapeutic activities  Barriers: Decreased endurance, Confused, Fatigue, Generalized weakness, Impaired activity tolerance, Impaired balance, Limited mobility    Plan    Walking in the // with a mirror   FWW with targets during walking   Standing R and L weight shifts with targets    Standing marches onto a step in //    STS from an elevated table   Seated cross body reaches to the L   Seated LE exercises (gastroc, quads, hip flexors, hamstrings)     DME  PT DME Recommendations  Wheelchair:  (TBD)  Assistive Device:  (TBD)    Passport items to be completed:  Get in/out of bed safely, in/out of a vehicle, safely use mobility device, walk or wheel around home/community, navigate up and down stairs, show how to get up/down from the ground, ensure home is accessible, demonstrate HEP, complete caregiver training    Physical Therapy Problems (Active)       Problem: Mobility       Dates: Start:  01/06/24         Goal: STG-Within one week, patient will propel wheelchair household distances x 25 feet with min A       Dates: Start:  01/06/24            Goal: STG-Within one week, patient will ambulate household distance x 10 feet in // bars with mod A        Dates: Start:  01/06/24               Problem: Mobility Transfers       Dates: Start:  01/06/24         Goal: STG-Within one week, patient will perform bed mobility with mod A       Dates: Start:  01/06/24            Goal: STG-Within one week, patient will transfer bed to chair with max A x 1 person SQPT       Dates: Start:  01/06/24               Problem: PT-Long Term Goals       Dates: Start:  01/06/24         Goal: LTG-By discharge, patient will propel wheelchair x 150 feet with SPV       Dates: Start:  01/06/24            Goal: LTG-By discharge, patient will ambulate x 30 feet with FWW and CGA       Dates: Start:  01/06/24            Goal: LTG-By discharge, patient will transfer one surface to another  with FWW and CGA       Dates: Start:  01/06/24            Goal: LTG-By discharge, patient will transfer in/out of a car with FWW and CGA       Dates: Start:  01/06/24

## 2024-01-10 NOTE — THERAPY
Physical Therapy   Daily Treatment     Patient Name: Debra Rodrigues  Age:  88 y.o., Sex:  female  Medical Record #: [id number]  Today's Date: 1/10/2024     Precautions  Precautions: Fall Risk  Comments: Maculary degeneration, hx of CVA    Subjective    Patient found in cafeteria, agreeable to therapy session     Objective       01/10/24 0831   PT Charge Group   PT Gait Training (Units) 1   PT Therapeutic Exercise (Units) 2   PT Therapeutic Activities (Units) 1   PT Total Time Spent   PT Individual Total Time Spent (Mins) 60   Precautions   Precautions Fall Risk   Comments Maculary degeneration, hx of CVA   Gait Functional Level of Assist    Gait Level Of Assist Total Assist X 2   Assistive Device Front Wheel Walker   Distance (Feet)   (3ft, 4ft, 6ft with FWW CGA x 1 with close w/c follow x 1)   Sitting Lower Body Exercises   Sitting Lower Body Exercises   (used as warm-up and cool-down)   Ankle Pumps 2 sets of 10;Bilateral   Hip Abduction 2 sets of 10;Bilateral   Hip Adduction 2 sets of 10;Bilateral   Long Arc Quad 2 sets of 10;Bilateral   Marching 2 sets of 10;Reciprocal   Hamstring Curl 2 sets of 10;Bilateral;Medium Resistance Theraband   Comments Motomed 5min forward, 3min backward #1 for endurance   Bed Mobility    Sit to Stand Maximal Assist  (2 bouts of max assist, 1 bout of mod assist)   Interdisciplinary Plan of Care Collaboration   IDT Collaboration with  Other (See Comments)   Collaboration Comments PT student         Assessment    Continues to self-limit ambulation distance based on fatigue, requires varying assist for STS due to quad weakness    Strengths: Able to follow instructions, Manages pain appropriately, Motivated for self care and independence, Pleasant and cooperative, Willingly participates in therapeutic activities  Barriers: Decreased endurance, Confused, Fatigue, Generalized weakness, Impaired activity tolerance, Impaired balance, Limited mobility    Plan    Walking in the // with a  mirror   FWW with targets during walking   Standing R and L weight shifts with targets    Standing marches onto a step in //    STS from an elevated table   Seated cross body reaches to the L   Seated LE exercises (gastroc, quads, hip flexors, hamstrings)      DME  PT DME Recommendations  Wheelchair:  (TBD)  Assistive Device:  (TBD)     Passport items to be completed:  Get in/out of bed safely, in/out of a vehicle, safely use mobility device, walk or wheel around home/community, navigate up and down stairs, show how to get up/down from the ground, ensure home is accessible, demonstrate HEP, complete caregiver training    Physical Therapy Problems (Active)       Problem: Mobility       Dates: Start:  01/10/24         Goal: STG-Within one week, patient will ambulate x 10' with FWW and Mod A        Dates: Start:  01/10/24               Problem: Mobility Transfers       Dates: Start:  01/06/24         Goal: STG-Within one week, patient will perform bed mobility with mod A       Dates: Start:  01/06/24            Goal: STG-Within one week, patient will transfer bed to chair with max A x 1 person SQPT       Dates: Start:  01/06/24               Problem: PT-Long Term Goals       Dates: Start:  01/06/24         Goal: LTG-By discharge, patient will propel wheelchair x 150 feet with SPV       Dates: Start:  01/06/24            Goal: LTG-By discharge, patient will ambulate x 30 feet with FWW and CGA       Dates: Start:  01/06/24            Goal: LTG-By discharge, patient will transfer one surface to another with FWW and CGA       Dates: Start:  01/06/24            Goal: LTG-By discharge, patient will transfer in/out of a car with FWW and CGA       Dates: Start:  01/06/24

## 2024-01-10 NOTE — THERAPY
"Occupational Therapy  Daily Treatment     Patient Name: Debra Rodrigues  Age:  88 y.o., Sex:  female  Medical Record #: [id number]  Today's Date: 1/10/2024     Precautions  Precautions: Fall Risk  Comments: Maculary degeneration, hx of CVA         Subjective    \"I doing okay\" Pt was seated in w/c upon arrival and agreeable for OT session.      Objective       01/10/24 1031   OT Charge Group   OT Therapy Activity (Units) 2   OT Therapeutic Exercise (Units) 2   OT Total Time Spent   OT Individual Total Time Spent (Mins) 60   Sitting Upper Body Exercises   Other Exercise Nustep ~15min level 1 w/ multipule rest breaks.   Balance   Sitting Balance (Static) Poor +   Sitting Balance (Dynamic) Poor   Bed Mobility    Supine to Sit Maximal Assist   Sit to Supine Total Assist   Scooting Contact Guard Assist   Rolling Minimum Assist to Lt.  (on therapy mat)   Interdisciplinary Plan of Care Collaboration   IDT Collaboration with  Family / Caregiver   Patient Position at End of Therapy Seated;Chair Alarm On;Call Light within Reach;Tray Table within Reach;Phone within Reach;Family / Friend in Room   Collaboration Comments Family present throughout treatment.     Communicated w/ son on PLOF. Pt lived in assisted living in which they assist in all ADL and IADLs tasks. Pt continues to need Max A for STS w/ VC for  hand position and upright standing tolerance while using FWW. Pt was edu and instructed pt on log rolling to increase Ind in bed mobility, however had poor return demo.   Instructed pt to touch each hand to therapist in different planes 5x each out side KARLOS to challenge sitting balance EOM w/ BLE blocked.     Assessment    Pt tolerated session fairly w/ focus on thera ex, bed mobility, and sitting balance to increase ind in functional tasks. Observed increased participation w/ 1 step commands throughout session.   Strengths: Alert and oriented, Able to follow instructions, Pleasant and cooperative, Willingly participates " in therapeutic activities  Barriers: Decreased endurance, Generalized weakness, Impaired activity tolerance, Impaired balance, Limited mobility, Visual impairment  (Dementia is not part of her medical record)    Plan    ADLs  Standing balance/ tolerance  Functional transfers   Strengthening/endurance     DME       Passport items to be completed:  Perform bathroom transfers, complete dressing, complete feeding, get ready for the day, prepare a simple meal, participate in household tasks, adapt home for safety needs, demonstrate home exercise program, complete caregiver training     Occupational Therapy Goals (Active)       Problem: Dressing       Dates: Start:  01/06/24         Goal: STG-Within one week, patient will dress UB at a Min A level.        Dates: Start:  01/06/24            Goal: STG-Within one week, patient will dress LB at a Mod A level with AE/AD PRN.        Dates: Start:  01/06/24               Problem: Functional Transfers       Dates: Start:  01/06/24         Goal: STG-Within one week, patient will transfer to toilet at a Mod A level with AD/DME PRN.        Dates: Start:  01/06/24               Problem: OT Long Term Goals       Dates: Start:  01/06/24         Goal: LTG-By discharge, patient will complete basic self care tasks at a SUP to Mod A level with AE/AD/DME PRN.        Dates: Start:  01/06/24            Goal: LTG-By discharge, patient will perform bathroom transfers at a SUP to Min A level with LRD and DME PRN.        Dates: Start:  01/06/24               Problem: Toileting       Dates: Start:  01/06/24         Goal: STG-Within one week, patient will complete toileting tasks at a Mod A level with AE/AD PRN.        Dates: Start:  01/06/24

## 2024-01-10 NOTE — CARE PLAN
The patient is Stable - Low risk of patient condition declining or worsening    Shift Goals  Clinical Goals: safety  Patient Goals: rest    Progress made toward(s) clinical / shift goals:    Problem: Fall Risk - Rehab  Goal: Patient will remain free from falls  Outcome: Progressing. Patient bed in lowest locked position with bed alarm on and call light within reach. Patient calls with call light for assistance but not if she has soiled herself in bed. Patient has remained free from falls over shift and has not attempted to self transfer over shift.      Problem: Pain - Standard  Goal: Alleviation of pain or a reduction in pain to the patient’s comfort goal  Outcome: Progressing. Patient denies having any pain over shift. Patient has prn tylenol available for pain if needed.

## 2024-01-10 NOTE — CARE PLAN
Problem: Mobility Transfers  Goal: STG-Within one week, patient will perform bed mobility with mod A  1/10/2024 1247 by Claudia Cherry, Student  Outcome: Progressing  Note: Pt was Max A and required verbal and tactile cueing for sequencing and initiating task  1/10/2024 1244 by Claudia Cherry, Student  Outcome: Progressing  Note: Pt was Max A for bed mobility, increased verbal and tactile cueing for sequencing activity      Problem: Mobility  Goal: STG-Within one week, patient will ambulate x 10' with FWW and Mod A   1/10/2024 1247 by Claudia Cherry, Student  Outcome: Progressing  Note: Pt ambulated 10' with FWW and Max A but required breaks to complete the activity   1/10/2024 1244 by Claudia Cherry, Student  Outcome: Progressing  Note: Pt was walked 10' with FWW and Max A, required multiple rest breaks during activity d/t decreased activity tolerance      Problem: Mobility  Goal: STG-Within one week, patient will propel wheelchair household distances x 25 feet with min A  Outcome: Met  Goal: STG-Within one week, patient will ambulate household distance x 10 feet in // bars with mod A   Outcome: Met     Problem: Mobility Transfers  Goal: STG-Within one week, patient will transfer bed to chair with max A x 1 person SQPT  Outcome: Met

## 2024-01-10 NOTE — PROGRESS NOTES
NURSING DAILY NOTE    Name: Debra Rodrigues  Date of Admission: 1/5/2024  Admitting Diagnosis: Pneumonia due to infectious organism  Attending Physician: Eugenia Ruiz D.o.  Allergies: Other misc, Hydrocodone-acetaminophen, and Sulfa drugs    Safety  Patient Assist  omax 1-2  Patient Precautions  oFall Risk  Precaution Comments  oMaculary degeneration, hx of CVA  Bed Transfer Status  oMaximal Assist  Toilet Transfer Status  oTotal Assist (2 people incidental steadying, help with pulling pants up)  Assistive Devices  oRails, Wheelchair  Oxygen  oNone - Room Air  Diet/Therapeutic Dining  o  Current Diet Order   Procedures    Diet Order Diet: Level 6 - Soft and Bite Sized; Liquid level: Level 0 - Thin     Pill Administration  owhole and floated  Agitated Behavioral Scale  o   ABS Level of Severity  o     Fall Risk  Has the patient had a fall this admission?  Colleen  Rubina Echavarria Fall Risk Scoring  o21, HIGH RISK  Fall Risk Safety Measures  jacquelin alarm and chair alarm    Vitals  Temperature: 37.1 °C (98.7 °F)  Temp src: Oral  Pulse: 65  Respiration: 18  Blood Pressure : (!) 181/73  Blood Pressure MAP (Calculated): 109 MM HG  BP Location: Right, Upper Arm  Patient BP Position: Supine    Oxygen  Pulse Oximetry: 92 %  O2 (LPM): 1  O2 Delivery Device: None - Room Air    Bowel and Bladder  Last Bowel Movement  o01/09/24  Stool Type  oType 6: Fluffy pieces with ragged edges, a mushy stool  Bowel Device  oBathroom, Diaper  Continent  oBladder: Did not void  oBowel: No movement  Bladder Function  oUrine Void (mL):  (large inc,)  Number of Times Voided: 1  Urine Color: Unable To Evaluate  Number of Times Incontinent of Urine: 1  Wet Diaper Count: 1  Genitourinary Assessment  oBladder Assessment (WDL):  WDL Except  Cortes Catheter: Not Applicable  Urinary Elimination: Incontinence  Urine Color: Unable To Evaluate  Number of Bladder Accidents: 1  Total Number of Bladder of Accidents in Last 7 Days: 1  Number of Times  Incontinent of Urine: 1  Bladder Device: Bathroom, Diaper  Time Void: No  Bladder Scan: Post Void  $ Bladder Scan Results (mL): 9    Skin  Larry Score  o 16  Sensory Interventions  o Bed Types: Standard/Trauma Mattress  Skin Preventative Measures: Pillows in Use for Support / Positioning  Moisture Interventions  oMoisturizers/Barriers: Barrier Wipes      Pain  Pain Rating Scale  o0 - No Pain  Pain Location  o   Pain Location Orientation  o   Pain Interventions  oDeclines    ADLs    Bathing  o   Linen Change  o   Personal Hygiene  oPerineal Care, Moist Mamie Wipes  Chlorhexidine Bath  o   Oral Care  oBrushed Teeth  Teeth/Dentures  o   Shave  o   Nutrition Percentage Eaten  oBreakfast, Between % Consumed  Environmental Precautions  o   Patient Turns/Positioning  oPatient Turns Self from Side to Side  Patient Turns Assistance/Tolerance  o   Bed Positions  Alexus Controls On  Head of Bed Elevated  oSelf regulated      Psychosocial/Neurologic Assessment  Psychosocial Assessment  oPsychosocial (WDL):  WDL Except  Patient Behaviors: Fatigue  Neurologic Assessment  oNeuro (WDL): Exceptions to WDL  Level of Consciousness: Alert  Orientation Level: Oriented to place, Oriented to person, Oriented to situation  Cognition: Follows commands  Speech: Clear  Motor Function/Sensation Assessment: Sensation  RUE Sensation: Full sensation  LUE Sensation: Full sensation  RLE Sensation: Numbness, Tingling  LLE Sensation: Numbness, Tingling  oEENT (WDL):  WDL Except    Cardio/Pulmonary Assessment  Edema  oRLE Edema: Generalized  LLE Edema: Generalized  Respiratory Breath Sounds  oRUL Breath Sounds: Clear  RML Breath Sounds: Clear  RLL Breath Sounds: Diminished  ANGÉLICA Breath Sounds: Clear  LLL Breath Sounds: Diminished  Cardiac Assessment  oCardiac (WDL):  WDL Except

## 2024-01-10 NOTE — ASSESSMENT & PLAN NOTE
K+: 3.2  --> 3.9 (1/12) --> 4.0 (1/15)  S/P KCL 40 meq x 1  Cont KCL: 20 meq daily (1/11)  2nd to sometimes diminished appetite  Cont to monitor

## 2024-01-10 NOTE — CARE PLAN
Problem: Fall Risk - Rehab  Goal: Patient will remain free from falls  Note: Pt uses call light consistently and appropriately. Waits for assistance does not attempt self transfer this shift. Able to verbalize needs. Will continue to monitor.      Problem: Infection  Goal: Patient will remain free from infection  Note: Patient remains free from s/s infection; afebrile.  Will continue to monitor.    The patient is Stable - Low risk of patient condition declining or worsening    Shift Goals  Clinical Goals: Safety  Patient Goals: rest

## 2024-01-10 NOTE — PROGRESS NOTES
Hospital Medicine Daily Progress Note      Chief Complaint  Hypertension  Afib    Interval Problem Update  Discussed about her BP elevated and have increased her Cozaar dose.    Review of Systems  Review of Systems   Constitutional:  Negative for fever.   Eyes:  Negative for blurred vision.   Respiratory:  Negative for shortness of breath.    Cardiovascular:  Negative for palpitations.   Gastrointestinal:  Negative for nausea and vomiting.   Neurological:  Negative for dizziness and headaches.   Psychiatric/Behavioral:  Negative for hallucinations.         Physical Exam  Temp:  [36.6 °C (97.8 °F)-37.1 °C (98.7 °F)] 37.1 °C (98.7 °F)  Pulse:  [65-81] 65  Resp:  [18] 18  BP: (154-181)/(72-80) 154/72  SpO2:  [92 %-93 %] 92 %    Physical Exam  Vitals and nursing note reviewed.   Constitutional:       General: She is not in acute distress.  HENT:      Mouth/Throat:      Mouth: Mucous membranes are moist.      Pharynx: Oropharynx is clear.   Eyes:      General: No scleral icterus.  Neck:      Vascular: No JVD.   Cardiovascular:      Rate and Rhythm: Normal rate and regular rhythm.      Heart sounds: Normal heart sounds. No murmur heard.  Pulmonary:      Effort: Pulmonary effort is normal.      Breath sounds: Normal breath sounds. No stridor.   Abdominal:      General: There is no distension.      Palpations: Abdomen is soft.      Tenderness: There is no abdominal tenderness.   Musculoskeletal:      Cervical back: No rigidity.      Right lower leg: No edema.      Left lower leg: No edema.   Skin:     General: Skin is warm and dry.      Findings: No rash.   Neurological:      Mental Status: She is alert and oriented to person, place, and time.   Psychiatric:         Mood and Affect: Mood normal.         Behavior: Behavior normal.         Fluids    Intake/Output Summary (Last 24 hours) at 1/10/2024 0906  Last data filed at 1/10/2024 0855  Gross per 24 hour   Intake 140 ml   Output --   Net 140 ml         Laboratory  Recent  Labs     01/10/24  0544   WBC 6.8   RBC 4.58   HEMOGLOBIN 14.0   HEMATOCRIT 42.4   MCV 92.6   MCH 30.6   MCHC 33.0   RDW 44.7   PLATELETCT 296   MPV 10.3       Recent Labs     01/10/24  0544   SODIUM 139   POTASSIUM 3.2*   CHLORIDE 102   CO2 24   GLUCOSE 102*   BUN 15   CREATININE 0.63   CALCIUM 8.4*                     Assessment/Plan  Hypokalemia  Assessment & Plan  K+: 3.2   Will give KCL 40 meq x 1  Will start KCL: 20 meq daily (starting 1/11)  Cont to monitor    Abdominal distension  Assessment & Plan  AXR: shows gaseous colonic distension  Cont Simethicone    Hypoxia- (present on admission)  Assessment & Plan  Resolved (from [facility])  S/P IV Lasix at [facility] for pulm edema  S/P Augmentin & Doxy for PNA    Acquired hypothyroidism- (present on admission)  Assessment & Plan  Cont Synthroid    MDD (major depressive disorder), recurrent, in partial remission (HCC)- (present on admission)  Assessment & Plan  Cont Paxil    Primary hypertension- (present on admission)  Assessment & Plan  BP labile and elevated  Cont Toprol XL -- dose recently decreased for bradycardia  Cont Cozaar --> will increase dose  Cont Norvasc  Cont to monitor    Paroxysmal atrial fibrillation (HCC)- (present on admission)  Assessment & Plan  HR ok  S/P PPM  Cont Tambacor  Cont Eliquis  Cont to monitor

## 2024-01-10 NOTE — PROGRESS NOTES
NURSING DAILY NOTE    Name: Debra Rodrigues   Date of Admission: 1/5/2024   Admitting Diagnosis: Pneumonia due to infectious organism  Attending Physician: Eugenia Ruiz D.o.  Allergies: Other misc, Hydrocodone-acetaminophen, and Sulfa drugs    Safety  Patient Assist  max 1-2  Patient Precautions  Fall Risk  Precaution Comments  Maculary degeneration, hx of CVA  Bed Transfer Status  Maximal Assist  Toilet Transfer Status   Total Assist (2 people incidental steadying, help with pulling pants up)  Assistive Devices  Rails, Wheelchair  Oxygen  None - Room Air  Diet/Therapeutic Dining  Current Diet Order   Procedures    Diet Order Diet: Level 6 - Soft and Bite Sized; Liquid level: Level 0 - Thin     Pill Administration  whole and one at a time   Agitated Behavioral Scale     ABS Level of Severity       Fall Risk  Has the patient had a fall this admission?   No  Rubina Echavarria Fall Risk Scoring  21, HIGH RISK  Fall Risk Safety Measures  bed alarm, chair alarm, and poor balance    Vitals  Temperature: 36.6 °C (97.8 °F)  Temp src: Oral  Pulse: 69  Respiration: 18  Blood Pressure : (!) 156/80  Blood Pressure MAP (Calculated): 105 MM HG  BP Location: Right, Upper Arm  Patient BP Position: Sitting     Oxygen  Pulse Oximetry: 93 %  O2 (LPM): 1  O2 Delivery Device: None - Room Air    Bowel and Bladder  Last Bowel Movement  01/09/24  Stool Type  Type 6: Fluffy pieces with ragged edges, a mushy stool  Bowel Device  Bathroom, Diaper  Continent  Bladder: Did not void   Bowel: No movement  Bladder Function  Urine Void (mL):  (large inc,)  Number of Times Voided: 1  Urine Color: Unable To Evaluate  Number of Times Incontinent of Urine: 1  Wet Diaper Count: 1  Genitourinary Assessment   Bladder Assessment (WDL):  WDL Except  Cortes Catheter: Not Applicable  Urinary Elimination: Incontinence  Urine Color: Unable To Evaluate  Number of Bladder Accidents: 1  Total Number of  Bladder of Accidents in Last 7 Days: 1  Number of Times Incontinent of Urine: 1  Bladder Device: Bathroom, Diaper  Time Void: No  Bladder Scan: Post Void  $ Bladder Scan Results (mL): 80    Skin  Larry Score   16  Sensory Interventions   Bed Types: Standard/Trauma Mattress  Skin Preventative Measures: Pillows in Use for Support / Positioning  Moisture Interventions  Moisturizers/Barriers: Barrier Wipes      Pain  Pain Rating Scale  0 - No Pain  Pain Location     Pain Location Orientation     Pain Interventions   Declines    ADLs    Bathing      Linen Change      Personal Hygiene  Perineal Care, Moist Mamie Wipes  Chlorhexidine Bath      Oral Care  Brushed Teeth  Teeth/Dentures     Shave     Nutrition Percentage Eaten  Breakfast, Between % Consumed  Environmental Precautions     Patient Turns/Positioning  Patient Turns Self from Side to Side  Patient Turns Assistance/Tolerance     Bed Positions  Bed Controls On  Head of Bed Elevated  Self regulated      Psychosocial/Neurologic Assessment  Psychosocial Assessment  Psychosocial (WDL):  WDL Except  Patient Behaviors: Fatigue, Uncooperative  Neurologic Assessment  Neuro (WDL): Exceptions to WDL  Level of Consciousness: Alert  Orientation Level: Oriented to place, Oriented to person, Oriented to situation  Cognition: Follows commands  Speech: Clear  Motor Function/Sensation Assessment: Sensation  RUE Sensation: Full sensation  LUE Sensation: Full sensation  RLE Sensation: Numbness, Tingling  LLE Sensation: Numbness, Tingling  EENT (WDL):  WDL Except    Cardio/Pulmonary Assessment  Edema   RLE Edema: Generalized  LLE Edema: Generalized  Respiratory Breath Sounds  RUL Breath Sounds: Clear  RML Breath Sounds: Clear  RLL Breath Sounds: Diminished  ANGÉLICA Breath Sounds: Clear  LLL Breath Sounds: Diminished  Cardiac Assessment   Cardiac (WDL):  WDL Except (hx htn, pacer, afib)

## 2024-01-10 NOTE — THERAPY
"Speech Language Pathology  Daily Treatment     Patient Name: Debra Rodrigues  Age:  88 y.o., Sex:  female  Medical Record #: [id number]  Today's Date: 1/10/2024     Precautions  Precautions: Fall Risk  Comments: Maculary degeneration, hx of CVA    Subjective    Patient agreeable to therapy     Objective       01/10/24 1001   Treatment Charges   SLP Cognitive Skill Development First 15 Minutes 1   SLP Cognitive Skill Development Additional 15 Minutes 1   SLP Total Time Spent   SLP Individual Total Time Spent (Mins) 30   Cognition   Simple Attention Minimal (4)   Prospective Memory Moderate (3)   Functional Memory Activities Moderate (3)   Functional Problem Solving Moderate (3)   Initiation Severe (2)   Functional Level of Assist   Comprehension Minimal Assist   Comprehension Description Glasses;Verbal cues;Other (comment)  (Extra time)   Expression Supervision   Expression Description Other (comment)  (Extra time)   Social Interaction Modified Independent   Social Interaction Description Increased time;Verbal cues   Problem Solving Moderate Assist   Problem Solving Description Bed/chair alarm;Increased time;Seat belt;Supervision;Verbal cueing;Therapy schedule   Memory Moderate Assist   Memory Description Bed/chair alarm;Increased time;Seat belt;Supervision;Verbal cueing;Therapy schedule   Interdisciplinary Plan of Care Collaboration   IDT Collaboration with  Family / Caregiver   Collaboration Comments Patient's son reported her to demonstrate decline in cognition over the last 3 years with continued decline. He reported that patient had \"let herself go\" and was no longer doing things like she used to.   Strengths & Barriers   Strengths Alert and oriented;Effective communication skills;Supportive family;Willingly participates in therapeutic activities   Barriers Generalized weakness;Impaired carryover of learning;Impaired functional cognition;Visual impairment  (Low initiation and slow to process.) "         Assessment    Patient recalled 75% of sit to stand transfer sequencing. Patient recalled 2/5 unrelated words immediately after review. Increased to 3/5 immediately after review. Increased to 4/5 after 5-minute delay.     OT reported that patient struggles to learn sequencing for use of reacher and sock aid.     Strengths: (P) Alert and oriented, Effective communication skills, Supportive family, Willingly participates in therapeutic activities  Barriers: (P) Generalized weakness, Impaired carryover of learning, Impaired functional cognition, Visual impairment (Low initiation and slow to process.)    Plan    Target attention and written 1 to 2-step directions. Target recall of safety sequencing.     Passport items to be completed:  Express basic needs, understand food/liquid recommendations, consistently follow swallow precautions, manage finances, manage medications, arrive to therapy appointments on time, complete daily memory log entries, solve problems related to safety situations, review education related to hospitalization, complete caregiver training     Speech Therapy Problems (Active)       Problem: Comprehension STGs       Dates: Start:  01/06/24         Goal: STG-Within one week, patient will comprehend both auditory and written instructions with 75% accuracy provided mod assist.       Dates: Start:  01/06/24         Goal Note filed on 01/10/24 1338 by Dania Nelson MS,CCC-SLP       To be addressed.                  Problem: Memory STGs       Dates: Start:  01/08/24         Goal: STG-Within one week, patient will recall transfer sequencing with 75% with min A.       Dates: Start:  01/10/24               Problem: Problem Solving STGs       Dates: Start:  01/06/24         Goal: STG-Within one week, patient will solve basic problems related to safety/sequencing with 75% accuracy provided mod assist.       Dates: Start:  01/06/24         Goal Note filed on 01/10/24 1338 by Dania Nelson MS,CCC-SLP        75% with mod-max A for initiation and sequencing multiple steps.                 Problem: Speech/Swallowing LTGs       Dates: Start:  01/06/24         Goal: LTG-By discharge, patient will solve basic problems related to safety to facilitate safe d/c to a functional living environment.       Dates: Start:  01/06/24

## 2024-01-10 NOTE — PROGRESS NOTES
"  Physical Medicine & Rehabilitation Progress Note    Encounter Date: 1/10/2024    Chief Complaint: Feels wel    Interval Events (Subjective):  VS: SBP into 150's   BM 1/10  Voiding with low PVRs    Patient seen and examined in her room. She feels well. She is tired from therapy, but she is doing ok. No systemic complaints.       ROS: 14 point ROS negative unless otherwise specified in the HPI    Objective:  VITAL SIGNS: /64   Pulse 83   Temp 36.7 °C (98 °F) (Oral)   Resp 18   Ht 1.676 m (5' 6\")   Wt 90 kg (198 lb 8 oz)   SpO2 94%   BMI 32.04 kg/m²     GEN: No apparent distress  HEENT: Head normocephalic, atraumatic.  Sclera nonicteric bilaterally, no ocular discharge appreciated bilaterally.  CV: Extremities warm and well-perfused, no peripheral edema appreciated bilaterally.  PULMONARY: Breathing nonlabored on room air, no respiratory accessory muscle use.  Not requiring supplemental oxygen.  Small cough appreciated.  ABD: Soft, nontender.  SKIN: No appreciable skin breakdown on exposed areas of skin.  PSYCH: Mood and affect within normal limits.  NEURO: Awake alert.  Conversational.  Logical thought content.      Laboratory Values:  Recent Results (from the past 72 hour(s))   Basic Metabolic Panel    Collection Time: 01/10/24  5:44 AM   Result Value Ref Range    Sodium 139 135 - 145 mmol/L    Potassium 3.2 (L) 3.6 - 5.5 mmol/L    Chloride 102 96 - 112 mmol/L    Co2 24 20 - 33 mmol/L    Glucose 102 (H) 65 - 99 mg/dL    Bun 15 8 - 22 mg/dL    Creatinine 0.63 0.50 - 1.40 mg/dL    Calcium 8.4 (L) 8.5 - 10.5 mg/dL    Anion Gap 13.0 7.0 - 16.0   CBC WITH DIFFERENTIAL    Collection Time: 01/10/24  5:44 AM   Result Value Ref Range    WBC 6.8 4.8 - 10.8 K/uL    RBC 4.58 4.20 - 5.40 M/uL    Hemoglobin 14.0 12.0 - 16.0 g/dL    Hematocrit 42.4 37.0 - 47.0 %    MCV 92.6 81.4 - 97.8 fL    MCH 30.6 27.0 - 33.0 pg    MCHC 33.0 32.2 - 35.5 g/dL    RDW 44.7 35.9 - 50.0 fL    Platelet Count 296 164 - 446 K/uL    MPV " 10.3 9.0 - 12.9 fL    Neutrophils-Polys 58.70 44.00 - 72.00 %    Lymphocytes 22.00 22.00 - 41.00 %    Monocytes 12.80 0.00 - 13.40 %    Eosinophils 4.00 0.00 - 6.90 %    Basophils 1.30 0.00 - 1.80 %    Immature Granulocytes 1.20 (H) 0.00 - 0.90 %    Nucleated RBC 0.00 0.00 - 0.20 /100 WBC    Neutrophils (Absolute) 4.00 1.82 - 7.42 K/uL    Lymphs (Absolute) 1.50 1.00 - 4.80 K/uL    Monos (Absolute) 0.87 (H) 0.00 - 0.85 K/uL    Eos (Absolute) 0.27 0.00 - 0.51 K/uL    Baso (Absolute) 0.09 0.00 - 0.12 K/uL    Immature Granulocytes (abs) 0.08 0.00 - 0.11 K/uL    NRBC (Absolute) 0.00 K/uL   ESTIMATED GFR    Collection Time: 01/10/24  5:44 AM   Result Value Ref Range    GFR (CKD-EPI) 85 >60 mL/min/1.73 m 2         Medications:  Scheduled Medications   Medication Dose Frequency    losartan  75 mg Q12HRS    [START ON 1/11/2024] potassium chloride SA  20 mEq DAILY    amLODIPine  5 mg Q DAY    metoprolol SR  12.5 mg DAILY    simethicone  125 mg 4X/DAY WITH MEALS + NIGHTLY    Pharmacy Consult Request  1 Each PHARMACY TO DOSE    senna-docusate  2 Tablet BID    nystatin   TID    apixaban  5 mg BID    famotidine  20 mg Q12HRS    flecainide  100 mg DAILY    traZODone  50 mg QHS    PARoxetine  10 mg DAILY    levothyroxine  75 mcg DAILY    gabapentin  100 mg Q EVENING    vitamin D3  1,000 Units DAILY     PRN medications: Respiratory Therapy Consult, hydrALAZINE, senna-docusate **AND** polyethylene glycol/lytes **AND** magnesium hydroxide **AND** bisacodyl, carboxymethylcellulose, benzocaine-menthol, mag hydrox-al hydrox-simeth, ondansetron **OR** ondansetron, sodium chloride, acetaminophen, guaiFENesin dextromethorphan    Diet:  Current Diet Order   Procedures    Diet Order Diet: Level 6 - Soft and Bite Sized; Liquid level: Level 0 - Thin       Medical Decision Making and Plan:  Community-acquired pneumonia  Flu, RSV, COVID-negative  Respiratory cultures PENDING (unclear if collected at [facility])  Blood cultures collected 1/2/2024 no  growth to date  PT and OT for mobility and ADLs. Per guidelines, 15 hours per week between PT, OT and/or SLP.  Follow-up geriatrician  Continue Augmentin through 1/8 -completed  Continue doxycycline through 1/9-completed     Atrial fibrillation  Permanent pacemaker  Recently referred to cardiology for local establishment (had been in [facility])  Continue Eliquis 5 mg twice daily  Continue flecainide 100 mg daily  Continue with oral 25 mg XL daily--> 12.5 mg 1/8     History of Stroke  Left Hemiparesis  Monitor     Impaired cognition  ?  Encephalopathy secondary to ammonia  SLP to establish baseline     Hypertension  Continue Cozaar 50 mg every 12 hours  Continue Toprol XL 25 mg daily -reduced due to bradycardia 1/8  Norvasc added 1/8 1/9 blood pressure improved with systolic in the 130s, evening of 1/8 had systolic at 168.  Is recently on Norvasc.  Continue to monitor.  1/10 SBP in 150's. Hospitalist following.     Hypokalemia  1/10 3.2, supplemented per hospitalist x1 and is on scheduled potassium     Hypothyroidism  Continue Synthroid 75 mcg daily     Depression  Insomnia  Continue trazodone 50 mg nightly (QTc 454)  Continue Paxil 10 mg daily     Pain  Tylenol as needed  Chronic neuropathy - Gabapentin at night     Skin  Patient at risk for skin breakdown due to debility in areas including sacrum, achilles, elbows and head in addition to other sites. Nursing to assess skin daily.      GI Ppx  Patient on Prilosec for GERD prophylaxis.    1/9 change diet to soft and bite sized as patient having difficulty with regular consistencies due to dentures.  Add Ensure supplement.     Bowel   Patient on Senna-docusate for constipation prophylaxis.      Bladder  TV/PVR/BS PRN      Screening labs 1/10: CBC and BMP - hypokalemia otherwise labs non-concerning  Labs 1/12       DVT PROPHYLAXIS: On Eliquis for atrial fibrillation     HOSPITALIST FOLLOWING: YES - 1/10     CODE STATUS: Reviewed note from geriatrics 11/30/2023 CODE STATUS  DNAR/DNI - d/w patient and family and confirm this code status.     DISPO: Home to [facility]     JEANINE: 1/17/24     ADDITIONAL MEDICAL NEEDS ON D/C (IV abx, O2, etc): To be determined     MEDS SENT TO: TBD     DISCHARGE SPECIALIST FOLLOW UP: Patient to scheduled follow up with their PCP within 2 weeks from discharge from the [facility].      ____________________________________    Dr. Eugenia Ruiz DO, MS  [facility] - Physical Medicine & Rehabilitation   ____________________________________      _____________________________________  Interdisciplinary Team Conference   Most recent IDT on 1/10/2024    I, Dr. Eugenia Ruiz DO, MS, was present and led the interdisciplinary team conference on 1/10/2024.  I led the IDT conference and agree with the IDT conference documentation and plan of care as noted below.     Nursing:  Diet Current Diet Order   Procedures    Diet Order Diet: Level 6 - Soft and Bite Sized; Liquid level: Level 0 - Thin       Eating ADL Supervision  Set-up of equipment or meal/tube feeding   % of Last Meal  Oral Nutrition: *  * Meal *  *, Breakfast, Between 50-75% Consumed   Sleep    Bowel Last BM: 01/10/24   Bladder    No complaints of pain  Incontinent    Physical Therapy:  Bed Mobility    Transfers Maximal Assist  Increased time, Initial preparation for task, Verbal cueing, Set-up of equipment (Tactile cueing)   Mobility Total Assist X 2   Stairs      Occupational Therapy:  Grooming Standby Assist   Bathing Moderate Assist   UB Dressing Moderate Assist   LB Dressing Maximal Assist   Toileting Total Assist   Shower & Transfer      Speech-Language Pathology:  Comprehension:  Moderate Assist  Comprehension Description:  Glasses, Verbal cues  Expression:  Minimal Assist  Expression Description:  Verbal cueing  Social Interaction:  Modified Independent  Social Interaction Description:  Schedule, Verbal cues, Increased time  Problem Solving:  Moderate Assist  Problem Solving  Description:  Bed/chair alarm, Increased time, Seat belt, Supervision, Therapy schedule, Verbal cueing  Memory:  Moderate Assist  Memory Description:  Bed/chair alarm, Increased time, Seat belt, Supervision, Therapy schedule, Verbal cueing    Respiratory Therapy:  O2 (LPM): 1  O2 Delivery Device: None - Room Air    Case Management:  Continues to work on disposition and DME needs.     BARRIERS TO DISCHARGE HOME:  Requires assist  Dependent at baseline  iADLs are taken care of   Needs mod cues for initiation  Slow processing      Discharge Date/Disposition:  1/17/24  _____________________________________    Total time:  54 minutes. Time spent included pre-rounding review of vitals and tests, unit/floor time, face-to-face time with the patient including physical examination, care coordination, counseling of patient and/or family, ordering medications/procedures/tests, discussion with CM, PT, OT, SLP and/or other healthcare providers, and documentation in the electronic medical record.

## 2024-01-10 NOTE — DISCHARGE PLANNING
Case Management/IDT follow up.   IDT continues to recommend IRF level of care as patient continue to make progress with all therapies.   Projected dc date set for 1/17/24.      DC needs:  Recommendations made for home health for PT/OT/SLP  Follow up with: PCP    Met with patient and spoke with her daughter in law by phone to provide update from IDT and discussed plan of care.    Plan:  Continue to follow

## 2024-01-11 ENCOUNTER — APPOINTMENT (OUTPATIENT)
Dept: SPEECH THERAPY | Facility: REHABILITATION | Age: 89
DRG: 194 | End: 2024-01-11
Attending: PHYSICAL MEDICINE & REHABILITATION
Payer: MEDICARE

## 2024-01-11 ENCOUNTER — APPOINTMENT (OUTPATIENT)
Dept: PHYSICAL THERAPY | Facility: REHABILITATION | Age: 89
DRG: 194 | End: 2024-01-11
Attending: PHYSICAL MEDICINE & REHABILITATION
Payer: MEDICARE

## 2024-01-11 ENCOUNTER — APPOINTMENT (OUTPATIENT)
Dept: OCCUPATIONAL THERAPY | Facility: REHABILITATION | Age: 89
DRG: 194 | End: 2024-01-11
Attending: PHYSICAL MEDICINE & REHABILITATION
Payer: MEDICARE

## 2024-01-11 PROCEDURE — 97129 THER IVNTJ 1ST 15 MIN: CPT

## 2024-01-11 PROCEDURE — 97130 THER IVNTJ EA ADDL 15 MIN: CPT

## 2024-01-11 PROCEDURE — 97110 THERAPEUTIC EXERCISES: CPT

## 2024-01-11 PROCEDURE — 97110 THERAPEUTIC EXERCISES: CPT | Mod: CO

## 2024-01-11 PROCEDURE — 700102 HCHG RX REV CODE 250 W/ 637 OVERRIDE(OP): Performed by: HOSPITALIST

## 2024-01-11 PROCEDURE — 99232 SBSQ HOSP IP/OBS MODERATE 35: CPT | Performed by: PHYSICAL MEDICINE & REHABILITATION

## 2024-01-11 PROCEDURE — 97535 SELF CARE MNGMENT TRAINING: CPT | Mod: CO

## 2024-01-11 PROCEDURE — 97530 THERAPEUTIC ACTIVITIES: CPT

## 2024-01-11 PROCEDURE — 99232 SBSQ HOSP IP/OBS MODERATE 35: CPT | Performed by: HOSPITALIST

## 2024-01-11 PROCEDURE — A9270 NON-COVERED ITEM OR SERVICE: HCPCS | Performed by: HOSPITALIST

## 2024-01-11 PROCEDURE — 97112 NEUROMUSCULAR REEDUCATION: CPT

## 2024-01-11 PROCEDURE — A9270 NON-COVERED ITEM OR SERVICE: HCPCS | Performed by: PHYSICAL MEDICINE & REHABILITATION

## 2024-01-11 PROCEDURE — 700102 HCHG RX REV CODE 250 W/ 637 OVERRIDE(OP): Performed by: PHYSICAL MEDICINE & REHABILITATION

## 2024-01-11 PROCEDURE — 770010 HCHG ROOM/CARE - REHAB SEMI PRIVAT*

## 2024-01-11 RX ORDER — POLYETHYLENE GLYCOL 3350 17 G/17G
1 POWDER, FOR SOLUTION ORAL
Status: DISCONTINUED | OUTPATIENT
Start: 2024-01-11 | End: 2024-01-22 | Stop reason: HOSPADM

## 2024-01-11 RX ORDER — AMANTADINE HYDROCHLORIDE 100 MG/1
100 TABLET ORAL 2 TIMES DAILY
Status: DISCONTINUED | OUTPATIENT
Start: 2024-01-11 | End: 2024-01-17

## 2024-01-11 RX ORDER — AMOXICILLIN 250 MG
2 CAPSULE ORAL 2 TIMES DAILY PRN
Status: DISCONTINUED | OUTPATIENT
Start: 2024-01-11 | End: 2024-01-22 | Stop reason: HOSPADM

## 2024-01-11 RX ORDER — LOSARTAN POTASSIUM 50 MG/1
100 TABLET ORAL EVERY 12 HOURS
Status: DISCONTINUED | OUTPATIENT
Start: 2024-01-11 | End: 2024-01-22 | Stop reason: HOSPADM

## 2024-01-11 RX ORDER — BISACODYL 10 MG
10 SUPPOSITORY, RECTAL RECTAL
Status: DISCONTINUED | OUTPATIENT
Start: 2024-01-11 | End: 2024-01-22 | Stop reason: HOSPADM

## 2024-01-11 RX ORDER — LOSARTAN POTASSIUM 25 MG/1
25 TABLET ORAL ONCE
Status: COMPLETED | OUTPATIENT
Start: 2024-01-11 | End: 2024-01-11

## 2024-01-11 RX ADMIN — ACETAMINOPHEN 650 MG: 325 TABLET ORAL at 21:00

## 2024-01-11 RX ADMIN — AMLODIPINE BESYLATE 5 MG: 5 TABLET ORAL at 05:07

## 2024-01-11 RX ADMIN — FLECAINIDE ACETATE 100 MG: 100 TABLET ORAL at 07:52

## 2024-01-11 RX ADMIN — METOPROLOL SUCCINATE 12.5 MG: 25 TABLET, EXTENDED RELEASE ORAL at 05:06

## 2024-01-11 RX ADMIN — AMANTADINE HYDROCHLORIDE 100 MG: 100 TABLET ORAL at 12:00

## 2024-01-11 RX ADMIN — SIMETHICONE 125 MG: 125 TABLET, CHEWABLE ORAL at 17:29

## 2024-01-11 RX ADMIN — APIXABAN 5 MG: 5 TABLET, FILM COATED ORAL at 21:00

## 2024-01-11 RX ADMIN — Medication 1000 UNITS: at 07:51

## 2024-01-11 RX ADMIN — POTASSIUM CHLORIDE 20 MEQ: 1500 TABLET, EXTENDED RELEASE ORAL at 09:36

## 2024-01-11 RX ADMIN — HYDRALAZINE HYDROCHLORIDE 25 MG: 25 TABLET, FILM COATED ORAL at 05:07

## 2024-01-11 RX ADMIN — PAROXETINE HYDROCHLORIDE 10 MG: 20 TABLET, FILM COATED ORAL at 07:51

## 2024-01-11 RX ADMIN — LOSARTAN POTASSIUM 25 MG: 25 TABLET, FILM COATED ORAL at 11:28

## 2024-01-11 RX ADMIN — LOSARTAN POTASSIUM 100 MG: 50 TABLET, FILM COATED ORAL at 17:28

## 2024-01-11 RX ADMIN — APIXABAN 5 MG: 5 TABLET, FILM COATED ORAL at 07:52

## 2024-01-11 RX ADMIN — SIMETHICONE 125 MG: 125 TABLET, CHEWABLE ORAL at 07:51

## 2024-01-11 RX ADMIN — TRAZODONE HYDROCHLORIDE 50 MG: 50 TABLET ORAL at 21:00

## 2024-01-11 RX ADMIN — FAMOTIDINE 20 MG: 20 TABLET ORAL at 07:51

## 2024-01-11 RX ADMIN — SIMETHICONE 125 MG: 125 TABLET, CHEWABLE ORAL at 21:01

## 2024-01-11 RX ADMIN — GABAPENTIN 100 MG: 100 CAPSULE ORAL at 21:00

## 2024-01-11 RX ADMIN — FAMOTIDINE 20 MG: 20 TABLET ORAL at 21:00

## 2024-01-11 RX ADMIN — SENNOSIDES AND DOCUSATE SODIUM 2 TABLET: 50; 8.6 TABLET ORAL at 07:52

## 2024-01-11 RX ADMIN — LOSARTAN POTASSIUM 75 MG: 50 TABLET, FILM COATED ORAL at 05:06

## 2024-01-11 RX ADMIN — SIMETHICONE 125 MG: 125 TABLET, CHEWABLE ORAL at 11:28

## 2024-01-11 RX ADMIN — LEVOTHYROXINE SODIUM 75 MCG: 0.07 TABLET ORAL at 07:52

## 2024-01-11 ASSESSMENT — PAIN DESCRIPTION - PAIN TYPE: TYPE: ACUTE PAIN

## 2024-01-11 ASSESSMENT — ENCOUNTER SYMPTOMS
DIZZINESS: 0
BLURRED VISION: 0
NERVOUS/ANXIOUS: 0
COUGH: 0
FEVER: 0
DIARRHEA: 0

## 2024-01-11 ASSESSMENT — ACTIVITIES OF DAILY LIVING (ADL)
BED_CHAIR_WHEELCHAIR_TRANSFER_DESCRIPTION: INCREASED TIME;INITIAL PREPARATION FOR TASK;VERBAL CUEING
TOILET_TRANSFER_DESCRIPTION: GRAB BAR;INCREASED TIME;INITIAL PREPARATION FOR TASK;VERBAL CUEING

## 2024-01-11 NOTE — PROGRESS NOTES
NURSING DAILY NOTE    Name: Debra Rodrigues   Date of Admission: 1/5/2024   Admitting Diagnosis: Pneumonia due to infectious organism  Attending Physician: Eugenia Ruiz D.o.  Allergies: Other misc, Hydrocodone-acetaminophen, and Sulfa drugs    Safety  Patient Assist  max 1-2  Patient Precautions  Fall Risk  Precaution Comments  Maculary degeneration, hx of CVA  Bed Transfer Status  Maximal Assist  Toilet Transfer Status   Total Assist (2 people incidental steadying, help with pulling pants up)  Assistive Devices  Rails, Wheelchair  Oxygen  None - Room Air  Diet/Therapeutic Dining  Current Diet Order   Procedures    Diet Order Diet: Level 6 - Soft and Bite Sized; Liquid level: Level 0 - Thin     Pill Administration  floated  Agitated Behavioral Scale  14  ABS Level of Severity  No Agitation    Fall Risk  Has the patient had a fall this admission?   No  Rubina Echavarria Fall Risk Scoring  21, HIGH RISK  Fall Risk Safety Measures  bed alarm, chair alarm, poor balance, and low vision/ hearing    Vitals  Temperature: 36.7 °C (98 °F)  Temp src: Oral  Pulse: 83  Respiration: 18  Blood Pressure : 126/64  Blood Pressure MAP (Calculated): 85 MM HG  BP Location: Right, Upper Arm  Patient BP Position: Sitting     Oxygen  Pulse Oximetry: 94 %  O2 (LPM): 1  O2 Delivery Device: None - Room Air    Bowel and Bladder  Last Bowel Movement  01/10/24  Stool Type  Type 6: Fluffy pieces with ragged edges, a mushy stool  Bowel Device  Bathroom, Diaper  Continent  Bladder: Did not void   Bowel: No movement  Bladder Function  Urine Void (mL):  (large inc,)  Number of Times Voided: 1  Urine Color: Yellow  Number of Times Incontinent of Urine: 1  Wet Diaper Count: 1  Genitourinary Assessment   Bladder Assessment (WDL):  WDL Except  Cortes Catheter: Not Applicable  Urinary Elimination: Incontinence  Urine Color: Yellow  Number of Bladder Accidents: 1  Total Number of Bladder of  Accidents in Last 7 Days: 1  Number of Times Incontinent of Urine: 1  Bladder Device: Bathroom, Diaper  Time Void: No  Bladder Scan: Post Void  $ Bladder Scan Results (mL): 58    Skin  Larry Score   16  Sensory Interventions   Bed Types: Standard/Trauma Mattress  Skin Preventative Measures: Pillows in Use for Support / Positioning  Moisture Interventions  Moisturizers/Barriers: Barrier Wipes      Pain  Pain Rating Scale  0 - No Pain  Pain Location  Generalized  Pain Location Orientation     Pain Interventions   Declines    ADLs    Bathing      Linen Change      Personal Hygiene  Perineal Care, Moist Mamie Wipes  Chlorhexidine Bath      Oral Care  Brushed Teeth  Teeth/Dentures     Shave     Nutrition Percentage Eaten  Lunch, Between 25-50% Consumed  Environmental Precautions     Patient Turns/Positioning  Patient Turns Self from Side to Side  Patient Turns Assistance/Tolerance     Bed Positions  Bed Controls On  Head of Bed Elevated  Self regulated      Psychosocial/Neurologic Assessment  Psychosocial Assessment  Psychosocial (WDL):  WDL Except  Patient Behaviors: Fatigue, Uncooperative  Neurologic Assessment  Neuro (WDL): Exceptions to WDL  Level of Consciousness: Alert  Orientation Level: Oriented to place, Oriented to person, Oriented to situation  Cognition: Follows commands  Speech: Clear  Motor Function/Sensation Assessment: Sensation  RUE Sensation: Full sensation  LUE Sensation: Full sensation  RLE Sensation: Numbness, Tingling  LLE Sensation: Numbness, Tingling  EENT (WDL):  WDL Except    Cardio/Pulmonary Assessment  Edema   RLE Edema: Generalized  LLE Edema: Generalized  Respiratory Breath Sounds  RUL Breath Sounds: Clear  RML Breath Sounds: Clear  RLL Breath Sounds: Diminished  ANGÉLICA Breath Sounds: Clear  LLL Breath Sounds: Diminished  Cardiac Assessment   Cardiac (WDL):  WDL Except (hx htn, pacer, afib)

## 2024-01-11 NOTE — CARE PLAN
Problem: Fall Risk - Rehab  Goal: Patient will remain free from falls  Note: Pt uses call light consistently and appropriately. Waits for assistance does not attempt self transfer this shift. Able to verbalize needs.Will continue to monitor.     Problem: Skin Integrity  Goal: Skin integrity is maintained or improved  Note: Patient's skin is maintained and free from new or accidental injury this shift.  Will continue to monitor.   The patient is Stable - Low risk of patient condition declining or worsening    Shift Goals  Clinical Goals: Safety  Patient Goals: sleep well

## 2024-01-11 NOTE — PROGRESS NOTES
NURSING DAILY NOTE    Name: Debra Rodrigues   Date of Admission: 1/5/2024   Admitting Diagnosis: Pneumonia due to infectious organism  Attending Physician: Eugenia Ruiz D.o.  Allergies: Other misc, Hydrocodone-acetaminophen, and Sulfa drugs    Safety  Patient Assist  max 1-2  Patient Precautions  Fall Risk  Precaution Comments  Maculary degeneration, hx of CVA  Bed Transfer Status  Moderate Assist  Toilet Transfer Status   Total Assist  Assistive Devices  Rails, Wheelchair  Oxygen  None - Room Air  Diet/Therapeutic Dining  Current Diet Order   Procedures    Diet Order Diet: Level 6 - Soft and Bite Sized; Liquid level: Level 0 - Thin     Pill Administration  floated  Agitated Behavioral Scale  14  ABS Level of Severity  No Agitation    Fall Risk  Has the patient had a fall this admission?   No  Rubina Echavarria Fall Risk Scoring  26, HIGH RISK  Fall Risk Safety Measures  bed alarm, chair alarm, poor balance, and low vision/ hearing    Vitals  Temperature: 36.7 °C (98.1 °F)  Temp src: Oral  Pulse: 65  Respiration: 17  Blood Pressure : (!) 145/2  Blood Pressure MAP (Calculated): 50 MM HG  BP Location: Right, Upper Arm  Patient BP Position: Sitting     Oxygen  Pulse Oximetry: 94 %  O2 (LPM): 0  O2 Delivery Device: None - Room Air    Bowel and Bladder  Last Bowel Movement  01/11/24  Stool Type  Type 7: Watery, no solid pieces-entirely liquid  Bowel Device  Bathroom, Diaper  Continent  Bladder: Did not void   Bowel: No movement  Bladder Function  Urine Void (mL):  (large inc,)  Number of Times Voided: 1  Urine Color: Unable To Evaluate  Urine Clarity: Unable to Evaluate  Number of Times Incontinent of Urine: 1  Wet Diaper Count: 1  Genitourinary Assessment   Bladder Assessment (WDL):  WDL Except  Cortes Catheter: Not Applicable  Urinary Elimination: Incontinence  Urine Color: Unable To Evaluate  Urine Clarity: Unable to Evaluate  Number of Bladder Accidents:  1  Total Number of Bladder of Accidents in Last 7 Days: 1  Number of Times Incontinent of Urine: 1  Bladder Device: Bathroom, Diaper  Time Void: No  Bladder Scan: Post Void  $ Bladder Scan Results (mL): 1 (0 scan)    Skin  Larry Score   16  Sensory Interventions   Bed Types: Standard/Trauma Mattress  Skin Preventative Measures: Pillows in Use for Support / Positioning  Moisture Interventions  Moisturizers/Barriers: Barrier Wipes      Pain  Pain Rating Scale  0 - No Pain  Pain Location  Generalized  Pain Location Orientation     Pain Interventions   Declines    ADLs    Bathing      Linen Change      Personal Hygiene  Perineal Care, Moist Mamie Wipes  Chlorhexidine Bath      Oral Care  Brushed Teeth  Teeth/Dentures     Shave     Nutrition Percentage Eaten  Lunch, Less than 25% Consumed  Environmental Precautions     Patient Turns/Positioning  Patient Turns Self from Side to Side  Patient Turns Assistance/Tolerance     Bed Positions  Bed Controls On  Head of Bed Elevated  Self regulated      Psychosocial/Neurologic Assessment  Psychosocial Assessment  Psychosocial (WDL):  WDL Except  Patient Behaviors: Fatigue, Forgetful  Neurologic Assessment  Neuro (WDL): Exceptions to WDL  Level of Consciousness: Alert  Orientation Level: Oriented to place, Oriented to person, Oriented to situation  Cognition: Follows commands  Speech: Clear  Motor Function/Sensation Assessment: Sensation  RUE Sensation: Full sensation  LUE Sensation: Full sensation  RLE Sensation: Numbness, Tingling  LLE Sensation: Numbness, Tingling  EENT (WDL):  WDL Except    Cardio/Pulmonary Assessment  Edema   RLE Edema: Generalized  LLE Edema: Generalized  Respiratory Breath Sounds  RUL Breath Sounds: Clear  RML Breath Sounds: Clear  RLL Breath Sounds: Diminished  ANGÉLICA Breath Sounds: Clear  LLL Breath Sounds: Diminished  Cardiac Assessment   Cardiac (WDL):  WDL Except (hx htn, pacer, afib)

## 2024-01-11 NOTE — PROGRESS NOTES
Hospital Medicine Daily Progress Note      Chief Complaint  Hypertension  Afib    Interval Problem Update  Discussed about her BP still elevated and have increased her Cozaar dose again.    Review of Systems  Review of Systems   Constitutional:  Negative for fever.   Eyes:  Negative for blurred vision.   Respiratory:  Negative for cough.    Cardiovascular:  Negative for chest pain.   Gastrointestinal:  Negative for diarrhea.   Musculoskeletal:  Negative for joint pain.   Neurological:  Negative for dizziness.   Psychiatric/Behavioral:  The patient is not nervous/anxious.         Physical Exam  Temp:  [36.6 °C (97.8 °F)-36.9 °C (98.4 °F)] 36.6 °C (97.8 °F)  Pulse:  [65-83] 67  Resp:  [18] 18  BP: (126-182)/(62-86) 166/75  SpO2:  [90 %-96 %] 90 %    Physical Exam  Vitals and nursing note reviewed.   Constitutional:       Appearance: She is not diaphoretic.   HENT:      Mouth/Throat:      Pharynx: No oropharyngeal exudate or posterior oropharyngeal erythema.   Eyes:      Extraocular Movements: Extraocular movements intact.   Neck:      Vascular: No carotid bruit or JVD.   Cardiovascular:      Rate and Rhythm: Normal rate and regular rhythm.      Heart sounds: Normal heart sounds. No murmur heard.  Pulmonary:      Effort: Pulmonary effort is normal.      Breath sounds: Normal breath sounds. No stridor.   Abdominal:      General: Bowel sounds are normal.      Palpations: Abdomen is soft.   Musculoskeletal:      Right lower leg: No edema.      Left lower leg: No edema.   Skin:     General: Skin is warm and dry.      Findings: No rash.   Neurological:      Mental Status: She is alert and oriented to person, place, and time.   Psychiatric:         Mood and Affect: Mood normal.         Behavior: Behavior normal.         Fluids    Intake/Output Summary (Last 24 hours) at 1/11/2024 0900  Last data filed at 1/11/2024 0848  Gross per 24 hour   Intake 480 ml   Output --   Net 480 ml         Laboratory  Recent Labs      01/10/24  0544   WBC 6.8   RBC 4.58   HEMOGLOBIN 14.0   HEMATOCRIT 42.4   MCV 92.6   MCH 30.6   MCHC 33.0   RDW 44.7   PLATELETCT 296   MPV 10.3       Recent Labs     01/10/24  0544   SODIUM 139   POTASSIUM 3.2*   CHLORIDE 102   CO2 24   GLUCOSE 102*   BUN 15   CREATININE 0.63   CALCIUM 8.4*                     Assessment/Plan  Hypokalemia  Assessment & Plan  K+: 3.2   S/P KCL 40 meq x 1  Cont KCL: 20 meq daily (1/11)  2nd to diminished appetite  Cont to monitor    Abdominal distension  Assessment & Plan  AXR: shows gaseous colonic distension  Cont Simethicone    Hypoxia- (present on admission)  Assessment & Plan  Resolved (from [facility])  S/P IV Lasix at [facility] for pulm edema  S/P Augmentin & Doxy for PNA    Acquired hypothyroidism- (present on admission)  Assessment & Plan  Cont Synthroid    MDD (major depressive disorder), recurrent, in partial remission (HCC)- (present on admission)  Assessment & Plan  Cont Paxil    Primary hypertension- (present on admission)  Assessment & Plan  BP labile and elevated  Cont Toprol XL   Cont Cozaar --> will increase dose again (1/11)  Cont Norvasc  Cont to monitor    Paroxysmal atrial fibrillation (HCC)- (present on admission)  Assessment & Plan  HR ok  S/P PPM  Cont Tambacor  Cont Eliquis  Cont to monitor

## 2024-01-11 NOTE — THERAPY
"Physical Therapy   Daily Treatment     Patient Name: Debra Rodrigues  Age:  88 y.o., Sex:  female  Medical Record #: [id number]  Today's Date: 1/11/2024     Precautions  Precautions: Fall Risk  Comments: Maculary degeneration, hx of CVA    Subjective    Pt. Was seen for 2 sessions today ([id number] and [id number]).  AM- seated in her w/c and agreeable to the session  Next session- seated in her w/c and agreeable to the treatment session. \"I am tired\". Discussed with the pt about her goal and PLOF. She expressed that she wanted to be able to get in/out of her bed. She stated that she had assistance for that activity at her prior facility and that her bed had grab rails. Also, she said that she uses w/c for mobility and had assistance with toilet transfers.      Objective       01/11/24 0831 01/11/24 1001   Precautions   Precautions Fall Risk Fall Risk   Comments Maculary degeneration, hx of CVA Maculary degeneration, hx of CVA   Vitals   Pulse 67  --    Patient BP Position Sitting  --    Blood Pressure  (!) 141/62  --    Transfer Functional Level of Assist   Bed, Chair, Wheelchair Transfer Maximal Assist Moderate Assist   Bed Chair Wheelchair Transfer Description Increased time;Initial preparation for task;Verbal cueing;Adaptive equipment  (tactile cueing, verbal and tactile cueing for sequencing, initiation) Increased time;Initial preparation for task;Verbal cueing; low stand pivot with chair and bed rails, Transferred to and from R side of the bed  (verbal and tactile cueing for sequencing task, use of bed rails, complete chair to bed x 2, bed to chair x 2)   Toilet Transfers Total Assist  --    Toilet Transfer Description Grab bar;Increased time;Initial preparation for task;Verbal cueing  (verbal cueing for sequencing and initiation of task, Max A for 1 person; 2nd person for incidental steadying, pant management, and safety)  --    Sitting Lower Body Exercises   Sitting Lower Body Exercises Yes Yes   Ankle Pumps 1 " set of 10;Bilateral 2 sets of 10;Bilateral   Long Arc Quad 1 set of 10;Bilateral 2 sets of 10;Bilateral   Marching 1 set of 10;Reciprocal 2 sets of 10;Reciprocal   Hamstring Curl 1 set of 10;Bilateral;Light Resistance Theraband 2 sets of 10;Bilateral;Light Resistance Theraband   Bed Mobility    Sit to Supine  --  Maximal Assist  (assist to get both LE into the bed, verbal and tactile cues for sequencing, increased time, set up, use of bed rails)   Neuro-Muscular Treatments   Neuro-Muscular Treatments  --  Anterior weight shift;Postural Changes;Postural Facilitation;Sequencing;Tactile Cuing;Verbal Cuing;Weight Shift Right;Weight Shift Left   Comments  --  unsupoorrted sitting on the bed with cross body reaches to the L with varying targets 2x10 to work on seated dynamic balance; seated scapular retraction 2x10 to work on postural musculature   Interdisciplinary Plan of Care Collaboration   IDT Collaboration with  Certified Nursing Assistant;Speech Therapist  --    Patient Position at End of Therapy Seated;Other (Comments)  (handed off to speech and CNA after toliet transfer) Seated;Chair Alarm On;Call Light within Reach;Tray Table within Reach  (seated in w/c)   Physical Therapist Assigned   Assigned PT / Treatment Time / Comments Roxann/Anthonie. TechA if no student. No 30 min treat and no 0830; 60 okay. Needs breaks after 60 min. Roxann/Anthonie. TechA if no student. No 30 min treat and no 0830; 60 okay. Needs breaks after 60 min.     Earlier session: seated calf stretch x 1 min, in weight bearing position; seated in w/c self active DF mobility 2x10  Later session: Bed mobility- increased time, verbal and tactile cueing for keeping knees bent for scooting and supine to sit, verbal and tactile cues for sequencing, assist given to B LE, Assist at trunk and LE during the pushoff phase of supine to sit  Educated patient: performing R and L weight shifts to assist with limb progression, performing bed mobility and  transfers, and purpose of performing strength and stretching exercise to improve her ability to perform transfers.     Assessment    Pt improves to Mod A for w/c to bed transfer if the environment is set up, and she needs constant verbal and tactile cues 90%  of the time to complete the activity. She can verbalize a 1-2 steps to bed mobility and STS. She presents with a forward body posture (Forward head, Increased kyphosis, and seated in posterior pelvic tilt) and responded well to postural exercise with a decrease in pain in her mid back. She is able to shift her weight from R<>L  during sitting but has the forward posture which limits her reaching abilities. Pt responds well to listening to her music during the session and taking long recovery breaks after activities (2 mins). Further discussion with the pt's previous care facility to further clarify the pt's PLOF.      Strengths: Able to follow instructions, Manages pain appropriately, Motivated for self care and independence, Pleasant and cooperative, Willingly participates in therapeutic activities  Barriers: Decreased endurance, Confused, Fatigue, Generalized weakness, Impaired activity tolerance, Impaired balance, Limited mobility    Plan    Continue seated DF stretches (in a w/c or with gait belt)    Continue seated LE exercises (gastroc, quads, hip flexors, hamstrings)   Work on postural muscle (sacp retractions)  STS from an elevated table   Standing R and L weight shifts with targets   Standing marches onto a step in //    FWW with targets for each step during walking    DME  PT DME Recommendations  Wheelchair:  (TBD)  Assistive Device:  (TBD)    Passport items to be completed:  Get in/out of bed safely, in/out of a vehicle, safely use mobility device, walk or wheel around home/community, navigate up and down stairs, show how to get up/down from the ground, ensure home is accessible, demonstrate HEP, complete caregiver training    Physical Therapy  Problems (Active)       Problem: Mobility       Dates: Start:  01/10/24         Goal: STG-Within one week, patient will ambulate x 10' with FWW and Mod A        Dates: Start:  01/10/24         Goal Note filed on 01/10/24 1247 by Claudia Cherry, Student       Pt ambulated 10' with FWW and Max A but required breaks to complete the activity                  Problem: Mobility Transfers       Dates: Start:  01/06/24         Goal: STG-Within one week, patient will perform bed mobility with mod A       Dates: Start:  01/06/24         Goal Note filed on 01/10/24 1247 by Claudia Cherry, Student       Pt was Max A and required verbal and tactile cueing for sequencing and initiating task                 Problem: PT-Long Term Goals       Dates: Start:  01/06/24         Goal: LTG-By discharge, patient will propel wheelchair x 150 feet with SPV       Dates: Start:  01/06/24            Goal: LTG-By discharge, patient will ambulate x 30 feet with FWW and CGA       Dates: Start:  01/06/24            Goal: LTG-By discharge, patient will transfer one surface to another with FWW and CGA       Dates: Start:  01/06/24            Goal: LTG-By discharge, patient will transfer in/out of a car with FWW and CGA       Dates: Start:  01/06/24

## 2024-01-11 NOTE — CARE PLAN
The patient is Stable - Low risk of patient condition declining or worsening    Shift Goals  Clinical Goals: safety  Patient Goals: sleep well    Progress made toward(s) clinical / shift goals:    Problem: Fall Risk - Rehab  Goal: Patient will remain free from falls  Outcome: Progressing. Patient bed in lowest locked position with bed alarm on and call light within reach. Patient calls appropriately with call light for needs. Patient does not attempt to transfer by self over shift.      Problem: Pain - Standard  Goal: Alleviation of pain or a reduction in pain to the patient’s comfort goal  Outcome: Progressing. Patient denies having pain over shift. Patient has tylenol available for pain if pain arises, declines over shift.

## 2024-01-11 NOTE — PROGRESS NOTES
"  Physical Medicine & Rehabilitation Progress Note    Encounter Date: 1/11/2024    Chief Complaint: Feels fine    Interval Events (Subjective):  Vital signs: Blood pressure elevated into the 150s-160s  Last bowel movement 1/11-liquid  Voiding volitionally    Patient seen and examined in her room working with therapy.  She states that she is feeling okay.  She is okay with trying a stimulant to see if it will help her attention and motivation.    ROS: 14 point ROS negative unless otherwise specified in the HPI    Objective:  VITAL SIGNS: BP (!) 145/2   Pulse 65   Temp 36.7 °C (98.1 °F)   Resp 17   Ht 1.676 m (5' 6\")   Wt 90 kg (198 lb 8 oz)   SpO2 94%   BMI 32.04 kg/m²     GEN: No apparent distress  HEENT: Head normocephalic, atraumatic.  Sclera nonicteric bilaterally, no ocular discharge appreciated bilaterally.  CV: Extremities warm and well-perfused, no peripheral edema appreciated bilaterally.  PULMONARY: Breathing nonlabored on room air, no respiratory accessory muscle use.  Not requiring supplemental oxygen.  Small cough appreciated.  ABD: Soft, nontender.  SKIN: No appreciable skin breakdown on exposed areas of skin.  PSYCH: Mood and affect within normal limits.  NEURO: Awake alert.  Conversational.  Logical thought content.      Laboratory Values:  Recent Results (from the past 72 hour(s))   Basic Metabolic Panel    Collection Time: 01/10/24  5:44 AM   Result Value Ref Range    Sodium 139 135 - 145 mmol/L    Potassium 3.2 (L) 3.6 - 5.5 mmol/L    Chloride 102 96 - 112 mmol/L    Co2 24 20 - 33 mmol/L    Glucose 102 (H) 65 - 99 mg/dL    Bun 15 8 - 22 mg/dL    Creatinine 0.63 0.50 - 1.40 mg/dL    Calcium 8.4 (L) 8.5 - 10.5 mg/dL    Anion Gap 13.0 7.0 - 16.0   CBC WITH DIFFERENTIAL    Collection Time: 01/10/24  5:44 AM   Result Value Ref Range    WBC 6.8 4.8 - 10.8 K/uL    RBC 4.58 4.20 - 5.40 M/uL    Hemoglobin 14.0 12.0 - 16.0 g/dL    Hematocrit 42.4 37.0 - 47.0 %    MCV 92.6 81.4 - 97.8 fL    MCH 30.6 " 27.0 - 33.0 pg    MCHC 33.0 32.2 - 35.5 g/dL    RDW 44.7 35.9 - 50.0 fL    Platelet Count 296 164 - 446 K/uL    MPV 10.3 9.0 - 12.9 fL    Neutrophils-Polys 58.70 44.00 - 72.00 %    Lymphocytes 22.00 22.00 - 41.00 %    Monocytes 12.80 0.00 - 13.40 %    Eosinophils 4.00 0.00 - 6.90 %    Basophils 1.30 0.00 - 1.80 %    Immature Granulocytes 1.20 (H) 0.00 - 0.90 %    Nucleated RBC 0.00 0.00 - 0.20 /100 WBC    Neutrophils (Absolute) 4.00 1.82 - 7.42 K/uL    Lymphs (Absolute) 1.50 1.00 - 4.80 K/uL    Monos (Absolute) 0.87 (H) 0.00 - 0.85 K/uL    Eos (Absolute) 0.27 0.00 - 0.51 K/uL    Baso (Absolute) 0.09 0.00 - 0.12 K/uL    Immature Granulocytes (abs) 0.08 0.00 - 0.11 K/uL    NRBC (Absolute) 0.00 K/uL   ESTIMATED GFR    Collection Time: 01/10/24  5:44 AM   Result Value Ref Range    GFR (CKD-EPI) 85 >60 mL/min/1.73 m 2         Medications:  Scheduled Medications   Medication Dose Frequency    losartan  100 mg Q12HRS    amantadine  100 mg BID    potassium chloride SA  20 mEq DAILY    amLODIPine  5 mg Q DAY    metoprolol SR  12.5 mg DAILY    simethicone  125 mg 4X/DAY WITH MEALS + NIGHTLY    Pharmacy Consult Request  1 Each PHARMACY TO DOSE    senna-docusate  2 Tablet BID    apixaban  5 mg BID    famotidine  20 mg Q12HRS    flecainide  100 mg DAILY    traZODone  50 mg QHS    PARoxetine  10 mg DAILY    levothyroxine  75 mcg DAILY    gabapentin  100 mg Q EVENING    vitamin D3  1,000 Units DAILY     PRN medications: Respiratory Therapy Consult, hydrALAZINE, senna-docusate **AND** polyethylene glycol/lytes **AND** magnesium hydroxide **AND** bisacodyl, carboxymethylcellulose, benzocaine-menthol, mag hydrox-al hydrox-simeth, ondansetron **OR** ondansetron, sodium chloride, acetaminophen, guaiFENesin dextromethorphan    Diet:  Current Diet Order   Procedures    Diet Order Diet: Level 6 - Soft and Bite Sized; Liquid level: Level 0 - Thin       Medical Decision Making and Plan:  Community-acquired pneumonia  Flu, RSV,  COVID-negative  Respiratory cultures PENDING (unclear if collected at [facility])  Blood cultures collected 1/2/2024 no growth to date  PT and OT for mobility and ADLs. Per guidelines, 15 hours per week between PT, OT and/or SLP.  Follow-up geriatrician  Continue Augmentin through 1/8 -completed  Continue doxycycline through 1/9-completed     Atrial fibrillation  Permanent pacemaker  Recently referred to cardiology for local establishment (had been in [facility])  Continue Eliquis 5 mg twice daily  Continue flecainide 100 mg daily  Continue with oral 25 mg XL daily--> 12.5 mg 1/8     History of Stroke  Left Hemiparesis  Monitor     Impaired cognition  Poor motivation/attention, increased fatigue  ?  Encephalopathy secondary to ammonia  SLP to establish baseline  1/11 trial amantadine twice daily, avoid other stimulants due to elevated blood pressure     Hypertension  Continue Cozaar 50 mg every 12 hours--> 100 mg every 12 hours 1/11   Continue Toprol XL 25 mg daily -reduced to 12.5 mg daily due to bradycardia 1/8  Norvasc 5 mg daily added 1/8    Hypokalemia  1/10 3.2, supplemented per hospitalist x1 and is on scheduled potassium  1/11 continue scheduled potassium     Hypothyroidism  Continue Synthroid 75 mcg daily     Depression  Insomnia  Continue trazodone 50 mg nightly (QTc 454)  Continue Paxil 10 mg daily     Pain  Tylenol as needed  Chronic neuropathy - Gabapentin at night     Skin  Patient at risk for skin breakdown due to debility in areas including sacrum, achilles, elbows and head in addition to other sites. Nursing to assess skin daily.      GI Ppx  Patient on Prilosec for GERD prophylaxis.    1/9 change diet to soft and bite sized as patient having difficulty with regular consistencies due to dentures.  Add Ensure supplement.  1/10 consulted dietary to improve nutrition     Bowel   Patient on Senna-docusate for constipation prophylaxis.      Bladder  TV/PVR/BS PRN    Labs 1/12       DVT PROPHYLAXIS: On Eliquis for  atrial fibrillation     HOSPITALIST FOLLOWING: YES - 1/11     CODE STATUS: Reviewed note from geriatrics 11/30/2023 CODE STATUS DNAR/DNI - d/w patient and family and confirm this code status.     DISPO: Home to [facility].  Need to better understand baseline and goals of therapy.  Patient had a lot of assist at her assisted living facility at baseline.     JEANINE: 1/17/24     ADDITIONAL MEDICAL NEEDS ON D/C (IV abx, O2, etc): To be determined     MEDS SENT TO: TBD     DISCHARGE SPECIALIST FOLLOW UP: Patient to scheduled follow up with their PCP within 2 weeks from discharge from the [facility].      ____________________________________    Dr. Eugenia Ruiz DO, MS  [facility] - Physical Medicine & Rehabilitation   ____________________________________

## 2024-01-11 NOTE — THERAPY
Speech Language Pathology  Daily Treatment     Patient Name: Debra Rodrigues  Age:  88 y.o., Sex:  female  Medical Record #: [id number]  Today's Date: 1/11/2024     Precautions  Precautions: Fall Risk  Comments: Maculary degeneration, hx of CVA    Subjective    Patient agreeable to therapy.     Objective       01/11/24 0901   Treatment Charges   SLP Cognitive Skill Development First 15 Minutes 1   SLP Cognitive Skill Development Additional 15 Minutes 1   SLP Total Time Spent   SLP Individual Total Time Spent (Mins) 30   Cognition   Simple Attention Minimal (4)   Prospective Memory Minimal (4)   Functional Memory Activities Moderate (3)         Assessment    Patient able to recall 3/5 unrelated words from training yesterday and recalled 5/5 with additional review and 15 min delay.  Patient had just worked with PT.  She was asked what she had done in that therapy and she reported that she didn't remember.  With min-mod prompting, she was able to recall some of the details from previous session.  She demonstrates low initiation to attend.  Educated patient on the importance of being alert to those skills she is being reminded to change in PT because these changes will help her get better function out of the strength she has.   Patient acknowledged that she needs to be more engaged with her therapies.    Strengths: Alert and oriented, Effective communication skills, Supportive family, Willingly participates in therapeutic activities  Barriers: Generalized weakness, Impaired carryover of learning, Impaired functional cognition, Visual impairment (Low initiation and slow to process.)    Plan    Target attention in the context of written instructions, recall of safety sequencing and new training.    Passport items to be completed:  Express basic needs, understand food/liquid recommendations, consistently follow swallow precautions, manage finances, manage medications, arrive to therapy appointments on time, complete daily  memory log entries, solve problems related to safety situations, review education related to hospitalization, complete caregiver training     Speech Therapy Problems (Active)       Problem: Comprehension STGs       Dates: Start:  01/06/24         Goal: STG-Within one week, patient will comprehend both auditory and written instructions with 75% accuracy provided mod assist.       Dates: Start:  01/06/24         Goal Note filed on 01/10/24 1338 by Dania Nelson MS,CCC-SLP       To be addressed.                  Problem: Memory STGs       Dates: Start:  01/08/24         Goal: STG-Within one week, patient will recall transfer sequencing with 75% with min A.       Dates: Start:  01/10/24               Problem: Problem Solving STGs       Dates: Start:  01/06/24         Goal: STG-Within one week, patient will solve basic problems related to safety/sequencing with 75% accuracy provided mod assist.       Dates: Start:  01/06/24         Goal Note filed on 01/10/24 1338 by Dania Nelson MS,CCC-SLP       75% with mod-max A for initiation and sequencing multiple steps.                 Problem: Speech/Swallowing LTGs       Dates: Start:  01/06/24         Goal: LTG-By discharge, patient will solve basic problems related to safety to facilitate safe d/c to a functional living environment.       Dates: Start:  01/06/24

## 2024-01-11 NOTE — PROGRESS NOTES
NURSING DAILY NOTE    Name: Debra Rodrigues  Date of Admission: 1/5/2024  Admitting Diagnosis: Pneumonia due to infectious organism  Attending Physician: Eugenia Ruiz D.o.  Allergies: Other misc, Hydrocodone-acetaminophen, and Sulfa drugs    Safety  Patient Assist  omax 1-2  Patient Precautions  oFall Risk  Precaution Comments  oMaculary degeneration, hx of CVA  Bed Transfer Status  oTotal Assist X 2  Toilet Transfer Status  oTotal Assist (2 people incidental steadying, help with pulling pants up)  Assistive Devices  oRails, Wheelchair  Oxygen  oNone - Room Air  Diet/Therapeutic Dining  o  Current Diet Order   Procedures    Diet Order Diet: Level 6 - Soft and Bite Sized; Liquid level: Level 0 - Thin     Pill Administration  owhole, floated, and one at a time   Agitated Behavioral Scale  o14  ABS Level of Severity  Colleen Agitation    Fall Risk  Has the patient had a fall this admission?  Colleen  Rubina Echavarria Fall Risk Scoring  o26, HIGH RISK  Fall Risk Safety Measures  jacquelin alarm, chair alarm, and seatbelt alarm    Vitals  Temperature: 36.6 °C (97.8 °F)  Temp src: Oral  Pulse: 65  Respiration: 18  Blood Pressure : (!) 182/73  Blood Pressure MAP (Calculated): 109 MM HG  BP Location: Right, Upper Arm  Patient BP Position: Supine    Oxygen  Pulse Oximetry: 90 %  O2 (LPM): 1  O2 Delivery Device: None - Room Air    Bowel and Bladder  Last Bowel Movement  o01/10/24  Stool Type  oType 6: Fluffy pieces with ragged edges, a mushy stool  Bowel Device  oBathroom, Diaper  Continent  oBladder: Did not void  oBowel: No movement  Bladder Function  oUrine Void (mL):  (large inc,)  Number of Times Voided: 1  Urine Color: Yellow  Number of Times Incontinent of Urine: 1  Wet Diaper Count: 1  Genitourinary Assessment  oBladder Assessment (WDL):  WDL Except  Cortes Catheter: Not Applicable  Urinary Elimination: Incontinence  Urine Color: Yellow  Number of Bladder Accidents: 1  Total Number of Bladder of Accidents in Last 7 Days:  1  Number of Times Incontinent of Urine: 1  Bladder Device: Bathroom, Diaper  Time Void: No  Bladder Scan: Post Void  $ Bladder Scan Results (mL): 1 (0 scan)    Skin  Larry Score  o 16  Sensory Interventions  o Bed Types: Standard/Trauma Mattress  Skin Preventative Measures: Pillows in Use for Support / Positioning, Waffle Overlay  Moisture Interventions  oMoisturizers/Barriers: Barrier Wipes      Pain  Pain Rating Scale  o0 - No Pain  Pain Location  oGeneralized  Pain Location Orientation  o   Pain Interventions  oDeclines    ADLs    Bathing  o   Linen Change  o   Personal Hygiene  oPerineal Care, Moist Mamie Wipes  Chlorhexidine Bath  o   Oral Care  oBrushed Teeth  Teeth/Dentures  o   Shave  o   Nutrition Percentage Eaten  oLunch, Between 25-50% Consumed  Environmental Precautions  o   Patient Turns/Positioning  oPatient Turns Self from Side to Side  Patient Turns Assistance/Tolerance  o   Bed Positions  lAexus Controls On  Head of Bed Elevated  oSelf regulated      Psychosocial/Neurologic Assessment  Psychosocial Assessment  oPsychosocial (WDL):  WDL Except  Patient Behaviors: Fatigue, Forgetful  Neurologic Assessment  oNeuro (WDL): Exceptions to WDL  Level of Consciousness: Alert  Orientation Level: Oriented to place, Oriented to person, Oriented to situation  Cognition: Follows commands  Speech: Clear  Motor Function/Sensation Assessment: Sensation  RUE Sensation: Full sensation  LUE Sensation: Full sensation  RLE Sensation: Numbness, Tingling  LLE Sensation: Numbness, Tingling  oEENT (WDL):  WDL Except    Cardio/Pulmonary Assessment  Edema  oRLE Edema: Generalized  LLE Edema: Generalized  Respiratory Breath Sounds  oRUL Breath Sounds: Clear  RML Breath Sounds: Clear  RLL Breath Sounds: Diminished  ANGÉLICA Breath Sounds: Clear  LLL Breath Sounds: Diminished  Cardiac Assessment  oCardiac (WDL):  WDL Except

## 2024-01-12 ENCOUNTER — APPOINTMENT (OUTPATIENT)
Dept: OCCUPATIONAL THERAPY | Facility: REHABILITATION | Age: 89
DRG: 194 | End: 2024-01-12
Attending: PHYSICAL MEDICINE & REHABILITATION
Payer: MEDICARE

## 2024-01-12 ENCOUNTER — APPOINTMENT (OUTPATIENT)
Dept: PHYSICAL THERAPY | Facility: REHABILITATION | Age: 89
DRG: 194 | End: 2024-01-12
Attending: PHYSICAL MEDICINE & REHABILITATION
Payer: MEDICARE

## 2024-01-12 LAB
ANION GAP SERPL CALC-SCNC: 10 MMOL/L (ref 7–16)
BASOPHILS # BLD AUTO: 1.2 % (ref 0–1.8)
BASOPHILS # BLD: 0.08 K/UL (ref 0–0.12)
BUN SERPL-MCNC: 15 MG/DL (ref 8–22)
CALCIUM SERPL-MCNC: 8.2 MG/DL (ref 8.5–10.5)
CHLORIDE SERPL-SCNC: 104 MMOL/L (ref 96–112)
CO2 SERPL-SCNC: 24 MMOL/L (ref 20–33)
CREAT SERPL-MCNC: 0.72 MG/DL (ref 0.5–1.4)
EOSINOPHIL # BLD AUTO: 0.28 K/UL (ref 0–0.51)
EOSINOPHIL NFR BLD: 4.3 % (ref 0–6.9)
ERYTHROCYTE [DISTWIDTH] IN BLOOD BY AUTOMATED COUNT: 46.4 FL (ref 35.9–50)
GFR SERPLBLD CREATININE-BSD FMLA CKD-EPI: 80 ML/MIN/1.73 M 2
GLUCOSE SERPL-MCNC: 98 MG/DL (ref 65–99)
HCT VFR BLD AUTO: 39.4 % (ref 37–47)
HGB BLD-MCNC: 13.1 G/DL (ref 12–16)
IMM GRANULOCYTES # BLD AUTO: 0.07 K/UL (ref 0–0.11)
IMM GRANULOCYTES NFR BLD AUTO: 1.1 % (ref 0–0.9)
LYMPHOCYTES # BLD AUTO: 1.52 K/UL (ref 1–4.8)
LYMPHOCYTES NFR BLD: 23.4 % (ref 22–41)
MCH RBC QN AUTO: 30.5 PG (ref 27–33)
MCHC RBC AUTO-ENTMCNC: 33.2 G/DL (ref 32.2–35.5)
MCV RBC AUTO: 91.8 FL (ref 81.4–97.8)
MONOCYTES # BLD AUTO: 0.87 K/UL (ref 0–0.85)
MONOCYTES NFR BLD AUTO: 13.4 % (ref 0–13.4)
NEUTROPHILS # BLD AUTO: 3.68 K/UL (ref 1.82–7.42)
NEUTROPHILS NFR BLD: 56.6 % (ref 44–72)
NRBC # BLD AUTO: 0 K/UL
NRBC BLD-RTO: 0 /100 WBC (ref 0–0.2)
PLATELET # BLD AUTO: 278 K/UL (ref 164–446)
PMV BLD AUTO: 10.4 FL (ref 9–12.9)
POTASSIUM SERPL-SCNC: 3.9 MMOL/L (ref 3.6–5.5)
RBC # BLD AUTO: 4.29 M/UL (ref 4.2–5.4)
SODIUM SERPL-SCNC: 138 MMOL/L (ref 135–145)
WBC # BLD AUTO: 6.5 K/UL (ref 4.8–10.8)

## 2024-01-12 PROCEDURE — A9270 NON-COVERED ITEM OR SERVICE: HCPCS | Performed by: HOSPITALIST

## 2024-01-12 PROCEDURE — 700102 HCHG RX REV CODE 250 W/ 637 OVERRIDE(OP): Performed by: HOSPITALIST

## 2024-01-12 PROCEDURE — 97530 THERAPEUTIC ACTIVITIES: CPT

## 2024-01-12 PROCEDURE — 770010 HCHG ROOM/CARE - REHAB SEMI PRIVAT*

## 2024-01-12 PROCEDURE — 97110 THERAPEUTIC EXERCISES: CPT

## 2024-01-12 PROCEDURE — A9270 NON-COVERED ITEM OR SERVICE: HCPCS | Performed by: PHYSICAL MEDICINE & REHABILITATION

## 2024-01-12 PROCEDURE — 99232 SBSQ HOSP IP/OBS MODERATE 35: CPT | Performed by: PHYSICAL MEDICINE & REHABILITATION

## 2024-01-12 PROCEDURE — 36415 COLL VENOUS BLD VENIPUNCTURE: CPT

## 2024-01-12 PROCEDURE — 80048 BASIC METABOLIC PNL TOTAL CA: CPT

## 2024-01-12 PROCEDURE — 85025 COMPLETE CBC W/AUTO DIFF WBC: CPT

## 2024-01-12 PROCEDURE — 700102 HCHG RX REV CODE 250 W/ 637 OVERRIDE(OP): Performed by: PHYSICAL MEDICINE & REHABILITATION

## 2024-01-12 PROCEDURE — 97535 SELF CARE MNGMENT TRAINING: CPT

## 2024-01-12 PROCEDURE — 99232 SBSQ HOSP IP/OBS MODERATE 35: CPT | Performed by: HOSPITALIST

## 2024-01-12 RX ADMIN — SIMETHICONE 125 MG: 125 TABLET, CHEWABLE ORAL at 20:05

## 2024-01-12 RX ADMIN — AMANTADINE HYDROCHLORIDE 100 MG: 100 TABLET ORAL at 06:04

## 2024-01-12 RX ADMIN — PAROXETINE HYDROCHLORIDE 10 MG: 20 TABLET, FILM COATED ORAL at 07:31

## 2024-01-12 RX ADMIN — AMANTADINE HYDROCHLORIDE 100 MG: 100 TABLET ORAL at 12:14

## 2024-01-12 RX ADMIN — SIMETHICONE 125 MG: 125 TABLET, CHEWABLE ORAL at 07:32

## 2024-01-12 RX ADMIN — ACETAMINOPHEN 650 MG: 325 TABLET ORAL at 20:10

## 2024-01-12 RX ADMIN — SIMETHICONE 125 MG: 125 TABLET, CHEWABLE ORAL at 12:14

## 2024-01-12 RX ADMIN — LEVOTHYROXINE SODIUM 75 MCG: 0.07 TABLET ORAL at 07:32

## 2024-01-12 RX ADMIN — GABAPENTIN 100 MG: 100 CAPSULE ORAL at 20:05

## 2024-01-12 RX ADMIN — APIXABAN 5 MG: 5 TABLET, FILM COATED ORAL at 20:05

## 2024-01-12 RX ADMIN — LOSARTAN POTASSIUM 100 MG: 50 TABLET, FILM COATED ORAL at 17:18

## 2024-01-12 RX ADMIN — FAMOTIDINE 20 MG: 20 TABLET ORAL at 20:04

## 2024-01-12 RX ADMIN — SIMETHICONE 125 MG: 125 TABLET, CHEWABLE ORAL at 17:18

## 2024-01-12 RX ADMIN — APIXABAN 5 MG: 5 TABLET, FILM COATED ORAL at 07:31

## 2024-01-12 RX ADMIN — POTASSIUM CHLORIDE 20 MEQ: 1500 TABLET, EXTENDED RELEASE ORAL at 07:32

## 2024-01-12 RX ADMIN — Medication 1000 UNITS: at 07:32

## 2024-01-12 RX ADMIN — METOPROLOL SUCCINATE 12.5 MG: 25 TABLET, EXTENDED RELEASE ORAL at 06:05

## 2024-01-12 RX ADMIN — TRAZODONE HYDROCHLORIDE 50 MG: 50 TABLET ORAL at 20:05

## 2024-01-12 RX ADMIN — FAMOTIDINE 20 MG: 20 TABLET ORAL at 07:32

## 2024-01-12 RX ADMIN — AMLODIPINE BESYLATE 5 MG: 5 TABLET ORAL at 06:04

## 2024-01-12 RX ADMIN — LOSARTAN POTASSIUM 100 MG: 50 TABLET, FILM COATED ORAL at 06:04

## 2024-01-12 RX ADMIN — FLECAINIDE ACETATE 100 MG: 100 TABLET ORAL at 07:31

## 2024-01-12 ASSESSMENT — ACTIVITIES OF DAILY LIVING (ADL)
BED_CHAIR_WHEELCHAIR_TRANSFER_DESCRIPTION: INCREASED TIME;SET-UP OF EQUIPMENT;SQUAT PIVOT TRANSFER TO WHEELCHAIR;SUPERVISION FOR SAFETY

## 2024-01-12 ASSESSMENT — ENCOUNTER SYMPTOMS
DIARRHEA: 0
SHORTNESS OF BREATH: 0
NAUSEA: 0
CHILLS: 0
VOMITING: 0
NERVOUS/ANXIOUS: 0
ABDOMINAL PAIN: 0
FEVER: 0

## 2024-01-12 ASSESSMENT — PAIN DESCRIPTION - PAIN TYPE: TYPE: ACUTE PAIN

## 2024-01-12 NOTE — PROGRESS NOTES
Hospital Medicine Daily Progress Note      Chief Complaint  Hypertension  Afib    Interval Problem Update  No significant events overnight.    Review of Systems  Review of Systems   Constitutional:  Negative for chills and fever.   Respiratory:  Negative for shortness of breath.    Cardiovascular:  Negative for chest pain.   Gastrointestinal:  Negative for abdominal pain, diarrhea, nausea and vomiting.   Psychiatric/Behavioral:  The patient is not nervous/anxious.         Physical Exam  Temp:  [36.4 °C (97.6 °F)-36.7 °C (98.1 °F)] 36.6 °C (97.9 °F)  Pulse:  [65-72] 72  Resp:  [17-18] 18  BP: (122-158)/(70-74) 158/74  SpO2:  [91 %-95 %] 95 %    Physical Exam  Vitals and nursing note reviewed.   Constitutional:       Appearance: Normal appearance.   HENT:      Head: Atraumatic.   Eyes:      Conjunctiva/sclera: Conjunctivae normal.      Pupils: Pupils are equal, round, and reactive to light.   Neck:      Vascular: No JVD.   Cardiovascular:      Rate and Rhythm: Normal rate and regular rhythm.      Heart sounds: Normal heart sounds.   Pulmonary:      Effort: Pulmonary effort is normal.      Breath sounds: Normal breath sounds.   Abdominal:      General: Bowel sounds are normal.      Palpations: Abdomen is soft.   Musculoskeletal:      Cervical back: Normal range of motion and neck supple.      Right lower leg: No edema.      Left lower leg: No edema.   Skin:     General: Skin is warm and dry.   Neurological:      Mental Status: She is alert and oriented to person, place, and time.   Psychiatric:         Mood and Affect: Mood normal.         Behavior: Behavior normal.         Fluids    Intake/Output Summary (Last 24 hours) at 1/12/2024 0942  Last data filed at 1/12/2024 0800  Gross per 24 hour   Intake 600 ml   Output --   Net 600 ml         Laboratory  Recent Labs     01/10/24  0544 01/12/24  0559   WBC 6.8 6.5   RBC 4.58 4.29   HEMOGLOBIN 14.0 13.1   HEMATOCRIT 42.4 39.4   MCV 92.6 91.8   MCH 30.6 30.5   MCHC 33.0 33.2    RDW 44.7 46.4   PLATELETCT 296 278   MPV 10.3 10.4       Recent Labs     01/10/24  0544 01/12/24  0559   SODIUM 139 138   POTASSIUM 3.2* 3.9   CHLORIDE 102 104   CO2 24 24   GLUCOSE 102* 98   BUN 15 15   CREATININE 0.63 0.72   CALCIUM 8.4* 8.2*                     Assessment/Plan  Hypokalemia  Assessment & Plan  K+: 3.2  --> 3.9 (1/12)  S/P KCL 40 meq x 1  Cont KCL: 20 meq daily (1/11)  2nd to diminished appetite  Cont to monitor    Abdominal distension  Assessment & Plan  AXR: shows gaseous colonic distension  Cont Simethicone    Hypoxia- (present on admission)  Assessment & Plan  Resolved (from [facility])  S/P IV Lasix at [facility] for pulm edema  S/P Augmentin & Doxy for PNA    Acquired hypothyroidism- (present on admission)  Assessment & Plan  Cont Synthroid    MDD (major depressive disorder), recurrent, in partial remission (HCC)- (present on admission)  Assessment & Plan  Cont Paxil    Primary hypertension- (present on admission)  Assessment & Plan  BP trending better recently  Cont Toprol XL   Cont Cozaar --> dose increased 1/11  Cont Norvasc  Will monitor another day since meds were recently adjusted    Paroxysmal atrial fibrillation (HCC)- (present on admission)  Assessment & Plan  HR ok  S/P PPM  Cont Tambacor  Cont Eliquis  Cont to monitor

## 2024-01-12 NOTE — THERAPY
Occupational Therapy  Daily Treatment     Patient Name: Debra Rodrigues  Age:  88 y.o., Sex:  female  Medical Record #: [id number]  Today's Date: 1/12/2024     Precautions  Precautions: Fall Risk  Comments: Maculary degeneration, hx of CVA         Subjective    Pt seated in cafeteria upon arrival, pleasant and cooperative, agreeable to therapy       Objective       01/12/24 1201   OT Charge Group   OT Self Care / ADL (Units) 1   OT Therapy Activity (Units) 1   OT Total Time Spent   OT Individual Total Time Spent (Mins) 30   Functional Level of Assist   Grooming Standby Assist;Seated  (oral care)   Bed, Chair, Wheelchair Transfer Maximal Assist  (stand pivot w/c to bed)   Bed Mobility    Sit to Supine Maximal Assist   Scooting Maximal Assist   Skilled Intervention Verbal Cuing   Interdisciplinary Plan of Care Collaboration   Patient Position at End of Therapy Seated;Call Light within Reach;Tray Table within Reach;Bed Alarm On;Family / Friend in Room     Functional mobility at w/c level: Total A room<>bathroom     Assessment    Pt cont to make slow but steady progress with functional mobility, requires significant increase in time d/t visual deficits, debility, bradykinesia    Strengths: Alert and oriented, Able to follow instructions, Pleasant and cooperative, Willingly participates in therapeutic activities  Barriers: Decreased endurance, Generalized weakness, Impaired activity tolerance, Impaired balance, Limited mobility, Visual impairment    Plan    Refer to Primary OT POC/goals     Occupational Therapy Goals (Active)       Problem: Dressing       Dates: Start:  01/06/24         Goal: STG-Within one week, patient will dress UB at a Min A level.        Dates: Start:  01/06/24            Goal: STG-Within one week, patient will dress LB at a Mod A level with AE/AD PRN.        Dates: Start:  01/06/24               Problem: Functional Transfers       Dates: Start:  01/06/24         Goal: STG-Within one week, patient  will transfer to toilet at a Mod A level with AD/DME PRN.        Dates: Start:  01/06/24               Problem: OT Long Term Goals       Dates: Start:  01/06/24         Goal: LTG-By discharge, patient will complete basic self care tasks at a SUP to Mod A level with AE/AD/DME PRN.        Dates: Start:  01/06/24            Goal: LTG-By discharge, patient will perform bathroom transfers at a SUP to Min A level with LRD and DME PRN.        Dates: Start:  01/06/24               Problem: Toileting       Dates: Start:  01/06/24         Goal: STG-Within one week, patient will complete toileting tasks at a Mod A level with AE/AD PRN.        Dates: Start:  01/06/24

## 2024-01-12 NOTE — THERAPY
Occupational Therapy  Daily Treatment     Patient Name: Debra Rodrigues  Age:  88 y.o., Sex:  female  Medical Record #: [id number]  Today's Date: 1/12/2024     Precautions  Precautions: Fall Risk  Comments: Maculary degeneration, hx of CVA         Subjective    Pt was seated in w/c upon arrival and agreeable for OT session.      Objective       01/12/24 0931   OT Charge Group   OT Self Care / ADL (Units) 4   OT Total Time Spent   OT Individual Total Time Spent (Mins) 60   Functional Level of Assist   Grooming Supervision   Grooming Description Seated in wheelchair at sink   Bathing Moderate Assist   Bathing Description Grab bar;Assit with back;Assit with perineal;Assit wtih lower extremities;Increased time;Verbal cueing   Upper Body Dressing Maximal Assist   Upper Body Dressing Description Assist with closures;Assit with threading arms through sleeves;Assist with pulling shirt over head;Increased time   Lower Body Dressing Maximal Assist   Lower Body Dressing Description Dressing stick;Assist with closures;Assist with threading into pant leg;Increased time;Verbal cueing   Toileting Total Assist  (BM)   Toileting Description Assist to pull pants up;Assist to pull pants down;Assist for standing balance;Increased time   Toilet Transfers Maximal Assist   Toilet Transfer Description Grab bar;Assist with one limb;Increased time;Verbal cueing;Set-up of equipment   Tub / Shower Transfers Maximal Assist   Tub Shower Transfer Description Grab bar;Shower bench;Assist with one limb;Increased time;Verbal cueing;Set-up of equipment   Interdisciplinary Plan of Care Collaboration   IDT Collaboration with  Certified Nursing Assistant   Patient Position at End of Therapy Seated;Chair Alarm On;Phone within Reach;Tray Table within Reach;Call Light within Reach   Collaboration Comments Vitals as well as communicate on pt CLOF     ADLs: Needed frequent verbal and tactile cues for all transfers as well as UB/LB sequencing during bathing  and drying tasks. Pt was unable to initiate any bathing sequencing Ind and needed frequent 1 step commands throughout treatment. Observed pt have a right lean during bathing and needed frequent VC for seated/standing upright posture. Pt was at a Min/Mod for all UB/LB dressing, however a Max A for all donning. Pt continue to need assistance w/ all ADL tasks as well as poor carryover on using AE for LB dressing.      Assessment    Pt tolerated session fairly w/ focus on ADLs. Pt does well w/ 1 step commands w/ adequate rest breaks in between d/t low activity tolerance.    Strengths: Alert and oriented, Able to follow instructions, Pleasant and cooperative, Willingly participates in therapeutic activities  Barriers: Decreased endurance, Generalized weakness, Impaired activity tolerance, Impaired balance, Limited mobility, Visual impairment    Plan    ADLs  Standing balance/ tolerance  Functional transfers   Strengthening/endurance     DME       Passport items to be completed:  Perform bathroom transfers, complete dressing, complete feeding, get ready for the day, prepare a simple meal, participate in household tasks, adapt home for safety needs, demonstrate home exercise program, complete caregiver training     Occupational Therapy Goals (Active)       Problem: Dressing       Dates: Start:  01/06/24         Goal: STG-Within one week, patient will dress UB at a Min A level.        Dates: Start:  01/06/24            Goal: STG-Within one week, patient will dress LB at a Mod A level with AE/AD PRN.        Dates: Start:  01/06/24               Problem: Functional Transfers       Dates: Start:  01/06/24         Goal: STG-Within one week, patient will transfer to toilet at a Mod A level with AD/DME PRN.        Dates: Start:  01/06/24               Problem: OT Long Term Goals       Dates: Start:  01/06/24         Goal: LTG-By discharge, patient will complete basic self care tasks at a SUP to Mod A level with AE/AD/DME PRN.         Dates: Start:  01/06/24            Goal: LTG-By discharge, patient will perform bathroom transfers at a SUP to Min A level with LRD and DME PRN.        Dates: Start:  01/06/24               Problem: Toileting       Dates: Start:  01/06/24         Goal: STG-Within one week, patient will complete toileting tasks at a Mod A level with AE/AD PRN.        Dates: Start:  01/06/24

## 2024-01-12 NOTE — THERAPY
Occupational Therapy  Daily Treatment     Patient Name: Debra Rodrigues  Age:  88 y.o., Sex:  female  Medical Record #: [id number]  Today's Date: 1/12/2024     Precautions  Precautions: Fall Risk  Comments: Maculary degeneration, hx of CVA         Subjective    Pt seated in w/c upon arrival and agreeable for therapy.      Objective       01/11/24 1401   OT Charge Group   OT Self Care / ADL (Units) 2   OT Therapeutic Exercise (Units) 2   OT Total Time Spent   OT Individual Total Time Spent (Mins) 60   Functional Level of Assist   Lower Body Dressing Maximal Assist   Lower Body Dressing Description Grab bar;Assist with threading into pant leg;Assist with closures;Increased time;Set-up of equipment   Toileting Total Assist   Toileting Description Assist for hygiene;Assist to pull pants up;Assist to pull pants down;Grab bar;Increased time   Toilet Transfers Maximal Assist   Toilet Transfer Description Grab bar;Increased time;Verbal cueing;Assist with one limb   Sitting Upper Body Exercises   Front Arm Raise 2 sets of 10;Bilateral;Weight (See Comments for lbs)  (1lb hand weight)   Internal Shoulder Rotation 2 sets of 10;Bilateral;Weight (See Comments for lbs)  (1lb hand weight)   External Shoulder Rotation 2 sets of 10;Bilateral;Weight (See Comments for lbs)  (1lb hand weight)   Bicep Curls 2 sets of 10;Bilateral;Weight (See Comments for lbs)  (1lb hand weight)   Other Exercise Nustep ~10min level 1 w/ multipule rest breaks   Bed Mobility    Sit to Supine Maximal Assist   Interdisciplinary Plan of Care Collaboration   Patient Position at End of Therapy In Bed;Bed Alarm On;Tray Table within Reach;Phone within Reach;Call Light within Reach     Pt stated that she did not need to go to the restroom, however therapist insisted and took her where she had soiled herself in brief. Increase time for toileting where pt had a BM as well as urination. Total A for mal care d/t pt not wanting to attempt to wipe and max a for LB  dressing d/t pt unable to bend over and doff clothing.     Assessment    Pt  tolerated session fairly w/ focus on ADLs and strengthening/endurance. Pt needs increase motivation to participate in any thera ex activity and has shown very little progress towards any goals.   Strengths: Alert and oriented, Able to follow instructions, Pleasant and cooperative, Willingly participates in therapeutic activities  Barriers: Decreased endurance, Generalized weakness, Impaired activity tolerance, Impaired balance, Limited mobility, Visual impairment    Plan    ADLs  Standing balance/ tolerance  Functional transfers   Strengthening/endurance     DME       Passport items to be completed:  Perform bathroom transfers, complete dressing, complete feeding, get ready for the day, prepare a simple meal, participate in household tasks, adapt home for safety needs, demonstrate home exercise program, complete caregiver training     Occupational Therapy Goals (Active)       Problem: Dressing       Dates: Start:  01/06/24         Goal: STG-Within one week, patient will dress UB at a Min A level.        Dates: Start:  01/06/24            Goal: STG-Within one week, patient will dress LB at a Mod A level with AE/AD PRN.        Dates: Start:  01/06/24               Problem: Functional Transfers       Dates: Start:  01/06/24         Goal: STG-Within one week, patient will transfer to toilet at a Mod A level with AD/DME PRN.        Dates: Start:  01/06/24               Problem: OT Long Term Goals       Dates: Start:  01/06/24         Goal: LTG-By discharge, patient will complete basic self care tasks at a SUP to Mod A level with AE/AD/DME PRN.        Dates: Start:  01/06/24            Goal: LTG-By discharge, patient will perform bathroom transfers at a SUP to Min A level with LRD and DME PRN.        Dates: Start:  01/06/24               Problem: Toileting       Dates: Start:  01/06/24         Goal: STG-Within one week, patient will complete  toileting tasks at a Mod A level with AE/AD PRN.        Dates: Start:  01/06/24

## 2024-01-12 NOTE — CARE PLAN
The patient is Stable - Low risk of patient condition declining or worsening    Shift Goals  Clinical Goals: Safety  Patient Goals: Safety    Progress made toward(s) clinical / shift goals:  Labs monitored.    Problem: Respiratory  Goal: Patient will understand use and administration of respiratory medications to improve respiratory function  Outcome: Progressing  Note: Patient is on room air.     Problem: Discharge Barriers/Planning  Goal: Patient's continuum of care needs are met  Outcome: Progressing  Flowsheets (Taken 1/12/2024 1450)  Continuum of Care Needs: Provided and explained discharge instructions  Note:    Latest Reference Range & Units 01/12/24 05:59   WBC 4.8 - 10.8 K/uL 6.5   RBC 4.20 - 5.40 M/uL 4.29   Hemoglobin 12.0 - 16.0 g/dL 13.1   Hematocrit 37.0 - 47.0 % 39.4   MCV 81.4 - 97.8 fL 91.8   MCH 27.0 - 33.0 pg 30.5   MCHC 32.2 - 35.5 g/dL 33.2   RDW 35.9 - 50.0 fL 46.4   Platelet Count 164 - 446 K/uL 278   MPV 9.0 - 12.9 fL 10.4   Neutrophils-Polys 44.00 - 72.00 % 56.60   Neutrophils (Absolute) 1.82 - 7.42 K/uL 3.68   Lymphocytes 22.00 - 41.00 % 23.40   Lymphs (Absolute) 1.00 - 4.80 K/uL 1.52   Monocytes 0.00 - 13.40 % 13.40   Monos (Absolute) 0.00 - 0.85 K/uL 0.87 (H)   Eosinophils 0.00 - 6.90 % 4.30   Eos (Absolute) 0.00 - 0.51 K/uL 0.28   Basophils 0.00 - 1.80 % 1.20   Baso (Absolute) 0.00 - 0.12 K/uL 0.08   Immature Granulocytes 0.00 - 0.90 % 1.10 (H)   Immature Granulocytes (abs) 0.00 - 0.11 K/uL 0.07   Nucleated RBC 0.00 - 0.20 /100 WBC 0.00   NRBC (Absolute) K/uL 0.00   Sodium 135 - 145 mmol/L 138   Potassium 3.6 - 5.5 mmol/L 3.9   Chloride 96 - 112 mmol/L 104   Co2 20 - 33 mmol/L 24   Anion Gap 7.0 - 16.0  10.0   Glucose 65 - 99 mg/dL 98   Bun 8 - 22 mg/dL 15   Creatinine 0.50 - 1.40 mg/dL 0.72   GFR (CKD-EPI) >60 mL/min/1.73 m 2 80   Calcium 8.5 - 10.5 mg/dL 8.2 (L)
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
## 2024-01-12 NOTE — CARE PLAN
"The patient is Stable - Low risk of patient condition declining or worsening    Shift Goals  Clinical Goals: Safety  Patient Goals: sleep well    Progress made toward(s) clinical / shift goals:      Problem: Fall Risk - Rehab  Goal: Patient will remain free from falls  Outcome: Progressing  Note: Rubina Echavarria Fall risk Assessment Score: 23    High fall risk Interventions   - Alarming seatbelt  - Bed and strip alarm   - Yellow sign by the door   - Yellow wrist band \"Fall risk\"  - Room near to the nurse station  - Do not leave patient unattended in the bathroom  - Fall risk education provided    Pt calls appropriately when in need of assistance.     Problem: Pain - Standard  Goal: Alleviation of pain or a reduction in pain to the patient’s comfort goal  Outcome: Progressing  Note: Tylenol given PRN per MAR for c/o headache with adequate relief. Pt sleeps good. Will continue to monitor.       Patient is not progressing towards the following goals:      "

## 2024-01-12 NOTE — DIETARY
"Nutrition services: Day 6 of admit.  Debra Rodrigues is a 88 y.o. female with admitting DX of Pneumonia due to infectious organism, Hypoxia     Consult received for calorie count. Per MD's progress note today, MD wants RD to see pt to improve nutrition. MD did not request formal calorie count      Assessment:  Height: 167.6 cm (5' 6\")  Weight: 90 kg (198 lb 8 oz)  Body mass index is 32.04 kg/m²., BMI classification: class 1 obesity  Diet/Intake: Level 6-soft and bite sized  w/ Ensure TID    Evaluation:   PO intake variable at <25% to % w/ avg of 50%. PO of Ensure 100%. Pt confirmed that she is drinking her Ensure. She is agreeable to adding cottage cheese w/ fruit to her afternoon snack. She thought 1 snack was enough.     Malnutrition Risk: ASPEN criteria not met    Recommendations/Plan:  Continue w/ Ensure as ordered  Add cottage cheese and fruit to afternoon snack   Encourage intake of >50%  Document intake of all meals, snack and supplement  as % taken in ADL's to provide interdisciplinary communication across all shifts.   Monitor weight.  Nutrition rep will continue to see patient for ongoing meal and snack preferences.        RD will follow  "

## 2024-01-13 ENCOUNTER — APPOINTMENT (OUTPATIENT)
Dept: SPEECH THERAPY | Facility: REHABILITATION | Age: 89
DRG: 194 | End: 2024-01-13
Attending: PHYSICAL MEDICINE & REHABILITATION
Payer: MEDICARE

## 2024-01-13 PROCEDURE — 700102 HCHG RX REV CODE 250 W/ 637 OVERRIDE(OP): Performed by: HOSPITALIST

## 2024-01-13 PROCEDURE — A9270 NON-COVERED ITEM OR SERVICE: HCPCS | Performed by: HOSPITALIST

## 2024-01-13 PROCEDURE — 97129 THER IVNTJ 1ST 15 MIN: CPT

## 2024-01-13 PROCEDURE — A9270 NON-COVERED ITEM OR SERVICE: HCPCS | Performed by: PHYSICAL MEDICINE & REHABILITATION

## 2024-01-13 PROCEDURE — 770010 HCHG ROOM/CARE - REHAB SEMI PRIVAT*

## 2024-01-13 PROCEDURE — 99232 SBSQ HOSP IP/OBS MODERATE 35: CPT | Performed by: HOSPITALIST

## 2024-01-13 PROCEDURE — 700102 HCHG RX REV CODE 250 W/ 637 OVERRIDE(OP): Performed by: PHYSICAL MEDICINE & REHABILITATION

## 2024-01-13 PROCEDURE — 97130 THER IVNTJ EA ADDL 15 MIN: CPT

## 2024-01-13 RX ADMIN — ACETAMINOPHEN 650 MG: 325 TABLET ORAL at 20:47

## 2024-01-13 RX ADMIN — Medication 1000 UNITS: at 07:41

## 2024-01-13 RX ADMIN — PAROXETINE HYDROCHLORIDE 10 MG: 20 TABLET, FILM COATED ORAL at 07:41

## 2024-01-13 RX ADMIN — SIMETHICONE 125 MG: 125 TABLET, CHEWABLE ORAL at 07:40

## 2024-01-13 RX ADMIN — SIMETHICONE 125 MG: 125 TABLET, CHEWABLE ORAL at 17:05

## 2024-01-13 RX ADMIN — APIXABAN 5 MG: 5 TABLET, FILM COATED ORAL at 07:41

## 2024-01-13 RX ADMIN — LOSARTAN POTASSIUM 100 MG: 50 TABLET, FILM COATED ORAL at 17:05

## 2024-01-13 RX ADMIN — APIXABAN 5 MG: 5 TABLET, FILM COATED ORAL at 20:45

## 2024-01-13 RX ADMIN — LEVOTHYROXINE SODIUM 75 MCG: 0.07 TABLET ORAL at 07:41

## 2024-01-13 RX ADMIN — FLECAINIDE ACETATE 100 MG: 100 TABLET ORAL at 07:41

## 2024-01-13 RX ADMIN — FAMOTIDINE 20 MG: 20 TABLET ORAL at 20:45

## 2024-01-13 RX ADMIN — AMANTADINE HYDROCHLORIDE 100 MG: 100 TABLET ORAL at 05:40

## 2024-01-13 RX ADMIN — GABAPENTIN 100 MG: 100 CAPSULE ORAL at 20:45

## 2024-01-13 RX ADMIN — SIMETHICONE 125 MG: 125 TABLET, CHEWABLE ORAL at 20:45

## 2024-01-13 RX ADMIN — AMANTADINE HYDROCHLORIDE 100 MG: 100 TABLET ORAL at 11:17

## 2024-01-13 RX ADMIN — FAMOTIDINE 20 MG: 20 TABLET ORAL at 07:41

## 2024-01-13 RX ADMIN — SIMETHICONE 125 MG: 125 TABLET, CHEWABLE ORAL at 11:16

## 2024-01-13 RX ADMIN — AMLODIPINE BESYLATE 5 MG: 5 TABLET ORAL at 05:40

## 2024-01-13 RX ADMIN — POTASSIUM CHLORIDE 20 MEQ: 1500 TABLET, EXTENDED RELEASE ORAL at 07:40

## 2024-01-13 RX ADMIN — LOSARTAN POTASSIUM 100 MG: 50 TABLET, FILM COATED ORAL at 05:41

## 2024-01-13 RX ADMIN — TRAZODONE HYDROCHLORIDE 50 MG: 50 TABLET ORAL at 20:53

## 2024-01-13 RX ADMIN — METOPROLOL SUCCINATE 12.5 MG: 25 TABLET, EXTENDED RELEASE ORAL at 05:41

## 2024-01-13 ASSESSMENT — ENCOUNTER SYMPTOMS
BLURRED VISION: 0
HEADACHES: 0
DIZZINESS: 0
VOMITING: 0
FEVER: 0
NAUSEA: 0
SHORTNESS OF BREATH: 0
PALPITATIONS: 0
HALLUCINATIONS: 0

## 2024-01-13 ASSESSMENT — PAIN DESCRIPTION - PAIN TYPE: TYPE: ACUTE PAIN

## 2024-01-13 NOTE — PROGRESS NOTES
NURSING DAILY NOTE    Name: Debra Rodrigues   Date of Admission: 1/5/2024   Admitting Diagnosis: Pneumonia due to infectious organism  Attending Physician: Eugenia Ruiz D.o.  Allergies: Other misc, Hydrocodone-acetaminophen, and Sulfa drugs    Safety  Patient Assist  max 2  Patient Precautions  Fall Risk  Precaution Comments  Maculary degeneration, hx of CVA  Bed Transfer Status  Maximal Assist (stand pivot w/c to bed)  Toilet Transfer Status   Maximal Assist  Assistive Devices  Rails, Wheelchair  Oxygen  None - Room Air  Diet/Therapeutic Dining  Current Diet Order   Procedures    Diet Order Diet: Level 6 - Soft and Bite Sized; Liquid level: Level 0 - Thin     Pill Administration  whole and floated  Agitated Behavioral Scale  15  ABS Level of Severity  No Agitation    Fall Risk  Has the patient had a fall this admission?   No  Rubina Echavarria Fall Risk Scoring  23, HIGH RISK  Fall Risk Safety Measures  bed alarm, chair alarm, poor balance, and low vision/ hearing    Vitals  Temperature: 36.9 °C (98.4 °F)  Temp src: Oral  Pulse: 71  Respiration: 18  Blood Pressure : (!) 154/71  Blood Pressure MAP (Calculated): 99 MM HG  BP Location: Right, Upper Arm  Patient BP Position: Supine     Oxygen  Pulse Oximetry: 94 %  O2 (LPM): 0  O2 Delivery Device: None - Room Air    Bowel and Bladder  Last Bowel Movement  01/12/24  Stool Type  Type 6: Fluffy pieces with ragged edges, a mushy stool  Bowel Device  Bathroom, Diaper  Continent  Bladder: Did not void   Bowel: No movement  Bladder Function  Urine Void (mL):  (large)  Number of Times Voided: 1  Urine Color: Yellow  Urine Clarity: Unable to Evaluate  Number of Times Incontinent of Urine: 1  Wet Diaper Count: 1  Genitourinary Assessment   Bladder Assessment (WDL):  WDL Except  Cortes Catheter: Not Applicable  Urinary Elimination: Incontinence  Urine Color: Yellow  Urine Clarity: Unable to Evaluate  Number of Bladder  Accidents: 1  Total Number of Bladder of Accidents in Last 7 Days: 1  Number of Times Incontinent of Urine: 1  Bladder Device: Diaper  Time Void: No  Bladder Scan: Post Void  $ Bladder Scan Results (mL): 1 (0 scan)    Skin  Larry Score   16  Sensory Interventions   Bed Types: Standard/Trauma Mattress  Skin Preventative Measures: Pillows in Use for Support / Positioning  Moisture Interventions  Moisturizers/Barriers: Barrier Wipes      Pain  Pain Rating Scale  0 - No Pain  Pain Location  Head  Pain Location Orientation     Pain Interventions   Declines    ADLs    Bathing   Staff, Shower  Linen Change      Personal Hygiene  Perineal Care, Moist Mamie Wipes  Chlorhexidine Bath      Oral Care  Brushed Teeth  Teeth/Dentures     Shave     Nutrition Percentage Eaten  *  * Meal *  *, Lunch, Between % Consumed  Environmental Precautions     Patient Turns/Positioning  Patient Turns Self from Side to Side  Patient Turns Assistance/Tolerance     Bed Positions  Bed Controls On  Head of Bed Elevated  Self regulated      Psychosocial/Neurologic Assessment  Psychosocial Assessment  Psychosocial (WDL):  WDL Except  Patient Behaviors: Fatigue, Forgetful  Neurologic Assessment  Neuro (WDL): Exceptions to WDL  Level of Consciousness: Alert  Orientation Level: Oriented to place, Oriented to person, Oriented to situation  Cognition: Follows commands  Speech: Clear  Pupil Assesment: No  Motor Function/Sensation Assessment: Sensation  RUE Sensation: Full sensation  LUE Sensation: Full sensation  RLE Sensation: Numbness, Tingling  LLE Sensation: Numbness, Tingling  EENT (WDL):  WDL Except    Cardio/Pulmonary Assessment  Edema   RLE Edema: Generalized  LLE Edema: Generalized  Respiratory Breath Sounds  RUL Breath Sounds: Clear  RML Breath Sounds: Clear  RLL Breath Sounds: Diminished  ANGÉLICA Breath Sounds: Clear  LLL Breath Sounds: Diminished  Cardiac Assessment   Cardiac (WDL):  WDL Except (HX: HTN,AFIB,Pacer)

## 2024-01-13 NOTE — CARE PLAN
"  Problem: Fall Risk - Rehab  Goal: Patient will remain free from falls  Outcome: Progressing  Note: Rubina Echavarria Fall risk Assessment Score: 23    High fall risk Interventions   - Bed and strip alarm   - Yellow sign by the door   - Yellow wrist band \"Fall risk\"  - Room near to the nurse station  - Do not leave patient unattended in the bathroom  - Fall risk education provided     Problem: Pain - Standard  Goal: Alleviation of pain or a reduction in pain to the patient’s comfort goal  Outcome: Progressing  Note: Assessed for pain and discomfort , pain under control, needs anticipated and attended.   The patient is Stable - Low risk of patient condition declining or worsening    Shift Goals  Clinical Goals: SAFETY  Patient Goals: SAFETY    Progress made toward(s) clinical / shift goals:  Pt free from fall and injury.    "

## 2024-01-13 NOTE — PROGRESS NOTES
NURSING DAILY NOTE    Name: Debra Rodrigues   Date of Admission: 1/5/2024   Admitting Diagnosis: Pneumonia due to infectious organism  Attending Physician: Eugenia Ruiz D.o.  Allergies: Other misc, Hydrocodone-acetaminophen, and Sulfa drugs    Safety  Patient Assist  max 2  Patient Precautions  Fall Risk  Precaution Comments  Maculary degeneration, hx of CVA  Bed Transfer Status  Maximal Assist (stand pivot w/c to bed)  Toilet Transfer Status   Maximal Assist  Assistive Devices  Rails, Wheelchair  Oxygen  None - Room Air  Diet/Therapeutic Dining  Current Diet Order   Procedures    Diet Order Diet: Level 6 - Soft and Bite Sized; Liquid level: Level 0 - Thin     Pill Administration  whole and floated  Agitated Behavioral Scale  15  ABS Level of Severity  No Agitation    Fall Risk  Has the patient had a fall this admission?   No  Rubina Echavarria Fall Risk Scoring  23, HIGH RISK  Fall Risk Safety Measures  bed alarm, chair alarm, poor balance, and low vision/ hearing    Vitals  Temperature: 36.9 °C (98.4 °F)  Temp src: Oral  Pulse: 70  Respiration: 18  Blood Pressure : 130/70  Blood Pressure MAP (Calculated): 90 MM HG  BP Location: Right, Upper Arm  Patient BP Position: Sitting     Oxygen  Pulse Oximetry: 94 %  O2 (LPM): 0  O2 Delivery Device: None - Room Air    Bowel and Bladder  Last Bowel Movement  01/12/24  Stool Type  Type 6: Fluffy pieces with ragged edges, a mushy stool  Bowel Device  Bathroom, Diaper  Continent  Bladder: Did not void   Bowel: No movement  Bladder Function  Urine Void (mL):  (large)  Number of Times Voided: 1  Urine Color: Yellow  Urine Clarity: Unable to Evaluate  Number of Times Incontinent of Urine: 1  Wet Diaper Count: 1  Genitourinary Assessment   Bladder Assessment (WDL):  WDL Except  Cortes Catheter: Not Applicable  Urinary Elimination: Incontinence  Urine Color: Yellow  Urine Clarity: Unable to Evaluate  Number of Bladder  Accidents: 1  Total Number of Bladder of Accidents in Last 7 Days: 1  Number of Times Incontinent of Urine: 1  Bladder Device: Diaper  Time Void: No  Bladder Scan: Post Void  $ Bladder Scan Results (mL): 1 (0 scan)    Skin  Larry Score   16  Sensory Interventions   Bed Types: Standard/Trauma Mattress  Skin Preventative Measures: Pillows in Use for Support / Positioning  Moisture Interventions  Moisturizers/Barriers: Barrier Wipes      Pain  Pain Rating Scale  0 - No Pain  Pain Location  Head  Pain Location Orientation  Right  Pain Interventions   Declines    ADLs    Bathing   Staff, Shower  Linen Change      Personal Hygiene  Perineal Care, Moist Mamie Wipes  Chlorhexidine Bath      Oral Care  Brushed Teeth  Teeth/Dentures     Shave     Nutrition Percentage Eaten  *  * Meal *  *, Lunch, Between % Consumed  Environmental Precautions     Patient Turns/Positioning  Patient Turns Self from Side to Side  Patient Turns Assistance/Tolerance  Assistance of One  Bed Positions  Bed Controls On, Bed Locked  Head of Bed Elevated  Self regulated      Psychosocial/Neurologic Assessment  Psychosocial Assessment  Psychosocial (WDL):  WDL Except  Patient Behaviors: Fatigue, Forgetful  Neurologic Assessment  Neuro (WDL): Exceptions to WDL  Level of Consciousness: Alert  Orientation Level: Oriented to place, Oriented to person, Oriented to situation  Cognition: Follows commands  Speech: Clear  Pupil Assesment: No  Motor Function/Sensation Assessment: Sensation  RUE Sensation: Full sensation  LUE Sensation: Full sensation  RLE Sensation: Numbness, Tingling  LLE Sensation: Numbness, Tingling  EENT (WDL):  WDL Except    Cardio/Pulmonary Assessment  Edema   RLE Edema: Generalized  LLE Edema: Generalized  Respiratory Breath Sounds  RUL Breath Sounds: Clear  RML Breath Sounds: Clear  RLL Breath Sounds: Diminished  ANGÉLICA Breath Sounds: Clear  LLL Breath Sounds: Diminished  Cardiac Assessment   Cardiac (WDL):  WDL Except (HX:  HTN,AFIB,Pacer)

## 2024-01-13 NOTE — THERAPY
"Speech Language Pathology  Daily Treatment     Patient Name: Debra Rodrigues  Age:  88 y.o., Sex:  female  Medical Record #: [id number]  Today's Date: 1/13/2024     Precautions  Precautions: Fall Risk  Comments: Maculary degeneration, hx of CVA    Subjective    Patient pleasant and agreeable to SLP services.     Objective       01/13/24 1301   Treatment Charges   SLP Cognitive Skill Development First 15 Minutes 1   SLP Cognitive Skill Development Additional 15 Minutes 1   SLP Total Time Spent   SLP Individual Total Time Spent (Mins) 30   Precautions   Precautions Fall Risk   Comments Maculary degeneration, hx of CVA   Cognition   Simple Attention Supervision (5)   Functional Memory Activities Minimal (4)   Interdisciplinary Plan of Care Collaboration   Patient Position at End of Therapy In Bed;Bed Alarm On;Call Light within Reach;Tray Table within Reach;Phone within Reach         Assessment    Patient completed auditory recall tasks with 100% accuracy provided no iwona. Patient requiring min assist to recall auditory stimuli with 5 minute delay. Patient reports that she does not have particular concerns about her cognition at this time as she will have assistance at [facility] upon d/c. However, the patient did express that she \"gets confused\"; unable to elaborate on confusion.    Strengths: Alert and oriented, Effective communication skills, Supportive family, Willingly participates in therapeutic activities  Barriers: Generalized weakness, Impaired carryover of learning, Impaired functional cognition, Visual impairment (Low initiation and slow to process.)    Plan    Continue to address recall and safety awareness  Continue following written directions tasks    Passport items to be completed:  Express basic needs, understand food/liquid recommendations, consistently follow swallow precautions, manage finances, manage medications, arrive to therapy appointments on time, complete daily memory log entries, solve problems " related to safety situations, review education related to hospitalization, complete caregiver training     Speech Therapy Problems (Active)       Problem: Comprehension STGs       Dates: Start:  01/06/24         Goal: STG-Within one week, patient will comprehend both auditory and written instructions with 75% accuracy provided mod assist.       Dates: Start:  01/06/24         Goal Note filed on 01/10/24 1338 by Dania Nelson MS,CCC-SLP       To be addressed.                  Problem: Memory STGs       Dates: Start:  01/08/24         Goal: STG-Within one week, patient will recall transfer sequencing with 75% with min A.       Dates: Start:  01/10/24               Problem: Problem Solving STGs       Dates: Start:  01/06/24         Goal: STG-Within one week, patient will solve basic problems related to safety/sequencing with 75% accuracy provided mod assist.       Dates: Start:  01/06/24         Goal Note filed on 01/10/24 1338 by Dania Nelson MS,CCC-SLP       75% with mod-max A for initiation and sequencing multiple steps.                 Problem: Speech/Swallowing LTGs       Dates: Start:  01/06/24         Goal: LTG-By discharge, patient will solve basic problems related to safety to facilitate safe d/c to a functional living environment.       Dates: Start:  01/06/24

## 2024-01-13 NOTE — THERAPY
Physical Therapy   Daily Treatment     Patient Name: Debra Rodrigues  Age:  88 y.o., Sex:  female  Medical Record #: [id number]  Today's Date: 1/12/2024     Precautions  Precautions: Fall Risk  Comments: Maculary degeneration, hx of CVA    Subjective    Pt was seated in w/c upon arrival and agreeable to treatment.  Pt reported significant fatigue both sessions.  Pt initially did not want to participate but was agreeable with encouragement.  Pt's DIL present during morning session.      Objective       01/12/24 1031 01/12/24 1401   PT Charge Group   PT Therapeutic Exercise (Units) 1 2   PT Therapeutic Activities (Units) 3  --    PT Total Time Spent   PT Individual Total Time Spent (Mins) 60 30   Precautions   Precautions Fall Risk Fall Risk   Transfer Functional Level of Assist   Bed, Chair, Wheelchair Transfer Maximal Assist  --    Bed Chair Wheelchair Transfer Description Increased time;Set-up of equipment;Squat pivot transfer to wheelchair;Supervision for safety  --    Supine Lower Body Exercise   Straight Leg Raises  --  2 sets of 10;Bilateral;Front   Short Arc Quad  --  2 sets of 10;Bilateral   Heel Slide  --  2 sets of 10;Bilateral   Ankle Pumps  --  2 sets of 10;Bilateral   Gluteal Isometrics  --  2 sets of 10;Bilateral   Quadriceps Isometrics  --  2 sets of 10;Bilateral   Sitting Lower Body Exercises   Ankle Pumps 2 sets of 10;Bilateral  --    Hip Flexion 2 sets of 10;Bilateral  --    Hip Abduction 2 sets of 10;Bilateral  --    Hip Adduction 2 sets of 10;Bilateral  --    Long Arc Quad 2 sets of 10;Bilateral  --    Hamstring Curl 2 sets of 10;Bilateral  --    Sit to Stand 1 set of 10  --    Interdisciplinary Plan of Care Collaboration   Patient Position at End of Therapy Seated;Other (Comments)  (Pt taken to dining room for lunch) In Bed;Call Light within Reach;Tray Table within Reach;Phone within Reach     Completed STS training in // bars x 3 reps, progress to FWW at variable mat table heights x 3 reps.      Assessment    Pt continues to be limited d/t poor endurance, decreased strength, and poor activity tolerance.  Pt continues with poor anterior lean despite VCing.   Strengths: Able to follow instructions, Manages pain appropriately, Motivated for self care and independence, Pleasant and cooperative, Willingly participates in therapeutic activities  Barriers: Decreased endurance, Confused, Fatigue, Generalized weakness, Impaired activity tolerance, Impaired balance, Limited mobility    Plan    Continue seated DF stretches (in a w/c or with gait belt)    Continue seated LE exercises (gastroc, quads, hip flexors, hamstrings)   Work on postural muscle (sacp retractions)  STS from an elevated table   Standing R and L weight shifts with targets   Standing marches onto a step in //    FWW with targets for each step during walking     DME  PT DME Recommendations  Wheelchair:  (TBD)  Assistive Device:  (TBD)     Passport items to be completed:  Get in/out of bed safely, in/out of a vehicle, safely use mobility device, walk or wheel around home/community, navigate up and down stairs, show how to get up/down from the ground, ensure home is accessible, demonstrate HEP, complete caregiver training    Physical Therapy Problems (Active)       Problem: Mobility       Dates: Start:  01/10/24         Goal: STG-Within one week, patient will ambulate x 10' with FWW and Mod A        Dates: Start:  01/10/24         Goal Note filed on 01/10/24 1247 by Claudia Cherry, Student       Pt ambulated 10' with FWW and Max A but required breaks to complete the activity                  Problem: Mobility Transfers       Dates: Start:  01/06/24         Goal: STG-Within one week, patient will perform bed mobility with mod A       Dates: Start:  01/06/24         Goal Note filed on 01/10/24 1247 by Claudia Cherry, Student       Pt was Max A and required verbal and tactile cueing for sequencing and initiating task                 Problem: PT-Long  Term Goals       Dates: Start:  01/06/24         Goal: LTG-By discharge, patient will propel wheelchair x 150 feet with SPV       Dates: Start:  01/06/24            Goal: LTG-By discharge, patient will ambulate x 30 feet with FWW and CGA       Dates: Start:  01/06/24            Goal: LTG-By discharge, patient will transfer one surface to another with FWW and CGA       Dates: Start:  01/06/24            Goal: LTG-By discharge, patient will transfer in/out of a car with FWW and CGA       Dates: Start:  01/06/24

## 2024-01-13 NOTE — CARE PLAN
Problem: Skin Integrity  Goal: Skin integrity is maintained or improved  Note: Patient's skin is maintained and free from new or accidental injury this shift.  Will continue to monitor.     Problem: Infection  Goal: Patient will remain free from infection  Note: Patient remains free from s/s infection; afebrile.  Will continue to monitor.   The patient is Stable - Low risk of patient condition declining or worsening    Shift Goals  Clinical Goals: Safety  Patient Goals: Safety, gain strength

## 2024-01-13 NOTE — PROGRESS NOTES
Hospital Medicine Daily Progress Note      Chief Complaint  Hypertension  Afib    Interval Problem Update  No complaints.  Doing ok.    Review of Systems  Review of Systems   Constitutional:  Negative for fever.   Eyes:  Negative for blurred vision.   Respiratory:  Negative for shortness of breath.    Cardiovascular:  Negative for palpitations.   Gastrointestinal:  Negative for nausea and vomiting.   Neurological:  Negative for dizziness and headaches.   Psychiatric/Behavioral:  Negative for hallucinations.         Physical Exam  Temp:  [36.7 °C (98.1 °F)-37.1 °C (98.7 °F)] 36.7 °C (98.1 °F)  Pulse:  [66-75] 66  Resp:  [18] 18  BP: (102-154)/(55-71) 146/69  SpO2:  [92 %-97 %] 92 %    Physical Exam  Vitals and nursing note reviewed.   Constitutional:       General: She is not in acute distress.  HENT:      Mouth/Throat:      Mouth: Mucous membranes are moist.      Pharynx: Oropharynx is clear.   Eyes:      General: No scleral icterus.  Neck:      Vascular: No JVD.   Cardiovascular:      Rate and Rhythm: Normal rate and regular rhythm.      Heart sounds: Normal heart sounds.   Pulmonary:      Effort: Pulmonary effort is normal.      Breath sounds: No wheezing or rales.   Abdominal:      General: Bowel sounds are normal.      Palpations: Abdomen is soft.   Musculoskeletal:      Cervical back: No rigidity.      Right lower leg: No edema.      Left lower leg: No edema.   Skin:     General: Skin is warm and dry.   Neurological:      Mental Status: She is alert and oriented to person, place, and time.   Psychiatric:         Mood and Affect: Mood normal.         Behavior: Behavior normal.         Fluids    Intake/Output Summary (Last 24 hours) at 1/13/2024 0943  Last data filed at 1/13/2024 0833  Gross per 24 hour   Intake 300 ml   Output 2 ml   Net 298 ml         Laboratory  Recent Labs     01/12/24  0559   WBC 6.5   RBC 4.29   HEMOGLOBIN 13.1   HEMATOCRIT 39.4   MCV 91.8   MCH 30.5   MCHC 33.2   RDW 46.4   PLATELETCT 278    MPV 10.4       Recent Labs     01/12/24  0559   SODIUM 138   POTASSIUM 3.9   CHLORIDE 104   CO2 24   GLUCOSE 98   BUN 15   CREATININE 0.72   CALCIUM 8.2*                     Assessment/Plan  Hypokalemia  Assessment & Plan  K+: 3.2  --> 3.9 (1/12)  S/P KCL 40 meq x 1  Cont KCL: 20 meq daily (1/11)  2nd to diminished appetite  Cont to monitor    Abdominal distension  Assessment & Plan  AXR: shows gaseous colonic distension  Cont Simethicone    Hypoxia- (present on admission)  Assessment & Plan  [place] [facility] [facility] [place]  S/P Augmentin & Doxy for PNA    Acquired hypothyroidism- (present on admission)  Assessment & Plan  Cont Synthroid    MDD (major depressive disorder), recurrent, in partial remission (HCC)- (present on admission)  Assessment & Plan  Cont Paxil    Primary hypertension- (present on admission)  Assessment & Plan  BP labile and occ rises up a little  Cont Toprol XL   Cont Cozaar --> dose increased 1/11  Cont Norvasc  Will monitor another day since meds were recently adjusted (1/13)    Paroxysmal atrial fibrillation (HCC)- (present on admission)  Assessment & Plan  HR ok  S/P PPM  Cont Tambacor  Cont Eliquis  Cont to monitor

## 2024-01-13 NOTE — PROGRESS NOTES
NURSING DAILY NOTE    Name: Debra Rodrigues   Date of Admission: 1/5/2024   Admitting Diagnosis: Pneumonia due to infectious organism  Attending Physician: Eugenia Ruiz D.o.  Allergies: Other misc, Hydrocodone-acetaminophen, and Sulfa drugs    Safety  Patient Assist  max 2  Patient Precautions  Fall Risk  Precaution Comments  Maculary degeneration, hx of CVA  Bed Transfer Status  Maximal Assist (stand pivot w/c to bed)  Toilet Transfer Status   Maximal Assist  Assistive Devices  Rails, Wheelchair  Oxygen  None - Room Air  Diet/Therapeutic Dining  Current Diet Order   Procedures    Diet Order Diet: Level 6 - Soft and Bite Sized; Liquid level: Level 0 - Thin     Pill Administration  floated and one at a time   Agitated Behavioral Scale  15  ABS Level of Severity  No Agitation    Fall Risk  Has the patient had a fall this admission?   No  Rubina Echavarria Fall Risk Scoring  23, HIGH RISK  Fall Risk Safety Measures  bed alarm, chair alarm, poor balance, and low vision/ hearing    Vitals  Temperature: 37.1 °C (98.8 °F)  Temp src: Oral  Pulse: 65  Respiration: 18  Blood Pressure : 135/62  Blood Pressure MAP (Calculated): 86 MM HG  BP Location: Right, Upper Arm  Patient BP Position: Supine     Oxygen  Pulse Oximetry: 92 %  O2 (LPM): 0  O2 Delivery Device: None - Room Air    Bowel and Bladder  Last Bowel Movement  01/12/24  Stool Type  Type 6: Fluffy pieces with ragged edges, a mushy stool  Bowel Device  Bathroom, Diaper  Continent  Bladder: Did not void   Bowel: No movement  Bladder Function  Urine Void (mL):  (large)  Number of Times Voided: 1  Urine Color: Yellow  Urine Clarity: Unable to Evaluate  Number of Times Incontinent of Urine: 1  Wet Diaper Count: 1  Genitourinary Assessment   Bladder Assessment (WDL):  WDL Except  Cortes Catheter: Not Applicable  Urinary Elimination: Incontinence  Urine Color: Yellow  Urine Clarity: Unable to Evaluate  Number of  Bladder Accidents: 1  Total Number of Bladder of Accidents in Last 7 Days: 1  Number of Times Incontinent of Urine: 1  Bladder Device: Bathroom, Diaper  Time Void: No  Bladder Scan: Post Void  $ Bladder Scan Results (mL): 1 (0 scan)    Skin  Larry Score   16  Sensory Interventions   Bed Types: Standard/Trauma Mattress  Skin Preventative Measures: Pillows in Use for Support / Positioning  Moisture Interventions  Moisturizers/Barriers: Barrier Wipes, Barrier Cream      Pain  Pain Rating Scale  0 - No Pain  Pain Location  Generalized  Pain Location Orientation  Right  Pain Interventions   Declines    ADLs    Bathing   Staff, Shower  Linen Change      Personal Hygiene  Perineal Care, Moist Mamie Wipes  Chlorhexidine Bath      Oral Care  Brushed Teeth  Teeth/Dentures     Shave     Nutrition Percentage Eaten  Lunch, Between 50-75% Consumed  Environmental Precautions     Patient Turns/Positioning  Patient Turns Self from Side to Side  Patient Turns Assistance/Tolerance  Assistance of One  Bed Positions  Bed Controls On, Bed Locked  Head of Bed Elevated  Self regulated      Psychosocial/Neurologic Assessment  Psychosocial Assessment  Psychosocial (WDL):  WDL Except  Patient Behaviors: Fatigue, Forgetful  Neurologic Assessment  Neuro (WDL): Exceptions to WDL  Level of Consciousness: Alert  Orientation Level: Oriented to place, Oriented to person, Oriented to situation  Cognition: Follows commands  Speech: Clear  Pupil Assesment: No  Motor Function/Sensation Assessment: Sensation  RUE Sensation: Full sensation  LUE Sensation: Full sensation  RLE Sensation: Numbness, Tingling  LLE Sensation: Numbness, Tingling  EENT (WDL):  WDL Except    Cardio/Pulmonary Assessment  Edema   RLE Edema: Generalized  LLE Edema: Generalized  Respiratory Breath Sounds  RUL Breath Sounds: Clear  RML Breath Sounds: Clear  RLL Breath Sounds: Diminished  ANGÉLICA Breath Sounds: Clear  LLL Breath Sounds: Diminished  Cardiac Assessment   Cardiac (WDL):  WDL  Except (HX: HTN,AFIB,Pacer)

## 2024-01-14 ENCOUNTER — APPOINTMENT (OUTPATIENT)
Dept: SPEECH THERAPY | Facility: REHABILITATION | Age: 89
DRG: 194 | End: 2024-01-14
Attending: PHYSICAL MEDICINE & REHABILITATION
Payer: MEDICARE

## 2024-01-14 ENCOUNTER — APPOINTMENT (OUTPATIENT)
Dept: PHYSICAL THERAPY | Facility: REHABILITATION | Age: 89
DRG: 194 | End: 2024-01-14
Attending: PHYSICAL MEDICINE & REHABILITATION
Payer: MEDICARE

## 2024-01-14 PROCEDURE — 99232 SBSQ HOSP IP/OBS MODERATE 35: CPT | Performed by: HOSPITALIST

## 2024-01-14 PROCEDURE — 97129 THER IVNTJ 1ST 15 MIN: CPT

## 2024-01-14 PROCEDURE — 700102 HCHG RX REV CODE 250 W/ 637 OVERRIDE(OP): Performed by: HOSPITALIST

## 2024-01-14 PROCEDURE — A9270 NON-COVERED ITEM OR SERVICE: HCPCS | Performed by: HOSPITALIST

## 2024-01-14 PROCEDURE — A9270 NON-COVERED ITEM OR SERVICE: HCPCS | Performed by: PHYSICAL MEDICINE & REHABILITATION

## 2024-01-14 PROCEDURE — 97116 GAIT TRAINING THERAPY: CPT

## 2024-01-14 PROCEDURE — 97530 THERAPEUTIC ACTIVITIES: CPT

## 2024-01-14 PROCEDURE — 770010 HCHG ROOM/CARE - REHAB SEMI PRIVAT*

## 2024-01-14 PROCEDURE — 700102 HCHG RX REV CODE 250 W/ 637 OVERRIDE(OP): Performed by: PHYSICAL MEDICINE & REHABILITATION

## 2024-01-14 PROCEDURE — 97130 THER IVNTJ EA ADDL 15 MIN: CPT

## 2024-01-14 RX ADMIN — SIMETHICONE 125 MG: 125 TABLET, CHEWABLE ORAL at 08:55

## 2024-01-14 RX ADMIN — APIXABAN 5 MG: 5 TABLET, FILM COATED ORAL at 20:06

## 2024-01-14 RX ADMIN — AMANTADINE HYDROCHLORIDE 100 MG: 100 TABLET ORAL at 05:55

## 2024-01-14 RX ADMIN — Medication 1000 UNITS: at 08:55

## 2024-01-14 RX ADMIN — TRAZODONE HYDROCHLORIDE 50 MG: 50 TABLET ORAL at 20:06

## 2024-01-14 RX ADMIN — GABAPENTIN 100 MG: 100 CAPSULE ORAL at 20:07

## 2024-01-14 RX ADMIN — SIMETHICONE 125 MG: 125 TABLET, CHEWABLE ORAL at 11:23

## 2024-01-14 RX ADMIN — SIMETHICONE 125 MG: 125 TABLET, CHEWABLE ORAL at 20:07

## 2024-01-14 RX ADMIN — HYDRALAZINE HYDROCHLORIDE 25 MG: 25 TABLET, FILM COATED ORAL at 16:55

## 2024-01-14 RX ADMIN — LOSARTAN POTASSIUM 100 MG: 50 TABLET, FILM COATED ORAL at 05:55

## 2024-01-14 RX ADMIN — METOPROLOL SUCCINATE 12.5 MG: 25 TABLET, EXTENDED RELEASE ORAL at 05:54

## 2024-01-14 RX ADMIN — POTASSIUM CHLORIDE 20 MEQ: 1500 TABLET, EXTENDED RELEASE ORAL at 08:56

## 2024-01-14 RX ADMIN — FAMOTIDINE 20 MG: 20 TABLET ORAL at 20:06

## 2024-01-14 RX ADMIN — SIMETHICONE 125 MG: 125 TABLET, CHEWABLE ORAL at 18:12

## 2024-01-14 RX ADMIN — AMANTADINE HYDROCHLORIDE 100 MG: 100 TABLET ORAL at 11:23

## 2024-01-14 RX ADMIN — APIXABAN 5 MG: 5 TABLET, FILM COATED ORAL at 08:55

## 2024-01-14 RX ADMIN — FAMOTIDINE 20 MG: 20 TABLET ORAL at 08:56

## 2024-01-14 RX ADMIN — LEVOTHYROXINE SODIUM 75 MCG: 0.07 TABLET ORAL at 08:56

## 2024-01-14 RX ADMIN — LOSARTAN POTASSIUM 100 MG: 50 TABLET, FILM COATED ORAL at 18:12

## 2024-01-14 RX ADMIN — FLECAINIDE ACETATE 100 MG: 100 TABLET ORAL at 08:56

## 2024-01-14 RX ADMIN — AMLODIPINE BESYLATE 5 MG: 5 TABLET ORAL at 05:55

## 2024-01-14 RX ADMIN — PAROXETINE HYDROCHLORIDE 10 MG: 20 TABLET, FILM COATED ORAL at 08:55

## 2024-01-14 ASSESSMENT — ENCOUNTER SYMPTOMS
FEVER: 0
COUGH: 0
BLURRED VISION: 0
DIZZINESS: 0
NERVOUS/ANXIOUS: 0
DIARRHEA: 0

## 2024-01-14 ASSESSMENT — PAIN DESCRIPTION - PAIN TYPE: TYPE: ACUTE PAIN

## 2024-01-14 ASSESSMENT — GAIT ASSESSMENTS
GAIT LEVEL OF ASSIST: TOTAL ASSIST X 2
ASSISTIVE DEVICE: FRONT WHEEL WALKER
DEVIATION: BRADYKINETIC;SHUFFLED GAIT;DECREASED HEEL STRIKE;DECREASED TOE OFF

## 2024-01-14 NOTE — PROGRESS NOTES
Hospital Medicine Daily Progress Note      Chief Complaint  Hypertension  Afib    Interval Problem Update  No significant events overnight.    Review of Systems  Review of Systems   Constitutional:  Negative for fever.   Eyes:  Negative for blurred vision.   Respiratory:  Negative for cough.    Cardiovascular:  Negative for chest pain.   Gastrointestinal:  Negative for diarrhea.   Musculoskeletal:  Negative for joint pain.   Neurological:  Negative for dizziness.   Psychiatric/Behavioral:  The patient is not nervous/anxious.         Physical Exam  Temp:  [36.7 °C (98.1 °F)-37.1 °C (98.8 °F)] 36.9 °C (98.4 °F)  Pulse:  [65-66] 66  Resp:  [18] 18  BP: (135-140)/(62-77) 137/66  SpO2:  [90 %-93 %] 93 %    Physical Exam  Vitals and nursing note reviewed.   Constitutional:       Appearance: She is not diaphoretic.   HENT:      Mouth/Throat:      Pharynx: No oropharyngeal exudate or posterior oropharyngeal erythema.   Eyes:      Extraocular Movements: Extraocular movements intact.   Neck:      Vascular: No carotid bruit or JVD.   Cardiovascular:      Rate and Rhythm: Normal rate and regular rhythm.      Heart sounds: Normal heart sounds.   Pulmonary:      Effort: Pulmonary effort is normal.      Breath sounds: No wheezing or rales.   Abdominal:      General: There is no distension.      Palpations: Abdomen is soft.      Tenderness: There is no abdominal tenderness.   Musculoskeletal:      Right lower leg: No edema.      Left lower leg: No edema.   Skin:     General: Skin is warm and dry.   Neurological:      Mental Status: She is alert and oriented to person, place, and time.   Psychiatric:         Mood and Affect: Mood normal.         Behavior: Behavior normal.         Fluids    Intake/Output Summary (Last 24 hours) at 1/14/2024 0923  Last data filed at 1/13/2024 1814  Gross per 24 hour   Intake 280 ml   Output --   Net 280 ml         Laboratory  Recent Labs     01/12/24  0559   WBC 6.5   RBC 4.29   HEMOGLOBIN 13.1    HEMATOCRIT 39.4   MCV 91.8   MCH 30.5   MCHC 33.2   RDW 46.4   PLATELETCT 278   MPV 10.4       Recent Labs     01/12/24  0559   SODIUM 138   POTASSIUM 3.9   CHLORIDE 104   CO2 24   GLUCOSE 98   BUN 15   CREATININE 0.72   CALCIUM 8.2*                     Assessment/Plan  Hypokalemia  Assessment & Plan  K+: 3.2  --> 3.9 (1/12)  S/P KCL 40 meq x 1  Cont KCL: 20 meq daily (1/11)  2nd to diminished appetite  Cont to monitor    Abdominal distension  Assessment & Plan  AXR: shows gaseous colonic distension  Cont Simethicone    Hypoxia- (present on admission)  Assessment & Plan  Resolved (from [facility] [place]  S/P IV Lasix at [facility] for pulm edema  S/P Augmentin & Doxy for PNA    Acquired hypothyroidism- (present on admission)  Assessment & Plan  Cont Synthroid    MDD (major depressive disorder), recurrent, in partial remission (HCC)- (present on admission)  Assessment & Plan  Cont Paxil    Primary hypertension- (present on admission)  Assessment & Plan  BP better recently but occ rises up a little  Cont Toprol XL   Cont Cozaar  Cont Norvasc  Cont to monitor    Paroxysmal atrial fibrillation (HCC)- (present on admission)  Assessment & Plan  HR ok  S/P PPM  Cont Tambacor  Cont Eliquis  Cont to monitor

## 2024-01-14 NOTE — THERAPY
Speech Language Pathology  Daily Treatment     Patient Name: Debra Rodrigues  Age:  88 y.o., Sex:  female  Medical Record #: [id number]  Today's Date: 1/14/2024     Precautions  Precautions: Fall Risk  Comments: Macular degeneration    Subjective    Patient pleasant and agreeable to SLP services.  Patient requested to complete ST at bedside this session.     Objective       01/14/24 0901   Treatment Charges   SLP Cognitive Skill Development First 15 Minutes 1   SLP Cognitive Skill Development Additional 15 Minutes 1   SLP Total Time Spent   SLP Individual Total Time Spent (Mins) 30   Precautions   Precautions Fall Risk   Comments Macular degeneration   Cognition   Functional Memory Activities Minimal (4)   Interdisciplinary Plan of Care Collaboration   IDT Collaboration with  Nursing   Patient Position at End of Therapy In Bed;Bed Alarm On;Call Light within Reach;Tray Table within Reach;Phone within Reach   Collaboration Comments RN finishing medication adminsitration upon arrival.       Assessment    Patient completed auditory memory activity this session using word-list retention strategies to answer questions about category inclusion. Patient completed task with 50% accuracy independently; required min cues for 100% accuracy. Patient reports this task felt more challenging when the provided list of words became longer, however, she believes she is currently functioning close to baseline for auditory memory tasks.    Strengths: Alert and oriented, Effective communication skills, Supportive family, Willingly participates in therapeutic activities  Barriers: Generalized weakness, Impaired carryover of learning, Impaired functional cognition, Visual impairment (Low initiation and slow to process.)    Plan    Continue to address recall, following written directions.    D/c planning for expected d/c date of 1/17/2024.    Passport items to be completed:  Express basic needs, understand food/liquid recommendations,  consistently follow swallow precautions, manage finances, manage medications, arrive to therapy appointments on time, complete daily memory log entries, solve problems related to safety situations, review education related to hospitalization, complete caregiver training     Speech Therapy Problems (Active)       Problem: Comprehension STGs       Dates: Start:  01/06/24         Goal: STG-Within one week, patient will comprehend both auditory and written instructions with 75% accuracy provided mod assist.       Dates: Start:  01/06/24         Goal Note filed on 01/10/24 1338 by Dania Nelson MS,CCC-SLP       To be addressed.                  Problem: Memory STGs       Dates: Start:  01/08/24         Goal: STG-Within one week, patient will recall transfer sequencing with 75% with min A.       Dates: Start:  01/10/24               Problem: Problem Solving STGs       Dates: Start:  01/06/24         Goal: STG-Within one week, patient will solve basic problems related to safety/sequencing with 75% accuracy provided mod assist.       Dates: Start:  01/06/24         Goal Note filed on 01/10/24 1338 by Dania Nelson MS,CCC-SLP       75% with mod-max A for initiation and sequencing multiple steps.                 Problem: Speech/Swallowing LTGs       Dates: Start:  01/06/24         Goal: LTG-By discharge, patient will solve basic problems related to safety to facilitate safe d/c to a functional living environment.       Dates: Start:  01/06/24

## 2024-01-14 NOTE — CARE PLAN
"  Problem: Fall Risk - Rehab  Goal: Patient will remain free from falls  Outcome: Progressing  Note: Rubina Echavarria Fall risk Assessment Score: 23    High fall risk Interventions   - Alarming seatbelt  - Bed and strip alarm   - Yellow sign by the door   - Yellow wrist band \"Fall risk\"  - Room near to the nurse station  - Do not leave patient unattended in the bathroom  - Fall risk education provided      Problem: Pain - Standard  Goal: Alleviation of pain or a reduction in pain to the patient’s comfort goal  Outcome: Progressing  Note: Assessed for pain and discomfort , medicated with tylenol for generalized discomfort with relief. Needs anticipated and attended .     The patient is Stable - Low risk of patient condition declining or worsening    Shift Goals  Clinical Goals: Safety  Patient Goals: Safety, gain strength    Progress made toward(s) clinical / shift goals:  Pt free from fall and injury.    "

## 2024-01-14 NOTE — PROGRESS NOTES
NURSING DAILY NOTE    Name: Debra Rodrigues   Date of Admission: 1/5/2024   Admitting Diagnosis: Pneumonia due to infectious organism  Attending Physician: Eugenia Ruiz D.o.  Allergies: Other misc, Hydrocodone-acetaminophen, and Sulfa drugs    Safety  Patient Assist  max 2  Patient Precautions  Fall Risk  Precaution Comments  Maculary degeneration, hx of CVA  Bed Transfer Status  Maximal Assist (stand pivot w/c to bed)  Toilet Transfer Status   Maximal Assist  Assistive Devices  Rails, Wheelchair  Oxygen  None - Room Air  Diet/Therapeutic Dining  Current Diet Order   Procedures    Diet Order Diet: Level 6 - Soft and Bite Sized; Liquid level: Level 0 - Thin     Pill Administration  whole, floated, and one at a time   Agitated Behavioral Scale  15  ABS Level of Severity  No Agitation    Fall Risk  Has the patient had a fall this admission?   No  Rubina Echavarria Fall Risk Scoring  23, HIGH RISK  Fall Risk Safety Measures  bed alarm, chair alarm, poor balance, and low vision/ hearing    Vitals  Temperature: 36.7 °C (98.1 °F)  Temp src: Oral  Pulse: 65  Respiration: 18  Blood Pressure : (!) 140/77  Blood Pressure MAP (Calculated): 98 MM HG  BP Location: Right, Upper Arm  Patient BP Position: Supine     Oxygen  Pulse Oximetry: 90 %  O2 (LPM): 0  O2 Delivery Device: None - Room Air    Bowel and Bladder  Last Bowel Movement  01/13/24  Stool Type  Type 6: Fluffy pieces with ragged edges, a mushy stool  Bowel Device  Bathroom, Diaper  Continent  Bladder: Did not void   Bowel: No movement  Bladder Function  Urine Void (mL):  (large)  Number of Times Voided: 1  Urine Color: Unable To Evaluate  Urine Clarity: Unable to Evaluate  Number of Times Incontinent of Urine: 1  Wet Diaper Count: 1  Genitourinary Assessment   Bladder Assessment (WDL):  WDL Except  Cortes Catheter: Not Applicable  Urinary Elimination: Incontinence  Urine Color: Unable To Evaluate  Urine  Clarity: Unable to Evaluate  Number of Bladder Accidents: 1  Total Number of Bladder of Accidents in Last 7 Days: 1  Number of Times Incontinent of Urine: 1  Bladder Device: Bathroom, Diaper  Time Void: No  Bladder Scan: Post Void  $ Bladder Scan Results (mL): 1 (0 scan)    Skin  Larry Score   16  Sensory Interventions   Bed Types: Standard/Trauma Mattress  Skin Preventative Measures: Pillows in Use for Support / Positioning  Moisture Interventions  Moisturizers/Barriers: Barrier Wipes      Pain  Pain Rating Scale  0 - No Pain  Pain Location  Generalized  Pain Location Orientation  Right  Pain Interventions   Declines    ADLs    Bathing   Staff, Shower  Linen Change      Personal Hygiene  Perineal Care, Moist Mamie Wipes  Chlorhexidine Bath      Oral Care  Brushed Teeth  Teeth/Dentures     Shave     Nutrition Percentage Eaten  Dinner, Between 50-75% Consumed  Environmental Precautions     Patient Turns/Positioning  Patient Turns Self from Side to Side  Patient Turns Assistance/Tolerance  Assistance of One  Bed Positions  Bed Controls On, Bed Locked  Head of Bed Elevated  Self regulated      Psychosocial/Neurologic Assessment  Psychosocial Assessment  Psychosocial (WDL):  WDL Except  Patient Behaviors: Fatigue, Forgetful  Neurologic Assessment  Neuro (WDL): Exceptions to WDL  Level of Consciousness: Alert  Orientation Level: Oriented to place, Oriented to person, Oriented to situation  Cognition: Follows commands  Speech: Clear  Pupil Assesment: No  Motor Function/Sensation Assessment: Sensation  RUE Sensation: Full sensation  LUE Sensation: Full sensation  RLE Sensation: Numbness, Tingling  LLE Sensation: Numbness, Tingling  EENT (WDL):  WDL Except    Cardio/Pulmonary Assessment  Edema   RLE Edema: Generalized  LLE Edema: Generalized  Respiratory Breath Sounds  RUL Breath Sounds: Clear  RML Breath Sounds: Clear  RLL Breath Sounds: Diminished  ANGÉLICA Breath Sounds: Clear  LLL Breath Sounds: Diminished  Cardiac  Assessment   Cardiac (WDL):  WDL Except (HX: HTN,AFIB,Pacer)

## 2024-01-14 NOTE — THERAPY
Physical Therapy   Daily Treatment     Patient Name: Debra Rodrigues  Age:  88 y.o., Sex:  female  Medical Record #: [id number]  Today's Date: 1/14/2024     Precautions  Precautions: (P) Fall Risk  Comments: (P) Macular degeneration    Subjective    Patient agreeable to treatment. Requesting to use bathroom.     Objective       01/14/24 0931   PT Charge Group   PT Gait Training (Units) 2   PT Therapeutic Activities (Units) 2   PT Total Time Spent   PT Individual Total Time Spent (Mins) 60   Precautions   Precautions Fall Risk   Comments Macular degeneration   Vitals   Vitals Comments CNA took vitals at beginning of treatment   Pain   Intervention Declines   Cognition    Level of Consciousness Alert   ABS (Agitated Behavior Scale)   Agitated Behavior Scale Performed No   Sleep/Wake Cycle   Sleep & Rest Resting   Sleep Observations Alert upon awakening   Gait Functional Level of Assist    Gait Level Of Assist Total Assist X 2  (CGA x 1, close WC follow x 1 due to limited endurance)   Assistive Device Front Wheel Walker   Distance (Feet)   (total 31 feet in four bouts, ranging from 5-12 ft/ea)   # of Times Distance was Traveled 4   Deviation Bradykinetic;Shuffled Gait;Decreased Heel Strike;Decreased Toe Off   Wheelchair Functional Level of Assist   Wheelchair Assist Minimal Assist   Distance Wheelchair (Feet or Distance) 20   Wheelchair Description Impaired coordination;Requires incidental assist;Extra time   Stairs Functional Level of Assist   Level of Assist with Stairs Unable to Participate   Transfer Functional Level of Assist   Bed, Chair, Wheelchair Transfer Maximal Assist   Bed Chair Wheelchair Transfer Description Increased time;Initial preparation for task;Set-up of equipment;Supervision for safety;Adaptive equipment  (EOB to WC using FWW)   Toilet Transfers Maximal Assist   Toilet Transfer Description Grab bar;Increased time;Requires lift;Supervision for safety;Set-up of equipment   Bed Mobility    Sit to  Supine Maximal Assist   Sit to Stand Moderate Assist   Scooting Maximal Assist   Rolling Moderate Assist to Lt.;Moderate Assist to Rt.   Neuro-Muscular Treatments   Neuro-Muscular Treatments Anterior weight shift;Sequencing;Tactile Cuing;Verbal Cuing   Comments transfer sequencing, gait rozina   Interdisciplinary Plan of Care Collaboration   IDT Collaboration with  Nursing;Therapy Tech   Patient Position at End of Therapy Seated;Chair Alarm On;Self Releasing Lap Belt Applied;Call Light within Reach;Tray Table within Reach;Phone within Reach   Collaboration Comments notified nursing of torn fingernail- clipped by nursing; tech assist throughout treatment     Donned pants in bed with moderate assist for bridging and rolling side to side.  STS from WC with FWW in front- initial posterior lean, decreased with repetitions.    Assessment    Patient able to complete STS without reduced assist throughout session with VC for anterior weight shifting. Improved stand to sit transfers with patient reaching back for chair. Improved gait distance today- patient reports weakness in legs is limiting factor to her completing longer gait distances. Pleasant and cooperative throughout session.      Strengths: Able to follow instructions, Manages pain appropriately, Motivated for self care and independence, Pleasant and cooperative, Willingly participates in therapeutic activities  Barriers: Decreased endurance, Confused, Fatigue, Generalized weakness, Impaired activity tolerance, Impaired balance, Limited mobility    Plan    Continue seated DF stretches (in a w/c or with gait belt)    Continue seated LE exercises (gastroc, quads, hip flexors, hamstrings)   Work on postural muscle (sacp retractions)  STS from an elevated table   Standing R and L weight shifts with targets   Standing marches onto a step in //    FWW with targets for each step during walking    DME  PT DME Recommendations  Wheelchair:  (TBD)  Assistive Device:   (TBD)    Passport items to be completed:  Get in/out of bed safely, in/out of a vehicle, safely use mobility device, walk or wheel around home/community, navigate up and down stairs, show how to get up/down from the ground, ensure home is accessible, demonstrate HEP, complete caregiver training    Physical Therapy Problems (Active)       Problem: Mobility       Dates: Start:  01/10/24         Goal: STG-Within one week, patient will ambulate x 10' with FWW and Mod A        Dates: Start:  01/10/24         Goal Note filed on 01/10/24 1247 by Claudia Cherry, Student       Pt ambulated 10' with FWW and Max A but required breaks to complete the activity                  Problem: Mobility Transfers       Dates: Start:  01/06/24         Goal: STG-Within one week, patient will perform bed mobility with mod A       Dates: Start:  01/06/24         Goal Note filed on 01/10/24 1247 by Claudia Cherry, Student       Pt was Max A and required verbal and tactile cueing for sequencing and initiating task                 Problem: PT-Long Term Goals       Dates: Start:  01/06/24         Goal: LTG-By discharge, patient will propel wheelchair x 150 feet with SPV       Dates: Start:  01/06/24            Goal: LTG-By discharge, patient will ambulate x 30 feet with FWW and CGA       Dates: Start:  01/06/24            Goal: LTG-By discharge, patient will transfer one surface to another with FWW and CGA       Dates: Start:  01/06/24            Goal: LTG-By discharge, patient will transfer in/out of a car with FWW and CGA       Dates: Start:  01/06/24

## 2024-01-15 ENCOUNTER — APPOINTMENT (OUTPATIENT)
Dept: PHYSICAL THERAPY | Facility: REHABILITATION | Age: 89
DRG: 194 | End: 2024-01-15
Attending: PHYSICAL MEDICINE & REHABILITATION
Payer: MEDICARE

## 2024-01-15 ENCOUNTER — APPOINTMENT (OUTPATIENT)
Dept: SPEECH THERAPY | Facility: REHABILITATION | Age: 89
DRG: 194 | End: 2024-01-15
Attending: PHYSICAL MEDICINE & REHABILITATION
Payer: MEDICARE

## 2024-01-15 ENCOUNTER — APPOINTMENT (OUTPATIENT)
Dept: OCCUPATIONAL THERAPY | Facility: REHABILITATION | Age: 89
DRG: 194 | End: 2024-01-15
Attending: PHYSICAL MEDICINE & REHABILITATION
Payer: MEDICARE

## 2024-01-15 LAB
ANION GAP SERPL CALC-SCNC: 10 MMOL/L (ref 7–16)
BASOPHILS # BLD AUTO: 1.3 % (ref 0–1.8)
BASOPHILS # BLD: 0.09 K/UL (ref 0–0.12)
BUN SERPL-MCNC: 12 MG/DL (ref 8–22)
CALCIUM SERPL-MCNC: 8.4 MG/DL (ref 8.5–10.5)
CHLORIDE SERPL-SCNC: 105 MMOL/L (ref 96–112)
CO2 SERPL-SCNC: 22 MMOL/L (ref 20–33)
CREAT SERPL-MCNC: 0.76 MG/DL (ref 0.5–1.4)
EOSINOPHIL # BLD AUTO: 0.27 K/UL (ref 0–0.51)
EOSINOPHIL NFR BLD: 4 % (ref 0–6.9)
ERYTHROCYTE [DISTWIDTH] IN BLOOD BY AUTOMATED COUNT: 45.9 FL (ref 35.9–50)
GFR SERPLBLD CREATININE-BSD FMLA CKD-EPI: 75 ML/MIN/1.73 M 2
GLUCOSE SERPL-MCNC: 102 MG/DL (ref 65–99)
HCT VFR BLD AUTO: 42 % (ref 37–47)
HGB BLD-MCNC: 13.9 G/DL (ref 12–16)
IMM GRANULOCYTES # BLD AUTO: 0.07 K/UL (ref 0–0.11)
IMM GRANULOCYTES NFR BLD AUTO: 1 % (ref 0–0.9)
LYMPHOCYTES # BLD AUTO: 1.63 K/UL (ref 1–4.8)
LYMPHOCYTES NFR BLD: 23.9 % (ref 22–41)
MCH RBC QN AUTO: 30.3 PG (ref 27–33)
MCHC RBC AUTO-ENTMCNC: 33.1 G/DL (ref 32.2–35.5)
MCV RBC AUTO: 91.7 FL (ref 81.4–97.8)
MONOCYTES # BLD AUTO: 0.72 K/UL (ref 0–0.85)
MONOCYTES NFR BLD AUTO: 10.6 % (ref 0–13.4)
NEUTROPHILS # BLD AUTO: 4.03 K/UL (ref 1.82–7.42)
NEUTROPHILS NFR BLD: 59.2 % (ref 44–72)
NRBC # BLD AUTO: 0 K/UL
NRBC BLD-RTO: 0 /100 WBC (ref 0–0.2)
PLATELET # BLD AUTO: 281 K/UL (ref 164–446)
PMV BLD AUTO: 10.4 FL (ref 9–12.9)
POTASSIUM SERPL-SCNC: 4 MMOL/L (ref 3.6–5.5)
RBC # BLD AUTO: 4.58 M/UL (ref 4.2–5.4)
SODIUM SERPL-SCNC: 137 MMOL/L (ref 135–145)
WBC # BLD AUTO: 6.8 K/UL (ref 4.8–10.8)

## 2024-01-15 PROCEDURE — 97130 THER IVNTJ EA ADDL 15 MIN: CPT

## 2024-01-15 PROCEDURE — A9270 NON-COVERED ITEM OR SERVICE: HCPCS | Performed by: HOSPITALIST

## 2024-01-15 PROCEDURE — 97110 THERAPEUTIC EXERCISES: CPT

## 2024-01-15 PROCEDURE — 700102 HCHG RX REV CODE 250 W/ 637 OVERRIDE(OP): Performed by: PHYSICAL MEDICINE & REHABILITATION

## 2024-01-15 PROCEDURE — 700102 HCHG RX REV CODE 250 W/ 637 OVERRIDE(OP): Performed by: HOSPITALIST

## 2024-01-15 PROCEDURE — 97535 SELF CARE MNGMENT TRAINING: CPT | Mod: CO

## 2024-01-15 PROCEDURE — 85025 COMPLETE CBC W/AUTO DIFF WBC: CPT

## 2024-01-15 PROCEDURE — 97116 GAIT TRAINING THERAPY: CPT

## 2024-01-15 PROCEDURE — 97112 NEUROMUSCULAR REEDUCATION: CPT

## 2024-01-15 PROCEDURE — 36415 COLL VENOUS BLD VENIPUNCTURE: CPT

## 2024-01-15 PROCEDURE — 97129 THER IVNTJ 1ST 15 MIN: CPT

## 2024-01-15 PROCEDURE — A9270 NON-COVERED ITEM OR SERVICE: HCPCS | Performed by: PHYSICAL MEDICINE & REHABILITATION

## 2024-01-15 PROCEDURE — 770010 HCHG ROOM/CARE - REHAB SEMI PRIVAT*

## 2024-01-15 PROCEDURE — 97110 THERAPEUTIC EXERCISES: CPT | Mod: CO

## 2024-01-15 PROCEDURE — 99232 SBSQ HOSP IP/OBS MODERATE 35: CPT | Performed by: HOSPITALIST

## 2024-01-15 PROCEDURE — 99232 SBSQ HOSP IP/OBS MODERATE 35: CPT | Performed by: PHYSICAL MEDICINE & REHABILITATION

## 2024-01-15 PROCEDURE — 80048 BASIC METABOLIC PNL TOTAL CA: CPT

## 2024-01-15 RX ORDER — AMLODIPINE BESYLATE 5 MG/1
5 TABLET ORAL ONCE
Status: COMPLETED | OUTPATIENT
Start: 2024-01-15 | End: 2024-01-15

## 2024-01-15 RX ORDER — AMLODIPINE BESYLATE 5 MG/1
10 TABLET ORAL
Status: DISCONTINUED | OUTPATIENT
Start: 2024-01-16 | End: 2024-01-22 | Stop reason: HOSPADM

## 2024-01-15 RX ADMIN — LEVOTHYROXINE SODIUM 75 MCG: 0.07 TABLET ORAL at 08:03

## 2024-01-15 RX ADMIN — APIXABAN 5 MG: 5 TABLET, FILM COATED ORAL at 19:24

## 2024-01-15 RX ADMIN — TRAZODONE HYDROCHLORIDE 50 MG: 50 TABLET ORAL at 19:24

## 2024-01-15 RX ADMIN — AMLODIPINE BESYLATE 5 MG: 5 TABLET ORAL at 05:12

## 2024-01-15 RX ADMIN — Medication 1000 UNITS: at 08:04

## 2024-01-15 RX ADMIN — APIXABAN 5 MG: 5 TABLET, FILM COATED ORAL at 08:04

## 2024-01-15 RX ADMIN — LOSARTAN POTASSIUM 100 MG: 50 TABLET, FILM COATED ORAL at 17:26

## 2024-01-15 RX ADMIN — AMLODIPINE BESYLATE 5 MG: 5 TABLET ORAL at 11:31

## 2024-01-15 RX ADMIN — SIMETHICONE 125 MG: 125 TABLET, CHEWABLE ORAL at 19:23

## 2024-01-15 RX ADMIN — FAMOTIDINE 20 MG: 20 TABLET ORAL at 19:23

## 2024-01-15 RX ADMIN — AMANTADINE HYDROCHLORIDE 100 MG: 100 TABLET ORAL at 11:31

## 2024-01-15 RX ADMIN — POTASSIUM CHLORIDE 20 MEQ: 1500 TABLET, EXTENDED RELEASE ORAL at 08:04

## 2024-01-15 RX ADMIN — SIMETHICONE 125 MG: 125 TABLET, CHEWABLE ORAL at 17:26

## 2024-01-15 RX ADMIN — FAMOTIDINE 20 MG: 20 TABLET ORAL at 08:04

## 2024-01-15 RX ADMIN — SIMETHICONE 125 MG: 125 TABLET, CHEWABLE ORAL at 08:03

## 2024-01-15 RX ADMIN — GABAPENTIN 100 MG: 100 CAPSULE ORAL at 19:23

## 2024-01-15 RX ADMIN — METOPROLOL SUCCINATE 12.5 MG: 25 TABLET, EXTENDED RELEASE ORAL at 05:12

## 2024-01-15 RX ADMIN — AMANTADINE HYDROCHLORIDE 100 MG: 100 TABLET ORAL at 05:12

## 2024-01-15 RX ADMIN — LOSARTAN POTASSIUM 100 MG: 50 TABLET, FILM COATED ORAL at 05:12

## 2024-01-15 RX ADMIN — PAROXETINE HYDROCHLORIDE 10 MG: 20 TABLET, FILM COATED ORAL at 08:04

## 2024-01-15 RX ADMIN — FLECAINIDE ACETATE 100 MG: 100 TABLET ORAL at 08:04

## 2024-01-15 RX ADMIN — SIMETHICONE 125 MG: 125 TABLET, CHEWABLE ORAL at 11:31

## 2024-01-15 ASSESSMENT — BRIEF INTERVIEW FOR MENTAL STATUS (BIMS)
INITIAL REPETITION OF BED BLUE SOCK - FIRST ATTEMPT: 3
BIMS SUMMARY SCORE: 14
WHAT MONTH IS IT: ACCURATE WITHIN 5 DAYS
ASKED TO RECALL BLUE: YES, AFTER CUEING (A COLOR")"
COGNITIVE PATTERN ASSESSMENT USED: BIMS
ASKED TO RECALL SOCK: YES, NO CUE REQUIRED
ASKED TO RECALL BED: YES, NO CUE REQUIRED
WHAT DAY OF THE WEEK IS IT: CORRECT
WHAT YEAR IS IT: CORRECT

## 2024-01-15 ASSESSMENT — ENCOUNTER SYMPTOMS
VOMITING: 0
SHORTNESS OF BREATH: 0
NERVOUS/ANXIOUS: 0
DIARRHEA: 0
ABDOMINAL PAIN: 0
CHILLS: 0
NAUSEA: 0
FEVER: 0

## 2024-01-15 ASSESSMENT — ACTIVITIES OF DAILY LIVING (ADL): TOILET_TRANSFER_DESCRIPTION: GRAB BAR;INCREASED TIME;VERBAL CUEING

## 2024-01-15 ASSESSMENT — GAIT ASSESSMENTS
ASSISTIVE DEVICE: PARALLEL BARS
DISTANCE (FEET): 8
GAIT LEVEL OF ASSIST: TOTAL ASSIST X 2
DEVIATION: BRADYKINETIC;DECREASED HEEL STRIKE;DECREASED TOE OFF;DECREASED BASE OF SUPPORT

## 2024-01-15 NOTE — PROGRESS NOTES
Patient care assumed. Report received from [facility] BLACK Najera. Patient is alert and calm, resting in bed. Call light and bedside table within reach. Will continue to monitor.

## 2024-01-15 NOTE — THERAPY
Physical Therapy   Daily Treatment     Patient Name: Debra Rodrigues  Age:  88 y.o., Sex:  female  Medical Record #: [id number]  Today's Date: 1/15/2024     Precautions  Precautions: (P) Fall Risk  Comments: Macular degeneration    Subjective    Pt was received from her previous PT session and was seated in her w/c. She was agreeable to the session.      Objective       01/15/24 1031   Precautions   Precautions Fall Risk   Standing Lower Body Exercises   Standing Lower Body Exercises Yes   Marching 1 set of 10  (1x10 using the //, alternating; 2x5 alternating, using a FWW in the //)   Bed Mobility    Sit to Stand Max Assist  (increased time, VC for sequencing, assist at pelvis for standing, // for steadying)   Neuro-Muscular Treatments   Neuro-Muscular Treatments Anterior weight shift;Sequencing;Tactile Cuing;Verbal Cuing;Weight Shift Right;Weight Shift Left;Postural Facilitation   Comments Seated in w/c with front, side, and across body reaches to match playing cards, 50% VC to help with matching and reaching actvities 1x20 with R hand, cards in random order, 1x10 with left hand, cards set in different order, scanning; Standing anterior to neutral stepping in // to work on AP weight shifting, 1x10 on each leg, stepping to different colored targets, VC for sequencing   Interdisciplinary Plan of Care Collaboration   IDT Collaboration with  Certified Nursing Assistant   Patient Position at End of Therapy Seated;Chair Alarm On;Self Releasing Lap Belt Applied  (handed off to CNA)   Physical Therapist Assigned   Assigned PT / Treatment Time / Comments Roxann/Claudia. TechA if no student. No 30 min treat and no 0830; 60 okay. Needs breaks after 60 min.     Increased time in between sets and exercises to allow for recovery d/t pt's self report of fatigue. Needs increased cuing and motivation for participation     Assessment    Pt demonstrated within session improvement for forward trunk lean during STS after performing  seated reaching exercises. She demonstrated adequate left to right visual scanning with occasional VC to direct her attention in the right direction. She is improving her STS by decreasing the amount of VC needed to perform the activity but continues to require 1 person Max A to perform the activity.     Strengths: Able to follow instructions, Manages pain appropriately, Motivated for self care and independence, Pleasant and cooperative, Willingly participates in therapeutic activities  Barriers: Decreased endurance, Confused, Fatigue, Generalized weakness, Impaired activity tolerance, Impaired balance, Limited mobility    Plan    STS from an elevated table with a reaching game (card matching, per board)   Trial modified TUG in //  Trial standing balance/tolerance (ladder toss)  Continue seated DF stretches (in a w/c or with gait belt)    Continue seated LE exercises (gastroc, quads, hip flexors, hamstrings)   Work on postural muscle (sacp retractions)    DME  PT DME Recommendations  Wheelchair:  (TBD)  Assistive Device:  (TBD)    Passport items to be completed:  Get in/out of bed safely, in/out of a vehicle, safely use mobility device, walk or wheel around home/community, navigate up and down stairs, show how to get up/down from the ground, ensure home is accessible, demonstrate HEP, complete caregiver training    Physical Therapy Problems (Active)       Problem: Mobility       Dates: Start:  01/10/24         Goal: STG-Within one week, patient will ambulate x 10' with FWW and Mod A        Dates: Start:  01/10/24         Goal Note filed on 01/10/24 1247 by Claudia Cherry, Student       Pt ambulated 10' with FWW and Max A but required breaks to complete the activity                  Problem: Mobility Transfers       Dates: Start:  01/06/24         Goal: STG-Within one week, patient will perform bed mobility with mod A       Dates: Start:  01/06/24         Goal Note filed on 01/10/24 1247 by Claudia Cherry, Student        Pt was Bhavin LOPEZ and required verbal and tactile cueing for sequencing and initiating task                 Problem: PT-Long Term Goals       Dates: Start:  01/06/24         Goal: LTG-By discharge, patient will propel wheelchair x 150 feet with SPV       Dates: Start:  01/06/24            Goal: LTG-By discharge, patient will ambulate x 30 feet with FWW and CGA       Dates: Start:  01/06/24            Goal: LTG-By discharge, patient will transfer one surface to another with FWW and CGA       Dates: Start:  01/06/24            Goal: LTG-By discharge, patient will transfer in/out of a car with FWW and CGA       Dates: Start:  01/06/24

## 2024-01-15 NOTE — THERAPY
Occupational Therapy  Daily Treatment     Patient Name: Debra Rodrigues  Age:  88 y.o., Sex:  female  Medical Record #: [id number]  Today's Date: 1/15/2024     Precautions  Precautions: Fall Risk  Comments: Macular degeneration         Subjective    Pt wsa seated in w/c upon arrival and willing to participate in OT session.      Objective       01/15/24 0831   OT Charge Group   OT Self Care / ADL (Units) 2   OT Therapeutic Exercise (Units) 2   OT Total Time Spent   OT Individual Total Time Spent (Mins) 60   Functional Level of Assist   Grooming Standby Assist   Grooming Description Increased time;Seated in wheelchair at sink;Verbal cueing   Toileting Total Assist   Toileting Description Assist to pull pants up;Assist to pull pants down;Grab bar;Increased time;Assist for hygiene   Toilet Transfers Maximal Assist   Toilet Transfer Description Grab bar;Increased time;Verbal cueing   Sitting Upper Body Exercises   Front Arm Raise 2 sets of 10;Bilateral   Bicep Curls 2 sets of 10;Bilateral;Weight (See Comments for lbs)  (1lbs hand weight)   Other Exercise Nustep ~10min level 2   Interdisciplinary Plan of Care Collaboration   Patient Position at End of Therapy Seated;Chair Alarm On;Call Light within Reach;Tray Table within Reach;Phone within Reach     Pt was Max A 1 PA for STS w/ stand pivot transfer from w/c>toilet using grab bars, however was Max A for stand pivot transfer from toilet>w/c with VC for LB sequencing. Pt was able to recall scooting up in w/c prior to STS with VC's   ADLs. Increase time for toileting and mal care. Total A to complete mal care post BM, however CGA for standing support to wipe the front. Pt needed VC for task initiation for grooming tasks, however was able to complete washing hands, combing hair, and brushing teeth    Assessment    Pt tolerated session fairly. Pt cont to make slow but steady progress with functional mobility and requires significant increase in time d/t visual deficits and  task processing.     Strengths: Alert and oriented, Able to follow instructions, Pleasant and cooperative, Willingly participates in therapeutic activities  Barriers: Decreased endurance, Generalized weakness, Impaired activity tolerance, Impaired balance, Limited mobility, Visual impairment    Plan    ADLs  Standing balance/ tolerance  Functional transfers   Strengthening/endurance     DME       Passport items to be completed:  Perform bathroom transfers, complete dressing, complete feeding, get ready for the day, prepare a simple meal, participate in household tasks, adapt home for safety needs, demonstrate home exercise program, complete caregiver training     Occupational Therapy Goals (Active)       Problem: Dressing       Dates: Start:  01/06/24         Goal: STG-Within one week, patient will dress UB at a Min A level.        Dates: Start:  01/06/24            Goal: STG-Within one week, patient will dress LB at a Mod A level with AE/AD PRN.        Dates: Start:  01/06/24               Problem: Functional Transfers       Dates: Start:  01/06/24         Goal: STG-Within one week, patient will transfer to toilet at a Mod A level with AD/DME PRN.        Dates: Start:  01/06/24               Problem: OT Long Term Goals       Dates: Start:  01/06/24         Goal: LTG-By discharge, patient will complete basic self care tasks at a SUP to Mod A level with AE/AD/DME PRN.        Dates: Start:  01/06/24            Goal: LTG-By discharge, patient will perform bathroom transfers at a SUP to Min A level with LRD and DME PRN.        Dates: Start:  01/06/24               Problem: Toileting       Dates: Start:  01/06/24         Goal: STG-Within one week, patient will complete toileting tasks at a Mod A level with AE/AD PRN.        Dates: Start:  01/06/24

## 2024-01-15 NOTE — CARE PLAN
The patient is Stable - Low risk of patient condition declining or worsening    Shift Goals  Clinical Goals: safety  Patient Goals: safety    Problem: Fall Risk - Rehab  Goal: Patient will remain free from falls  Outcome: Progressing Patient demonstrates good safety technique this shift.  Asks for assistance when needed and does not attempt self transfer.  Able to verbalize needs.  Will continue to monitor.     Problem: Pain - Standard  Goal: Alleviation of pain or a reduction in pain to the patient’s comfort goal  Outcome: Progressing  Patient able to verbalize pain level and verbalize an acceptable level of pain.

## 2024-01-15 NOTE — PROGRESS NOTES
NURSING DAILY NOTE    Name: Debra Rodrigues   Date of Admission: 1/5/2024   Admitting Diagnosis: Pneumonia due to infectious organism  Attending Physician: Eugenia Ruiz D.o.  Allergies: Other misc, Hydrocodone-acetaminophen, and Sulfa drugs    Safety  Patient Assist  max 2  Patient Precautions  Fall Risk  Precaution Comments  Macular degeneration  Bed Transfer Status  Maximal Assist  Toilet Transfer Status   Maximal Assist  Assistive Devices  Rails, Wheelchair  Oxygen  None - Room Air  Diet/Therapeutic Dining  Current Diet Order   Procedures    Diet Order Diet: Level 6 - Soft and Bite Sized; Liquid level: Level 0 - Thin     Pill Administration  floated and one at a time   Agitated Behavioral Scale  15  ABS Level of Severity  No Agitation    Fall Risk  Has the patient had a fall this admission?   No  Rubina Echavarria Fall Risk Scoring  16, HIGH RISK  Fall Risk Safety Measures  bed alarm, chair alarm, poor balance, and low vision/ hearing    Vitals  Temperature: 36.7 °C (98 °F)  Temp src: Oral  Pulse: 64  Respiration: 18  Blood Pressure : 138/68  Blood Pressure MAP (Calculated): 91 MM HG  BP Location: Right, Upper Arm  Patient BP Position: Supine     Oxygen  Pulse Oximetry: 91 %  O2 (LPM): 0  O2 Delivery Device: None - Room Air    Bowel and Bladder  Last Bowel Movement  01/14/24  Stool Type  Type 6: Fluffy pieces with ragged edges, a mushy stool  Bowel Device  Bathroom, Diaper  Continent  Bladder: Did not void   Bowel: No movement  Bladder Function  Urine Void (mL):  (large)  Number of Times Voided: 1  Urine Color: Unable To Evaluate  Urine Clarity: Unable to Evaluate  Number of Times Incontinent of Urine: 1  Wet Diaper Count: 1  Genitourinary Assessment   Bladder Assessment (WDL):  WDL Except  Cortes Catheter: Not Applicable  Urinary Elimination: Incontinence  Urine Color: Unable To Evaluate  Urine Clarity: Unable to Evaluate  Number of Bladder Accidents:  1  Total Number of Bladder of Accidents in Last 7 Days: 1  Number of Times Incontinent of Urine: 1  Bladder Device: Bathroom, Diaper  Time Void: No  Bladder Scan: Post Void  $ Bladder Scan Results (mL): 1 (0 scan)    Skin  Larry Score   16  Sensory Interventions   Bed Types: Standard/Trauma Mattress  Skin Preventative Measures: Pillows in Use for Support / Positioning  Moisture Interventions  Moisturizers/Barriers: Barrier Wipes      Pain  Pain Rating Scale  0 - No Pain  Pain Location  Generalized  Pain Location Orientation  Right  Pain Interventions   Declines    ADLs    Bathing   Staff, Shower  Linen Change      Personal Hygiene  Perineal Care, Moist Mamie Wipes  Chlorhexidine Bath      Oral Care  Brushed Teeth  Teeth/Dentures     Shave     Nutrition Percentage Eaten  Dinner, Between 50-75% Consumed  Environmental Precautions     Patient Turns/Positioning  Patient Turns Self from Side to Side  Patient Turns Assistance/Tolerance  Assistance of One  Bed Positions  Bed Controls On, Bed Locked  Head of Bed Elevated  Self regulated      Psychosocial/Neurologic Assessment  Psychosocial Assessment  Psychosocial (WDL):  WDL Except  Patient Behaviors: Fatigue, Forgetful  Neurologic Assessment  Neuro (WDL): Exceptions to WDL  Level of Consciousness: Alert  Orientation Level: Oriented to place, Oriented to person, Oriented to situation  Cognition: Follows commands  Speech: Clear  Pupil Assesment: No  Motor Function/Sensation Assessment: Sensation  RUE Sensation: Full sensation  LUE Sensation: Full sensation  RLE Sensation: Numbness, Tingling  LLE Sensation: Numbness, Tingling  EENT (WDL):  WDL Except    Cardio/Pulmonary Assessment  Edema   RLE Edema: Generalized  LLE Edema: Generalized  Respiratory Breath Sounds  RUL Breath Sounds: Clear  RML Breath Sounds: Clear  RLL Breath Sounds: Diminished  ANGÉLICA Breath Sounds: Clear  LLL Breath Sounds: Diminished  Cardiac Assessment   Cardiac (WDL):  WDL Except (HX:  HTN,AFIB,Pacer)

## 2024-01-15 NOTE — PROGRESS NOTES
NURSING DAILY NOTE    Name: Debra Rodrigues   Date of Admission: 1/5/2024   Admitting Diagnosis: Pneumonia due to infectious organism  Attending Physician: Eugenia Ruzi D.o.  Allergies: Other misc, Hydrocodone-acetaminophen, and Sulfa drugs    Safety  Patient Assist  max 2  Patient Precautions  Fall Risk  Precaution Comments  Macular degeneration  Bed Transfer Status  Maximal Assist  Toilet Transfer Status   Maximal Assist  Assistive Devices  Rails, Wheelchair  Oxygen  None - Room Air  Diet/Therapeutic Dining  Current Diet Order   Procedures    Diet Order Diet: Level 6 - Soft and Bite Sized; Liquid level: Level 0 - Thin     Pill Administration  whole, floated, and one at a time   Agitated Behavioral Scale  15  ABS Level of Severity  No Agitation    Fall Risk  Has the patient had a fall this admission?   No  Rubina Echavarria Fall Risk Scoring  16, HIGH RISK  Fall Risk Safety Measures  bed alarm, chair alarm, poor balance, and low vision/ hearing    Vitals  Temperature: 36.4 °C (97.6 °F)  Temp src: Axillary  Pulse: 66  Respiration: 20  Blood Pressure : (!) 173/83  Blood Pressure MAP (Calculated): 113 MM HG  BP Location: Right, Upper Arm  Patient BP Position: Supine     Oxygen  Pulse Oximetry: 91 %  O2 (LPM): 0  O2 Delivery Device: None - Room Air    Bowel and Bladder  Last Bowel Movement  01/14/24  Stool Type  Type 6: Fluffy pieces with ragged edges, a mushy stool  Bowel Device  Diaper  Continent  Bladder: Did not void   Bowel: No movement  Bladder Function  Urine Void (mL):  (large)  Number of Times Voided: 1  Urine Color: Unable To Evaluate  Urine Clarity: Unable to Evaluate  Number of Times Incontinent of Urine: 1  Wet Diaper Count: 1  Genitourinary Assessment   Bladder Assessment (WDL):  WDL Except  Cortes Catheter: Not Applicable  Urinary Elimination: Incontinence  Urine Color: Unable To Evaluate  Urine Clarity: Unable to Evaluate  Number of Bladder  Accidents: 1  Total Number of Bladder of Accidents in Last 7 Days: 2  Number of Times Incontinent of Urine: 1  Bladder Device: Bathroom, Diaper  Time Void: No  Bladder Scan: Post Void  $ Bladder Scan Results (mL): 1 (0 scan)    Skin  Larry Score   16  Sensory Interventions   Bed Types: Standard/Trauma Mattress  Skin Preventative Measures: Pillows in Use for Support / Positioning  Moisture Interventions  Moisturizers/Barriers: Barrier Wipes      Pain  Pain Rating Scale  0 - No Pain  Pain Location  Generalized  Pain Location Orientation  Right  Pain Interventions   Declines    ADLs    Bathing   Staff, Shower  Linen Change      Personal Hygiene  Perineal Care, Moist Mamie Wipes  Chlorhexidine Bath      Oral Care  Brushed Teeth  Teeth/Dentures     Shave     Nutrition Percentage Eaten  Dinner, Between 50-75% Consumed  Environmental Precautions     Patient Turns/Positioning  Patient Turns Self from Side to Side  Patient Turns Assistance/Tolerance  Assistance of One  Bed Positions  Bed Controls On, Bed Locked  Head of Bed Elevated  Self regulated      Psychosocial/Neurologic Assessment  Psychosocial Assessment  Psychosocial (WDL):  WDL Except  Patient Behaviors: Fatigue, Forgetful  Neurologic Assessment  Neuro (WDL): Exceptions to WDL  Level of Consciousness: Alert  Orientation Level: Oriented to place, Oriented to person, Oriented to situation  Cognition: Follows commands  Speech: Clear  Pupil Assesment: No  Motor Function/Sensation Assessment: Sensation  RUE Sensation: Full sensation  LUE Sensation: Full sensation  RLE Sensation: Numbness, Tingling  LLE Sensation: Numbness, Tingling  EENT (WDL):  WDL Except    Cardio/Pulmonary Assessment  Edema   RLE Edema: Generalized  LLE Edema: Generalized  Respiratory Breath Sounds  RUL Breath Sounds: Clear  RML Breath Sounds: Clear  RLL Breath Sounds: Diminished  ANGÉLICA Breath Sounds: Clear  LLL Breath Sounds: Diminished  Cardiac Assessment   Cardiac (WDL):  WDL Except (HX:  HTN,AFIB,Pacer)

## 2024-01-15 NOTE — PROGRESS NOTES
Hospital Medicine Daily Progress Note      Chief Complaint  Hypertension  Afib    Interval Problem Update  Discussed about her BP being elevated and have increased the Norvasc dose.    Review of Systems  Review of Systems   Constitutional:  Negative for chills and fever.   Respiratory:  Negative for shortness of breath.    Cardiovascular:  Negative for chest pain.   Gastrointestinal:  Negative for abdominal pain, diarrhea, nausea and vomiting.   Psychiatric/Behavioral:  The patient is not nervous/anxious.         Physical Exam  Temp:  [36.4 °C (97.6 °F)-36.7 °C (98 °F)] 36.4 °C (97.6 °F)  Pulse:  [64-67] 67  Resp:  [18-20] 20  BP: (138-174)/(64-83) 147/64  SpO2:  [91 %] 91 %    Physical Exam  Vitals and nursing note reviewed.   Constitutional:       Appearance: Normal appearance.   HENT:      Head: Atraumatic.   Eyes:      Conjunctiva/sclera: Conjunctivae normal.      Pupils: Pupils are equal, round, and reactive to light.   Cardiovascular:      Rate and Rhythm: Normal rate and regular rhythm.      Heart sounds: No murmur heard.  Pulmonary:      Effort: Pulmonary effort is normal.      Breath sounds: No stridor. No wheezing or rales.   Abdominal:      General: There is no distension.      Palpations: Abdomen is soft.      Tenderness: There is no abdominal tenderness.   Musculoskeletal:      Cervical back: Normal range of motion and neck supple.      Right lower leg: No edema.      Left lower leg: No edema.   Skin:     General: Skin is warm and dry.      Findings: No rash.   Neurological:      Mental Status: She is alert and oriented to person, place, and time.   Psychiatric:         Mood and Affect: Mood normal.         Behavior: Behavior normal.         Fluids    Intake/Output Summary (Last 24 hours) at 1/15/2024 1000  Last data filed at 1/15/2024 0830  Gross per 24 hour   Intake 860 ml   Output --   Net 860 ml         Laboratory  Recent Labs     01/15/24  0623   WBC 6.8   RBC 4.58   HEMOGLOBIN 13.9   HEMATOCRIT  42.0   MCV 91.7   MCH 30.3   MCHC 33.1   RDW 45.9   PLATELETCT 281   MPV 10.4       Recent Labs     01/15/24  0623   SODIUM 137   POTASSIUM 4.0   CHLORIDE 105   CO2 22   GLUCOSE 102*   BUN 12   CREATININE 0.76   CALCIUM 8.4*                     Assessment/Plan  Hypokalemia  Assessment & Plan  K+: 3.2  --> 3.9 (1/12) --> 4.0 (1/15)  S/P KCL 40 meq x 1  Cont KCL: 20 meq daily (1/11)  2nd to sometimes diminished appetite  Cont to monitor    Abdominal distension  Assessment & Plan  AXR: shows gaseous colonic distension  Cont Simethicone    Hypoxia- (present on admission)  Assessment & Plan  [place] [facility] [facility] [place]m [place]  S/P Augmentin & Doxy for PNA    Acquired hypothyroidism- (present on admission)  Assessment & Plan  Cont Synthroid    MDD (major depressive disorder), recurrent, in partial remission (HCC)- (present on admission)  Assessment & Plan  Cont Paxil    Primary hypertension- (present on admission)  Assessment & Plan  BP labile and mostly elevated  Cont Toprol XL   Cont Cozaar  Cont Norvasc --> will increased dose  Cont to monitor    Paroxysmal atrial fibrillation (HCC)- (present on admission)  Assessment & Plan  HR ok  S/P PPM  Cont Tambacor  Cont Eliquis  Cont to monitor

## 2024-01-15 NOTE — PROGRESS NOTES
"  Physical Medicine & Rehabilitation Progress Note    Encounter Date: 1/15/2024    Chief Complaint: Feeling well    Interval Events (Subjective):  Vital signs stable: Occasional elevated blood pressure 140s-150s  Large bowel movement 1/15  Voiding volitionally, incontinence into adult briefs.    Patient seen and examined in her room.  She is feeling well.  Had therapy this morning.  States that the representative from [facility] will be coming this afternoon to assess her.  She denies any systemic symptoms of illness.  No difficulty breathing, fevers, chills, GI discomfort.    ROS: 14 point ROS negative unless otherwise specified in the HPI    Objective:  VITAL SIGNS: BP (!) 147/64   Pulse 67   Temp 36.4 °C (97.6 °F) (Axillary)   Resp 20   Ht 1.676 m (5' 6\")   Wt 90 kg (198 lb 8 oz)   SpO2 91%   BMI 32.04 kg/m²     GEN: No apparent distress  HEENT: Head normocephalic, atraumatic.  Sclera nonicteric bilaterally, no ocular discharge appreciated bilaterally.  CV: Extremities warm and well-perfused, no peripheral edema appreciated bilaterally.  PULMONARY: Breathing nonlabored on room air, no respiratory accessory muscle use.  Not requiring supplemental oxygen.   ABD: Soft, nontender.  SKIN: No appreciable skin breakdown on exposed areas of skin.  PSYCH: Mood and affect within normal limits.  NEURO: Awake alert.  Conversational.  Logical thought content.      Laboratory Values:  Recent Results (from the past 72 hour(s))   CBC WITH DIFFERENTIAL    Collection Time: 01/15/24  6:23 AM   Result Value Ref Range    WBC 6.8 4.8 - 10.8 K/uL    RBC 4.58 4.20 - 5.40 M/uL    Hemoglobin 13.9 12.0 - 16.0 g/dL    Hematocrit 42.0 37.0 - 47.0 %    MCV 91.7 81.4 - 97.8 fL    MCH 30.3 27.0 - 33.0 pg    MCHC 33.1 32.2 - 35.5 g/dL    RDW 45.9 35.9 - 50.0 fL    Platelet Count 281 164 - 446 K/uL    MPV 10.4 9.0 - 12.9 fL    Neutrophils-Polys 59.20 44.00 - 72.00 %    Lymphocytes 23.90 22.00 - 41.00 %    Monocytes 10.60 0.00 - 13.40 %    " Eosinophils 4.00 0.00 - 6.90 %    Basophils 1.30 0.00 - 1.80 %    Immature Granulocytes 1.00 (H) 0.00 - 0.90 %    Nucleated RBC 0.00 0.00 - 0.20 /100 WBC    Neutrophils (Absolute) 4.03 1.82 - 7.42 K/uL    Lymphs (Absolute) 1.63 1.00 - 4.80 K/uL    Monos (Absolute) 0.72 0.00 - 0.85 K/uL    Eos (Absolute) 0.27 0.00 - 0.51 K/uL    Baso (Absolute) 0.09 0.00 - 0.12 K/uL    Immature Granulocytes (abs) 0.07 0.00 - 0.11 K/uL    NRBC (Absolute) 0.00 K/uL   Basic Metabolic Panel    Collection Time: 01/15/24  6:23 AM   Result Value Ref Range    Sodium 137 135 - 145 mmol/L    Potassium 4.0 3.6 - 5.5 mmol/L    Chloride 105 96 - 112 mmol/L    Co2 22 20 - 33 mmol/L    Glucose 102 (H) 65 - 99 mg/dL    Bun 12 8 - 22 mg/dL    Creatinine 0.76 0.50 - 1.40 mg/dL    Calcium 8.4 (L) 8.5 - 10.5 mg/dL    Anion Gap 10.0 7.0 - 16.0   ESTIMATED GFR    Collection Time: 01/15/24  6:23 AM   Result Value Ref Range    GFR (CKD-EPI) 75 >60 mL/min/1.73 m 2         Medications:  Scheduled Medications   Medication Dose Frequency    losartan  100 mg Q12HRS    amantadine  100 mg BID    potassium chloride SA  20 mEq DAILY    amLODIPine  5 mg Q DAY    metoprolol SR  12.5 mg DAILY    simethicone  125 mg 4X/DAY WITH MEALS + NIGHTLY    Pharmacy Consult Request  1 Each PHARMACY TO DOSE    apixaban  5 mg BID    famotidine  20 mg Q12HRS    flecainide  100 mg DAILY    traZODone  50 mg QHS    PARoxetine  10 mg DAILY    levothyroxine  75 mcg DAILY    gabapentin  100 mg Q EVENING    vitamin D3  1,000 Units DAILY     PRN medications: senna-docusate **AND** polyethylene glycol/lytes **AND** magnesium hydroxide **AND** bisacodyl, Respiratory Therapy Consult, hydrALAZINE, carboxymethylcellulose, benzocaine-menthol, mag hydrox-al hydrox-simeth, ondansetron **OR** ondansetron, sodium chloride, acetaminophen, guaiFENesin dextromethorphan    Diet:  Current Diet Order   Procedures    Diet Order Diet: Level 6 - Soft and Bite Sized; Liquid level: Level 0 - Thin       Medical  Decision Making and Plan:  Community-acquired pneumonia  Flu, RSV, COVID-negative  Respiratory cultures PENDING (unclear if collected at [facility])  Blood cultures collected 1/2/2024 no growth to date  PT and OT for mobility and ADLs. Per guidelines, 15 hours per week between PT, OT and/or SLP.  Follow-up geriatrician  Continue Augmentin through 1/8 -completed  Continue doxycycline through 1/9-completed     Atrial fibrillation  Permanent pacemaker  Recently referred to cardiology for local establishment (had been in [facility])  Continue Eliquis 5 mg twice daily  Continue flecainide 100 mg daily  Continue with oral 25 mg XL daily--> 12.5 mg 1/8     History of Stroke  Left Hemiparesis  Monitor     Impaired cognition  Poor motivation/attention, increased fatigue  ?  Encephalopathy secondary to ammonia  SLP to establish baseline  1/11 trial amantadine twice daily, avoid other stimulants due to elevated blood pressure  1/12 DIL reporting more clarity.  Continue amantadine.  1/15 patient more alert.     Hypertension  Continue Cozaar 50 mg every 12 hours--> 100 mg every 12 hours 1/11   Continue Toprol XL 25 mg daily -reduced to 12.5 mg daily due to bradycardia 1/8  Norvasc 5 mg daily added 1/8 --> increase to 10 mg 1/15  1/15: Continue amlodipine 10 mg + Cozaar 100 mg every 12 hours + metoprolol 12.5 mg XL daily    Hypokalemia  1/10 3.2, supplemented per hospitalist x1 and is on scheduled potassium  1/11 continue scheduled potassium  1/12 potassium normal at 3.9 6  1/15 potassium normal at 4.0.  Continue supplementation     Hypothyroidism  Continue Synthroid 75 mcg daily     Depression  Insomnia  Continue trazodone 50 mg nightly (QTc 454)  Continue Paxil 10 mg daily     Pain  Tylenol as needed  Chronic neuropathy - Gabapentin at night     Skin  Patient at risk for skin breakdown due to debility in areas including sacrum, achilles, elbows and head in addition to other sites. Nursing to assess skin daily.      GI Ppx  Patient on  Prilosec for GERD prophylaxis.    1/9 change diet to soft and bite sized as patient having difficulty with regular consistencies due to dentures.  Add Ensure supplement.  1/10 consulted dietary to improve nutrition     Bowel   Patient on Senna-docusate for constipation prophylaxis.      Bladder  TV/PVR/BS PRN      LABS 1/15: CBC + BMP  Potassium normal on supplementation, BMP and CMP otherwise normal and nonconcerning       DVT PROPHYLAXIS: On Eliquis for atrial fibrillation     HOSPITALIST FOLLOWING: YES -discussed with hospitalist 1/15     CODE STATUS: Reviewed note from geriatrics 11/30/2023 CODE STATUS DNAR/DNI - d/w patient and family and confirm this code status.     DISPO: Home to [facility].  Need to better understand baseline and goals of therapy.  Patient had a lot of assist at her assisted living facility at baseline.  Evaluation occurring 1/15.     JEANINE: 1/17/24     ADDITIONAL MEDICAL NEEDS ON D/C (IV abx, O2, etc): NONE     MEDS SENT TO: TBD     DISCHARGE SPECIALIST FOLLOW UP: Patient to scheduled follow up with their PCP within 2 weeks from discharge from the [facility].      ____________________________________    Dr. Eugenia Ruiz DO, MS  [facility] - Physical Medicine & Rehabilitation   ____________________________________

## 2024-01-15 NOTE — CARE PLAN
"  Problem: Fall Risk - Rehab  Goal: Patient will remain free from falls  Outcome: Progressing  Note: Rubina Echavarria Fall risk Assessment Score: 16    High fall risk Interventions   - Bed and strip alarm   - Yellow sign by the door   - Yellow wrist band \"Fall risk\"  - Room near to the nurse station  - Do not leave patient unattended in the bathroom  - Fall risk education provided      Problem: Pain - Standard  Goal: Alleviation of pain or a reduction in pain to the patient’s comfort goal  Outcome: Progressing  Note: Pain assessed , denies pain at this time , needs anticipated .    The patient is Stable - Low risk of patient condition declining or worsening    Shift Goals  Clinical Goals: Safety  Patient Goals: Safety, gain strength    Progress made toward(s) clinical / shift goals:  Pt free from fall and injury.    "

## 2024-01-15 NOTE — THERAPY
Speech Language Pathology  Daily Treatment     Patient Name: Debra Rodrigues  Age:  88 y.o., Sex:  female  Medical Record #: [id number]  Today's Date: 1/15/2024     Precautions  Precautions: Fall Risk  Comments: Macular degeneration    Subjective    Pt was willing to participate in this ST session with encouragement.       Objective       01/15/24 1231   Treatment Charges   SLP Cognitive Skill Development First 15 Minutes 1   SLP Cognitive Skill Development Additional 15 Minutes 1   SLP Total Time Spent   SLP Individual Total Time Spent (Mins) 30   SCCAN (Scales of Cognitive and Communicative Ability for Neurorehabilitation)   Oral Expression - Raw Score 15   Oral Expression - Scale Performance Score 79   Orientation - Raw Score 10   Orientation - Scale Performance Score 83   Speech Comprehension - Raw Score 8   Speech Comprehension - Scale Performance Score 62         Assessment    Initiated administration of the SCCAN as pt's d/c date was scheduled for 1/17; however after this session was completed per CM notes pt's discharge date has been extended until 1/22 (Pt's [facility] reported that they are not able to provided the assistance pt requires at this time).  Pt demonstrated a slight improvement in the area of oral expression (74% increased to 79%) and a slight decline in the area of orientation (92% initially, decreased to 83% today).      Strengths: Alert and oriented, Effective communication skills, Supportive family, Willingly participates in therapeutic activities  Barriers: Generalized weakness, Impaired carryover of learning, Impaired functional cognition, Visual impairment (Low initiation and slow to process.)    Plan    Complete administration of the SCCAN, Target attention in the context of written instructions, recall of safety sequencing and new training.         Speech Therapy Problems (Active)       Problem: Comprehension STGs       Dates: Start:  01/06/24         Goal: STG-Within one week, patient will  comprehend both auditory and written instructions with 75% accuracy provided mod assist.       Dates: Start:  01/06/24         Goal Note filed on 01/10/24 1338 by Dania Nelson MS,CCC-SLP       To be addressed.                  Problem: Memory STGs       Dates: Start:  01/08/24         Goal: STG-Within one week, patient will recall transfer sequencing with 75% with min A.       Dates: Start:  01/10/24               Problem: Problem Solving STGs       Dates: Start:  01/06/24         Goal: STG-Within one week, patient will solve basic problems related to safety/sequencing with 75% accuracy provided mod assist.       Dates: Start:  01/06/24         Goal Note filed on 01/10/24 1338 by Dania Nelson MS,CCC-SLP       75% with mod-max A for initiation and sequencing multiple steps.                 Problem: Speech/Swallowing LTGs       Dates: Start:  01/06/24         Goal: LTG-By discharge, patient will solve basic problems related to safety to facilitate safe d/c to a functional living environment.       Dates: Start:  01/06/24

## 2024-01-15 NOTE — DISCHARGE PLANNING
[facility] nurse came out to assess the patient and at this time they can not provide the amount of assistance that she needs. Left her daughter-in-law a message to see if they can provide private caregivers for the patient. Will continue to assist with the discharge planning.    Spoke with daughter-in-law. Will meet with her tomorrow to go over private caregiver agencies and long term care facilities.

## 2024-01-15 NOTE — THERAPY
Physical Therapy   Daily Treatment     Patient Name: Debra Rodrigues  Age:  88 y.o., Sex:  female  Medical Record #: [id number]  Today's Date: 1/15/2024     Precautions  Precautions: Fall Risk  Comments: (P) Macular degeneration    Subjective    Pt is in her wc and agreeable to participate in therapy.     Objective       01/15/24 1001   PT Charge Group   Charges Yes   PT Gait Training (Units) 1   PT Therapeutic Exercise (Units) 1   PT Total Time Spent   PT Individual Total Time Spent (Mins) 30   Precautions   Precautions Fall Risk   Comments Macular degeneration   Gait Functional Level of Assist    Gait Level Of Assist Total Assist X 2  (CGA with WC follows)   Assistive Device Parallel Bars   Distance (Feet) 8   # of Times Distance was Traveled 2   Deviation Bradykinetic;Decreased Heel Strike;Decreased Toe Off;Decreased Base Of Support   Sitting Lower Body Exercises   Hip Abduction 2 sets of 10;Bilateral;Light Resistance Theraband   Long Arc Quad 2 sets of 10;Bilateral;Weight (See Comments for lbs)  (3.5lbs AW)   Marching 2 sets of 10  (3.5lbs AW)   Hamstring Curl 2 sets of 10;Weight (See Comments for lbs);Bilateral;Light Resistance Theraband   Interdisciplinary Plan of Care Collaboration   IDT Collaboration with  Therapy Tech   Patient Position at End of Therapy Seated;Chair Alarm On;Self Releasing Lap Belt Applied   Collaboration Comments assist during gait training.         Assessment    Pt completed the seated resistance exercises with rest break after each sets of exercises. She performed multiple sit to stand transfer from  in // bars, Min A with repeated vc and tactile cueing for sequence and hand placement. Gait training in // bars, CGA with  follows due to fatigue. She easily fatigued and would like to sit down after ambulating few steps. Pt was not sob during task performance.   Strengths: Able to follow instructions, Manages pain appropriately, Motivated for self care and independence, Pleasant and  cooperative, Willingly participates in therapeutic activities  Barriers: Decreased endurance, Confused, Fatigue, Generalized weakness, Impaired activity tolerance, Impaired balance, Limited mobility    Plan    Continue seated DF stretches (in a w/c or with gait belt)    Continue seated LE exercises (gastroc, quads, hip flexors, hamstrings)   Work on postural muscle (sacp retractions)  STS from an elevated table   Standing R and L weight shifts with targets   Standing marches onto a step in //    FWW with targets for each step during walking    DME  PT DME Recommendations  Wheelchair:  (TBD)  Assistive Device:  (TBD)    Passport items to be completed:  Get in/out of bed safely, in/out of a vehicle, safely use mobility device, walk or wheel around home/community, navigate up and down stairs, show how to get up/down from the ground, ensure home is accessible, demonstrate HEP, complete caregiver training    Physical Therapy Problems (Active)       Problem: Mobility       Dates: Start:  01/10/24         Goal: STG-Within one week, patient will ambulate x 10' with FWW and Mod A        Dates: Start:  01/10/24         Goal Note filed on 01/10/24 1247 by Claudia Cherry, Student       Pt ambulated 10' with FWW and Max A but required breaks to complete the activity                  Problem: Mobility Transfers       Dates: Start:  01/06/24         Goal: STG-Within one week, patient will perform bed mobility with mod A       Dates: Start:  01/06/24         Goal Note filed on 01/10/24 1247 by Claudia Cherry, Student       Pt was Max A and required verbal and tactile cueing for sequencing and initiating task                 Problem: PT-Long Term Goals       Dates: Start:  01/06/24         Goal: LTG-By discharge, patient will propel wheelchair x 150 feet with SPV       Dates: Start:  01/06/24            Goal: LTG-By discharge, patient will ambulate x 30 feet with FWW and CGA       Dates: Start:  01/06/24            Goal: LTG-By  discharge, patient will transfer one surface to another with FWW and CGA       Dates: Start:  01/06/24            Goal: LTG-By discharge, patient will transfer in/out of a car with FWW and CGA       Dates: Start:  01/06/24

## 2024-01-15 NOTE — DISCHARGE PLANNING
Case management  Reviewed signed copy of IMM and answered all questions.    Dc date /disposition: 1/17/24 home to [facility].

## 2024-01-16 ENCOUNTER — APPOINTMENT (OUTPATIENT)
Dept: PHYSICAL THERAPY | Facility: REHABILITATION | Age: 89
DRG: 194 | End: 2024-01-16
Attending: PHYSICAL MEDICINE & REHABILITATION
Payer: MEDICARE

## 2024-01-16 ENCOUNTER — APPOINTMENT (OUTPATIENT)
Dept: SPEECH THERAPY | Facility: REHABILITATION | Age: 89
DRG: 194 | End: 2024-01-16
Attending: PHYSICAL MEDICINE & REHABILITATION
Payer: MEDICARE

## 2024-01-16 ENCOUNTER — APPOINTMENT (OUTPATIENT)
Dept: OCCUPATIONAL THERAPY | Facility: REHABILITATION | Age: 89
DRG: 194 | End: 2024-01-16
Attending: PHYSICAL MEDICINE & REHABILITATION
Payer: MEDICARE

## 2024-01-16 PROCEDURE — 99232 SBSQ HOSP IP/OBS MODERATE 35: CPT | Performed by: HOSPITALIST

## 2024-01-16 PROCEDURE — 700102 HCHG RX REV CODE 250 W/ 637 OVERRIDE(OP): Performed by: HOSPITALIST

## 2024-01-16 PROCEDURE — 97129 THER IVNTJ 1ST 15 MIN: CPT

## 2024-01-16 PROCEDURE — A9270 NON-COVERED ITEM OR SERVICE: HCPCS | Performed by: HOSPITALIST

## 2024-01-16 PROCEDURE — 97130 THER IVNTJ EA ADDL 15 MIN: CPT

## 2024-01-16 PROCEDURE — 97530 THERAPEUTIC ACTIVITIES: CPT

## 2024-01-16 PROCEDURE — 97110 THERAPEUTIC EXERCISES: CPT | Mod: CO

## 2024-01-16 PROCEDURE — 97116 GAIT TRAINING THERAPY: CPT

## 2024-01-16 PROCEDURE — 99232 SBSQ HOSP IP/OBS MODERATE 35: CPT | Performed by: PHYSICAL MEDICINE & REHABILITATION

## 2024-01-16 PROCEDURE — 770010 HCHG ROOM/CARE - REHAB SEMI PRIVAT*

## 2024-01-16 PROCEDURE — 700102 HCHG RX REV CODE 250 W/ 637 OVERRIDE(OP): Performed by: PHYSICAL MEDICINE & REHABILITATION

## 2024-01-16 PROCEDURE — 97110 THERAPEUTIC EXERCISES: CPT

## 2024-01-16 PROCEDURE — 97535 SELF CARE MNGMENT TRAINING: CPT | Mod: CO

## 2024-01-16 PROCEDURE — A9270 NON-COVERED ITEM OR SERVICE: HCPCS | Performed by: PHYSICAL MEDICINE & REHABILITATION

## 2024-01-16 RX ORDER — HYDRALAZINE HYDROCHLORIDE 10 MG/1
10 TABLET, FILM COATED ORAL EVERY 8 HOURS
Status: DISCONTINUED | OUTPATIENT
Start: 2024-01-16 | End: 2024-01-18

## 2024-01-16 RX ADMIN — HYDRALAZINE HYDROCHLORIDE 10 MG: 10 TABLET ORAL at 17:57

## 2024-01-16 RX ADMIN — APIXABAN 5 MG: 5 TABLET, FILM COATED ORAL at 20:36

## 2024-01-16 RX ADMIN — METOPROLOL SUCCINATE 12.5 MG: 25 TABLET, EXTENDED RELEASE ORAL at 05:12

## 2024-01-16 RX ADMIN — SIMETHICONE 125 MG: 125 TABLET, CHEWABLE ORAL at 11:30

## 2024-01-16 RX ADMIN — FAMOTIDINE 20 MG: 20 TABLET ORAL at 20:36

## 2024-01-16 RX ADMIN — FLECAINIDE ACETATE 100 MG: 100 TABLET ORAL at 08:39

## 2024-01-16 RX ADMIN — SIMETHICONE 125 MG: 125 TABLET, CHEWABLE ORAL at 08:33

## 2024-01-16 RX ADMIN — AMANTADINE HYDROCHLORIDE 100 MG: 100 TABLET ORAL at 05:12

## 2024-01-16 RX ADMIN — AMANTADINE HYDROCHLORIDE 100 MG: 100 TABLET ORAL at 11:40

## 2024-01-16 RX ADMIN — LEVOTHYROXINE SODIUM 75 MCG: 0.07 TABLET ORAL at 08:33

## 2024-01-16 RX ADMIN — LOSARTAN POTASSIUM 100 MG: 50 TABLET, FILM COATED ORAL at 05:11

## 2024-01-16 RX ADMIN — GABAPENTIN 100 MG: 100 CAPSULE ORAL at 20:36

## 2024-01-16 RX ADMIN — POTASSIUM CHLORIDE 20 MEQ: 1500 TABLET, EXTENDED RELEASE ORAL at 08:33

## 2024-01-16 RX ADMIN — SIMETHICONE 125 MG: 125 TABLET, CHEWABLE ORAL at 17:57

## 2024-01-16 RX ADMIN — SIMETHICONE 125 MG: 125 TABLET, CHEWABLE ORAL at 20:36

## 2024-01-16 RX ADMIN — ACETAMINOPHEN 650 MG: 325 TABLET ORAL at 01:21

## 2024-01-16 RX ADMIN — Medication 1000 UNITS: at 08:32

## 2024-01-16 RX ADMIN — TRAZODONE HYDROCHLORIDE 50 MG: 50 TABLET ORAL at 20:36

## 2024-01-16 RX ADMIN — HYDRALAZINE HYDROCHLORIDE 10 MG: 10 TABLET ORAL at 20:36

## 2024-01-16 RX ADMIN — HYDRALAZINE HYDROCHLORIDE 25 MG: 25 TABLET, FILM COATED ORAL at 20:37

## 2024-01-16 RX ADMIN — PAROXETINE HYDROCHLORIDE 10 MG: 20 TABLET, FILM COATED ORAL at 08:32

## 2024-01-16 RX ADMIN — AMLODIPINE BESYLATE 10 MG: 5 TABLET ORAL at 05:11

## 2024-01-16 RX ADMIN — HYDRALAZINE HYDROCHLORIDE 25 MG: 25 TABLET, FILM COATED ORAL at 05:11

## 2024-01-16 RX ADMIN — APIXABAN 5 MG: 5 TABLET, FILM COATED ORAL at 08:33

## 2024-01-16 RX ADMIN — LOSARTAN POTASSIUM 100 MG: 50 TABLET, FILM COATED ORAL at 17:57

## 2024-01-16 RX ADMIN — FAMOTIDINE 20 MG: 20 TABLET ORAL at 08:32

## 2024-01-16 ASSESSMENT — ENCOUNTER SYMPTOMS
COUGH: 0
NAUSEA: 0
BRUISES/BLEEDS EASILY: 0
MUSCULOSKELETAL NEGATIVE: 1
POLYDIPSIA: 0
VOMITING: 0
PALPITATIONS: 0
EYES NEGATIVE: 1
ABDOMINAL PAIN: 0
SHORTNESS OF BREATH: 0
WEAKNESS: 1
CHILLS: 0
FEVER: 0

## 2024-01-16 ASSESSMENT — GAIT ASSESSMENTS
ASSISTIVE DEVICE: FRONT WHEEL WALKER
GAIT LEVEL OF ASSIST: MAXIMAL ASSIST
DISTANCE (FEET): 8

## 2024-01-16 ASSESSMENT — PATIENT HEALTH QUESTIONNAIRE - PHQ9
SUM OF ALL RESPONSES TO PHQ9 QUESTIONS 1 AND 2: 0
2. FEELING DOWN, DEPRESSED, IRRITABLE, OR HOPELESS: NOT AT ALL
1. LITTLE INTEREST OR PLEASURE IN DOING THINGS: NOT AT ALL

## 2024-01-16 ASSESSMENT — ACTIVITIES OF DAILY LIVING (ADL): TUB_SHOWER_TRANSFER_DESCRIPTION: GRAB BAR;SHOWER BENCH;INCREASED TIME;VERBAL CUEING

## 2024-01-16 NOTE — THERAPY
Physical Therapy   Daily Treatment     Patient Name: Debra Rodrigues  Age:  88 y.o., Sex:  female  Medical Record #: [id number]  Today's Date: 1/16/2024     Precautions  Precautions: Fall Risk  Comments: macular degeneration, Hx of CVA    Subjective    Pt was seen for two PT sessions. ([id number] and [id number])  AM-Pt was seated in her w/c with her family and was agreeable to PT treatment. Pt's family was able to clarify that the pt had a rise chair and w/c at her facility to help with mobility. The stated that the pt's living facility didn't have much night time assistance.      PM- Pt was laying down in bed and was agreeable to PT tx.   Objective       01/16/24 1101   Precautions   Precautions Fall Risk   Comments macular degeneration, Hx of CVA   Transfer Functional Level of Assist   Toilet Transfers Maximal Assist   Toilet Transfer Description Grab bar;Increased time;Initial preparation for task;Verbal cueing;Adaptive equipment  (VC for sequncing pivoting and hand position for steadying, assist with pant management (up/down), steadying with grab bar and w/c, commode to increase toliet seat height, had pt completed 3 transfers, Max A <> Mod A for steadying and lifting at the pelvis/trunk)   Sitting Lower Body Exercises   Sitting Lower Body Exercises Yes   Ankle Pumps 1 set of 10;Bilateral   Long Arc Quad 1 set of 10;Bilateral   Marching 1 set of 10;Reciprocal   Interdisciplinary Plan of Care Collaboration   IDT Collaboration with  Physical Therapist;Family / Caregiver;Nursing   Patient Position at End of Therapy Seated;Chair Alarm On;Family / Friend in Room  (handeed off to nursing for lunch)   Collaboration Comments pt family was present and discussed pt's progress with transfers, POC, and D/C destination   Physical Therapist Assigned   Assigned PT / Treatment Time / Comments Roxann/Claudia. TechA if no student. No 30 min treat and no 0830; 60 okay. Needs breaks after 60 min.     Pt and family education;  discussed purchasing 1-2 grab bars by bed, chair, or toilet to help assist the pt and caregivers with transfers; discussed the pt's progress and POC with the family; demonstrated w/c exercise and toilet transfers for family; discussed pt's prognosis and need for 24 hr 1-2 person assist for transfers and mobility      01/16/24 1431   Precautions   Precautions Fall Risk   Comments macular degeneration, Hx of CVA   Vitals   Pulse 68   Patient BP Position Sitting   Blood Pressure  129/74   Gait Functional Level of Assist    Gait Level Of Assist Maximal Assist  (Tactile cues and VC at pelvis and trunk to maintain upright posture)   Assistive Device Front Wheel Walker   Distance (Feet) 8   # of Times Distance was Traveled 3   Deviation Increased Base Of Support;Step To;Bradykinetic;Decreased Toe Off;Decreased Heel Strike   Transfer Functional Level of Assist   Bed, Chair, Wheelchair Transfer Maximal Assist   Bed Chair Wheelchair Transfer Description Increased time;Initial preparation for task;Set-up of equipment;Verbal cueing;Assist with two limbs  (into and out of bed x 1, Assist at pelvis and trunk for for steadying, use of railings for steadying and pull support, use of w/c for steadying, VC for sequencing)   Sitting Lower Body Exercises   Sitting Lower Body Exercises Yes   Ankle Pumps 2 sets of 10;Bilateral   Long Arc Quad 2 sets of 10;Bilateral   Marching 2 sets of 10;Reciprocal   Bed Mobility    Supine to Sit Maximal Assist  (assisted at trunk and B LE, increased time, initiation of task, VC and Tactile cues for sequencing)   Sit to Supine Maximal Assist  (assist at trunk and B LE)   Sit to Stand Maximal Assist  (assist at pelvis, use of chair handles to push off, use of W/C or bed railing for support)   Scooting Maximal Assist  (to the L, Modified bridge position, Max A for shoulder)   Rolling Maximal Assist to Rt.  (Assist at shoulders and pelvis)   Neuro-Muscular Treatments   Neuro-Muscular Treatments Anterior  weight shift;Weight Shift Right;Weight Shift Left;Sequencing;Tactile Cuing;Verbal Cuing   Comments Standing in the // making a pattern on the peg board to work on standing tolorance and static balance, for 5 mins, VC for targeting peg, 1 UE support, VC for sequencing for activity steps, incidental steadying at pelvis    Interdisciplinary Plan of Care Collaboration   IDT Collaboration with  Physical Therapist   Patient Position at End of Therapy In Bed;Bed Alarm On;Call Light within Reach;Tray Table within Reach  (lights off)   Physical Therapist Assigned   Assigned PT / Treatment Time / Comments Roxann/Claudia. TechA if no student. No 30 min treat and no 0830; 60 okay. Needs breaks after 60 min.     Gait training: with 2 household chair set in a triangle, 8 ft apart, without w/c follow to simulate household ambulation distances and transfer      Assessment    The pt's family demonstrated a good understanding of the pt's current functional status. The pt improved to Max A <> Mod A x 1 for toilet transfer with having the toilet seated raised with a commode. Pt demonstrates better pulling assist for transfers and was recommended grab bars for at home to improve safety and decreased assist for family/caregivers. Pt had increased walking and activity tolerance when given specific and visible a goal. She requires Max A and verbal cues during ambulation to maintain an upright posture and sequencing pivots. Pt reported dizziness after activities which returned to baseline after resting, but vitals were WFL and symptoms could be from pt holding her breath or increased exertion. Recommend continue to monitor vitals after activities. Pt is able increase walking distance but requires increased rest periods bw walking bouts for adequate recovery.     Strengths: Able to follow instructions, Manages pain appropriately, Motivated for self care and independence, Pleasant and cooperative, Willingly participates in therapeutic  activities  Barriers: Decreased endurance, Confused, Fatigue, Generalized weakness, Impaired activity tolerance, Impaired balance, Limited mobility    Plan    Continue walking with a FWW with the chair as a target (trial 10 ft intervals)   STS from an elevated table with a reaching game (card matching)   Continue seated DF stretches (in a w/c or with gait belt)    Continue seated LE exercises (gastroc, quads, hip flexors, hamstrings)   Work on postural muscle (sacp retractions)  Per case management, pt 's possible D/C destination is a group home- 01/16/24      DME  PT DME Recommendations  Wheelchair:  (TBD)  Assistive Device:  (TBD)    Passport items to be completed:  Get in/out of bed safely, in/out of a vehicle, safely use mobility device, walk or wheel around home/community, navigate up and down stairs, show how to get up/down from the ground, ensure home is accessible, demonstrate HEP, complete caregiver training    Physical Therapy Problems (Active)       Problem: Mobility       Dates: Start:  01/10/24         Goal: STG-Within one week, patient will ambulate x 10' with FWW and Mod A        Dates: Start:  01/10/24         Goal Note filed on 01/10/24 1247 by Claudia Cherry, Student       Pt ambulated 10' with FWW and Max A but required breaks to complete the activity                  Problem: Mobility Transfers       Dates: Start:  01/06/24         Goal: STG-Within one week, patient will perform bed mobility with mod A       Dates: Start:  01/06/24         Goal Note filed on 01/10/24 1247 by Claudia Cherry, Student       Pt was Max A and required verbal and tactile cueing for sequencing and initiating task                 Problem: PT-Long Term Goals       Dates: Start:  01/06/24         Goal: LTG-By discharge, patient will propel wheelchair x 150 feet with SPV       Dates: Start:  01/06/24            Goal: LTG-By discharge, patient will ambulate x 30 feet with FWW and CGA       Dates: Start:  01/06/24             Goal: LTG-By discharge, patient will transfer one surface to another with FWW and CGA       Dates: Start:  01/06/24            Goal: LTG-By discharge, patient will transfer in/out of a car with FWW and CGA       Dates: Start:  01/06/24

## 2024-01-16 NOTE — PROGRESS NOTES
"  Physical Medicine & Rehabilitation Progress Note    Encounter Date: 1/16/2024    Chief Complaint: Good appetite    Interval Events (Subjective):  Vital signs: Intermittently elevated blood pressure continues in the 140s and 150s  Bowel movement 1/15  Voiding volitionally, does have incontinence and daily adult briefs 6    Patient seen and examined in cafe. Did well with therapy this morning. Son and DIL with her. Looking into arrangements for group homes.     ROS: 14 point ROS negative unless otherwise specified in the HPI    Objective:  VITAL SIGNS: BP (!) 156/65   Pulse 65   Temp 36.4 °C (97.6 °F) (Oral)   Resp 18   Ht 1.676 m (5' 6\")   Wt 90 kg (198 lb 8 oz)   SpO2 94%   BMI 32.04 kg/m²     GEN: No apparent distress  HEENT: Head normocephalic, atraumatic.  Sclera nonicteric bilaterally, no ocular discharge appreciated bilaterally.  CV: Extremities warm and well-perfused, no peripheral edema appreciated bilaterally.  PULMONARY: Breathing nonlabored on room air, no respiratory accessory muscle use.  Not requiring supplemental oxygen.   ABD: Soft, nontender.  SKIN: No appreciable skin breakdown on exposed areas of skin.  PSYCH: Mood and affect within normal limits.  NEURO: Awake alert.  Conversational.  Logical thought content.      Laboratory Values:  Recent Results (from the past 72 hour(s))   CBC WITH DIFFERENTIAL    Collection Time: 01/15/24  6:23 AM   Result Value Ref Range    WBC 6.8 4.8 - 10.8 K/uL    RBC 4.58 4.20 - 5.40 M/uL    Hemoglobin 13.9 12.0 - 16.0 g/dL    Hematocrit 42.0 37.0 - 47.0 %    MCV 91.7 81.4 - 97.8 fL    MCH 30.3 27.0 - 33.0 pg    MCHC 33.1 32.2 - 35.5 g/dL    RDW 45.9 35.9 - 50.0 fL    Platelet Count 281 164 - 446 K/uL    MPV 10.4 9.0 - 12.9 fL    Neutrophils-Polys 59.20 44.00 - 72.00 %    Lymphocytes 23.90 22.00 - 41.00 %    Monocytes 10.60 0.00 - 13.40 %    Eosinophils 4.00 0.00 - 6.90 %    Basophils 1.30 0.00 - 1.80 %    Immature Granulocytes 1.00 (H) 0.00 - 0.90 %    " Nucleated RBC 0.00 0.00 - 0.20 /100 WBC    Neutrophils (Absolute) 4.03 1.82 - 7.42 K/uL    Lymphs (Absolute) 1.63 1.00 - 4.80 K/uL    Monos (Absolute) 0.72 0.00 - 0.85 K/uL    Eos (Absolute) 0.27 0.00 - 0.51 K/uL    Baso (Absolute) 0.09 0.00 - 0.12 K/uL    Immature Granulocytes (abs) 0.07 0.00 - 0.11 K/uL    NRBC (Absolute) 0.00 K/uL   Basic Metabolic Panel    Collection Time: 01/15/24  6:23 AM   Result Value Ref Range    Sodium 137 135 - 145 mmol/L    Potassium 4.0 3.6 - 5.5 mmol/L    Chloride 105 96 - 112 mmol/L    Co2 22 20 - 33 mmol/L    Glucose 102 (H) 65 - 99 mg/dL    Bun 12 8 - 22 mg/dL    Creatinine 0.76 0.50 - 1.40 mg/dL    Calcium 8.4 (L) 8.5 - 10.5 mg/dL    Anion Gap 10.0 7.0 - 16.0   ESTIMATED GFR    Collection Time: 01/15/24  6:23 AM   Result Value Ref Range    GFR (CKD-EPI) 75 >60 mL/min/1.73 m 2         Medications:  Scheduled Medications   Medication Dose Frequency    amLODIPine  10 mg Q DAY    losartan  100 mg Q12HRS    amantadine  100 mg BID    potassium chloride SA  20 mEq DAILY    metoprolol SR  12.5 mg DAILY    simethicone  125 mg 4X/DAY WITH MEALS + NIGHTLY    Pharmacy Consult Request  1 Each PHARMACY TO DOSE    apixaban  5 mg BID    famotidine  20 mg Q12HRS    flecainide  100 mg DAILY    traZODone  50 mg QHS    PARoxetine  10 mg DAILY    levothyroxine  75 mcg DAILY    gabapentin  100 mg Q EVENING    vitamin D3  1,000 Units DAILY     PRN medications: senna-docusate **AND** polyethylene glycol/lytes **AND** magnesium hydroxide **AND** bisacodyl, Respiratory Therapy Consult, hydrALAZINE, carboxymethylcellulose, benzocaine-menthol, mag hydrox-al hydrox-simeth, ondansetron **OR** ondansetron, sodium chloride, acetaminophen, guaiFENesin dextromethorphan    Diet:  Current Diet Order   Procedures    Diet Order Diet: Level 6 - Soft and Bite Sized; Liquid level: Level 0 - Thin       Medical Decision Making and Plan:  Community-acquired pneumonia  Flu, RSV, COVID-negative  Respiratory cultures PENDING  (unclear if collected at [facility])  Blood cultures collected 1/2/2024 no growth to date  PT and OT for mobility and ADLs. Per guidelines, 15 hours per week between PT, OT and/or SLP.  Follow-up geriatrician  Continue Augmentin through 1/8 -completed  Continue doxycycline through 1/9-completed     Atrial fibrillation  Permanent pacemaker  Recently referred to cardiology for local establishment (had been in [facility])  Continue Eliquis 5 mg twice daily  Continue flecainide 100 mg daily  Continue with oral 25 mg XL daily--> 12.5 mg 1/8     History of Stroke  Left Hemiparesis  Monitor     Impaired cognition  Poor motivation/attention, increased fatigue  ?  Encephalopathy secondary to ammonia  SLP to establish baseline  1/11 trial amantadine twice daily, avoid other stimulants due to elevated blood pressure  1/12 DIL reporting more clarity.  Continue amantadine.  1/16 Continue Amantadine     Hypertension  Continue Cozaar 50 mg every 12 hours--> 100 mg every 12 hours 1/11   Continue Toprol XL 25 mg daily -reduced to 12.5 mg daily due to bradycardia 1/8  Norvasc 5 mg daily added 1/8 --> increase to 10 mg 1/15  1/16: Continue amlodipine 10 mg + Cozaar 100 mg every 12 hours + metoprolol 12.5 mg XL daily    Hypokalemia  1/10 3.2, supplemented per hospitalist x1 and is on scheduled potassium  1/11 continue scheduled potassium  1/12 potassium normal at 3.9 6  1/15 potassium normal at 4.0.  Continue supplementation     Hypothyroidism  Continue Synthroid 75 mcg daily     Depression  Insomnia  Continue trazodone 50 mg nightly (QTc 454)  Continue Paxil 10 mg daily     Pain  Tylenol as needed  Chronic neuropathy - Gabapentin at night     Skin  Patient at risk for skin breakdown due to debility in areas including sacrum, achilles, elbows and head in addition to other sites. Nursing to assess skin daily.      GI Ppx  Patient on Prilosec for GERD prophylaxis.    1/9 change diet to soft and bite sized as patient having difficulty with regular  consistencies due to dentures.  Add Ensure supplement.  1/10 consulted dietary to improve nutrition     Bowel   Patient on Senna-docusate for constipation prophylaxis.      Bladder  TV/PVR/BS PRN      LABS 1/15: CBC + BMP  Potassium normal on supplementation, BMP and CMP otherwise normal and nonconcerning       DVT PROPHYLAXIS: On Eliquis for atrial fibrillation     HOSPITALIST FOLLOWING: YES -discussed with hospitalist 1/16     CODE STATUS: Reviewed note from geriatrics 11/30/2023 CODE STATUS DNAR/DNI - d/w patient and family and confirm this code status.     DISPO: Home to [facility].  Need to better understand baseline and goals of therapy.  Patient had a lot of assist at her assisted living facility at baseline.  Evaluation occurring 1/15.     JEANINE: 1/17/24, pending ability to provide extra caregivers.     ADDITIONAL MEDICAL NEEDS ON D/C (IV abx, O2, etc): NONE     MEDS SENT TO: TBD     DISCHARGE SPECIALIST FOLLOW UP: Patient to scheduled follow up with their PCP within 2 weeks from discharge from the [facility].      ____________________________________    Dr. Eugenia Ruiz DO, MS  [facility] - Physical Medicine & Rehabilitation   ____________________________________

## 2024-01-16 NOTE — DIETARY
Nutrition Update:    Day 11 of admit.  Debra Rodrigues is a 88 y.o. female with admitting DX of Pneumonia due to infectious organism [J18.9]  Hypoxia [R09.02].  Patient being followed to optimize nutrition.    Current Diet: Level 6-soft and bite sized  w/ Level 0 - thin liquids. Ensure Plus TID    Recorded PO intake continues to be variable at <25% to 50-75% w/ avg of 40%. PO intake of supplements is recorded as 100 mL x 1 and 240 mL x 1 (~70%). Pt's diet since 1/13 has provided > 2800 kcals per day. Pt's PO intake is most likely meeting pt's estimated nutrition needs.     Problem: Nutritional:  Goal: Achieve adequate nutritional intake  Description: Patient will consume >/= 50% of meals and supplements  Outcome: progressing    RD following.

## 2024-01-16 NOTE — PROGRESS NOTES
Hospital Medicine Daily Progress Note      Chief Complaint  Hypertension    Interval Problem Update  Blood pressures remain elevated; pt asymptomatic.    Review of Systems  Review of Systems   Constitutional:  Negative for chills and fever.   HENT: Negative.     Eyes: Negative.    Respiratory:  Negative for cough and shortness of breath.    Cardiovascular:  Negative for chest pain and palpitations.   Gastrointestinal:  Negative for abdominal pain, nausea and vomiting.   Genitourinary: Negative.    Musculoskeletal: Negative.    Skin:  Negative for itching and rash.   Neurological:  Positive for weakness.   Endo/Heme/Allergies:  Negative for polydipsia. Does not bruise/bleed easily.        Physical Exam  Temp:  [36.4 °C (97.6 °F)-36.6 °C (97.9 °F)] 36.4 °C (97.6 °F)  Pulse:  [65-70] 65  Resp:  [16-18] 18  BP: (139-180)/(65-88) 156/65  SpO2:  [93 %-94 %] 94 %    Physical Exam  Vitals reviewed.   Constitutional:       General: She is not in acute distress.     Appearance: Normal appearance. She is not ill-appearing.   HENT:      Head: Normocephalic and atraumatic.      Right Ear: External ear normal.      Left Ear: External ear normal.      Nose: Nose normal.      Mouth/Throat:      Pharynx: Oropharynx is clear.   Eyes:      General:         Right eye: No discharge.         Left eye: No discharge.      Extraocular Movements: Extraocular movements intact.      Conjunctiva/sclera: Conjunctivae normal.   Cardiovascular:      Rate and Rhythm: Normal rate and regular rhythm.   Pulmonary:      Effort: Pulmonary effort is normal. No respiratory distress.      Breath sounds: Normal breath sounds. No wheezing.   Abdominal:      General: Bowel sounds are normal. There is no distension.      Palpations: Abdomen is soft.      Tenderness: There is no abdominal tenderness. There is no guarding or rebound.   Musculoskeletal:      Cervical back: Normal range of motion and neck supple.      Right lower leg: No edema.      Left lower  leg: No edema.   Skin:     General: Skin is warm and dry.   Neurological:      Mental Status: She is alert and oriented to person, place, and time.         Fluids    Intake/Output Summary (Last 24 hours) at 1/16/2024 1442  Last data filed at 1/16/2024 1216  Gross per 24 hour   Intake 420 ml   Output --   Net 420 ml       Laboratory  Recent Labs     01/15/24  0623   WBC 6.8   RBC 4.58   HEMOGLOBIN 13.9   HEMATOCRIT 42.0   MCV 91.7   MCH 30.3   MCHC 33.1   RDW 45.9   PLATELETCT 281   MPV 10.4     Recent Labs     01/15/24  0623   SODIUM 137   POTASSIUM 4.0   CHLORIDE 105   CO2 22   GLUCOSE 102*   BUN 12   CREATININE 0.76   CALCIUM 8.4*               Assessment/Plan  Abdominal distension  Assessment & Plan  KUB gaseous colonic distension  Continue Simethicone    Hypoxia- (present on admission)  Assessment & Plan  S/P IV Lasix at [facility] for pulm edema  S/P Augmentin and Doxy for PNA  RT protocol    Acquired hypothyroidism- (present on admission)  Assessment & Plan  Euthyroid on Synthroid    MDD (major depressive disorder), recurrent, in partial remission (HCC)- (present on admission)  Assessment & Plan  On Paxil    Primary hypertension- (present on admission)  Assessment & Plan  Blood pressure control suboptimal on Norvasc, Losartan, and Toprol  Will start Hydralazine    Paroxysmal atrial fibrillation (HCC)- (present on admission)  Assessment & Plan  Echo EF 55%  S/P PPM  On Flecainide  Anticoagulated on Eliquis  Cardiology F/U    DNR    Reviewed w/ Dr. Ruiz

## 2024-01-16 NOTE — THERAPY
Occupational Therapy  Daily Treatment     Patient Name: Debra Rodrigues  Age:  88 y.o., Sex:  female  Medical Record #: [id number]  Today's Date: 1/16/2024     Precautions  Precautions: Fall Risk  Comments: macular degeneration, Hx of CVA         Subjective    Pt was seated in w/c upon arrival and agreeable for therapy.      Objective     01/16/24 1001   OT Charge Group   OT Self Care / ADL (Units) 3   OT Therapeutic Exercise (Units) 1   OT Total Time Spent   OT Individual Total Time Spent (Mins) 60   Functional Level of Assist   Grooming Standby Assist   Grooming Description Increased time;Verbal cueing   Bathing Moderate Assist   Bathing Description Grab bar;Hand held shower;Tub bench;Assit with back;Assit with perineal;Increased time;Supervision for safety   Upper Body Dressing Maximal Assist   Upper Body Dressing Description Assist with closures;Increased time;Assist with pulling shirt over head;Supervision for safety;Verbal cueing   Lower Body Dressing Maximal Assist   Lower Body Dressing Description Assist with closures;Assist with threading into pant leg;Increased time;Verbal cueing   Tub / Shower Transfers Moderate Assist   Tub Shower Transfer Description Grab bar;Shower bench;Increased time;Verbal cueing   Sitting Upper Body Exercises   Other Exercise Nustep ~10min level 2   Interdisciplinary Plan of Care Collaboration   IDT Collaboration with  Family / Caregiver   Patient Position at End of Therapy Seated;Chair Alarm On;Call Light within Reach;Tray Table within Reach;Phone within Reach   Collaboration Comments pt family was present and discussed pt's prior assist level as well as progress with COLF.     ADLs: Needed frequent VC for sequncing pivoting and hand position for steadying, assist with pant management (up/down) w/ one hand on GB to increase steadiness as well as UB/LB sequencing during bathing and drying tasks. Pt was Mod A for pull to stand w/ VC to scoot closer to edge of chair prior as well as  proper upright standing posture. Pt completed 4 pull to stands ranging from Mod A to Max A .     Assessment    Pt tolerated session fairly w/ focus on ADLs and thera-ex. Pt continues to need 1 step commands for sequencing for ADLs as well as transfers d/t visual deficits and task processing.    Strengths: Alert and oriented, Able to follow instructions, Pleasant and cooperative, Willingly participates in therapeutic activities  Barriers: Decreased endurance, Generalized weakness, Impaired activity tolerance, Impaired balance, Limited mobility, Visual impairment    Plan    ADLs  Standing balance/ tolerance  Functional transfers   Strengthening/endurance     DME       Passport items to be completed:  Perform bathroom transfers, complete dressing, complete feeding, get ready for the day, prepare a simple meal, participate in household tasks, adapt home for safety needs, demonstrate home exercise program, complete caregiver training     Occupational Therapy Goals (Active)       Problem: Dressing       Dates: Start:  01/06/24         Goal: STG-Within one week, patient will dress UB at a Min A level.        Dates: Start:  01/06/24            Goal: STG-Within one week, patient will dress LB at a Mod A level with AE/AD PRN.        Dates: Start:  01/06/24               Problem: Functional Transfers       Dates: Start:  01/06/24         Goal: STG-Within one week, patient will transfer to toilet at a Mod A level with AD/DME PRN.        Dates: Start:  01/06/24               Problem: OT Long Term Goals       Dates: Start:  01/06/24         Goal: LTG-By discharge, patient will complete basic self care tasks at a SUP to Mod A level with AE/AD/DME PRN.        Dates: Start:  01/06/24            Goal: LTG-By discharge, patient will perform bathroom transfers at a SUP to Min A level with LRD and DME PRN.        Dates: Start:  01/06/24               Problem: Toileting       Dates: Start:  01/06/24         Goal: STG-Within one week,  patient will complete toileting tasks at a Mod A level with AE/AD PRN.        Dates: Start:  01/06/24

## 2024-01-16 NOTE — THERAPY
"Speech Language Pathology  Daily Treatment     Patient Name: Debra Rodrigues  Age:  88 y.o., Sex:  female  Medical Record #: [id number]  Today's Date: 1/16/2024     Precautions  Precautions: Fall Risk  Comments: macular degeneration, Hx of CVA    Subjective    Patient agreeable to therapy.      Objective       01/16/24 0901   Treatment Charges   SLP Cognitive Skill Development First 15 Minutes 1   SLP Cognitive Skill Development Additional 15 Minutes 1   SLP Total Time Spent   SLP Individual Total Time Spent (Mins) 30   Cognition   Prospective Memory Minimal (4)   Functional Memory Activities Supervision (5)   Functional Problem Solving Moderate (3)   Initiation Moderate (3)         Assessment    Patient recalled 3/5 unrelated words from 3-4 days ago. Recalled 5/5 after 15-minute delay with additional review. Patient maintains that she cannot read written information on her memory log due to her macular degeneration. She was able to verbally recall transfer sequencing with min A. She has recognized that she needs cues for \"nose over toes\" to lift her weight off the the seat. Patient also remembered hand placement with use of walker.    Strengths: Alert and oriented, Effective communication skills, Supportive family, Willingly participates in therapeutic activities  Barriers: Generalized weakness, Impaired carryover of learning, Impaired functional cognition, Visual impairment (Low initiation and slow to process.)    Plan    Complete the SCCAN.    Passport items to be completed:  Express basic needs, understand food/liquid recommendations, consistently follow swallow precautions, manage finances, manage medications, arrive to therapy appointments on time, complete daily memory log entries, solve problems related to safety situations, review education related to hospitalization, complete caregiver training     Speech Therapy Problems (Active)       Problem: Comprehension STGs       Dates: Start:  01/06/24         " Goal: STG-Within one week, patient will comprehend both auditory and written instructions with 75% accuracy provided mod assist.       Dates: Start:  01/06/24         Goal Note filed on 01/10/24 1338 by Dania Nelson MS,CCC-SLP       To be addressed.                  Problem: Memory STGs       Dates: Start:  01/08/24         Goal: STG-Within one week, patient will recall transfer sequencing with 75% with min A.       Dates: Start:  01/10/24               Problem: Problem Solving STGs       Dates: Start:  01/06/24         Goal: STG-Within one week, patient will solve basic problems related to safety/sequencing with 75% accuracy provided mod assist.       Dates: Start:  01/06/24         Goal Note filed on 01/10/24 1338 by Dania Nelson MS,CCC-SLP       75% with mod-max A for initiation and sequencing multiple steps.                 Problem: Speech/Swallowing LTGs       Dates: Start:  01/06/24         Goal: LTG-By discharge, patient will solve basic problems related to safety to facilitate safe d/c to a functional living environment.       Dates: Start:  01/06/24

## 2024-01-16 NOTE — CARE PLAN
"The patient is Stable - Low risk of patient condition declining or worsening    Shift Goals  Clinical Goals: safety  Patient Goals: safety    Patient is not progressing towards the following goals:    Problem: Fall Risk - Rehab  Goal: Patient will remain free from falls  Outcome: Not Met  Note: Rubina Echavarria Fall risk Assessment Score: 20    High fall risk Interventions   - Alarming seatbelt  - Bed and strip alarm   - Yellow sign by the door   - Yellow wrist band \"Fall risk\"  - Room near to the nurse station  - Do not leave patient unattended in the bathroom  - Fall risk education provided     "

## 2024-01-16 NOTE — PROGRESS NOTES
..                                                         NURSING DAILY NOTE    Name: Debra Rodrigues   Date of Admission: 1/5/2024   Admitting Diagnosis: Pneumonia due to infectious organism  Attending Physician: Eugenia Ruiz D.o.  Allergies: Other misc, Hydrocodone-acetaminophen, and Sulfa drugs    Safety  Patient Assist  Max x 2  Patient Precautions  Fall Risk  Precaution Comments  Macular degeneration  Bed Transfer Status  Maximal Assist  Toilet Transfer Status   Maximal Assist  Assistive Devices  Rails, Wheelchair  Oxygen  None - Room Air  Diet/Therapeutic Dining  Current Diet Order   Procedures    Diet Order Diet: Level 6 - Soft and Bite Sized; Liquid level: Level 0 - Thin     Pill Administration  whole, floated, and one at a time   Agitated Behavioral Scale  15  ABS Level of Severity  No Agitation    Fall Risk  Has the patient had a fall this admission?   No  Rubina Echavarria Fall Risk Scoring  20, HIGH RISK  Fall Risk Safety Measures  bed alarm, chair alarm, seatbelt alarm, poor balance, and low vision/ hearing    Vitals  Temperature: 36.6 °C (97.9 °F)  Temp src: Oral  Pulse: 70  Respiration: 16  Blood Pressure : 139/88  Blood Pressure MAP (Calculated): 105 MM HG  BP Location: Right, Upper Arm  Patient BP Position: Sitting     Oxygen  Pulse Oximetry: 93 %  O2 (LPM): 0  O2 Delivery Device: None - Room Air    Bowel and Bladder  Last Bowel Movement  01/15/24  Stool Type  Type 6: Fluffy pieces with ragged edges, a mushy stool  Bowel Device  Diaper  Continent  Bladder: Did not void   Bowel: No movement  Bladder Function  Urine Void (mL):  (large)  Number of Times Voided: 1  Urine Color: Unable To Evaluate  Urine Clarity: Unable to Evaluate  Number of Times Incontinent of Urine: 1  Wet Diaper Count: 1  Genitourinary Assessment   Bladder Assessment (WDL):  WDL Except  Cortes Catheter: Not Applicable  Urinary Elimination: Incontinence  Urine Color: Unable To Evaluate  Urine Clarity: Unable to Evaluate  Number  of Bladder Accidents: 1  Total Number of Bladder of Accidents in Last 7 Days: 3  Number of Times Incontinent of Urine: 1  Bladder Device: Diaper  Time Void: No  Bladder Scan: Post Void  $ Bladder Scan Results (mL): 1 (0 scan)    Skin  Larry Score   16  Sensory Interventions   Bed Types: Standard/Trauma Mattress  Skin Preventative Measures: Pillows in Use for Support / Positioning  Moisture Interventions  Moisturizers/Barriers: Barrier Wipes      Pain  Pain Rating Scale  0 - No Pain  Pain Location  Generalized  Pain Location Orientation  Right  Pain Interventions   Declines    ADLs    Bathing   Patient Refused Bathing  Linen Change   Partial  Personal Hygiene  Mamie Bottle, Moist Mmaie Wipes, Perineal Care  Chlorhexidine Bath      Oral Care  Brushed Teeth  Teeth/Dentures     Shave     Nutrition Percentage Eaten  *  * Meal *  *, Dinner, Between 25-50% Consumed (25%)  Environmental Precautions     Patient Turns/Positioning  Patient Turns Self from Side to Side  Patient Turns Assistance/Tolerance  Assistance of One  Bed Positions  Bed Controls On, Bed Locked  Head of Bed Elevated  Self regulated      Psychosocial/Neurologic Assessment  Psychosocial Assessment  Psychosocial (WDL):  WDL Except  Patient Behaviors: Fatigue  Neurologic Assessment  Neuro (WDL): Exceptions to WDL  Level of Consciousness: Alert  Orientation Level: Oriented X4  Cognition: Follows commands  Speech: Clear  Pupil Assesment: No  Motor Function/Sensation Assessment: Sensation  RUE Sensation: Full sensation  LUE Sensation: Full sensation  RLE Sensation: Numbness, Tingling  LLE Sensation: Numbness, Tingling  EENT (WDL):  WDL Except    Cardio/Pulmonary Assessment  Edema   RLE Edema: Generalized  LLE Edema: Generalized  Respiratory Breath Sounds  RUL Breath Sounds: Clear  RML Breath Sounds: Clear  RLL Breath Sounds: Diminished  ANGÉLICA Breath Sounds: Clear  LLL Breath Sounds: Diminished  Cardiac Assessment   Cardiac (WDL):  WDL Except (A-fib, HTN)

## 2024-01-16 NOTE — DISCHARGE PLANNING
Family here, CM gave them a list of the local SNF's and private caregivers. They will be looking at group homes for the patient.Will continue to assist with the discharge planning.

## 2024-01-16 NOTE — CARE PLAN
The patient is Stable - Low risk of patient condition declining or worsening    Shift Goals  Clinical Goals: safety, BP control  Patient Goals: safety    Progress made toward(s) clinical / shift goals:      Problem: Fall Risk - Rehab  Goal: Patient will remain free from falls  Outcome: Progressing     Problem: Mobility  Goal: Patient's capacity to carry out activities will improve  Outcome: Progressing     Problem: Provide Safe Environment  Goal: Suicide environmental safety, protocols, policies, and practices will be implemented  Outcome: Progressing       Patient is not progressing towards the following goals:none

## 2024-01-17 ENCOUNTER — APPOINTMENT (OUTPATIENT)
Dept: PHYSICAL THERAPY | Facility: REHABILITATION | Age: 89
DRG: 194 | End: 2024-01-17
Attending: PHYSICAL MEDICINE & REHABILITATION
Payer: MEDICARE

## 2024-01-17 ENCOUNTER — APPOINTMENT (OUTPATIENT)
Dept: OCCUPATIONAL THERAPY | Facility: REHABILITATION | Age: 89
DRG: 194 | End: 2024-01-17
Attending: PHYSICAL MEDICINE & REHABILITATION
Payer: MEDICARE

## 2024-01-17 ENCOUNTER — APPOINTMENT (OUTPATIENT)
Dept: SPEECH THERAPY | Facility: REHABILITATION | Age: 89
DRG: 194 | End: 2024-01-17
Attending: PHYSICAL MEDICINE & REHABILITATION
Payer: MEDICARE

## 2024-01-17 PROCEDURE — 97530 THERAPEUTIC ACTIVITIES: CPT | Mod: CO

## 2024-01-17 PROCEDURE — 97130 THER IVNTJ EA ADDL 15 MIN: CPT

## 2024-01-17 PROCEDURE — 99232 SBSQ HOSP IP/OBS MODERATE 35: CPT | Performed by: HOSPITALIST

## 2024-01-17 PROCEDURE — 97535 SELF CARE MNGMENT TRAINING: CPT | Mod: CO

## 2024-01-17 PROCEDURE — 700102 HCHG RX REV CODE 250 W/ 637 OVERRIDE(OP): Performed by: PHYSICAL MEDICINE & REHABILITATION

## 2024-01-17 PROCEDURE — 97530 THERAPEUTIC ACTIVITIES: CPT

## 2024-01-17 PROCEDURE — A9270 NON-COVERED ITEM OR SERVICE: HCPCS | Performed by: HOSPITALIST

## 2024-01-17 PROCEDURE — 99231 SBSQ HOSP IP/OBS SF/LOW 25: CPT | Performed by: PHYSICAL MEDICINE & REHABILITATION

## 2024-01-17 PROCEDURE — 97116 GAIT TRAINING THERAPY: CPT

## 2024-01-17 PROCEDURE — 97129 THER IVNTJ 1ST 15 MIN: CPT

## 2024-01-17 PROCEDURE — 700102 HCHG RX REV CODE 250 W/ 637 OVERRIDE(OP): Performed by: HOSPITALIST

## 2024-01-17 PROCEDURE — A9270 NON-COVERED ITEM OR SERVICE: HCPCS | Performed by: PHYSICAL MEDICINE & REHABILITATION

## 2024-01-17 PROCEDURE — 97110 THERAPEUTIC EXERCISES: CPT

## 2024-01-17 PROCEDURE — 770010 HCHG ROOM/CARE - REHAB SEMI PRIVAT*

## 2024-01-17 RX ORDER — SIMETHICONE 125 MG
125 TABLET,CHEWABLE ORAL 4 TIMES DAILY PRN
Status: DISCONTINUED | OUTPATIENT
Start: 2024-01-17 | End: 2024-01-18

## 2024-01-17 RX ORDER — AMANTADINE HYDROCHLORIDE 100 MG/1
100 TABLET ORAL DAILY
Status: COMPLETED | OUTPATIENT
Start: 2024-01-18 | End: 2024-01-18

## 2024-01-17 RX ADMIN — APIXABAN 5 MG: 5 TABLET, FILM COATED ORAL at 08:26

## 2024-01-17 RX ADMIN — HYDRALAZINE HYDROCHLORIDE 10 MG: 10 TABLET ORAL at 04:46

## 2024-01-17 RX ADMIN — LOSARTAN POTASSIUM 100 MG: 50 TABLET, FILM COATED ORAL at 04:46

## 2024-01-17 RX ADMIN — FAMOTIDINE 20 MG: 20 TABLET ORAL at 08:26

## 2024-01-17 RX ADMIN — PAROXETINE HYDROCHLORIDE 10 MG: 20 TABLET, FILM COATED ORAL at 08:26

## 2024-01-17 RX ADMIN — HYDRALAZINE HYDROCHLORIDE 10 MG: 10 TABLET ORAL at 14:43

## 2024-01-17 RX ADMIN — ACETAMINOPHEN 650 MG: 325 TABLET ORAL at 03:46

## 2024-01-17 RX ADMIN — TRAZODONE HYDROCHLORIDE 50 MG: 50 TABLET ORAL at 21:10

## 2024-01-17 RX ADMIN — FLECAINIDE ACETATE 100 MG: 100 TABLET ORAL at 08:26

## 2024-01-17 RX ADMIN — AMANTADINE HYDROCHLORIDE 100 MG: 100 TABLET ORAL at 11:33

## 2024-01-17 RX ADMIN — LEVOTHYROXINE SODIUM 75 MCG: 0.07 TABLET ORAL at 08:27

## 2024-01-17 RX ADMIN — FAMOTIDINE 20 MG: 20 TABLET ORAL at 21:10

## 2024-01-17 RX ADMIN — SIMETHICONE 125 MG: 125 TABLET, CHEWABLE ORAL at 08:25

## 2024-01-17 RX ADMIN — APIXABAN 5 MG: 5 TABLET, FILM COATED ORAL at 21:10

## 2024-01-17 RX ADMIN — LOSARTAN POTASSIUM 100 MG: 50 TABLET, FILM COATED ORAL at 17:41

## 2024-01-17 RX ADMIN — GABAPENTIN 100 MG: 100 CAPSULE ORAL at 21:10

## 2024-01-17 RX ADMIN — METOPROLOL SUCCINATE 12.5 MG: 25 TABLET, EXTENDED RELEASE ORAL at 04:46

## 2024-01-17 RX ADMIN — Medication 1000 UNITS: at 08:26

## 2024-01-17 RX ADMIN — AMLODIPINE BESYLATE 10 MG: 5 TABLET ORAL at 04:46

## 2024-01-17 RX ADMIN — HYDRALAZINE HYDROCHLORIDE 10 MG: 10 TABLET ORAL at 21:10

## 2024-01-17 RX ADMIN — AMANTADINE HYDROCHLORIDE 100 MG: 100 TABLET ORAL at 04:46

## 2024-01-17 ASSESSMENT — ACTIVITIES OF DAILY LIVING (ADL)
TOILET_TRANSFER_DESCRIPTION: GRAB BAR;INCREASED TIME;SET-UP OF EQUIPMENT;VERBAL CUEING
TOILET_TRANSFER_DESCRIPTION: GRAB BAR;INCREASED TIME;SET-UP OF EQUIPMENT;VERBAL CUEING

## 2024-01-17 ASSESSMENT — ENCOUNTER SYMPTOMS
CHILLS: 0
VOMITING: 0
COUGH: 0
FEVER: 0
SHORTNESS OF BREATH: 0
PALPITATIONS: 0
EYES NEGATIVE: 1
MUSCULOSKELETAL NEGATIVE: 1
ABDOMINAL PAIN: 0
NAUSEA: 0
POLYDIPSIA: 0
BRUISES/BLEEDS EASILY: 0

## 2024-01-17 ASSESSMENT — GAIT ASSESSMENTS
ASSISTIVE DEVICE: FRONT WHEEL WALKER
DEVIATION: STEP TO;DECREASED BASE OF SUPPORT;DECREASED HEEL STRIKE;DECREASED TOE OFF;BRADYKINETIC
DISTANCE (FEET): 10
GAIT LEVEL OF ASSIST: MODERATE ASSIST
ASSISTIVE DEVICE: FRONT WHEEL WALKER;PARALLEL BARS
DISTANCE (FEET): 20
GAIT LEVEL OF ASSIST: MAXIMAL ASSIST
DEVIATION: STEP TO;INCREASED BASE OF SUPPORT;BRADYKINETIC;DECREASED HEEL STRIKE;DECREASED TOE OFF

## 2024-01-17 NOTE — THERAPY
Physical Therapy   Daily Treatment     Patient Name: Debra Rodrigues  Age:  88 y.o., Sex:  female  Medical Record #: [id number]  Today's Date: 1/17/2024     Precautions  Precautions: (P) Fall Risk  Comments: (P) macular degeneration, Hx of CVA    Subjective    Pt is in her wc and agreeable to participate. She just finished OT session.     Objective       01/17/24 0901   PT Charge Group   Charges Yes   PT Gait Training (Units) 1   PT Therapeutic Exercise (Units) 1   PT Total Time Spent   PT Individual Total Time Spent (Mins) 30   Precautions   Precautions Fall Risk   Comments macular degeneration, Hx of CVA   Vitals   Patient BP Position Sitting   Pulse Oximetry 92 %   O2 Delivery Device None - Room Air   Pain 0 - 10 Group   Location Leg   Location Orientation Lower   Pain Rating Scale (NPRS) 5   Description Aching   Comfort Goal Comfort with Movement;Perform Activity   Therapist Pain Assessment During Activity  (after 2nd bout of gait training.)   Gait Functional Level of Assist    Gait Level Of Assist Moderate Assist   Assistive Device Front Wheel Walker;Parallel Bars   Distance (Feet) 10  (8 feet in //bars)   # of Times Distance was Traveled 2   Deviation Step To;Decreased Base Of Support;Decreased Heel Strike;Decreased Toe Off;Bradykinetic   Transfer Functional Level of Assist   Bed, Chair, Wheelchair Transfer Maximal Assist   Bed Chair Wheelchair Transfer Description Verbal cueing;Supervision for safety;Increased time;Initial preparation for task;Adaptive equipment   Sitting Lower Body Exercises   Hip Abduction 2 sets of 10;Bilateral;Light Resistance Theraband   Long Arc Quad 2 sets of 10;Bilateral;Weight (See Comments for lbs)  (2lbs AW)   Marching Reciprocal;2 sets of 10  (2lbs AW)   Interdisciplinary Plan of Care Collaboration   IDT Collaboration with  Therapy Tech   Patient Position at End of Therapy Seated;Chair Alarm On;Self Releasing Lap Belt Applied;Call Light within Reach;Tray Table within Reach;Phone  within Reach   Collaboration Comments assist during session.         Assessment    Pt completed seated resistance exercises but needs frequent rest breaks. Performed gait training in // bars and FWW, Mod A with cueing for upright posture, increasing KARLOS and increasing step length. Pt easily fatigues and refused to continue after 2nd bout of gait using FWW due to leg pain.   Strengths: Able to follow instructions, Manages pain appropriately, Motivated for self care and independence, Pleasant and cooperative, Willingly participates in therapeutic activities  Barriers: Decreased endurance, Confused, Fatigue, Generalized weakness, Impaired activity tolerance, Impaired balance, Limited mobility    Plan    Continue walking with a FWW with the chair as a target (trial 10 ft intervals)   STS from an elevated table with a reaching game (card matching)   Continue seated DF stretches (in a w/c or with gait belt)    Continue seated LE exercises (gastroc, quads, hip flexors, hamstrings)   Work on postural muscle (sacp retractions)  Per case management, pt 's possible D/C destination is a group home- 01/16/24          DME  PT DME Recommendations  Wheelchair:  (TBD)  Assistive Device:  (TBD)    Passport items to be completed:  Get in/out of bed safely, in/out of a vehicle, safely use mobility device, walk or wheel around home/community, navigate up and down stairs, show how to get up/down from the ground, ensure home is accessible, demonstrate HEP, complete caregiver training    Physical Therapy Problems (Active)       Problem: Mobility       Dates: Start:  01/10/24         Goal: STG-Within one week, patient will ambulate x 10' with FWW and Mod A        Dates: Start:  01/10/24         Goal Note filed on 01/10/24 1247 by Claudia Cherry, Student       Pt ambulated 10' with FWW and Max A but required breaks to complete the activity                  Problem: Mobility Transfers       Dates: Start:  01/06/24         Goal: STG-Within  one week, patient will perform bed mobility with mod A       Dates: Start:  01/06/24         Goal Note filed on 01/10/24 1247 by Claudia Cherry, Student       Pt was Bhavin LOPEZ and required verbal and tactile cueing for sequencing and initiating task                 Problem: PT-Long Term Goals       Dates: Start:  01/06/24         Goal: LTG-By discharge, patient will propel wheelchair x 150 feet with SPV       Dates: Start:  01/06/24            Goal: LTG-By discharge, patient will ambulate x 30 feet with FWW and CGA       Dates: Start:  01/06/24            Goal: LTG-By discharge, patient will transfer one surface to another with FWW and CGA       Dates: Start:  01/06/24            Goal: LTG-By discharge, patient will transfer in/out of a car with FWW and CGA       Dates: Start:  01/06/24

## 2024-01-17 NOTE — PROGRESS NOTES
..                                                         NURSING DAILY NOTE    Name: Debra Rodrigues   Date of Admission: 1/5/2024   Admitting Diagnosis: Pneumonia due to infectious organism  Attending Physician: Eugenia Ruiz D.o.  Allergies: Other misc, Hydrocodone-acetaminophen, and Sulfa drugs    Safety  Patient Assist  max 1  Patient Precautions  Fall Risk  Precaution Comments  macular degeneration, Hx of CVA  Bed Transfer Status  Maximal Assist  Toilet Transfer Status   Maximal Assist  Assistive Devices  Gait Belt, Rails, Wheelchair  Oxygen  None - Room Air  Diet/Therapeutic Dining  Current Diet Order   Procedures    Diet Order Diet: Level 6 - Soft and Bite Sized; Liquid level: Level 0 - Thin     Pill Administration  whole and floated  Agitated Behavioral Scale  15  ABS Level of Severity  No Agitation    Fall Risk  Has the patient had a fall this admission?   No  Rubina Echavarria Fall Risk Scoring  20, HIGH RISK  Fall Risk Safety Measures  bed alarm, chair alarm, seatbelt alarm, poor balance, and low vision/ hearing    Vitals  Temperature: 36.7 °C (98.1 °F)  Temp src: Oral  Pulse: 67  Respiration: 18  Blood Pressure : (!) 155/66  Blood Pressure MAP (Calculated): 96 MM HG  BP Location: Right, Upper Arm  Patient BP Position: Supine     Oxygen  Pulse Oximetry: 97 %  O2 (LPM): 0  O2 Delivery Device: None - Room Air    Bowel and Bladder  Last Bowel Movement  01/16/24  Stool Type  Type 4: Like a sausage or snake, smooth and soft  Bowel Device  Diaper  Continent  Bladder: Did not void   Bowel: No movement  Bladder Function  Urine Void (mL):  (moderate)  Number of Times Voided: 1  Urine Color: Unable To Evaluate  Urine Clarity: Unable to Evaluate  Number of Times Incontinent of Urine: 1  Wet Diaper Count: 1  Genitourinary Assessment   Bladder Assessment (WDL):  WDL Except  Cortes Catheter: Not Applicable  Urinary Elimination: Incontinence  Urine Color: Unable To Evaluate  Urine Clarity: Unable to  Evaluate  Number of Bladder Accidents: 0  Total Number of Bladder of Accidents in Last 7 Days: 3  Number of Times Incontinent of Urine: 1  Bladder Device: Diaper  Time Void: No  Bladder Scan: Post Void  $ Bladder Scan Results (mL): 1 (0 scan)    Skin  Larry Score   16  Sensory Interventions   Bed Types: Standard/Trauma Mattress  Skin Preventative Measures: Pillows in Use for Support / Positioning  Moisture Interventions  Moisturizers/Barriers: Barrier Wipes, Barrier Cream      Pain  Pain Rating Scale  2 - Notice Pain, does not interfere with activities  Pain Location  Back  Pain Location Orientation  Lower  Pain Interventions   Declines    ADLs    Bathing    (shower done on Day shift)  Linen Change   Partial  Personal Hygiene  Change Mamie Pads, Moist Mamie Wipes, Perineal Care  Chlorhexidine Bath      Oral Care  Brushed Teeth  Teeth/Dentures     Shave     Nutrition Percentage Eaten  Lunch, Between 25-50% Consumed  Environmental Precautions  Bed in Low Position, Treaded Slipper Socks on Patient  Patient Turns/Positioning  Patient Turns Self from Side to Side  Patient Turns Assistance/Tolerance  Assistance of One  Bed Positions  Bed Controls On  Head of Bed Elevated  Self regulated      Psychosocial/Neurologic Assessment  Psychosocial Assessment  Psychosocial (WDL):  Within Defined Limits  Patient Behaviors: Fatigue  Neurologic Assessment  Neuro (WDL): Exceptions to WDL  Level of Consciousness: Alert  Orientation Level: Oriented X4  Cognition: Follows commands  Speech: Clear  Pupil Assesment: No  Motor Function/Sensation Assessment: Sensation  RUE Sensation: Full sensation  LUE Sensation: Full sensation  RLE Sensation: Numbness, Tingling  LLE Sensation: Numbness, Tingling  EENT (WDL):  WDL Except    Cardio/Pulmonary Assessment  Edema   RLE Edema: Generalized  LLE Edema: Generalized  Respiratory Breath Sounds  RUL Breath Sounds: Clear  RML Breath Sounds: Clear  RLL Breath Sounds: Diminished  ANGÉLICA Breath Sounds:  Clear  LLL Breath Sounds: Diminished  Cardiac Assessment   Cardiac (WDL):  WDL Except (A-fib, HTN)

## 2024-01-17 NOTE — THERAPY
Occupational Therapy  Daily Treatment     Patient Name: Debra Rodrigues  Age:  88 y.o., Sex:  female  Medical Record #: [id number]  Today's Date: 1/17/2024     Precautions  Precautions: Fall Risk  Comments: macular degeneration, Hx of CVA         Subjective    Pt was seated in w/c upon arrival and agreeable for OT session.      Objective       01/17/24 0830   OT Charge Group   OT Self Care / ADL (Units) 1   OT Therapy Activity (Units) 1   OT Total Time Spent   OT Individual Total Time Spent (Mins) 30   Functional Level of Assist   Toileting Maximal Assist  (for BM)   Toileting Description Assist for hygiene;Assist to pull pants up;Grab bar;Increased time;Set-up of equipment;Verbal cueing   Toilet Transfers Moderate Assist   Toilet Transfer Description Grab bar;Increased time;Set-up of equipment;Verbal cueing   IADL Treatments   IADL Treatments Home management   Home Management Laundry tasks at w/c level   Interdisciplinary Plan of Care Collaboration   IDT Collaboration with  Physical Therapist   Patient Position at End of Therapy Seated;Chair Alarm On   Collaboration Comments Pt was transfered over to PT and communicated COLF     ADLs; Increase time needed for toielting tasks. Pt needed assistance w/ mal care post BM, however was able to wipe the front.   IADLs: Needed 1 step VC to load laundry in washer at w/c level. Instructed pt in STS using FWW to add detergent and adjust washer settings, however pt was unable to perform tasks d/t poor standing balance and low vision.     Assessment    Pt tolerated session fairly w/ focus on ADLs and thera-ex. Pt continues to need 1 step commands for sequencing for ADLs as well as transfers d/t visual deficits and task processing.       Strengths: Alert and oriented, Able to follow instructions, Pleasant and cooperative, Willingly participates in therapeutic activities  Barriers: Decreased endurance, Generalized weakness, Impaired activity tolerance, Impaired balance, Limited  mobility, Visual impairment    Plan    Standing balance/ tolerance  Functional transfers   Strengthening/endurance    clothing management.     DME       Passport items to be completed:  Perform bathroom transfers, complete dressing, complete feeding, get ready for the day, prepare a simple meal, participate in household tasks, adapt home for safety needs, demonstrate home exercise program, complete caregiver training     Occupational Therapy Goals (Active)       Problem: Dressing       Dates: Start:  01/06/24         Goal: STG-Within one week, patient will dress UB at a Min A level.        Dates: Start:  01/06/24            Goal: STG-Within one week, patient will dress LB at a Mod A level with AE/AD PRN.        Dates: Start:  01/06/24               Problem: Functional Transfers       Dates: Start:  01/06/24         Goal: STG-Within one week, patient will transfer to toilet at a Mod A level with AD/DME PRN.        Dates: Start:  01/06/24               Problem: OT Long Term Goals       Dates: Start:  01/06/24         Goal: LTG-By discharge, patient will complete basic self care tasks at a SUP to Mod A level with AE/AD/DME PRN.        Dates: Start:  01/06/24            Goal: LTG-By discharge, patient will perform bathroom transfers at a SUP to Min A level with LRD and DME PRN.        Dates: Start:  01/06/24               Problem: Toileting       Dates: Start:  01/06/24         Goal: STG-Within one week, patient will complete toileting tasks at a Mod A level with AE/AD PRN.        Dates: Start:  01/06/24

## 2024-01-17 NOTE — CARE PLAN
Problem: Dressing  Goal: STG-Within one week, patient will dress LB at a Mod A level with AE/AD PRN.   Outcome: Not Progressing     Problem: Toileting  Goal: STG-Within one week, patient will complete toileting tasks at a Mod A level with AE/AD PRN.   Outcome: Not Progressing     Problem: Dressing  Goal: STG-Within one week, patient will dress UB at a Min A level.   Outcome: Progressing     Problem: Functional Transfers  Goal: STG-Within one week, patient will transfer to toilet at a Mod A level with AD/DME PRN.   Outcome: Progressing

## 2024-01-17 NOTE — PROGRESS NOTES
Patient care assumed. Report received from [facility] BL[facility]. Patient is alert and calm, resting in bed. Call light and bedside table within reach. Will continue to monitor.

## 2024-01-17 NOTE — CARE PLAN
"The patient is Stable - Low risk of patient condition declining or worsening    Shift Goals  Clinical Goals: safety, BP control  Patient Goals: safety    Patient is not progressing towards the following goals:    Problem: Fall Risk - Rehab  Goal: Patient will remain free from falls  Outcome: Not Met  Note: Rubina Echavarria Fall risk Assessment Score: 20    High fall risk Interventions   - Alarming seatbelt  - Bed and strip alarm   - Yellow sign by the door   - Yellow wrist band \"Fall risk\"  - Room near to the nurse station  - Do not leave patient unattended in the bathroom  - Fall risk education provided     "

## 2024-01-17 NOTE — CARE PLAN
Problem: Problem Solving STGs  Goal: STG-Within one week, patient will solve basic problems related to safety/sequencing with 75% accuracy provided mod assist.  Outcome: Met  Note: 75% with mod A.     Problem: Memory STGs  Goal: STG-Within one week, patient will recall transfer sequencing with 75% with min A.  Outcome: Met  Note: Verbalizes with 75% with min A. Patient needs extra time and prompting.

## 2024-01-17 NOTE — CARE PLAN
Problem: Mobility Transfers  Goal: STG-Within one week, patient will perform bed mobility with mod A  Outcome: Progressing  Note: Pt is Max A for bed mobility      Problem: PT-Long Term Goals  Goal: LTG-By discharge, patient will propel wheelchair x 150 feet with SPV  Outcome: Progressing  Note: Pt was able to propel w/c 65 ft with Min A for turning   Goal: LTG-By discharge, patient will ambulate x 30 feet with FWW and CGA  Outcome: Progressing  Note: Pt can ambulate 20 ft with Mod A with a FWW  Goal: LTG-By discharge, patient will transfer one surface to another with FWW and CGA  Outcome: Progressing  Note: Pt can transfer surfaces with Max A x 1 person   Goal: LTG-By discharge, patient will transfer in/out of a car with FWW and CGA  Outcome: Progressing  Note: Needs to be assessed      Problem: Mobility  Goal: STG-Within one week, patient will ambulate x 10' with FWW and Mod A   Outcome: Met

## 2024-01-17 NOTE — THERAPY
Occupational Therapy  Daily Treatment     Patient Name: Debra Rodrigues  Age:  88 y.o., Sex:  female  Medical Record #: [id number]  Today's Date: 1/17/2024     Precautions  Precautions: Fall Risk  Comments: macular degeneration, Hx of CVA         Subjective    Pt sleeping supine in bed upon arrival. Needed increase encouragement to participate in 2nd OT session.      Objective       01/17/24 1301   OT Charge Group   OT Self Care / ADL (Units) 2   OT Total Time Spent   OT Individual Total Time Spent (Mins) 30   Functional Level of Assist   Grooming Standby Assist  (wash hands, brush hair, wash face w/ wet wash cloth, and brush teeth)   Grooming Description Increased time;Initial preparation for task;Seated in wheelchair at sink;Verbal cueing   Toileting Maximal Assist  (BM)   Toileting Description Assist for hygiene;Assist to pull pants up;Grab bar;Increased time;Assist to pull pants down;Assist for standing balance;Verbal cueing   Toilet Transfers Maximal Assist   Toilet Transfer Description Grab bar;Increased time;Set-up of equipment;Verbal cueing   Bed Mobility    Supine to Sit Maximal Assist   Sit to Supine Maximal Assist   Scooting Maximal Assist   Interdisciplinary Plan of Care Collaboration   IDT Collaboration with  Certified Nursing Assistant   Patient Position at End of Therapy In Bed;Bed Alarm On;Call Light within Reach;Tray Table within Reach;Phone within Reach   Collaboration Comments assisted w/ stand pivot transfer after bed mobs.   Occupational Therapist Assigned   Assigned OT / Treatment Time / Comments Pt will like an hr break in between therapy d/t fatigue. Pt reqested no 7am     ADLs; Increase time needed for toielting tasks. Pt needed assistance w/ mal care post BM, however was able to wipe the front. Needed VC to complete tasks for grooming/hygiene while seated in w/c     Assessment    Pt tolerated session fairly w/ focus on ADLs. Pt continues to need 1 step commands for sequencing for ADLs as  well as transfers d/t visual deficits and task processing.        Strengths: Alert and oriented, Able to follow instructions, Pleasant and cooperative, Willingly participates in therapeutic activities  Barriers: Decreased endurance, Generalized weakness, Impaired activity tolerance, Impaired balance, Limited mobility, Visual impairment    Plan    Standing balance/ tolerance  Functional transfers   Strengthening/endurance    clothing management.     DME       Passport items to be completed:  Perform bathroom transfers, complete dressing, complete feeding, get ready for the day, prepare a simple meal, participate in household tasks, adapt home for safety needs, demonstrate home exercise program, complete caregiver training     Occupational Therapy Goals (Active)       Problem: Dressing       Dates: Start:  01/06/24         Goal: STG-Within one week, patient will dress UB at a Min A level.        Dates: Start:  01/06/24            Goal: STG-Within one week, patient will dress LB at a Mod A level with AE/AD PRN.        Dates: Start:  01/06/24               Problem: Functional Transfers       Dates: Start:  01/06/24         Goal: STG-Within one week, patient will transfer to toilet at a Mod A level with AD/DME PRN.        Dates: Start:  01/06/24               Problem: OT Long Term Goals       Dates: Start:  01/06/24         Goal: LTG-By discharge, patient will complete basic self care tasks at a SUP to Mod A level with AE/AD/DME PRN.        Dates: Start:  01/06/24            Goal: LTG-By discharge, patient will perform bathroom transfers at a SUP to Min A level with LRD and DME PRN.        Dates: Start:  01/06/24               Problem: Toileting       Dates: Start:  01/06/24         Goal: STG-Within one week, patient will complete toileting tasks at a Mod A level with AE/AD PRN.        Dates: Start:  01/06/24

## 2024-01-17 NOTE — PROGRESS NOTES
"  Physical Medicine & Rehabilitation Progress Note    Encounter Date: 1/17/2024    Chief Complaint: Feels well today    Interval Events (Subjective):  Vital signs: Blood pressure elevated into the 140s and 150s  Bowel movement 1/16  Voiding, incontinent into adult briefs    Patient seen and examined in her room.  States she feels well.  She thinks her daughter in law and son have found a group home for her.  She is not sure.    ROS: 14 point ROS negative unless otherwise specified in the HPI    Objective:  VITAL SIGNS: /61   Pulse 67   Temp 36.7 °C (98.1 °F) (Oral)   Resp 18   Ht 1.676 m (5' 6\")   Wt 90 kg (198 lb 8 oz)   SpO2 96%   BMI 32.04 kg/m²     GEN: No apparent distress  HEENT: Head normocephalic, atraumatic.  Sclera nonicteric bilaterally, no ocular discharge appreciated bilaterally.  CV: Extremities warm and well-perfused, no peripheral edema appreciated bilaterally.  PULMONARY: Breathing nonlabored on room air, no respiratory accessory muscle use.  Not requiring supplemental oxygen.   ABD: Soft, nontender.  SKIN: No appreciable skin breakdown on exposed areas of skin.  PSYCH: Mood and affect within normal limits.  NEURO: Awake alert.  Conversational.  Logical thought content.      Laboratory Values:  Recent Results (from the past 72 hour(s))   CBC WITH DIFFERENTIAL    Collection Time: 01/15/24  6:23 AM   Result Value Ref Range    WBC 6.8 4.8 - 10.8 K/uL    RBC 4.58 4.20 - 5.40 M/uL    Hemoglobin 13.9 12.0 - 16.0 g/dL    Hematocrit 42.0 37.0 - 47.0 %    MCV 91.7 81.4 - 97.8 fL    MCH 30.3 27.0 - 33.0 pg    MCHC 33.1 32.2 - 35.5 g/dL    RDW 45.9 35.9 - 50.0 fL    Platelet Count 281 164 - 446 K/uL    MPV 10.4 9.0 - 12.9 fL    Neutrophils-Polys 59.20 44.00 - 72.00 %    Lymphocytes 23.90 22.00 - 41.00 %    Monocytes 10.60 0.00 - 13.40 %    Eosinophils 4.00 0.00 - 6.90 %    Basophils 1.30 0.00 - 1.80 %    Immature Granulocytes 1.00 (H) 0.00 - 0.90 %    Nucleated RBC 0.00 0.00 - 0.20 /100 WBC    " Neutrophils (Absolute) 4.03 1.82 - 7.42 K/uL    Lymphs (Absolute) 1.63 1.00 - 4.80 K/uL    Monos (Absolute) 0.72 0.00 - 0.85 K/uL    Eos (Absolute) 0.27 0.00 - 0.51 K/uL    Baso (Absolute) 0.09 0.00 - 0.12 K/uL    Immature Granulocytes (abs) 0.07 0.00 - 0.11 K/uL    NRBC (Absolute) 0.00 K/uL   Basic Metabolic Panel    Collection Time: 01/15/24  6:23 AM   Result Value Ref Range    Sodium 137 135 - 145 mmol/L    Potassium 4.0 3.6 - 5.5 mmol/L    Chloride 105 96 - 112 mmol/L    Co2 22 20 - 33 mmol/L    Glucose 102 (H) 65 - 99 mg/dL    Bun 12 8 - 22 mg/dL    Creatinine 0.76 0.50 - 1.40 mg/dL    Calcium 8.4 (L) 8.5 - 10.5 mg/dL    Anion Gap 10.0 7.0 - 16.0   ESTIMATED GFR    Collection Time: 01/15/24  6:23 AM   Result Value Ref Range    GFR (CKD-EPI) 75 >60 mL/min/1.73 m 2         Medications:  Scheduled Medications   Medication Dose Frequency    hydrALAZINE  10 mg Q8HRS    amLODIPine  10 mg Q DAY    losartan  100 mg Q12HRS    amantadine  100 mg BID    metoprolol SR  12.5 mg DAILY    Pharmacy Consult Request  1 Each PHARMACY TO DOSE    apixaban  5 mg BID    famotidine  20 mg Q12HRS    flecainide  100 mg DAILY    traZODone  50 mg QHS    PARoxetine  10 mg DAILY    levothyroxine  75 mcg DAILY    gabapentin  100 mg Q EVENING    vitamin D3  1,000 Units DAILY     PRN medications: simethicone, senna-docusate **AND** polyethylene glycol/lytes **AND** magnesium hydroxide **AND** bisacodyl, Respiratory Therapy Consult, hydrALAZINE, carboxymethylcellulose, benzocaine-menthol, mag hydrox-al hydrox-simeth, ondansetron **OR** ondansetron, sodium chloride, acetaminophen, guaiFENesin dextromethorphan    Diet:  Current Diet Order   Procedures    Diet Order Diet: Level 6 - Soft and Bite Sized; Liquid level: Level 0 - Thin       Medical Decision Making and Plan:  Community-acquired pneumonia  Flu, RSV, COVID-negative  Respiratory cultures PENDING (unclear if collected at [facility])  Blood cultures collected 1/2/2024 no growth to date  PT and OT  for mobility and ADLs. Per guidelines, 15 hours per week between PT, OT and/or SLP.  Follow-up geriatrician  Continue Augmentin through 1/8 -completed  Continue doxycycline through 1/9-completed     Atrial fibrillation  Permanent pacemaker  Recently referred to cardiology for local establishment (had been in [facility])  Continue Eliquis 5 mg twice daily  Continue flecainide 100 mg daily  Continue with oral 25 mg XL daily--> 12.5 mg 1/8     History of Stroke  Left Hemiparesis  Monitor     Impaired cognition  Poor motivation/attention, increased fatigue  ?  Encephalopathy secondary to ammonia  SLP to establish baseline  1/11 trial amantadine twice daily, avoid other stimulants due to elevated blood pressure  1/12 DIL reporting more clarity.  Continue amantadine.  1/17 Equivocal if helping, is not helping with initiation in therapy. Taper off and stop before discharge.      Hypertension  Continue Cozaar 50 mg every 12 hours--> 100 mg every 12 hours 1/11   Continue Toprol XL 25 mg daily -reduced to 12.5 mg daily due to bradycardia 1/8  Norvasc 5 mg daily added 1/8 --> increase to 10 mg 1/15  1/16: Continue amlodipine 10 mg + Cozaar 100 mg every 12 hours + metoprolol 12.5 mg XL daily + (new) Hydralazine 10mg q8 hrs  1/17: Monitor BP with addition of Hydralazine yesterday.     Hypokalemia  1/10 3.2, supplemented per hospitalist x1 and is on scheduled potassium  1/11 continue scheduled potassium  1/12 potassium normal at 3.9 6  1/15 potassium normal at 4.0.  Continue supplementation     Hypothyroidism  Continue Synthroid 75 mcg daily     Depression  Insomnia  Continue trazodone 50 mg nightly (QTc 454)  Continue Paxil 10 mg daily     Pain  Tylenol as needed  Chronic neuropathy - Gabapentin at night     Skin  Patient at risk for skin breakdown due to debility in areas including sacrum, achilles, elbows and head in addition to other sites. Nursing to assess skin daily.      GI Ppx  Patient on Prilosec for GERD prophylaxis.    1/9  change diet to soft and bite sized as patient having difficulty with regular consistencies due to dentures.  Add Ensure supplement.  1/10 consulted dietary to improve nutrition     Bowel   Patient on Senna-docusate for constipation prophylaxis.      Bladder  TV/PVR/BS PRN      LABS 1/15: CBC + BMP  Potassium normal on supplementation, BMP and CMP otherwise normal and nonconcerning    Screening labs ordered for 1/18.        DVT PROPHYLAXIS: On Eliquis for atrial fibrillation     HOSPITALIST FOLLOWING: YES -discussed with hospitalist 1/17     CODE STATUS: Reviewed note from geriatrics 11/30/2023 CODE STATUS DNAR/DNI - d/w patient and family and confirm this code status.     DISPO: Home to [facility] at [facility]. Patient had a lot of assist at her assisted living facility at baseline.  Evaluation occurring 1/15 - requires too much assist. DIL and son looking into group homes vs CG at [facility]. Group home coming 1/17/2024 to evaluate for acceptance.      JEANINE: Expected 1/19 to SNF or  or [facility] with additional CG. Being evaluated for GH today 1/17. Likely d/c to group home on Friday.      ADDITIONAL MEDICAL NEEDS ON D/C (IV abx, O2, etc): NONE     MEDS SENT TO: Is M2B Eligible     DISCHARGE SPECIALIST FOLLOW UP: Patient to scheduled follow up with their PCP within 2 weeks from discharge from the [facility].      ____________________________________    Dr. Eugenia Ruiz DO, MS  [facility] - Physical Medicine & Rehabilitation   ____________________________________    _____________________________________  Interdisciplinary Team Conference   Most recent IDT on 1/17/2024    I, Dr. Eugenia Ruiz DO, MS, was present and led the interdisciplinary team conference on 1/17/2024.  I led the IDT conference and agree with the IDT conference documentation and plan of care as noted below.     Nursing:  Diet Current Diet Order   Procedures    Diet Order Diet: Level 6 - Soft and Bite Sized; Liquid level: Level 0 - Thin        Eating ADL Supervision  Set-up of equipment or meal/tube feeding   % of Last Meal  Oral Nutrition: Lunch, Between 25-50% Consumed   Sleep    Bowel Last BM: 01/16/24   Bladder Incontinent of bladder at times     Physical Therapy:  Bed Mobility    Transfers Maximal Assist  Increased time, Initial preparation for task, Set-up of equipment, Verbal cueing, Assist with two limbs (into and out of bed x 1, Assist at pelvis and trunk for for steadying, use of railings for steadying and pull support, use of w/c for steadying, VC for sequencing)   Mobility Maximal Assist (Assist at trunk and pelvis for maintaining upright posture while walking)   Stairs    Met 1 of 6 STG  Moderate A for walking with FWW     Occupational Therapy:  Grooming Standby Assist   Bathing Moderate Assist   UB Dressing Maximal Assist   LB Dressing Maximal Assist   Toileting Total Assist for bowel   Shower & Transfer Varies from mod to total A  Can use grab bar to pull   Requires a lot of cuing.     Speech-Language Pathology:  Comprehension:  Minimal Assist  Comprehension Description:  Glasses, Verbal cues, Other (comment) (Extra time)  Expression:  Supervision  Expression Description:  Other (comment) (Extra time)  Social Interaction:  Modified Independent  Social Interaction Description:  Increased time, Verbal cues  Problem Solving:  Moderate Assist  Problem Solving Description:  Bed/chair alarm, Increased time, Seat belt, Supervision, Verbal cueing, Therapy schedule  Memory:  Moderate Assist  Memory Description:  Bed/chair alarm, Increased time, Seat belt, Supervision, Verbal cueing, Therapy schedule    Has met her 2 goals, but functionally still Mod A. Still does not initiate.     Respiratory Therapy:  O2 (LPM): 0  O2 Delivery Device: None - Room Air    Case Management:  Continues to work on disposition and DME needs.     BARRIERS TO DISCHARGE HOME:  Requires heavy assist for all function  Discharge plan     Discharge  Date/Disposition:  1/19/24  _____________________________________

## 2024-01-17 NOTE — CARE PLAN
The patient is Stable - Low risk of patient condition declining or worsening    Shift Goals  Clinical Goals: safety  Patient Goals: safety    Problem: Fall Risk - Rehab  Goal: Patient will remain free from falls  Outcome: Progressing Pt uses call light consistently and appropriately. Waits for assistance does not attempt self transfer this shift. Able to verbalize needs.     Problem: Pain - Standard  Goal: Alleviation of pain or a reduction in pain to the patient’s comfort goal  Outcome: Progressing Patient able to verbalize needs.  Denies pain or discomfort this shift and no s/s same noted.  Will continue to monitor.

## 2024-01-17 NOTE — PROGRESS NOTES
NURSING DAILY NOTE    Name: Debra Rodrigues   Date of Admission: 1/5/2024   Admitting Diagnosis: Pneumonia due to infectious organism  Attending Physician: Eugenia Ruiz D.o.  Allergies: Other misc, Hydrocodone-acetaminophen, and Sulfa drugs    Safety  Patient Assist  Max 2  Patient Precautions  Fall Risk  Precaution Comments  macular degeneration, Hx of CVA  Bed Transfer Status  Maximal Assist  Toilet Transfer Status   Maximal Assist  Assistive Devices  Gait Belt, Rails, Wheelchair  Oxygen  None - Room Air  Diet/Therapeutic Dining  Current Diet Order   Procedures    Diet Order Diet: Level 6 - Soft and Bite Sized; Liquid level: Level 0 - Thin     Pill Administration  floated and one at a time   Agitated Behavioral Scale  15  ABS Level of Severity  No Agitation    Fall Risk  Has the patient had a fall this admission?   No  Rubina Echavarria Fall Risk Scoring  20, HIGH RISK  Fall Risk Safety Measures  bed alarm, chair alarm, seatbelt alarm, and poor balance    Vitals  Temperature: 36.9 °C (98.4 °F)  Temp src: Oral  Pulse: 66  Respiration: 18  Blood Pressure : (!) 144/74  Blood Pressure MAP (Calculated): 97 MM HG  BP Location: Right, Upper Arm  Patient BP Position: Supine     Oxygen  Pulse Oximetry: 90 %  O2 (LPM): 0  O2 Delivery Device: None - Room Air    Bowel and Bladder  Last Bowel Movement  01/16/24  Stool Type  Type 4: Like a sausage or snake, smooth and soft  Bowel Device  Diaper  Continent  Bladder: Did not void   Bowel: No movement  Bladder Function  Urine Void (mL):  (large)  Number of Times Voided: 1  Urine Color: Unable To Evaluate  Urine Clarity: Unable to Evaluate  Number of Times Incontinent of Urine: 1  Wet Diaper Count: 1  Genitourinary Assessment   Bladder Assessment (WDL):  WDL Except  Cortes Catheter: Not Applicable  Urinary Elimination: Incontinence  Urine Color: Unable To Evaluate  Urine Clarity: Unable to Evaluate  Number of Bladder  Accidents: 1  Total Number of Bladder of Accidents in Last 7 Days: 3  Number of Times Incontinent of Urine: 1  Bladder Device: Diaper  Time Void: No  Bladder Scan: Post Void  $ Bladder Scan Results (mL): 1 (0 scan)    Skin  Larry Score   16  Sensory Interventions   Bed Types: Standard/Trauma Mattress  Skin Preventative Measures: Pillows in Use for Support / Positioning, Pillows in Use to Float Heels  Moisture Interventions  Moisturizers/Barriers: Barrier Wipes, Barrier Cream      Pain  Pain Rating Scale  0 - No Pain  Pain Location  Generalized  Pain Location Orientation  Right  Pain Interventions   Declines    ADLs    Bathing   Shower, Staff (OT)  Linen Change   Partial  Personal Hygiene  Mamie Bottle, Moist Mamie Wipes, Perineal Care  Chlorhexidine Bath      Oral Care  Brushed Teeth  Teeth/Dentures     Shave     Nutrition Percentage Eaten  Lunch, Between 25-50% Consumed  Environmental Precautions     Patient Turns/Positioning  Patient Turns Self from Side to Side  Patient Turns Assistance/Tolerance  Assistance of One  Bed Positions  Bed Controls On, Bed Locked  Head of Bed Elevated  Self regulated      Psychosocial/Neurologic Assessment  Psychosocial Assessment  Psychosocial (WDL):  WDL Except  Patient Behaviors: Fatigue  Neurologic Assessment  Neuro (WDL): Exceptions to WDL  Level of Consciousness: Alert  Orientation Level: Oriented X4  Cognition: Follows commands  Speech: Clear  Pupil Assesment: No  Motor Function/Sensation Assessment: Sensation  RUE Sensation: Full sensation  LUE Sensation: Full sensation  RLE Sensation: Numbness, Tingling  LLE Sensation: Numbness, Tingling  EENT (WDL):  WDL Except    Cardio/Pulmonary Assessment  Edema   RLE Edema: Generalized  LLE Edema: Generalized  Respiratory Breath Sounds  RUL Breath Sounds: Clear  RML Breath Sounds: Clear  RLL Breath Sounds: Diminished  ANGÉLICA Breath Sounds: Clear  LLL Breath Sounds: Diminished  Cardiac Assessment   Cardiac (WDL):  WDL Except (A-fib, HTN)

## 2024-01-17 NOTE — PROGRESS NOTES
All Patches/Pads removed. Skin Intact. Hospital Medicine Daily Progress Note      Chief Complaint  Hypertension    Interval Problem Update  Blood pressures improving w/ addition of Hydralazine.    Review of Systems  Review of Systems   Constitutional:  Negative for chills and fever.   HENT: Negative.     Eyes: Negative.    Respiratory:  Negative for cough and shortness of breath.    Cardiovascular:  Negative for chest pain and palpitations.   Gastrointestinal:  Negative for abdominal pain, nausea and vomiting.   Genitourinary: Negative.    Musculoskeletal: Negative.    Skin:  Negative for itching and rash.   Endo/Heme/Allergies:  Negative for polydipsia. Does not bruise/bleed easily.        Physical Exam  Temp:  [36.7 °C (98.1 °F)-36.9 °C (98.4 °F)] 36.7 °C (98.1 °F)  Pulse:  [65-68] 67  Resp:  [18] 18  BP: (129-173)/(61-74) 133/61  SpO2:  [90 %-97 %] 96 %    Physical Exam  Vitals reviewed.   Constitutional:       General: She is not in acute distress.     Appearance: Normal appearance. She is not ill-appearing.   HENT:      Head: Normocephalic and atraumatic.      Right Ear: External ear normal.      Left Ear: External ear normal.      Nose: Nose normal.      Mouth/Throat:      Pharynx: Oropharynx is clear.   Eyes:      General:         Right eye: No discharge.         Left eye: No discharge.      Extraocular Movements: Extraocular movements intact.      Conjunctiva/sclera: Conjunctivae normal.   Cardiovascular:      Rate and Rhythm: Normal rate and regular rhythm.   Pulmonary:      Effort: Pulmonary effort is normal. No respiratory distress.      Breath sounds: Normal breath sounds. No wheezing.   Abdominal:      General: Bowel sounds are normal. There is no distension.      Palpations: Abdomen is soft.      Tenderness: There is no abdominal tenderness. There is no guarding or rebound.   Musculoskeletal:      Cervical back: Normal range of motion and neck supple.      Right lower leg: No edema.      Left lower leg: No edema.   Skin:     General: Skin  is warm and dry.   Neurological:      Mental Status: She is alert and oriented to person, place, and time.         Fluids    Intake/Output Summary (Last 24 hours) at 1/17/2024 0903  Last data filed at 1/17/2024 0445  Gross per 24 hour   Intake 360 ml   Output --   Net 360 ml       Laboratory  Recent Labs     01/15/24  0623   WBC 6.8   RBC 4.58   HEMOGLOBIN 13.9   HEMATOCRIT 42.0   MCV 91.7   MCH 30.3   MCHC 33.1   RDW 45.9   PLATELETCT 281   MPV 10.4     Recent Labs     01/15/24  0623   SODIUM 137   POTASSIUM 4.0   CHLORIDE 105   CO2 22   GLUCOSE 102*   BUN 12   CREATININE 0.76   CALCIUM 8.4*               Assessment/Plan  Abdominal distension  Assessment & Plan  KUB gaseous colonic distension  Clinically improved on Simethicone    Hypoxia- (present on admission)  Assessment & Plan  S/P IV Lasix at [facility] for pulm edema  S/P Augmentin and Doxy for PNA  RT protocol    Acquired hypothyroidism- (present on admission)  Assessment & Plan  Euthyroid on Synthroid    MDD (major depressive disorder), recurrent, in partial remission (HCC)- (present on admission)  Assessment & Plan  On Paxil    Primary hypertension- (present on admission)  Assessment & Plan  On Norvasc, Losartan, Toprol, and now Hydralazine  Observe blood pressure trends w/ addition of Hydralazine    Paroxysmal atrial fibrillation (HCC)- (present on admission)  Assessment & Plan  Echo EF 55%  S/P PPM  On Flecainide  Anticoagulated on Eliquis  Cardiology F/U    DNR

## 2024-01-17 NOTE — THERAPY
"Physical Therapy   Daily Treatment     Patient Name: Debra Rodrigues  Age:  88 y.o., Sex:  female  Medical Record #: [id number]  Today's Date: 1/17/2024     Precautions  Precautions: Fall Risk  Comments: macular degeneration, Hx of CVA    Subjective    Pt was seated in her room watching TV, and was agreeable to PT tx. \"I will know how I'm feeling when we start moving\"     Objective       01/17/24 0701   Vitals   Pulse 67   Patient BP Position Sitting   Blood Pressure  133/61   Pulse Oximetry 96 % (91% after walking)    O2 Delivery Device None - Room Air   Gait Functional Level of Assist    Gait Level Of Assist Moderate Assist  (Assist at trunk and pelvis for maintaining upright posture while walking, VC for turning, FWW management for turns)   Assistive Device Front Wheel Walker   Distance (Feet) 20   # of Times Distance was Traveled 1   Deviation Step To;Increased Base Of Support;Bradykinetic;Decreased Heel Strike;Decreased Toe Off   Wheelchair Functional Level of Assist   Wheelchair Assist Minimal Assist   Distance Wheelchair (Feet or Distance) 65 ft  (1x 25 ft)   Wheelchair Description Extra time;Leg rest management;Impaired coordination;Limited by fatigue;Requires incidental assist;Verbal cueing  (Min A with turning, More pushing with R than L, increased rest periods d/t arm fatigue)   Sitting Lower Body Exercises   Ankle Pumps 2 sets of 10;Bilateral   Long Arc Quad 2 sets of 10;Bilateral   Marching Reciprocal;2 sets of 10   Hamstring Curl 2 sets of 10;Light Resistance Theraband;Bilateral   Bed Mobility    Sit to Stand Maximal Assist  (increased time, FWW for [id number], VC for hand placement and sequencing, Assist at pelvis during standing)   Neuro-Muscular Treatments   Neuro-Muscular Treatments Anterior weight shift;Verbal Cuing;Sequencing;Weight Shift Right;Weight Shift Left;Postural Facilitation   Interdisciplinary Plan of Care Collaboration   IDT Collaboration with  Physical Therapist   Patient Position at " End of Therapy Seated;Chair Alarm On;Self Releasing Lap Belt Applied;Call Light within Reach;Tray Table within Reach   Physical Therapist Assigned   Assigned PT / Treatment Time / Comments Roxann/Claudia. TechA if no student. No 30 min treat and no 0830; 60 okay. Needs breaks after 60 min.     Gait training: With Max A and FWW, walking around a target that was placed 10 ft away, simulating walking household distances   W/C mobility: VC to increase propeling force (especially with the L UE), VC for how to perform turns, rest periods after 10-15 pushes d/t UE fatigue    Assessment    Pt demonstrated improved walking tolerance when given a visible goal. She continues to require increased rest breaks during and bw exercises due to a decrease in endurance. She responds well to external motivators to initiate and complete tasks. During ambulation she demonstrated improved backwards and forward walking but required constant VC for turing and FWW management when turning.     Strengths: Able to follow instructions, Manages pain appropriately, Motivated for self care and independence, Pleasant and cooperative, Willingly participates in therapeutic activities  Barriers: Decreased endurance, Confused, Fatigue, Generalized weakness, Impaired activity tolerance, Impaired balance, Limited mobility    Plan    Focus on scooting on a firm surface (Mat table)  Continue working on her W/c mobility   Continue walking with a FWW with the chair as a target (trial 10 ft intervals)   STS from an elevated table with a reaching game (card matching)  Per case management, pt 's possible D/C destination is a group home- 01/16/24    DME  PT DME Recommendations  Wheelchair:  (TBD)  Assistive Device:  (TBD)    Passport items to be completed:  Get in/out of bed safely, in/out of a vehicle, safely use mobility device, walk or wheel around home/community, navigate up and down stairs, show how to get up/down from the ground, ensure home is accessible,  demonstrate HEP, complete caregiver training    Physical Therapy Problems (Active)       Problem: Mobility       Dates: Start:  01/10/24         Goal: STG-Within one week, patient will ambulate x 10' with FWW and Mod A        Dates: Start:  01/10/24         Goal Note filed on 01/10/24 1247 by Claudia Cherry, Student       Pt ambulated 10' with FWW and Max A but required breaks to complete the activity                  Problem: Mobility Transfers       Dates: Start:  01/06/24         Goal: STG-Within one week, patient will perform bed mobility with mod A       Dates: Start:  01/06/24         Goal Note filed on 01/10/24 1247 by Claudia Cherry, Student       Pt was Max A and required verbal and tactile cueing for sequencing and initiating task                 Problem: PT-Long Term Goals       Dates: Start:  01/06/24         Goal: LTG-By discharge, patient will propel wheelchair x 150 feet with SPV       Dates: Start:  01/06/24            Goal: LTG-By discharge, patient will ambulate x 30 feet with FWW and CGA       Dates: Start:  01/06/24            Goal: LTG-By discharge, patient will transfer one surface to another with FWW and CGA       Dates: Start:  01/06/24            Goal: LTG-By discharge, patient will transfer in/out of a car with FWW and CGA       Dates: Start:  01/06/24

## 2024-01-18 ENCOUNTER — APPOINTMENT (OUTPATIENT)
Dept: SPEECH THERAPY | Facility: REHABILITATION | Age: 89
DRG: 194 | End: 2024-01-18
Attending: PHYSICAL MEDICINE & REHABILITATION
Payer: MEDICARE

## 2024-01-18 ENCOUNTER — APPOINTMENT (OUTPATIENT)
Dept: PHYSICAL THERAPY | Facility: REHABILITATION | Age: 89
DRG: 194 | End: 2024-01-18
Attending: PHYSICAL MEDICINE & REHABILITATION
Payer: MEDICARE

## 2024-01-18 ENCOUNTER — APPOINTMENT (OUTPATIENT)
Dept: OCCUPATIONAL THERAPY | Facility: REHABILITATION | Age: 89
DRG: 194 | End: 2024-01-18
Attending: PHYSICAL MEDICINE & REHABILITATION
Payer: MEDICARE

## 2024-01-18 LAB
ANION GAP SERPL CALC-SCNC: 11 MMOL/L (ref 7–16)
BASOPHILS # BLD AUTO: 1.4 % (ref 0–1.8)
BASOPHILS # BLD: 0.11 K/UL (ref 0–0.12)
BUN SERPL-MCNC: 15 MG/DL (ref 8–22)
CALCIUM SERPL-MCNC: 8.7 MG/DL (ref 8.5–10.5)
CHLORIDE SERPL-SCNC: 105 MMOL/L (ref 96–112)
CO2 SERPL-SCNC: 21 MMOL/L (ref 20–33)
CREAT SERPL-MCNC: 0.74 MG/DL (ref 0.5–1.4)
EOSINOPHIL # BLD AUTO: 0.39 K/UL (ref 0–0.51)
EOSINOPHIL NFR BLD: 5.1 % (ref 0–6.9)
ERYTHROCYTE [DISTWIDTH] IN BLOOD BY AUTOMATED COUNT: 50.2 FL (ref 35.9–50)
GFR SERPLBLD CREATININE-BSD FMLA CKD-EPI: 77 ML/MIN/1.73 M 2
GLUCOSE SERPL-MCNC: 94 MG/DL (ref 65–99)
HCT VFR BLD AUTO: 45.6 % (ref 37–47)
HGB BLD-MCNC: 14.4 G/DL (ref 12–16)
IMM GRANULOCYTES # BLD AUTO: 0.05 K/UL (ref 0–0.11)
IMM GRANULOCYTES NFR BLD AUTO: 0.7 % (ref 0–0.9)
LYMPHOCYTES # BLD AUTO: 1.95 K/UL (ref 1–4.8)
LYMPHOCYTES NFR BLD: 25.7 % (ref 22–41)
MCH RBC QN AUTO: 29.9 PG (ref 27–33)
MCHC RBC AUTO-ENTMCNC: 31.6 G/DL (ref 32.2–35.5)
MCV RBC AUTO: 94.6 FL (ref 81.4–97.8)
MONOCYTES # BLD AUTO: 0.97 K/UL (ref 0–0.85)
MONOCYTES NFR BLD AUTO: 12.8 % (ref 0–13.4)
NEUTROPHILS # BLD AUTO: 4.12 K/UL (ref 1.82–7.42)
NEUTROPHILS NFR BLD: 54.3 % (ref 44–72)
NRBC # BLD AUTO: 0 K/UL
NRBC BLD-RTO: 0 /100 WBC (ref 0–0.2)
PLATELET # BLD AUTO: 259 K/UL (ref 164–446)
PMV BLD AUTO: 11 FL (ref 9–12.9)
POTASSIUM SERPL-SCNC: 4.4 MMOL/L (ref 3.6–5.5)
RBC # BLD AUTO: 4.82 M/UL (ref 4.2–5.4)
SODIUM SERPL-SCNC: 137 MMOL/L (ref 135–145)
WBC # BLD AUTO: 7.6 K/UL (ref 4.8–10.8)

## 2024-01-18 PROCEDURE — 36415 COLL VENOUS BLD VENIPUNCTURE: CPT

## 2024-01-18 PROCEDURE — 80048 BASIC METABOLIC PNL TOTAL CA: CPT

## 2024-01-18 PROCEDURE — 97530 THERAPEUTIC ACTIVITIES: CPT

## 2024-01-18 PROCEDURE — 97535 SELF CARE MNGMENT TRAINING: CPT | Mod: CO

## 2024-01-18 PROCEDURE — 90662 IIV NO PRSV INCREASED AG IM: CPT | Performed by: PHYSICAL MEDICINE & REHABILITATION

## 2024-01-18 PROCEDURE — 97129 THER IVNTJ 1ST 15 MIN: CPT

## 2024-01-18 PROCEDURE — 770010 HCHG ROOM/CARE - REHAB SEMI PRIVAT*

## 2024-01-18 PROCEDURE — 97110 THERAPEUTIC EXERCISES: CPT

## 2024-01-18 PROCEDURE — A9270 NON-COVERED ITEM OR SERVICE: HCPCS | Performed by: HOSPITALIST

## 2024-01-18 PROCEDURE — 700102 HCHG RX REV CODE 250 W/ 637 OVERRIDE(OP): Performed by: HOSPITALIST

## 2024-01-18 PROCEDURE — 90471 IMMUNIZATION ADMIN: CPT

## 2024-01-18 PROCEDURE — 85025 COMPLETE CBC W/AUTO DIFF WBC: CPT

## 2024-01-18 PROCEDURE — 97116 GAIT TRAINING THERAPY: CPT

## 2024-01-18 PROCEDURE — A9270 NON-COVERED ITEM OR SERVICE: HCPCS | Performed by: PHYSICAL MEDICINE & REHABILITATION

## 2024-01-18 PROCEDURE — 86480 TB TEST CELL IMMUN MEASURE: CPT

## 2024-01-18 PROCEDURE — 99232 SBSQ HOSP IP/OBS MODERATE 35: CPT | Performed by: PHYSICAL MEDICINE & REHABILITATION

## 2024-01-18 PROCEDURE — 99232 SBSQ HOSP IP/OBS MODERATE 35: CPT | Performed by: HOSPITALIST

## 2024-01-18 PROCEDURE — 700111 HCHG RX REV CODE 636 W/ 250 OVERRIDE (IP): Performed by: PHYSICAL MEDICINE & REHABILITATION

## 2024-01-18 PROCEDURE — 700102 HCHG RX REV CODE 250 W/ 637 OVERRIDE(OP): Performed by: PHYSICAL MEDICINE & REHABILITATION

## 2024-01-18 PROCEDURE — 97130 THER IVNTJ EA ADDL 15 MIN: CPT

## 2024-01-18 RX ORDER — HYDRALAZINE HYDROCHLORIDE 25 MG/1
25 TABLET, FILM COATED ORAL EVERY 8 HOURS
Status: DISCONTINUED | OUTPATIENT
Start: 2024-01-18 | End: 2024-01-22 | Stop reason: HOSPADM

## 2024-01-18 RX ADMIN — LOSARTAN POTASSIUM 100 MG: 50 TABLET, FILM COATED ORAL at 05:43

## 2024-01-18 RX ADMIN — Medication 1000 UNITS: at 08:23

## 2024-01-18 RX ADMIN — FAMOTIDINE 20 MG: 20 TABLET ORAL at 08:23

## 2024-01-18 RX ADMIN — HYDRALAZINE HYDROCHLORIDE 10 MG: 10 TABLET ORAL at 05:43

## 2024-01-18 RX ADMIN — APIXABAN 5 MG: 5 TABLET, FILM COATED ORAL at 08:23

## 2024-01-18 RX ADMIN — FAMOTIDINE 20 MG: 20 TABLET ORAL at 21:39

## 2024-01-18 RX ADMIN — FLECAINIDE ACETATE 100 MG: 100 TABLET ORAL at 08:23

## 2024-01-18 RX ADMIN — LOSARTAN POTASSIUM 100 MG: 50 TABLET, FILM COATED ORAL at 17:38

## 2024-01-18 RX ADMIN — PAROXETINE HYDROCHLORIDE 10 MG: 20 TABLET, FILM COATED ORAL at 08:23

## 2024-01-18 RX ADMIN — GABAPENTIN 100 MG: 100 CAPSULE ORAL at 21:39

## 2024-01-18 RX ADMIN — LEVOTHYROXINE SODIUM 75 MCG: 0.07 TABLET ORAL at 08:23

## 2024-01-18 RX ADMIN — METOPROLOL SUCCINATE 12.5 MG: 25 TABLET, EXTENDED RELEASE ORAL at 05:43

## 2024-01-18 RX ADMIN — ACETAMINOPHEN 650 MG: 325 TABLET ORAL at 11:52

## 2024-01-18 RX ADMIN — HYDRALAZINE HYDROCHLORIDE 25 MG: 25 TABLET ORAL at 21:40

## 2024-01-18 RX ADMIN — TRAZODONE HYDROCHLORIDE 50 MG: 50 TABLET ORAL at 21:40

## 2024-01-18 RX ADMIN — AMLODIPINE BESYLATE 10 MG: 5 TABLET ORAL at 05:43

## 2024-01-18 RX ADMIN — APIXABAN 5 MG: 5 TABLET, FILM COATED ORAL at 21:39

## 2024-01-18 RX ADMIN — HYDRALAZINE HYDROCHLORIDE 25 MG: 25 TABLET ORAL at 14:15

## 2024-01-18 RX ADMIN — AMANTADINE HYDROCHLORIDE 100 MG: 100 TABLET ORAL at 08:23

## 2024-01-18 ASSESSMENT — ENCOUNTER SYMPTOMS
POLYDIPSIA: 0
ABDOMINAL PAIN: 0
SHORTNESS OF BREATH: 0
BRUISES/BLEEDS EASILY: 0
COUGH: 0
PALPITATIONS: 0
MUSCULOSKELETAL NEGATIVE: 1
VOMITING: 0
EYES NEGATIVE: 1
FEVER: 0
CHILLS: 0
NAUSEA: 0

## 2024-01-18 ASSESSMENT — BRIEF INTERVIEW FOR MENTAL STATUS (BIMS)
ASKED TO RECALL SOCK: YES, NO CUE REQUIRED
ASKED TO RECALL BED: YES, NO CUE REQUIRED
WHAT YEAR IS IT: CORRECT
INITIAL REPETITION OF BED BLUE SOCK - FIRST ATTEMPT: 3
WHAT DAY OF THE WEEK IS IT: INCORRECT
WHAT MONTH IS IT: MISSED BY 6 DAYS TO 1 MONTH
ASKED TO RECALL BLUE: YES, NO CUE REQUIRED
ASKED TO RECALL BED: YES, NO CUE REQUIRED
WHAT MONTH IS IT: MISSED BY 6 DAYS TO 1 MONTH
BIMS SUMMARY SCORE: 13
WHAT DAY OF THE WEEK IS IT: INCORRECT
WHAT YEAR IS IT: CORRECT
BIMS SUMMARY SCORE: 13
ASKED TO RECALL SOCK: YES, NO CUE REQUIRED
ASKED TO RECALL BLUE: YES, NO CUE REQUIRED
INITIAL REPETITION OF BED BLUE SOCK - FIRST ATTEMPT: 3

## 2024-01-18 ASSESSMENT — ACTIVITIES OF DAILY LIVING (ADL)
TOILETING_LEVEL_OF_ASSIST: REQUIRES PHYSICAL ASSIST WITH TOILETING
BED_CHAIR_WHEELCHAIR_TRANSFER_DESCRIPTION: ADAPTIVE EQUIPMENT;INCREASED TIME;INITIAL PREPARATION FOR TASK;VERBAL CUEING
SHOWER_TRANSFER_LEVEL_OF_ASSIST: REQUIRES PHYSICAL ASSIST WITH SHOWER TRANSFER
TOILET_TRANSFER_LEVEL_OF_ASSIST: REQUIRES PHYSICAL ASSIST WITH TOILET TRANSFER
TUB_SHOWER_TRANSFER_DESCRIPTION: GRAB BAR;SHOWER BENCH;INCREASED TIME;SET-UP OF EQUIPMENT;VERBAL CUEING

## 2024-01-18 ASSESSMENT — GAIT ASSESSMENTS
GAIT LEVEL OF ASSIST: TOTAL ASSIST
ASSISTIVE DEVICE: FRONT WHEEL WALKER
DEVIATION: STEP TO;DECREASED BASE OF SUPPORT;BRADYKINETIC;DECREASED HEEL STRIKE;DECREASED TOE OFF
DISTANCE (FEET): 10

## 2024-01-18 NOTE — CARE PLAN
Problem: Dressing  Goal: STG-Within one week, patient will dress UB at a Min A level.   Outcome: Not Met  Goal: STG-Within one week, patient will dress LB at a Mod A level with AE/AD PRN.   Outcome: Not Met     Problem: Toileting  Goal: STG-Within one week, patient will complete toileting tasks at a Mod A level with AE/AD PRN.   Outcome: Not Met     Problem: OT Long Term Goals  Goal: LTG-By discharge, patient will complete basic self care tasks at a SUP to Mod A level with AE/AD/DME PRN.   Outcome: Not Met  Goal: LTG-By discharge, patient will perform bathroom transfers at a SUP to Min A level with LRD and DME PRN.   Outcome: Not Met     Problem: Functional Transfers  Goal: STG-Within one week, patient will transfer to toilet at a Mod A level with AD/DME PRN.   Outcome: Progressing

## 2024-01-18 NOTE — CARE PLAN
Problem: Speech/Swallowing LTGs  Goal: LTG-By discharge, patient will solve basic problems related to safety to facilitate safe d/c to a functional living environment.  Outcome: Not Met  Note: Patient will need verbal cues for safety planning and problem solving in discharge environment.   She will need assist with medication, financial, and health care management.     Problem: Comprehension STGs  Goal: STG-Within one week, patient will comprehend both auditory and written instructions with 75% accuracy provided mod assist.  Outcome: Met  Note: 75% with mod A.     Problem: Problem Solving STGs  Goal: STG-Within one week, patient will complete outcomes measures and famliy training.  Outcome: Met  Note: SCCAN completed 1/18/24.  Please see note on this date for full information.   Family training not completed.

## 2024-01-18 NOTE — PROGRESS NOTES
[facility] Medicine Daily Progress Note      Chief Complaint  Hypertension    Interval Problem Update  No chest pain, shortness of breath, or palpitations.  Labs reviewed.    Review of Systems  Review of Systems   Constitutional:  Negative for chills and fever.   HENT: Negative.     Eyes: Negative.    Respiratory:  Negative for cough and shortness of breath.    Cardiovascular:  Negative for chest pain and palpitations.   Gastrointestinal:  Negative for abdominal pain, nausea and vomiting.   Genitourinary: Negative.    Musculoskeletal: Negative.    Skin:  Negative for itching and rash.   Endo/Heme/Allergies:  Negative for polydipsia. Does not bruise/bleed easily.        Physical Exam  Temp:  [36.5 °C (97.7 °F)-37.1 °C (98.7 °F)] 36.5 °C (97.7 °F)  Pulse:  [64-66] 65  Resp:  [16-18] 18  BP: (116-180)/(59-74) 180/71  SpO2:  [90 %-92 %] 91 %    Physical Exam  Vitals reviewed.   Constitutional:       General: She is not in acute distress.     Appearance: Normal appearance. She is not ill-appearing.   HENT:      Head: Normocephalic and atraumatic.      Right Ear: External ear normal.      Left Ear: External ear normal.      Nose: Nose normal.      Mouth/Throat:      Pharynx: Oropharynx is clear.   Eyes:      General:         Right eye: No discharge.         Left eye: No discharge.      Extraocular Movements: Extraocular movements intact.      Conjunctiva/sclera: Conjunctivae normal.   Cardiovascular:      Rate and Rhythm: Normal rate and regular rhythm.   Pulmonary:      Effort: Pulmonary effort is normal. No respiratory distress.      Breath sounds: Normal breath sounds. No wheezing.   Abdominal:      General: Bowel sounds are normal. There is no distension.      Palpations: Abdomen is soft.      Tenderness: There is no abdominal tenderness. There is no guarding or rebound.   Musculoskeletal:      Cervical back: Normal range of motion and neck supple.      Right lower leg: No edema.      Left lower leg: No edema.   Skin:      General: Skin is warm and dry.   Neurological:      Mental Status: She is alert and oriented to person, place, and time.         Fluids    Intake/Output Summary (Last 24 hours) at 1/18/2024 0915  Last data filed at 1/17/2024 2114  Gross per 24 hour   Intake 240 ml   Output --   Net 240 ml       Laboratory  Recent Labs     01/18/24  0546   WBC 7.6   RBC 4.82   HEMOGLOBIN 14.4   HEMATOCRIT 45.6   MCV 94.6   MCH 29.9   MCHC 31.6*   RDW 50.2*   PLATELETCT 259   MPV 11.0     Recent Labs     01/18/24  0546   SODIUM 137   POTASSIUM 4.4   CHLORIDE 105   CO2 21   GLUCOSE 94   BUN 15   CREATININE 0.74   CALCIUM 8.7               Assessment/Plan  Abdominal distension  Assessment & Plan  KUB gaseous colonic distension  Clinically improved on Simethicone    Hypoxia- (present on admission)  Assessment & Plan  S/P IV Lasix at [facility] for pulm edema  S/P Augmentin and Doxy for PNA  RT protocol    Acquired hypothyroidism- (present on admission)  Assessment & Plan  Euthyroid on Synthroid    MDD (major depressive disorder), recurrent, in partial remission (HCC)- (present on admission)  Assessment & Plan  On Paxil    Primary hypertension- (present on admission)  Assessment & Plan  On Norvasc, Losartan, Toprol, and Hydralazine  Will increase Hydralazine to optimize blood pressure control    Paroxysmal atrial fibrillation (HCC)- (present on admission)  Assessment & Plan  Echo EF 55%  S/P PPM  On Flecainide  Anticoagulated on Eliquis  Cardiology F/U    DNR

## 2024-01-18 NOTE — THERAPY
"Speech Language Pathology  Daily Treatment     Patient Name: Debra Rodrigues  Age:  88 y.o., Sex:  female  Medical Record #: [id number]  Today's Date: 1/17/2024     Precautions  Precautions: Fall Risk  Comments: macular degeneration, Hx of CVA    Subjective    Patient in bed and agreeable to therapy at bedside.  Vision a barrier to completing some task items.     Objective       01/17/24 1101   Treatment Charges   SLP Cognitive Skill Development First 15 Minutes 1   SLP Cognitive Skill Development Additional 15 Minutes 1   SLP Total Time Spent   SLP Individual Total Time Spent (Mins) 30   Cognition   Simple Attention Minimal (4)   Functional Problem Solving Moderate (3)   Functional Level of Assist   Comprehension Minimal Assist  (Extra time)   Comprehension Description Verbal cues   Expression Supervision   Expression Description Other (comment)  (Extra time.)   Social Interaction Modified Independent   Social Interaction Description Increased time   Problem Solving Moderate Assist   Problem Solving Description Increased time;Bed/chair alarm;Seat belt;Supervision;Therapy schedule;Verbal cueing   Memory Moderate Assist   Memory Description Increased time;Bed/chair alarm;Seat belt;Supervision;Therapy schedule;Verbal cueing         Assessment    Continued administration of SCCAN.  Patient frequently responded \"I don't know cause I can't see\", however, with additional prompting she was able to complete task.  Displays some self limiting behavior. To be completed tomorrow.    Strengths: Alert and oriented, Effective communication skills, Supportive family, Willingly participates in therapeutic activities  Barriers: Generalized weakness, Impaired carryover of learning, Impaired functional cognition, Visual impairment (Low initiation and slow to process.)    Plan    Complete SCCAN, BIMS/CAMs, target recall. Complete passport.    Passport items to be completed:  Express basic needs, understand food/liquid recommendations, " consistently follow swallow precautions, manage finances, manage medications, arrive to therapy appointments on time, complete daily memory log entries, solve problems related to safety situations, review education related to hospitalization, complete caregiver training     Speech Therapy Problems (Active)       Problem: Comprehension STGs       Dates: Start:  01/06/24         Goal: STG-Within one week, patient will comprehend both auditory and written instructions with 75% accuracy provided mod assist.       Dates: Start:  01/06/24         Goal Note filed on 01/10/24 1338 by Dania Nelson MS,CCC-SLP       To be addressed.                  Problem: Problem Solving STGs       Dates: Start:  01/17/24         Goal: STG-Within one week, patient will complete outcomes measures and famliy training.       Dates: Start:  01/17/24               Problem: Speech/Swallowing LTGs       Dates: Start:  01/06/24         Goal: LTG-By discharge, patient will solve basic problems related to safety to facilitate safe d/c to a functional living environment.       Dates: Start:  01/06/24

## 2024-01-18 NOTE — CARE PLAN
"  Problem: Fall Risk - Rehab  Goal: Patient will remain free from falls  Note: Rubina Echavarria Fall risk Assessment Score: 20  High fall risk Interventions   - Alarming seatbelt  - Bed and strip alarm   - Yellow sign by the door   - Yellow wrist band \"Fall risk\"  - Room near to the nurse station  - Do not leave patient unattended in the bathroom  - Fall risk education provided      Problem: Bowel Elimination  Goal: Patient will participate in bowel management program  Note: Pt is continent of bowel per report.LBM 1/17.Will continue to monitor.         "

## 2024-01-18 NOTE — PROGRESS NOTES
"  Physical Medicine & Rehabilitation Progress Note    Encounter Date: 1/18/2024    Chief Complaint:  s/p CAP     Interval Events (Subjective):  Vitals Reviewed : BP elevated to 180/71   Labs reviewed: 1/18 labs reviewed WNL     Patient seen and examined at bedside, patient reports she feels ok. Denies pain, discomfort, shortness of breath or dizziness. Reviewed HTN with patient and plans to continue on new dose of hydralazine to assist with lowering BP. Patient agreeable, patient agreeable to DC to group home, family touring today. Pending plans to go to group home.     Objective:  Physical Exam:  Vitals: BP (!) 180/71   Pulse 65   Temp 36.5 °C (97.7 °F) (Oral)   Resp 18   Ht 1.676 m (5' 6\")   Wt 90 kg (198 lb 8 oz)   SpO2 91%   Gen: NAD, laying comfortably in bed   Head:  NC/AT  Eyes/ Nose/ Mouth: PERRLA, moist mucous membranes  Cardio: RRR, good distal perfusion, warm extremities  Pulm: normal respiratory effort, no cyanosis, on RA   Abd: Soft NTND, negative borborygmi   Ext: No peripheral edema. No calf tenderness. No clubbing.    Mental status:  A&Ox4 (person, place, date, situation) answers questions appropriately follows commands  Speech: fluent, no aphasia or dysarthria        Laboratory Values:  Recent Results (from the past 72 hour(s))   CBC WITH DIFFERENTIAL    Collection Time: 01/18/24  5:46 AM   Result Value Ref Range    WBC 7.6 4.8 - 10.8 K/uL    RBC 4.82 4.20 - 5.40 M/uL    Hemoglobin 14.4 12.0 - 16.0 g/dL    Hematocrit 45.6 37.0 - 47.0 %    MCV 94.6 81.4 - 97.8 fL    MCH 29.9 27.0 - 33.0 pg    MCHC 31.6 (L) 32.2 - 35.5 g/dL    RDW 50.2 (H) 35.9 - 50.0 fL    Platelet Count 259 164 - 446 K/uL    MPV 11.0 9.0 - 12.9 fL    Neutrophils-Polys 54.30 44.00 - 72.00 %    Lymphocytes 25.70 22.00 - 41.00 %    Monocytes 12.80 0.00 - 13.40 %    Eosinophils 5.10 0.00 - 6.90 %    Basophils 1.40 0.00 - 1.80 %    Immature Granulocytes 0.70 0.00 - 0.90 %    Nucleated RBC 0.00 0.00 - 0.20 /100 WBC    Neutrophils " (Absolute) 4.12 1.82 - 7.42 K/uL    Lymphs (Absolute) 1.95 1.00 - 4.80 K/uL    Monos (Absolute) 0.97 (H) 0.00 - 0.85 K/uL    Eos (Absolute) 0.39 0.00 - 0.51 K/uL    Baso (Absolute) 0.11 0.00 - 0.12 K/uL    Immature Granulocytes (abs) 0.05 0.00 - 0.11 K/uL    NRBC (Absolute) 0.00 K/uL   Basic Metabolic Panel    Collection Time: 01/18/24  5:46 AM   Result Value Ref Range    Sodium 137 135 - 145 mmol/L    Potassium 4.4 3.6 - 5.5 mmol/L    Chloride 105 96 - 112 mmol/L    Co2 21 20 - 33 mmol/L    Glucose 94 65 - 99 mg/dL    Bun 15 8 - 22 mg/dL    Creatinine 0.74 0.50 - 1.40 mg/dL    Calcium 8.7 8.5 - 10.5 mg/dL    Anion Gap 11.0 7.0 - 16.0   ESTIMATED GFR    Collection Time: 01/18/24  5:46 AM   Result Value Ref Range    GFR (CKD-EPI) 77 >60 mL/min/1.73 m 2       Medications:  Scheduled Medications   Medication Dose Frequency    influenza Vac High-Dose Quad  0.7 mL Once    amantadine  100 mg DAILY    hydrALAZINE  10 mg Q8HRS    amLODIPine  10 mg Q DAY    losartan  100 mg Q12HRS    metoprolol SR  12.5 mg DAILY    Pharmacy Consult Request  1 Each PHARMACY TO DOSE    apixaban  5 mg BID    famotidine  20 mg Q12HRS    flecainide  100 mg DAILY    traZODone  50 mg QHS    PARoxetine  10 mg DAILY    levothyroxine  75 mcg DAILY    gabapentin  100 mg Q EVENING    vitamin D3  1,000 Units DAILY     PRN medications: simethicone, senna-docusate **AND** polyethylene glycol/lytes **AND** magnesium hydroxide **AND** bisacodyl, Respiratory Therapy Consult, hydrALAZINE, carboxymethylcellulose, benzocaine-menthol, mag hydrox-al hydrox-simeth, ondansetron **OR** ondansetron, sodium chloride, acetaminophen, guaiFENesin dextromethorphan    Diet:  Current Diet Order   Procedures    Diet Order Diet: Level 6 - Soft and Bite Sized; Liquid level: Level 0 - Thin       Medical Decision Making and Plan:  Per Dr. Best's prior progress note:     Community-acquired pneumonia  Flu, RSV, COVID-negative  Respiratory cultures PENDING (unclear if collected at  [facility])  Blood cultures collected 1/2/2024 no growth to date  PT and OT for mobility and ADLs. Per guidelines, 15 hours per week between PT, OT and/or SLP.  Follow-up geriatrician  Continue Augmentin through 1/8 -completed  Continue doxycycline through 1/9-completed     Atrial fibrillation  Permanent pacemaker  Recently referred to cardiology for local establishment (had been in [facility])  Continue Eliquis 5 mg twice daily  Continue flecainide 100 mg daily  Continue with oral 25 mg XL daily--> 12.5 mg 1/8     History of Stroke  Left Hemiparesis  Monitor     Impaired cognition  Poor motivation/attention, increased fatigue  ?  Encephalopathy secondary to ammonia  SLP to establish baseline  1/11 trial amantadine twice daily, avoid other stimulants due to elevated blood pressure  1/12 DIL reporting more clarity.  Continue amantadine.  1/17 Equivocal if helping, is not helping with initiation in therapy. Taper off and stop before discharge.   1/18 doing well off amantadine      Hypertension  Continue Cozaar 50 mg every 12 hours--> 100 mg every 12 hours 1/11   Continue Toprol XL 25 mg daily -reduced to 12.5 mg daily due to bradycardia 1/8  Norvasc 5 mg daily added 1/8 --> increase to 10 mg 1/15  1/16: Continue amlodipine 10 mg + Cozaar 100 mg every 12 hours + metoprolol 12.5 mg XL daily + (new) Hydralazine 10mg q8 hrs  1/17: Monitor BP with addition of Hydralazine yesterday.   1/18 BP elevated to 180/71 prior to meds, recheck pending       Hypokalemia  1/10 3.2, supplemented per hospitalist x1 and is on scheduled potassium  1/11 continue scheduled potassium  1/12 potassium normal at 3.9 6  1/15 potassium normal at 4.0.  Continue supplementation  1/18 K 4.4       Hypothyroidism  Continue Synthroid 75 mcg daily     Depression  Insomnia  Continue trazodone 50 mg nightly (QTc 454)  Continue Paxil 10 mg daily     Pain  Tylenol as needed  Chronic neuropathy - Gabapentin at night  1/18 pain controlled       Skin  Patient at risk for  skin breakdown due to debility in areas including sacrum, achilles, elbows and head in addition to other sites. Nursing to assess skin daily.      GI Ppx  Patient on Prilosec for GERD prophylaxis.    1/9 change diet to soft and bite sized as patient having difficulty with regular consistencies due to dentures.  Add Ensure supplement.  1/10 consulted dietary to improve nutrition     Bowel   Patient on Senna-docusate for constipation prophylaxis.   Last BM 1/17      Bladder  TV/PVR/BS PRN    DVT PROPHYLAXIS: On Eliquis for atrial fibrillation     HOSPITALIST FOLLOWING: YES     CODE STATUS: Reviewed note from geriatrics 11/30/2023 CODE STATUS DNAR/DNI - d/w patient and family and confirm this code status.     DISPO: Home to [facility] at [facility]. Patient had a lot of assist at her assisted living facility at baseline.  Evaluation occurring 1/15 - requires too much assist. DIL and son looking into group homes vs CG at [facility]. Group home coming 1/17/2024 to evaluate for acceptance.      JEANINE: Expected 1/19 to SNF or  or [facility] with additional CG. Being evaluated for GH today 1/17. Likely d/c to group home on Friday.    ___________________________    Erlinda Yao D.O.    ____________________________________    Patient was seen for 35 minutes on unit/floor of which > 50% of time was spent on counseling and coordination of care regarding the above, including prognosis, risk reduction, benefits of treatment, and options for next stage of care.

## 2024-01-18 NOTE — THERAPY
"Physical Therapy   Discharge Summary     Patient Name: Debra Rodrigues  Age:  88 y.o., Sex:  female  Medical Record #: [id number]  Today's Date: 1/18/2024     Precautions  Precautions: Fall Risk  Comments: macular degenaration, Hx of CVA    Subjective  Pt was seen for two sessions today ([id number] and [id number])    Am- Pt was seated in w/c and agreeable to PT tx.   PM- Pt was seated in her room and agreeable to PT tx. \"I am cold\"   Objective         01/18/24 0901   Precautions   Precautions Fall Risk   Comments macular degenaration, Hx of CVA   Gait Functional Level of Assist    Gait Level Of Assist Total Assist  (Mod A for safety, incidental steadying, VC for upright posture, 2nd person w/c follow)   Assistive Device Front Wheel Walker   Distance (Feet) 10   # of Times Distance was Traveled 2   Deviation Step To;Decreased Base Of Support;Bradykinetic;Decreased Heel Strike;Decreased Toe Off   Transfer Functional Level of Assist   Bed, Chair, Wheelchair Transfer Moderate Assist   Bed Chair Wheelchair Transfer Description Adaptive equipment;Increased time;Initial preparation for task;Verbal cueing  (Use of FWW to transfer from W/C to bed, VC for pivoting and sequencing)   Toilet Transfers Total Assist   Toilet Transfer Description Grab bar;Increased time;Initial preparation for task;Adaptive equipment;Verbal cueing  (2nd person for safety, pant management, and mal care; grab bars for steadying and lifting, VC for sequencing)   Sitting Lower Body Exercises   Sitting Lower Body Exercises Yes   Ankle Pumps 1 set of 10;Bilateral   Long Arc Quad 1 set of 10;Bilateral   Marching 1 set of 10;Reciprocal   Bed Mobility    Sit to Supine Moderate Assist  (Assist at LE to get into bed)   Sit to Stand Moderate Assist  (1 set of 8, FWW for steadying, VC for sequencing, Assist with push off and steadying at pelvis when standing)   Scooting Moderate Assist  (assist at pelvis to scoot)   Interdisciplinary Plan of Care Collaboration "   IDT Collaboration with  Therapy Tech  (Physical Therapy Student)   Patient Position at End of Therapy In Bed;Bed Alarm On;Call Light within Reach;Tray Table within Reach   Roll Left and Right   Assistance Needed Physical assistance   Physical Assistance Level 26%-50%   CARE Score - Roll Left and Right 3   Sit to Lying   Assistance Needed Physical assistance   Physical Assistance Level 26%-50%   CARE Score - Sit to Lying 3   Lying to Sitting on Side of Bed   Assistance Needed Physical assistance   Physical Assistance Level 26%-50%   CARE Score - Lying to Sitting on Side of Bed 3   Sit to Stand   Assistance Needed Physical assistance   Physical Assistance Level 26%-50%   CARE Score - Sit to Stand 3   Chair/Bed-to-Chair Transfer   Assistance Needed Physical assistance   Physical Assistance Level 25% or less   CARE Score - Chair/Bed-to-Chair Transfer 3   Car Transfer   Reason if not Attempted Safety concerns   CARE Score - Car Transfer 88   Walk 10 Feet   Assistance Needed Incidental touching   CARE Score - Walk 10 Feet 4   Walk 50 Feet with Two Turns   Reason if not Attempted Activity not applicable   CARE Score - Walk 50 Feet with Two Turns 9   Walk 150 Feet   Reason if not Attempted Activity not applicable   CARE Score - Walk 150 Feet 9   Walking 10 Feet on Uneven Surfaces   Reason if not Attempted Activity not applicable   CARE Score - Walking 10 Feet on Uneven Surfaces 9   1 Step (Curb)   Reason if not Attempted Activity not applicable   CARE Score - 1 Step (Curb) 9   4 Steps   Reason if not Attempted Activity not applicable   CARE Score - 4 Steps 9   12 Steps   Reason if not Attempted Activity not applicable   CARE Score - 12 Steps 9   Picking Up Object   Assistance Needed Physical assistance   Physical Assistance Level 26%-50%   CARE Score - Picking Up Object 3   Wheel 50 Feet with Two Turns   Assistance Needed Physical assistance   Physical Assistance Level 76% or more   CARE Score - Wheel 50 Feet with Two  Turns 2   Wheel 150 Feet   Assistance Needed Physical assistance   Physical Assistance Level 76% or more   CARE Score - Wheel 150 Feet 2   P.T. Discharge Summary   Discharge Location Group Home   Patient Discharging with Assist of Paid Caregiver   Level of Supervision Required Upon Discharge Twenty Four Hour Supervision   Recommended Equipment for Discharge Manual Wheelchair;Front-Wheeled Walker   Recommeded Services Upon Discharge Home Health Physical Therapy   Long Term Goals Met 0   Long Term Goals Not Met 4   Reason(s) for Goals Not Met requires up to Max A for mobility   Criteria for Termination of Services Maximum Function Achieved for Inpatient Rehabilitation   Discharge Instructions to Patient   Level of Assist Required for Ambulation Physical Assist on Flat Surfaces;Should Not Attempt Curbs at This Time;Should Not Attempt Stairs at This Time   Distance Patient May Ambulate   (Short distances with physical assist and paid caregiver)   Device Recommended for Ambulation Front-Wheeled Walker   Level of Assist Required to Propel Wheelchair Intermittent Physical Assist   Level of Assist Required for Transfers Physical Assist   Device Recommended for Transfers Front-Wheeled Walker   Home Exercise Program Refer to Home Exercise Program Handout for Details   Prosthesis / Orthosis Recommendation / Location No Prosthesis  or Orthosis Recommended        01/18/24 1301   Precautions   Precautions Fall Risk   Comments macular degenaration, Hx of CVA   Wheelchair Functional Level of Assist   Wheelchair Assist Minimal Assist   Distance Wheelchair (Feet or Distance) 10   Wheelchair Description Extra time;Assistance with steering;Leg rest management;Limited by fatigue;Verbal cueing   Sitting Lower Body Exercises   Sitting Lower Body Exercises Yes   Ankle Pumps Bilateral;2 sets of 10   Long Arc Quad 2 sets of 10;Bilateral   Marching Reciprocal;2 sets of 10   Hamstring Curl 2 sets of 10;Light Resistance Theraband;Bilateral    Interdisciplinary Plan of Care Collaboration   IDT Collaboration with  Physical Therapist   Patient Position at End of Therapy Seated;Bed Alarm On;Chair Alarm On;Call Light within Reach;Tray Table within Reach   Physical Therapist Assigned   Assigned PT / Treatment Time / Comments Roxann/Claudia. Jana if no student. No 30 min treat and no 0830; 60 okay. Needs breaks after 60 min.     Pm session: AAROM DF stretch with a towel under the foot, 2x10 with 5 sec holds, VC and tactile cues for when to hold and relax the stretch, how to perform the exercise    Educated, provided, and demonstrated to the pt her HEP (LAQ, HS curls, Toe/heel raises, Seated DF stretch, seated marches)    Assessment    Pt continues to require physical assistance with her transfers and mobility due to safety concerns which include: poor vision, generalized weakness, impaired sensation, impaired balance, poor endurance, and impaired cognition. Also, recommend that pt has 24 hour care and continues to work on her HEP daily. Pt responds well to one step commands and requires constant verbal cues for sequencing activities.     Strengths: Able to follow instructions, Manages pain appropriately, Motivated for self care and independence, Pleasant and cooperative, Willingly participates in therapeutic activities  Barriers: Decreased endurance, Confused, Fatigue, Generalized weakness, Impaired activity tolerance, Impaired balance, Limited mobility    Plan    Review HEP with the patient (seated: marches, HS curls, toe raises, LAQ, DF stretching)    W/C mobility   Scooting practice on a firm surface  Walking with FWW     Passport items to be completed:  Get in/out of bed safely, in/out of a vehicle, safely use mobility device, walk or wheel around home/community, navigate up and down stairs, show how to get up/down from the ground, ensure home is accessible, demonstrate HEP, complete caregiver training    Physical Therapy Problems (Active)       Problem:  PT-Long Term Goals       Dates: Start:  01/06/24         Goal: LTG-By discharge, patient will propel wheelchair x 150 feet with SPV       Dates: Start:  01/06/24         Goal Note filed on 01/17/24 0959 by Claudia Cherry, Student       Pt was able to propel w/c 65 ft with Min A for turning

## 2024-01-18 NOTE — THERAPY
Speech Language Pathology  Daily Treatment     Patient Name: Debra Rodrigues  Age:  88 y.o., Sex:  female  Medical Record #: [id number]  Today's Date: 1/18/2024     Precautions  Precautions: Fall Risk  Comments: Macular degeneration with low vision    Subjective    Patient agreeable to therapy at bedside.  Reported neck and headache.  Nursing notified via volte.     Objective       01/18/24 1101   Treatment Charges   SLP Cognitive Skill Development First 15 Minutes 1   SLP Cognitive Skill Development Additional 15 Minutes 1   SLP Total Time Spent   SLP Individual Total Time Spent (Mins) 30   Precautions   Precautions Fall Risk   Comments Macular degeneration with low vision   Cognition   Simple Attention Moderate (3)   Prospective Memory Moderate (3)   Functional Problem Solving Moderate (3)   Initiation Moderate (3)   Outcome Measures   Outcome Measures Utilized SCCAN   SCCAN (Scales of Cognitive and Communicative Ability for Neurorehabilitation)   Oral Expression - Raw Score 15   Oral Expression - Scale Performance Score 79   Orientation - Raw Score 10   Orientation - Scale Performance Score 83   Memory - Raw Score 7   Memory - Scale Performance Score 37   Speech Comprehension - Raw Score 8   Speech Comprehension - Scale Performance Score 62   Reading Comprehension - Raw Score 5   Reading Comprehension - Scale Performance Score 42   Writing - Raw Score 6   Writing - Scale Performance Score 86   Attention - Raw Score 7   Attention - Scale Performance Score 44   Problem Solving - Raw Score 16   Problem Solving - Scale Performance Score 70   SCCAN Total Raw Score 60   SCCAN Degree of Severity Moderate Impairment   Cognitive Pattern Assessment   Cognitive Pattern Assessment Used BIMS   Brief Interview for Mental Status (BIMS)   Repetition of Three Words (First Attempt) 3   Temporal Orientation: Year Correct   Temporal Orientation: Month Missed by 6 days to 1 month   Temporal Orientation: Day Incorrect   Recall:  "\"Sock\" Yes, no cue required   Recall: \"Blue\" Yes, no cue required   Recall: \"Bed\" Yes, no cue required   BIMS Summary Score 13   Confusion Assessment Method (CAM)   Is there evidence of an acute change in mental status from the patient's baseline? No   Inattention Behavior not present   Disorganized thinking Behavior not present   Altered level of consciousness Behavior not present   Discharge Summary    Progress since Admit Patient completed outcomes assessment with SCCAN administered.  Patient achieved a total raw score of 60 characteristic of Moderate Impairment ( improved from 56, moderate impairment, on initial evaluation).   The patient achieved the following percentage scores for given subtests:  Oral Expression 79 (improved from 74),  Orientation 83 (declined from 92 ), Memory 37 (unchanged from 37), Speech Comprehension 62 (unchanged from 62 ), Reading Comprehension 92 (improved from 83 ), Writing 86 (unchanged from 86), Attention 44 (improved from 38 ), and Problem solving 70 (improved from 48 ).  Patient displayed slight decline in Orientation and slight improvement in attention and oral expression. She displayed mild improvement in verbal problem solving but continues to demonstrate Moderate to Severe impairment of initiation.  Macular degeneration and low vision are significant barriers.  Patient wears glasses but she reports that they do not help. Patient is able to write short sentences with mild to moderately impaired legibility but is unable to read her own writing and even enlarged print due to vision impairment. Patient will need assist with IADL’s, and cuing for safety and safety sequencing.   Discharge Location  Group Home   Patient Discharging with Assist of Caregiver   Level of Supervision Required 24 Hour Supervision   Recommended Services Upon Discharge Home Health Speech Therapy   Long Term Goals Not Met Patient completed outcomes assessment with SCCAN administered.  Patient achieved a " total raw score of 60 characteristic of Moderate Impairment ( improved from 56, moderate impairment, on initial evaluation).   The patient achieved the following percentage scores for given subtests:  Oral Expression 79 (improved from 74),  Orientation 83 (declined from 92 ), Memory 37 (unchanged from 37), Speech Comprehension 62 (unchanged from 62 ), Reading Comprehension 92 (improved from 83 ), Writing 86 (unchanged from 86), Attention 44 (improved from 38 ), and Problem solving 70 (improved from 48 ).  Patient displayed slight decline in Orientation and slight improvement in attention and oral expression. She displayed mild improvement in verbal problem solving but continues to demonstrate Moderate to Severe impairment of initiation.  Macular degeneration and low vision are significant barriers.  Patient wears glasses but she reports that they do not help. Patient is able to write short sentences with mild to moderately impaired legibility but is unable to read her own writing and even enlarged print due to vision impairment. Patient will need assist with IADL’s, and cuing for safety and safety sequencing.   Reason(s) for Goals Not Met Impaired initiation, impaired carryover. Low vision.  Low task endurance.   Criteria for Termination of Services Maximum Function Achieved for Inpatient Rehabilitation   Discharge Instructions   Supervision Patient will need assist with medication, financial and healthcare management.  She will need cuing and assist for safety planning/problem solving and sequencing.   Cognition / Communication Allow patient extra time to respond.  Budget exta time to complete tasks. Help patient improve independence for memory by giving him/her enough extra time to process.  Talk together about potential challenges to transfers and ambulation before performing them, and review strategies for safety.  Use RWAVS memory strategies to reinforce new learning.  R - repeat, repeat, repeat.   W - write it  down or get it in writing.   A - associate the new information with something that you already know very well.   V - visualize it, practice in your mind's eye, when you aren't able to perform the action physically.  S - say it back, out loud.  This benefits you, as you repeat the information for practice. You also are seeking clarification from the educator, evaluating to see if all of the information was understood, and prompting feedback if needed.  And it slows down the exchange, buying extra time to process the information.         Assessment    Patient completed outcomes assessment with SCCAN administered.  Patient achieved a total raw score of 60 characteristic of Moderate Impairment ( improved from 56, moderate impairment, on initial evaluation).   The patient achieved the following percentage scores for given subtests:  Oral Expression 79 (improved from 74),  Orientation 83 (declined from 92 ), Memory 37 (unchanged from 37), Speech Comprehension 62 (unchanged from 62 ), Reading Comprehension 92 (improved from 83 ), Writing 86 (unchanged from 86), Attention 44 (improved from 38 ), and Problem solving 70 (improved from 48 ).  Patient displayed slight decline in Orientation and slight improvement in attention and oral expression. She displayed mild improvement in verbal problem solving but continues to demonstrate Moderate to Severe impairment of initiation.  Macular degeneration and low vision are significant barriers.  Patient wears glasses but she reports that they do not help. Patient is able to write short sentences with mild to moderately impaired legibility but is unable to read her own writing and even enlarged print due to vision impairment. Patient will need assist with IADL’s, and cuing for safety and safety sequencing.    Strengths: Alert and oriented, Effective communication skills, Supportive family, Willingly participates in therapeutic activities  Barriers: Generalized weakness, Impaired  carryover of learning, Impaired functional cognition, Visual impairment (Low initiation and slow to process.)    Plan    Patient is planning to discharge to group home on 1/19/24.    Passport items to be completed:  Express basic needs, understand food/liquid recommendations, consistently follow swallow precautions, manage finances, manage medications, arrive to therapy appointments on time, complete daily memory log entries, solve problems related to safety situations, review education related to hospitalization, complete caregiver training     Speech Therapy Problems (Active)       Problem: Comprehension STGs       Dates: Start:  01/06/24         Goal: STG-Within one week, patient will comprehend both auditory and written instructions with 75% accuracy provided mod assist.       Dates: Start:  01/06/24         Goal Note filed on 01/10/24 1338 by Dania Nelson MS,CCC-SLP       To be addressed.                  Problem: Problem Solving STGs       Dates: Start:  01/17/24         Goal: STG-Within one week, patient will complete outcomes measures and famliy training.       Dates: Start:  01/17/24               Problem: Speech/Swallowing LTGs       Dates: Start:  01/06/24         Goal: LTG-By discharge, patient will solve basic problems related to safety to facilitate safe d/c to a functional living environment.       Dates: Start:  01/06/24

## 2024-01-18 NOTE — CARE PLAN
The patient is Stable - Low risk of patient condition declining or worsening    Shift Goals  Clinical Goals: safety  Patient Goals: safety    Problem: Fall Risk - Rehab  Goal: Patient will remain free from falls  Outcome: Progressing Pt uses call light consistently and appropriately. Waits for assistance does not attempt self transfer this shift. Able to verbalize needs.     Problem: Pain - Standard  Goal: Alleviation of pain or a reduction in pain to the patient’s comfort goal  Outcome: Progressing Patient able to verbalize pain level and verbalize an acceptable level of pain.

## 2024-01-18 NOTE — DISCHARGE INSTRUCTIONS
[facility] NURSING DISCHARGE INSTRUCTIONS    Blood Pressure : 129/51  Weight: 90 kg (198 lb 8 oz)  Nursing recommendations for Debra Rodrigues at time of discharge are as follows:  Client verbalized understanding of all discharge instructions and prescriptions.     Review all your home medications and newly ordered medications with your doctor and/or pharmacist. Follow medication instructions as directed by your doctor and/or pharmacist.    Pain Management:   Discharge Pain Medication Instructions:  Comfort Goal: Comfort with Movement, Perform Activity  Notify your primary care provider if pain is unrelieved with these measures, if the pain is new, or increased in intensity.    Discharge Skin Characteristics: Dry, Warm  Discharge Skin Exam: Bruise (Indicate Location In Comment) (generalized)  Moisture Associated Skin Damage 01/02/24 Panus;Groin;Breast (Active)   Wound Image   01/05/24 1227   Drainage Amount None 01/18/24 2140   Periwound Assessment Clean;Dry;Pink 01/18/24 2140   IAD Cleansing Foam Cleanser/Washcloth 01/14/24 2012   Periwound Protectant Barrier Paste 01/18/24 2140   IAD Containment Device None 01/19/24 0810       Wound 01/02/24 Abrasion Arm Upper Left (Active)   Site Assessment Scabbed 01/18/24 2140   Periwound Assessment Clean;Dry;Intact 01/18/24 2140   Margins Undefined edges 01/19/24 0810   Closure Secondary intention 01/18/24 2140   Drainage Amount None 01/18/24 2140   Treatments Cleansed;Site care 01/05/24 1227   Wound Cleansing Normal Saline Irrigation 01/05/24 1227   Dressing Status Clean;Intact;Dry 01/18/24 0720   Dressing Changed Observed 01/17/24 0715   Dressing Options Mepitel One 01/18/24 0720   Dressing Change/Treatment Frequency As Needed 01/17/24 2114       Wound Heel Right non blanchable (Active)   Wound Image   01/19/24 0900       Wound Heel Left non blanchable (Active)   Wound Image   01/19/24 0900     Skin / Wound Care Instructions: Please contact your  primary care physician for any change in skin integrity.     If You Have Surgical Incisions / Wounds:  Monitor surgical site(s) for signs of increased swelling, redness or symptoms of drainage from the site or fever as this could indicate signs and symptoms of infection. If these symptoms are noted, notifiy your primary care provider.      Discharge Safety Instructions: Should Have ADULT SUPERVISION     Discharge Safety Concerns: Impaired Judgement  The interdisciplinary team has made recommendation that you should have adult supervision in the house due to weakness  Anti-embolic stockings are required during the day and off at night to increase circulation to the lower extremities.    Discharge Diet: level 6     Discharge Liquids: Thin  Discharge Bowel Function:    Please contact your primary care physician for any changes in bowel habits.  Discharge Bowel Program: Intermittent bowel incontinence  Discharge Bladder Function: Incontinent  Discharge Urinary Devices: Brief      Nursing Discharge Plan:   Influenza Vaccine Indication: Not indicated: Previously immunized this influenza season and > 8 years of age  Influenza Vaccine Given - only chart on this line when given: Influenza Vaccine Given (See MAR)    Case Management Discharge Instructions:   Discharge Location:    Agency Name/Address/Phone:    Home Health:    Outpatient Services:    DME Provider/Phone:    Medical Equipment Ordered:    Prescription Faxed to:        Discharge Medication Instructions:  Below are the medications your physician expects you to take upon discharge:      Speech Therapy Discharge Instructions for Debra Rodrigues    1/18/2024    Supervision: Patient will need assist with medication, financial and healthcare management.  She will need cuing and assist for safety planning/problem solving and sequencing.    Cognition / Communication: Allow patient extra time to respond.  Budget exta time to complete tasks. Help patient improve  independence for memory by giving him/her enough extra time to process.  Talk together about potential challenges to transfers and ambulation before performing them, and review strategies for safety.  Use AVS memory strategies to reinforce new learning.    R - repeat, repeat, repeat.     W - write it down or get it in writing.     A - associate the new information with something that you already know very well.     V - visualize it, practice in your mind's eye, when you aren't able to perform the action physically.    S - say it back, out loud.  This benefits you, as you repeat the information for practice. You also are seeking clarification from the educator, evaluating to see if all of the information was understood, and prompting feedback if needed.  And it slows down the exchange, buying extra time to process the information.    It was a pleasure to work with you.  -- Dania Nelson CCC-SLP and Josselyn Albert SLP student extern.    Occupational Therapy Discharge Instructions for Debra Rodrigues    1/18/2024    Level of Assist Required for Eating: Able to Complete Eating without Assist  Level of Assist Required for Grooming: Able to Complete Grooming without Assist  Level of Assist Required for Dressing: Requires Physical Assist with Dressing  Equipment for Dressing: Reacher  Level of Assist Required for Toileting: Requires Physical Assist with Toileting  Level of Assist Required for Toilet Transfer: Requires Physical Assist with Toilet Transfer  Equipment for Toilet Transfer: Raised Toilet Seat with Arms, Grab Bars by Toilet  Level of Assist Required for Bathing: Requires Supervision with Bathing  Equipment for Bathing: Tub Transfer Bench, Grab Bars in Tub / Shower, Hand Held Shower Head, Long Handled Sponge  Level of Assist Required for Shower Transfer: Requires Physical Assist with Shower Transfer  Equipment for Shower Transfer: Grab Bars in Tub / Shower, Shower Chair  Level of Assist Required for Home Mgmt:  Requires Physical Assist with Home Management  Level of Assist Required for Meal Prep: Requires Physical Assist with Meal Preparation  Driving: Please Contact Physician Prior to Driving  Home Exercise Program: Refer to Home Exercise Program Handout for Details    Physical Therapy Discharge Instructions for Debra Rodrigues    1/18/2024    Level of Assist Required for Ambulation: Physical Assist on Flat Surfaces, Should Not Attempt Curbs at This Time, Should Not Attempt Stairs at This Time  Distance Patient May Ambulate:  (Short distances with physical assist and paid caregiver)  Device Recommended for Ambulation: Front-Wheeled Walker  Level of Assist Required to Propel Wheelchair: Intermittent Physical Assist  Level of Assist Required for Transfers: Physical Assist  Device Recommended for Transfers: Front-Wheeled Walker  Home Exercise Program: Refer to Home Exercise Program Handout for Details  Prosthesis / Orthosis Recommendation / Location: No Prosthesis  or Orthosis Recommended  Good job Debra! It was a pleasure to work with you. - Claudia Cherry, PRATBIHA    Fall Prevention in the Home, Adult  Falls can cause injuries and can happen to people of all ages. There are many things you can do to make your home safe and to help prevent falls. Ask for help when making these changes.  What actions can I take to prevent falls?  General Instructions  Use good lighting in all rooms. Replace any light bulbs that burn out.  Turn on the lights in dark areas. Use night-lights.  Keep items that you use often in easy-to-reach places. Lower the shelves around your home if needed.  Set up your furniture so you have a clear path. Avoid moving your furniture around.  Do not have throw rugs or other things on the floor that can make you trip.  Avoid walking on wet floors.  If any of your floors are uneven, fix them.  Add color or contrast paint or tape to clearly tasha and help you see:  Grab bars or handrails.  First and last steps of  staircases.  Where the edge of each step is.  If you use a stepladder:  Make sure that it is fully opened. Do not climb a closed stepladder.  Make sure the sides of the stepladder are locked in place.  Ask someone to hold the stepladder while you use it.  Know where your pets are when moving through your home.  What can I do in the bathroom?         Keep the floor dry. Clean up any water on the floor right away.  Remove soap buildup in the tub or shower.  Use nonskid mats or decals on the floor of the tub or shower.  Attach bath mats securely with double-sided, nonslip rug tape.  If you need to sit down in the shower, use a plastic, nonslip stool.  Install grab bars by the toilet and in the tub and shower. Do not use towel bars as grab bars.  What can I do in the bedroom?  Make sure that you have a light by your bed that is easy to reach.  Do not use any sheets or blankets for your bed that hang to the floor.  Have a firm chair with side arms that you can use for support when you get dressed.  What can I do in the kitchen?  Clean up any spills right away.  If you need to reach something above you, use a step stool with a grab bar.  Keep electrical cords out of the way.  Do not use floor polish or wax that makes floors slippery.  What can I do with my stairs?  Do not leave any items on the stairs.  Make sure that you have a light switch at the top and the bottom of the stairs.  Make sure that there are handrails on both sides of the stairs. Fix handrails that are broken or loose.  Install nonslip stair treads on all your stairs.  Avoid having throw rugs at the top or bottom of the stairs.  Choose a carpet that does not hide the edge of the steps on the stairs.  Check carpeting to make sure that it is firmly attached to the stairs. Fix carpet that is loose or worn.  What can I do on the outside of my home?  Use bright outdoor lighting.  Fix the edges of walkways and driveways and fix any cracks.  Remove anything that  might make you trip as you walk through a door, such as a raised step or threshold.  Trim any bushes or trees on paths to your home.  Check to see if handrails are loose or broken and that both sides of all steps have handrails.  Install guardrails along the edges of any raised decks and porches.  Clear paths of anything that can make you trip, such as tools or rocks.  Have leaves, snow, or ice cleared regularly.  Use sand or salt on paths during winter.  Clean up any spills in your garage right away. This includes grease or oil spills.  What other actions can I take?  Wear shoes that:  Have a low heel. Do not wear high heels.  Have rubber bottoms.  Feel good on your feet and fit well.  Are closed at the toe. Do not wear open-toe sandals.  Use tools that help you move around if needed. These include:  Canes.  Walkers.  Scooters.  Crutches.  Review your medicines with your doctor. Some medicines can make you feel dizzy. This can increase your chance of falling.  Ask your doctor what else you can do to help prevent falls.  Where to find more information  Centers for Disease Control and Prevention, [place]: www.cdc.gov  National [place] on Aging: www.alessandro.nih.gov  Contact a doctor if:  You are afraid of falling at home.  You feel weak, drowsy, or dizzy at home.  You fall at home.  Summary  There are many simple things that you can do to make your home safe and to help prevent falls.  Ways to make your home safe include removing things that can make you trip and installing grab bars in the bathroom.  Ask for help when making these changes in your home.  This information is not intended to replace advice given to you by your health care provider. Make sure you discuss any questions you have with your health care provider.  Document Revised: 09/19/2022 Document Reviewed: 07/21/2021  Elsevier Patient Education © 2023 Elsevier Inc.    Depression / Suicide Risk    As you are discharged from this [facility] facility, it is  "important to learn how to keep safe from harming yourself.    Recognize the warning signs:  Abrupt changes in personality, positive or negative- including increase in energy   Giving away possessions  Change in eating patterns- significant weight changes-  positive or negative  Change in sleeping patterns- unable to sleep or sleeping all the time   Unwillingness or inability to communicate  Depression  Unusual sadness, discouragement and loneliness  Talk of wanting to die  Neglect of personal appearance   Rebelliousness- reckless behavior  Withdrawal from people/activities they love  Confusion- inability to concentrate     If you or a loved one observes any of these behaviors or has concerns about self-harm, here's what you can do:  Talk about it- your feelings and reasons for harming yourself  Remove any means that you might use to hurt yourself (examples: pills, rope, extension cords, firearm)  Get professional help from the community (Mental Health, Substance Abuse, psychological counseling)  Do not be alone:Call your Safe Contact- someone whom you trust who will be there for you.  Call your local CRISIS HOTLINE 681-0583 or 938-481-9725  Call your local Children's Mobile Crisis Response Team [place] (666) 428-0957 or www.[employer]  Call the toll free National Suicide Prevention Hotlines   National Suicide Prevention Lifeline 819-497-PQYZ (1839)  National [employer] Line Network 800-SUICIDE (060-5816)    Prevent Falls in Your Home    \"Falling once doubles your chance of falling again\"        -Center for Disease Control and Prevention    Falls in the home can lead to serious injury (fractures, brain injuries), hospitalizations, increased medical costs, and could even be fatal.  The good news is, there are many precautions you can take to avoid falls in your home and help keep you safe:     If prescribed an assistive device (walker, crutches), use as instructed by the healthcare provider\"   Remove any " tripping hazards from your home, including loose cords, throw rugs and clutter  Keep a nightlight on in dark (hallways, bathrooms, etc)   Get up slowly, to make sure you feel okay before getting up  Be aware of any side effects of your medications: some medications may make you dizzy  Place a non-skid rubber mat in your shower or tub-consider a shower bench or chair if unsteady on your feet  Wear supportive shoes or non-skid socks when moving around  Start an exercise program once approved by your provider.  If you are feeling weak following a hospital stay, talk to your doctor about home health or outpatient therapy programs designed to help rebuild your strength and endurance    Pain Medicine Instructions  You may need pain medicine after an injury or illness. Two common types of pain medicine are:  Non-opioid pain medicine. This includes NSAIDs.  Opioid pain medicine. These may be called opioids.  Pain medicine may not make all of your pain go away. It should make you comfortable enough to move, breathe, and do normal activities.  How can pain medicines affect me?  Pain medicines can cause side effects such as:  Vomiting or feeling like you may vomit.  Belly pain.  Opioids can cause other side effects, such as:  Trouble pooping (constipation).  Feeling very sleepy.  Confusion.  Trouble breathing.  Addiction to opioids. This means that you will take the medicine even though it hurts your health.  Taking opioids for longer than 3 days raises your risk of these side effects.  Taking opioids for a long time can affect how well you can do daily tasks. It also puts you at risk for:  Car crashes.  Depression.  Suicide.  Heart attack.  If you do not take pain medicines correctly, you may be at risk for:  Liver problems.  Kidney problems.  Taking too much of the medicine (overdose). This can lead to death.  What actions can I take to lower my risk of problems?  Know your treatment plan  Talk about your treatment plan with  your doctor. Both you and your doctor should agree on how you should be treated.  Talk about the goals of your treatment, including:  How much pain you might expect to have.  How you will manage the pain.  Ask your doctor if you can see other doctors who can treat your pain without using medicine. This can include physical therapy and counseling.  Talk about the risks and benefits of taking these medicines for your condition.  Tell your doctor about the amount of medicines you take and about any use of drugs or alcohol.  Get your pain medicine prescriptions from only one doctor.  Keep all follow-up visits.  Take your medicine as told    Take pain medicine exactly as told by your doctor. Take it only when you need it.  If your pain is not too bad, you may take less medicine if your doctor allows.  If you have no pain, do not take the medicine unless your doctor tells you to take it.  If your pain is very bad, do not take more medicine than your doctor tells you to take. Call your doctor to know what to do.  If your pain medicine has acetaminophen in it, do not take any other acetaminophen while you are taking this medicine. Too much can damage the liver.  Write down the times when you take your pain medicine. Look at the times before you take your next dose.  Take other over-the-counter or prescription medicines only as told by your doctor.  Avoid certain activities  While you are taking prescription pain medicine, and for 8 hours after your last dose:  Do not drive.  Do not use machinery.  Do not use power tools.  Do not sign legal documents.  Do not drink alcohol.  Do not take sleeping pills.  Do not take care of children by yourself.  Do not do any activities that involve climbing or being in high places.  Do not go to a lake, river, ocean, spa, or swimming pool unless an adult is nearby who can monitor and help you.    Keep pets and people safe  Store your medicine as told by your doctor. Keep it where children  and pets cannot reach it.  Do not share your pain medicine with anyone.  Do not save unused pills. If you have unused pills, you can:  Bring them to a take-back program.  Bring them to a pharmacy that takes back unused pills.  Throw them in the trash. Check the medicine label or package insert to see if it is safe to throw it out. If it is safe, take the medicine out of the container. Mix it with something that makes it unusable, such as pet waste. Then put the medicine in the trash.  Flush them down the toilet only if this is safe to do. To find out:  Check the label or package insert of your medicine.  Read information given by the Food and Drug Administration website: fda.gov  Treat or prevent constipation  You may need to take these actions to prevent or treat constipation:  Drink enough fluid to keep your pee (urine) pale yellow.  Take over-the-counter or prescription medicines.  Eat foods that are high in fiber. These include beans, whole grains, and fresh fruits and vegetables.  Limit foods that are high in fat and sugar. These include fried or sweet foods.  Contact a doctor if:  Your medicine is not helping with your pain.  You have a rash.  You feel sick to your stomach.  You throw up.  You feel depressed.  Get help right away if:  You have trouble breathing. This means:  Breathing that is slower than normal.  Breathing that is more shallow than normal.  You are confused.  You are sleeping a lot, or you have trouble staying awake.  Your skin or lips turn pale or bluish in color.  You tongue swells.  You have thoughts of harming yourself or harming others.  These symptoms may be an emergency. Get help right away. Call your local emergency services (911 in the [place]  Do not wait to see if the symptoms will go away.  Do not drive yourself to the hospital.  Get help right away if you feel like you may hurt yourself or others, or have thoughts about taking your own life. Go to your nearest emergency room  or:  Call your local emergency services (911 in the [place]  Call the National Suicide Prevention Lifeline at 1-407.738.5252 or 426 in the [place] This is open 24 hours a day.  Text the Crisis Text Line at 097008.  Summary  Pain medicine can help lower your pain. It may also cause side effects.  Take your pain medicine exactly as told by your doctor.  Talk with your doctor about other ways to manage your pain.  Ask what activities you should avoid while taking pain medicine.  This information is not intended to replace advice given to you by your health care provider. Make sure you discuss any questions you have with your health care provider.  Document Revised: 07/13/2022 Document Reviewed: 04/27/2022  [employer] Patient Education © 2023 [employer]    Community-Acquired Pneumonia, Adult  Pneumonia is an infection of the lungs. It causes irritation and swelling in the airways of the lungs. Mucus and fluid may also build up inside the airways. This may cause coughing and trouble breathing.  One type of pneumonia can happen while you are in a hospital. A different type can happen when you are not in a hospital (community-acquired pneumonia).  What are the causes?    This condition is caused by germs (viruses, bacteria, or fungi). Some types of germs can spread from person to person. Pneumonia is not thought to spread from person to person.  What increases the risk?  You have a long-term (chronic) disease, such as:  Disease of the lungs. This may be chronic obstructive pulmonary disease (COPD) or asthma.  Heart failure.  Cystic fibrosis.  Diabetes.  Kidney disease.  Sickle cell disease.  HIV.  You have other health problems, such as:  Your body's defense system (immune system) is weak.  A condition that may cause you to breathe in fluids from your mouth and nose.  You had your spleen taken out.  You do not take good care of your teeth and mouth (poor dental hygiene).  You use or have used tobacco products.  You go where  the germs that cause this illness are common.  You are older than 65 years of age.  What are the signs or symptoms?  A cough.  A fever.  Sweating or chills.  Chest pain, often when you breathe deeply or cough.  Breathing problems, such as:  Fast breathing.  Trouble breathing.  Shortness of breath.  Feeling tired (fatigued).  Muscle aches.  How is this treated?  Treatment for this condition depends on many things, such as:  The cause of your illness.  Your medicines.  Your other health problems.  Most adults can be treated at home. Sometimes, treatment must happen in a hospital.  Treatment may include medicines to kill germs.  Medicines may depend on which germ caused your illness.  Very bad pneumonia is rare. If you get it, you may:  Have a machine to help you breathe.  Have fluid taken away from around your lungs.  Follow these instructions at home:  Medicines  Take over-the-counter and prescription medicines only as told by your doctor.  Take cough medicine only if you are losing sleep. Cough medicine can keep your body from taking mucus away from your lungs.  If you were prescribed antibiotics, take them as told by your doctor. Do not stop taking them even if you start to feel better.  Lifestyle         Do not smoke or use any products that contain nicotine or tobacco. If you need help quitting, ask your doctor.  Do not drink alcohol.  Eat a healthy diet. This includes a lot of vegetables, fruits, whole grains, low-fat dairy products, and low-fat (lean) protein.  General instructions    Rest a lot. Sleep for at least 8 hours each night.  Sleep with your head and neck raised. Put a few pillows under your head or sleep in a reclining chair.  Return to your normal activities as told by your doctor. Ask your doctor what activities are safe for you.  Drink enough fluid to keep your pee (urine) pale yellow.  If your throat is sore, gargle with a mixture of salt and water 3-4 times a day or as needed. To make salt  water, completely dissolve ½-1 tsp (3-6 g) of salt in 1 cup (237 mL) of warm water.  Keep all follow-up visits.  How is this prevented?  Getting the pneumonia shot (vaccine). These shots have different types and schedules. Ask your doctor what works best for you. Think about getting this shot if:  You are older than 65 years of age.  You are 19-65 years of age and:  You are being treated for cancer.  You have long-term lung disease.  You have other problems that affect your body's defense system. Ask your doctor if you have one of these.  Getting your flu shot every year. Ask your doctor which type of shot is best for you.  Going to the dentist as often as told.  Washing your hands often with soap and water for at least 20 seconds. If you cannot use soap and water, use hand .  Contact a doctor if:  You have a fever.  You lose sleep because your cough medicine does not help.  Get help right away if:  You are short of breath and this gets worse.  You have more chest pain.  Your sickness gets worse. This is very serious if:  You are an older adult.  Your body's defense system is weak.  You cough up blood.  These symptoms may be an emergency. Get help right away. Call 911.  Do not wait to see if the symptoms will go away.  Do not drive yourself to the hospital.  Summary  Pneumonia is an infection of the lungs.  Community-acquired pneumonia affects people who have not been in the hospital. Certain germs can cause this infection.  This condition may be treated with medicines that kill germs.  For very bad pneumonia, you may need a hospital stay and treatment to help with breathing.  This information is not intended to replace advice given to you by your health care provider. Make sure you discuss any questions you have with your health care provider.  Document Revised: 02/15/2023 Document Reviewed: 02/15/2023  Elsevier Patient Education © 2023 [employer]

## 2024-01-18 NOTE — PROGRESS NOTES
NURSING DAILY NOTE    Name: Debra Rodrigues   Date of Admission: 1/5/2024   Admitting Diagnosis: Pneumonia due to infectious organism  Attending Physician: Erlinda Yao D.o.  Allergies: Other misc, Hydrocodone-acetaminophen, and Sulfa drugs    Safety  Patient Assist  Max x 1  Patient Precautions  Fall Risk  Precaution Comments  macular degeneration, Hx of CVA  Bed Transfer Status  Maximal Assist  Toilet Transfer Status   Maximal Assist  Assistive Devices  Gait Belt, Rails, Wheelchair  Oxygen  None - Room Air  Diet/Therapeutic Dining  Current Diet Order   Procedures    Diet Order Diet: Level 6 - Soft and Bite Sized; Liquid level: Level 0 - Thin     Pill Administration  floated  Agitated Behavioral Scale  15  ABS Level of Severity  No Agitation    Fall Risk  Has the patient had a fall this admission?   No  Rubina Echavarria Fall Risk Scoring  20, HIGH RISK  Fall Risk Safety Measures  bed alarm and chair alarm    Vitals  Temperature: 37.1 °C (98.7 °F)  Temp src: Oral  Pulse: 65  Respiration: 18  Blood Pressure : (!) 140/65  Blood Pressure MAP (Calculated): 90 MM HG  BP Location: Right, Upper Arm  Patient BP Position: Supine     Oxygen  Pulse Oximetry: 90 %  O2 (LPM): 0  O2 Delivery Device: None - Room Air    Bowel and Bladder  Last Bowel Movement  01/17/24  Stool Type  Type 5: Soft blob with clear cut edges (passed easily)  Bowel Device  Diaper  Continent  Bladder: Did not void   Bowel: No movement  Bladder Function  Urine Void (mL):  (moderate)  Number of Times Voided: 1  Urine Color: Unable To Evaluate  Urine Clarity: Unable to Evaluate  Number of Times Incontinent of Urine: 1  Wet Diaper Count: 1  Genitourinary Assessment   Bladder Assessment (WDL):  WDL Except  Cortes Catheter: Not Applicable  Urinary Elimination: Incontinence  Urine Color: Unable To Evaluate  Urine Clarity: Unable to Evaluate  Number of Bladder Accidents: 0  Total Number of Bladder of  Accidents in Last 7 Days: 3  Number of Times Incontinent of Urine: 1  Bladder Device: Diaper  Time Void: No  Bladder Scan: Post Void  $ Bladder Scan Results (mL): 1 (0 scan)    Skin  Larry Score   17  Sensory Interventions   Bed Types: Standard/Trauma Mattress  Skin Preventative Measures: Pillows in Use for Support / Positioning  Moisture Interventions  Moisturizers/Barriers: Barrier Wipes      Pain  Pain Rating Scale  5 - Interrupts some activities  Pain Location  Leg  Pain Location Orientation  Lower  Pain Interventions   Repositioned, Rest    ADLs    Bathing    (shower done on Day shift)  Linen Change   Partial  Personal Hygiene  Change Mamie Pads, Moist Mamie Wipes, Perineal Care  Chlorhexidine Bath      Oral Care  Brushed Teeth  Teeth/Dentures     Shave     Nutrition Percentage Eaten  *  * Meal *  *, Dinner, 0% Consumed, Refused  Environmental Precautions  Bed in Low Position, Treaded Slipper Socks on Patient  Patient Turns/Positioning  Patient Turns Self from Side to Side  Patient Turns Assistance/Tolerance  Assistance of One  Bed Positions  Bed Controls On, Bed Locked  Head of Bed Elevated  Self regulated      Psychosocial/Neurologic Assessment  Psychosocial Assessment  Psychosocial (WDL):  Within Defined Limits  Patient Behaviors: Fatigue  Neurologic Assessment  Neuro (WDL): Exceptions to WDL  Level of Consciousness: Alert  Orientation Level: Oriented X4  Cognition: Follows commands  Speech: Clear  Pupil Assesment: No  Motor Function/Sensation Assessment: Sensation  RUE Sensation: Full sensation  LUE Sensation: Full sensation  RLE Sensation: Numbness, Tingling  LLE Sensation: Numbness, Tingling  Neuro Additional Assessments: Jaylan Coma Scale  EENT (WDL):  WDL Except    Cardio/Pulmonary Assessment  Edema   RLE Edema: Generalized  LLE Edema: Generalized  Respiratory Breath Sounds  RUL Breath Sounds: Clear  RML Breath Sounds: Clear  RLL Breath Sounds: Diminished  ANGÉLICA Breath Sounds: Clear  LLL Breath Sounds:  Diminished  Cardiac Assessment   Cardiac (WDL):  WDL Except (A-fib, HTN)

## 2024-01-18 NOTE — THERAPY
Occupational Therapy  Daily Treatment     Patient Name: Debra Rodrigues  Age:  88 y.o., Sex:  female  Medical Record #: [id number]  Today's Date: 1/18/2024     Precautions  Precautions: Fall Risk  Comments: Macular degeneration with low vision         Subjective    Pt was seated in w/c upon arrival and agreeable for OT session.      Objective       01/18/24 1330   OT Charge Group   OT Self Care / ADL (Units) 4   OT Total Time Spent   OT Individual Total Time Spent (Mins) 60   Functional Level of Assist   Grooming Modified Independent   Grooming Description Increased time   Bathing Moderate Assist   Bathing Description Grab bar;Hand held shower;Hand rails;Tub bench;Assit with perineal;Increased time;Verbal cueing   Upper Body Dressing Moderate Assist   Upper Body Dressing Description Increased time;Verbal cueing;Assist with pulling shirt over head   Lower Body Dressing Maximal Assist   Lower Body Dressing Description Grab bar;Assist with threading into pant leg;Increased time;Verbal cueing   Toileting Maximal Assist   Toileting Description Assist to pull pants up;Assist for hygiene;Increased time;Grab bar;Verbal cueing   Toilet Transfers Moderate Assist   Toilet Transfer Description Grab bar;Assist with one limb;Increased time;Verbal cueing   Tub / Shower Transfers Moderate Assist   Tub Shower Transfer Description Grab bar;Shower bench;Increased time;Set-up of equipment;Verbal cueing   Eating   Assistance Needed Independent   Physical Assistance Level No physical assistance   CARE Score - Eating 6   Oral Hygiene   Assistance Needed Set-up / clean-up   Physical Assistance Level No physical assistance   CARE Score - Oral Hygiene 5   Toileting Hygiene   Assistance Needed Physical assistance   Physical Assistance Level 51%-75%   CARE Score - Toileting Hygiene 2   Shower/Bathe Self   Assistance Needed Physical assistance   Physical Assistance Level 26%-50%   CARE Score - Shower/Bathe Self 3   Upper Body Dressing  "  Assistance Needed Physical assistance   Physical Assistance Level 26%-50%   CARE Score - Upper Body Dressing 3   Lower Body Dressing   Assistance Needed Physical assistance   Physical Assistance Level 76% or more   CARE Score - Lower Body Dressing 2   Putting On/Taking Off Footwear   Assistance Needed Physical assistance   Physical Assistance Level Total assistance   CARE Score - Putting On/Taking Off Footwear 1   Toilet Transfer   Assistance Needed Physical assistance   Physical Assistance Level 76% or more   CARE Score - Toilet Transfer 2   Cognitive Pattern Assessment   Cognitive Pattern Assessment Used BIMS   Brief Interview for Mental Status (BIMS)   Repetition of Three Words (First Attempt) 3   Temporal Orientation: Year Correct   Temporal Orientation: Month Missed by 6 days to 1 month   Temporal Orientation: Day Incorrect   Recall: \"Sock\" Yes, no cue required   Recall: \"Blue\" Yes, no cue required   Recall: \"Bed\" Yes, no cue required   BIMS Summary Score 13   Confusion Assessment Method (CAM)   Is there evidence of an acute change in mental status from the patient's baseline? No   Inattention Behavior not present   Disorganized thinking Behavior not present   Altered level of consciousness Behavior not present   Discharge Summary    Discharge Location  Group Home   Patient Discharging with Assist of Paid Caregiver   Level of Supervision Required 24 Hour Supervision   Recommended Equipment for Discharge Front-Wheeled Walker;Manual Wheelchair;Shower Chair;Raised Toilet Seat with Arms;3 in 1 Commode;Grab Bars by Toilet;Grab Bars in Tub / Shower;Hand Held Shower Head   Recommended Services Upon Discharge Home Health Occupational Therapy   Long Term Goals Met 0   Long Term Goals Not Met 2   Reason(s) for Goals Not Met Pt family communicated that care facility assisted w/ all ADLs.   Criteria for Termination of Services Maximum Function Achieved for Inpatient Rehabilitation   Discharge Instructions to Patient "   Level of Assist Required for Eating Able to Complete Eating without Assist   Level of Assist Required for Grooming Able to Complete Grooming without Assist   Level of Assist Required for Dressing Requires Physical Assist with Dressing   Equipment for Dressing Reacher   Level of Assist Required for Toileting Requires Physical Assist with Toileting   Level of Assist Required for Toilet Transfer Requires Physical Assist with Toilet Transfer   Equipment for Toilet Transfer Raised Toilet Seat with Arms;Grab Bars by Toilet   Level of Assist Required for Bathing Requires Supervision with Bathing   Equipment for Bathing Tub Transfer Bench;Grab Bars in Tub / Shower;Hand Held Shower Head;Long Handled Sponge   Level of Assist Required for Shower Transfer Requires Physical Assist with Shower Transfer   Equipment for Shower Transfer Grab Bars in Tub / Shower;Shower Chair   Level of Assist Required for Home Mgmt Requires Physical Assist with Home Management   Level of Assist Required for Meal Prep Requires Physical Assist with Meal Preparation   Driving Please Contact Physician Prior to Driving   Home Exercise Program Refer to Home Exercise Program Handout for Details     ADLs: Needed frequent VC for sequncing pivoting and hand position for steadying, assist with pant management (up/down) w/ one hand on GB to increase steadiness as well as UB/LB sequencing during bathing and drying tasks. Pt was Mod A for pull to stand w/ VC to scoot closer to edge of chair prior as well as proper upright standing posture. Pt completed 4 pull to stands ranging from Mod A to Max A .      Assessment    Pt tolerated session fairly w/ focus on ADLs. Pt continues to need 1 step commands for sequencing for ADLs as well as transfers d/t visual deficits and task processing.       Strengths: Alert and oriented, Able to follow instructions, Pleasant and cooperative, Willingly participates in therapeutic activities  Barriers: Decreased endurance,  Generalized weakness, Impaired activity tolerance, Impaired balance, Limited mobility, Visual impairment    Plan    D/C [id number]    DME       Passport items to be completed:  Perform bathroom transfers, complete dressing, complete feeding, get ready for the day, prepare a simple meal, participate in household tasks, adapt home for safety needs, demonstrate home exercise program, complete caregiver training     Occupational Therapy Goals (Active)       Problem: Dressing       Dates: Start:  01/06/24         Goal: STG-Within one week, patient will dress UB at a Min A level.        Dates: Start:  01/06/24            Goal: STG-Within one week, patient will dress LB at a Mod A level with AE/AD PRN.        Dates: Start:  01/06/24               Problem: Functional Transfers       Dates: Start:  01/06/24         Goal: STG-Within one week, patient will transfer to toilet at a Mod A level with AD/DME PRN.        Dates: Start:  01/06/24               Problem: OT Long Term Goals       Dates: Start:  01/06/24         Goal: LTG-By discharge, patient will complete basic self care tasks at a SUP to Mod A level with AE/AD/DME PRN.        Dates: Start:  01/06/24            Goal: LTG-By discharge, patient will perform bathroom transfers at a SUP to Min A level with LRD and DME PRN.        Dates: Start:  01/06/24               Problem: Toileting       Dates: Start:  01/06/24         Goal: STG-Within one week, patient will complete toileting tasks at a Mod A level with AE/AD PRN.        Dates: Start:  01/06/24

## 2024-01-18 NOTE — DISCHARGE PLANNING
Case Management/IDT follow up.   IDT continues to recommend IRF level of care as patient continue to make progress with all therapies.   Projected dc date set for 1/19/24      DC needs:  Recommendations made for home health for PT/OT/SLP  Follow up with: PCP    Left a message with patient's family to provide update from IDT and discuss plan of care.    Plan:  Continue to follow

## 2024-01-18 NOTE — PROGRESS NOTES
Patient care assumed. Report received from [facility] BLACK Jean.  Patient is alert and calm, resting in bed. Call light and bedside table within reach. Will continue to monitor.

## 2024-01-19 ENCOUNTER — APPOINTMENT (OUTPATIENT)
Dept: SPEECH THERAPY | Facility: REHABILITATION | Age: 89
DRG: 194 | End: 2024-01-19
Attending: PHYSICAL MEDICINE & REHABILITATION
Payer: MEDICARE

## 2024-01-19 ENCOUNTER — APPOINTMENT (OUTPATIENT)
Dept: PHYSICAL THERAPY | Facility: REHABILITATION | Age: 89
DRG: 194 | End: 2024-01-19
Attending: PHYSICAL MEDICINE & REHABILITATION
Payer: MEDICARE

## 2024-01-19 ENCOUNTER — APPOINTMENT (OUTPATIENT)
Dept: OCCUPATIONAL THERAPY | Facility: REHABILITATION | Age: 89
DRG: 194 | End: 2024-01-19
Attending: PHYSICAL MEDICINE & REHABILITATION
Payer: MEDICARE

## 2024-01-19 LAB
GAMMA INTERFERON BACKGROUND BLD IA-ACNC: 0.06 IU/ML
M TB IFN-G BLD-IMP: NEGATIVE
M TB IFN-G CD4+ BCKGRND COR BLD-ACNC: -0.01 IU/ML
MITOGEN IGNF BCKGRD COR BLD-ACNC: 3.09 IU/ML
QFT TB2 - NIL TBQ2: -0.01 IU/ML

## 2024-01-19 PROCEDURE — 770010 HCHG ROOM/CARE - REHAB SEMI PRIVAT*

## 2024-01-19 PROCEDURE — 97110 THERAPEUTIC EXERCISES: CPT | Mod: CO

## 2024-01-19 PROCEDURE — 97116 GAIT TRAINING THERAPY: CPT

## 2024-01-19 PROCEDURE — 97535 SELF CARE MNGMENT TRAINING: CPT | Mod: CO

## 2024-01-19 PROCEDURE — 97129 THER IVNTJ 1ST 15 MIN: CPT

## 2024-01-19 PROCEDURE — 99232 SBSQ HOSP IP/OBS MODERATE 35: CPT | Performed by: HOSPITALIST

## 2024-01-19 PROCEDURE — 700102 HCHG RX REV CODE 250 W/ 637 OVERRIDE(OP): Performed by: HOSPITALIST

## 2024-01-19 PROCEDURE — 97530 THERAPEUTIC ACTIVITIES: CPT

## 2024-01-19 PROCEDURE — 99232 SBSQ HOSP IP/OBS MODERATE 35: CPT | Performed by: PHYSICAL MEDICINE & REHABILITATION

## 2024-01-19 PROCEDURE — A9270 NON-COVERED ITEM OR SERVICE: HCPCS | Performed by: HOSPITALIST

## 2024-01-19 PROCEDURE — 700102 HCHG RX REV CODE 250 W/ 637 OVERRIDE(OP): Performed by: PHYSICAL MEDICINE & REHABILITATION

## 2024-01-19 PROCEDURE — 97130 THER IVNTJ EA ADDL 15 MIN: CPT

## 2024-01-19 PROCEDURE — A9270 NON-COVERED ITEM OR SERVICE: HCPCS | Performed by: PHYSICAL MEDICINE & REHABILITATION

## 2024-01-19 RX ADMIN — FAMOTIDINE 20 MG: 20 TABLET ORAL at 21:37

## 2024-01-19 RX ADMIN — APIXABAN 5 MG: 5 TABLET, FILM COATED ORAL at 08:04

## 2024-01-19 RX ADMIN — FLECAINIDE ACETATE 100 MG: 100 TABLET ORAL at 08:04

## 2024-01-19 RX ADMIN — FAMOTIDINE 20 MG: 20 TABLET ORAL at 08:04

## 2024-01-19 RX ADMIN — LEVOTHYROXINE SODIUM 75 MCG: 0.07 TABLET ORAL at 08:04

## 2024-01-19 RX ADMIN — HYDRALAZINE HYDROCHLORIDE 25 MG: 25 TABLET ORAL at 21:37

## 2024-01-19 RX ADMIN — PAROXETINE HYDROCHLORIDE 10 MG: 20 TABLET, FILM COATED ORAL at 08:03

## 2024-01-19 RX ADMIN — AMLODIPINE BESYLATE 10 MG: 5 TABLET ORAL at 05:14

## 2024-01-19 RX ADMIN — METOPROLOL SUCCINATE 12.5 MG: 25 TABLET, EXTENDED RELEASE ORAL at 05:14

## 2024-01-19 RX ADMIN — GABAPENTIN 100 MG: 100 CAPSULE ORAL at 21:37

## 2024-01-19 RX ADMIN — APIXABAN 5 MG: 5 TABLET, FILM COATED ORAL at 21:37

## 2024-01-19 RX ADMIN — ACETAMINOPHEN 650 MG: 325 TABLET ORAL at 05:39

## 2024-01-19 RX ADMIN — HYDRALAZINE HYDROCHLORIDE 25 MG: 25 TABLET ORAL at 05:13

## 2024-01-19 RX ADMIN — HYDRALAZINE HYDROCHLORIDE 25 MG: 25 TABLET ORAL at 15:04

## 2024-01-19 RX ADMIN — Medication 1000 UNITS: at 08:04

## 2024-01-19 RX ADMIN — LOSARTAN POTASSIUM 100 MG: 50 TABLET, FILM COATED ORAL at 05:13

## 2024-01-19 RX ADMIN — ACETAMINOPHEN 650 MG: 325 TABLET ORAL at 21:39

## 2024-01-19 RX ADMIN — TRAZODONE HYDROCHLORIDE 50 MG: 50 TABLET ORAL at 21:37

## 2024-01-19 ASSESSMENT — ACTIVITIES OF DAILY LIVING (ADL): BED_CHAIR_WHEELCHAIR_TRANSFER_DESCRIPTION: ADAPTIVE EQUIPMENT;INCREASED TIME;VERBAL CUEING;SUPERVISION FOR SAFETY

## 2024-01-19 ASSESSMENT — ENCOUNTER SYMPTOMS
MUSCULOSKELETAL NEGATIVE: 1
SHORTNESS OF BREATH: 0
NAUSEA: 0
COUGH: 0
BRUISES/BLEEDS EASILY: 0
CHILLS: 0
POLYDIPSIA: 0
ABDOMINAL PAIN: 0
PALPITATIONS: 0
EYES NEGATIVE: 1
FEVER: 0
VOMITING: 0

## 2024-01-19 ASSESSMENT — GAIT ASSESSMENTS
DEVIATION: STEP TO;DECREASED BASE OF SUPPORT;DECREASED HEEL STRIKE;DECREASED TOE OFF
GAIT LEVEL OF ASSIST: MAXIMAL ASSIST
DISTANCE (FEET): 8
ASSISTIVE DEVICE: FRONT WHEEL WALKER

## 2024-01-19 ASSESSMENT — PAIN DESCRIPTION - PAIN TYPE: TYPE: ACUTE PAIN

## 2024-01-19 ASSESSMENT — PATIENT HEALTH QUESTIONNAIRE - PHQ9
2. FEELING DOWN, DEPRESSED, IRRITABLE, OR HOPELESS: NOT AT ALL
SUM OF ALL RESPONSES TO PHQ9 QUESTIONS 1 AND 2: 0
1. LITTLE INTEREST OR PLEASURE IN DOING THINGS: NOT AT ALL

## 2024-01-19 NOTE — DISCHARGE PLANNING
Case management  Reviewed signed copy of IMM and answered all questions.    Dc date /disposition:1/22/24 Group home with home health.

## 2024-01-19 NOTE — THERAPY
Occupational Therapy  Daily Treatment     Patient Name: Debra Rodrigues  Age:  88 y.o., Sex:  female  Medical Record #: [id number]  Today's Date: 1/19/2024     Precautions  Precautions: Fall Risk  Comments: macular degenaration, Hx of CVA         Subjective    Pt sleeping in bed upon arrival, however was agreeable for 2nd OT session.      Objective       01/19/24 1431   OT Charge Group   OT Therapeutic Exercise (Units) 2   OT Total Time Spent   OT Individual Total Time Spent (Mins) 30   Functional Level of Assist   Bed, Chair, Wheelchair Transfer Moderate Assist  (bed slight elevated)   Bed Chair Wheelchair Transfer Description Adaptive equipment;Increased time;Verbal cueing;Supervision for safety   Sitting Upper Body Exercises   Front Arm Raise 2 sets of 10;Bilateral;Other Resistance (See Comments)  (AROM)   Internal Shoulder Rotation 2 sets of 10;Bilateral;Weight (See Comments for lbs)  (1lb hand weight)   External Shoulder Rotation 2 sets of 10;Bilateral;Weight (See Comments for lbs)  (1lb hand weight)   Bicep Curls 2 sets of 10;Bilateral;Weight (See Comments for lbs)  (3lbs hand weight)   Interdisciplinary Plan of Care Collaboration   Patient Position at End of Therapy Seated;Chair Alarm On;Tray Table within Reach;Phone within Reach;Call Light within Reach         Assessment    Pt tolerated session fairly w/ focus on thera ex for strengthening/endurance. Pt needs increase motivation to participate in any thera ex activity   Strengths: Alert and oriented, Able to follow instructions, Pleasant and cooperative, Willingly participates in therapeutic activities  Barriers: Decreased endurance, Generalized weakness, Impaired activity tolerance, Impaired balance, Limited mobility, Visual impairment    Plan  D/C on 1/22 however continue w/  Standing balance/ tolerance  Functional transfers   Strengthening/endurance    clothing management.    DME       Passport items to be completed:  Perform bathroom transfers, complete  dressing, complete feeding, get ready for the day, prepare a simple meal, participate in household tasks, adapt home for safety needs, demonstrate home exercise program, complete caregiver training     Occupational Therapy Goals (Active)       Problem: Dressing       Dates: Start:  01/06/24         Goal: STG-Within one week, patient will dress UB at a Min A level.        Dates: Start:  01/06/24            Goal: STG-Within one week, patient will dress LB at a Mod A level with AE/AD PRN.        Dates: Start:  01/06/24               Problem: Functional Transfers       Dates: Start:  01/06/24         Goal: STG-Within one week, patient will transfer to toilet at a Mod A level with AD/DME PRN.        Dates: Start:  01/06/24               Problem: OT Long Term Goals       Dates: Start:  01/06/24         Goal: LTG-By discharge, patient will complete basic self care tasks at a SUP to Mod A level with AE/AD/DME PRN.        Dates: Start:  01/06/24            Goal: LTG-By discharge, patient will perform bathroom transfers at a SUP to Min A level with LRD and DME PRN.        Dates: Start:  01/06/24               Problem: Toileting       Dates: Start:  01/06/24         Goal: STG-Within one week, patient will complete toileting tasks at a Mod A level with AE/AD PRN.        Dates: Start:  01/06/24

## 2024-01-19 NOTE — PROGRESS NOTES
"  Physical Medicine & Rehabilitation Progress Note    Encounter Date: 1/19/2024    Chief Complaint:  s/p CAP     Interval Events (Subjective):  Vitals Reviewed : BP WNL today   Labs reviewed: 1/18 labs reviewed WNL     Patient accepted to Group Home, unable to move in until Monday 1/22 .   Patient seen and examined at bedside, reports she slept well. Reports she is looking forward to going to the group home, understands her space at the group home wont be available for move in until Monday.  Reports some neck discomfort, reports as soreness. Otherwise, Does not report HA, lightheadedness, SOB, CP, abdominal pain, or changes in numbness/tingling/weakness.       Objective:  Physical Exam:  Vitals: /51   Pulse 65   Temp 36.4 °C (97.5 °F) (Oral)   Resp 18   Ht 1.676 m (5' 6\")   Wt 90 kg (198 lb 8 oz)   SpO2 90%   Gen: NAD, seated comfortably in MWC   Head:  NC/AT  Eyes/ Nose/ Mouth: PERRLA, moist mucous membranes  Cardio: RRR, good distal perfusion, warm extremities  Pulm: normal respiratory effort, no cyanosis, on RA   Abd: Soft NTND, negative borborygmi   Ext: No peripheral edema. No calf tenderness. No clubbing.  Mood: appropriate, is looking forward to leaving on Monday   Mental status:  A&Ox4 (person, place, date, situation) answers questions appropriately follows commands  Speech: fluent, no aphasia or dysarthria        Laboratory Values:  Recent Results (from the past 72 hour(s))   CBC WITH DIFFERENTIAL    Collection Time: 01/18/24  5:46 AM   Result Value Ref Range    WBC 7.6 4.8 - 10.8 K/uL    RBC 4.82 4.20 - 5.40 M/uL    Hemoglobin 14.4 12.0 - 16.0 g/dL    Hematocrit 45.6 37.0 - 47.0 %    MCV 94.6 81.4 - 97.8 fL    MCH 29.9 27.0 - 33.0 pg    MCHC 31.6 (L) 32.2 - 35.5 g/dL    RDW 50.2 (H) 35.9 - 50.0 fL    Platelet Count 259 164 - 446 K/uL    MPV 11.0 9.0 - 12.9 fL    Neutrophils-Polys 54.30 44.00 - 72.00 %    Lymphocytes 25.70 22.00 - 41.00 %    Monocytes 12.80 0.00 - 13.40 %    Eosinophils 5.10 " 0.00 - 6.90 %    Basophils 1.40 0.00 - 1.80 %    Immature Granulocytes 0.70 0.00 - 0.90 %    Nucleated RBC 0.00 0.00 - 0.20 /100 WBC    Neutrophils (Absolute) 4.12 1.82 - 7.42 K/uL    Lymphs (Absolute) 1.95 1.00 - 4.80 K/uL    Monos (Absolute) 0.97 (H) 0.00 - 0.85 K/uL    Eos (Absolute) 0.39 0.00 - 0.51 K/uL    Baso (Absolute) 0.11 0.00 - 0.12 K/uL    Immature Granulocytes (abs) 0.05 0.00 - 0.11 K/uL    NRBC (Absolute) 0.00 K/uL   Basic Metabolic Panel    Collection Time: 01/18/24  5:46 AM   Result Value Ref Range    Sodium 137 135 - 145 mmol/L    Potassium 4.4 3.6 - 5.5 mmol/L    Chloride 105 96 - 112 mmol/L    Co2 21 20 - 33 mmol/L    Glucose 94 65 - 99 mg/dL    Bun 15 8 - 22 mg/dL    Creatinine 0.74 0.50 - 1.40 mg/dL    Calcium 8.7 8.5 - 10.5 mg/dL    Anion Gap 11.0 7.0 - 16.0   ESTIMATED GFR    Collection Time: 01/18/24  5:46 AM   Result Value Ref Range    GFR (CKD-EPI) 77 >60 mL/min/1.73 m 2       Medications:  Scheduled Medications   Medication Dose Frequency    hydrALAZINE  25 mg Q8HRS    influenza Vac High-Dose Quad  0.7 mL Once    amLODIPine  10 mg Q DAY    losartan  100 mg Q12HRS    metoprolol SR  12.5 mg DAILY    Pharmacy Consult Request  1 Each PHARMACY TO DOSE    apixaban  5 mg BID    famotidine  20 mg Q12HRS    flecainide  100 mg DAILY    traZODone  50 mg QHS    PARoxetine  10 mg DAILY    levothyroxine  75 mcg DAILY    gabapentin  100 mg Q EVENING    vitamin D3  1,000 Units DAILY     PRN medications: senna-docusate **AND** polyethylene glycol/lytes **AND** magnesium hydroxide **AND** bisacodyl, Respiratory Therapy Consult, hydrALAZINE, ondansetron **OR** ondansetron, acetaminophen    Diet:  Current Diet Order   Procedures    Diet Order Diet: Level 6 - Soft and Bite Sized; Liquid level: Level 0 - Thin       Medical Decision Making and Plan:  Per Dr. Best's prior progress note:     Community-acquired pneumonia  Flu, RSV, COVID-negative  Respiratory cultures PENDING (unclear if collected at [facility])  Blood  cultures collected 1/2/2024 no growth to date  PT and OT for mobility and ADLs. Per guidelines, 15 hours per week between PT, OT and/or SLP.  Follow-up geriatrician  Continue Augmentin through 1/8 -completed  Continue doxycycline through 1/9-completed     Atrial fibrillation  Permanent pacemaker  Recently referred to cardiology for local establishment (had been in [facility])  Continue Eliquis 5 mg twice daily  Continue flecainide 100 mg daily  Continue with oral 25 mg XL daily--> 12.5 mg 1/8     History of Stroke  Left Hemiparesis  Monitor     Impaired cognition  Poor motivation/attention, increased fatigue  ?  Encephalopathy secondary to ammonia  SLP to establish baseline  1/11 trial amantadine twice daily, avoid other stimulants due to elevated blood pressure  1/12 DIL reporting more clarity.  Continue amantadine.  1/17 Equivocal if helping, is not helping with initiation in therapy. Taper off and stop before discharge.   1/18 and 1/19 doing well off amantadine, no fatigue      Hypertension  Continue Cozaar 50 mg every 12 hours--> 100 mg every 12 hours 1/11   Continue Toprol XL 25 mg daily -reduced to 12.5 mg daily due to bradycardia 1/8  Norvasc 5 mg daily added 1/8 --> increase to 10 mg 1/15  1/16: Continue amlodipine 10 mg + Cozaar 100 mg every 12 hours + metoprolol 12.5 mg XL daily + (new) Hydralazine 10mg q8 hrs  1/17: Monitor BP with addition of Hydralazine yesterday.   1/18 BP elevated to 180/71 prior to meds  1/19 BP WNL now on inceased dose of hydralazine       Hypokalemia  1/10 3.2, supplemented per hospitalist x1 and is on scheduled potassium  1/11 continue scheduled potassium  1/12 potassium normal at 3.9 6  1/15 potassium normal at 4.0.  Continue supplementation  1/18 K 4.4       Hypothyroidism  Continue Synthroid 75 mcg daily     Depression  Insomnia  Continue trazodone 50 mg nightly (QTc 454)  Continue Paxil 10 mg daily     Pain  Tylenol as needed  Chronic neuropathy - Gabapentin at night  1/19 pain  controlled       Skin  Patient at risk for skin breakdown due to debility in areas including sacrum, achilles, elbows and head in addition to other sites. Nursing to assess skin daily.      GI Ppx  Patient on Prilosec for GERD prophylaxis.    1/9 change diet to soft and bite sized as patient having difficulty with regular consistencies due to dentures.  Add Ensure supplement.  1/10 consulted dietary to improve nutrition     Bowel   Patient on Senna-docusate for constipation prophylaxis.   Last BM 1/19       Bladder  TV/PVR/BS PRN    DVT PROPHYLAXIS: On Eliquis for atrial fibrillation     HOSPITALIST FOLLOWING: YES     CODE STATUS: Reviewed note from geriatrics 11/30/2023 CODE STATUS DNAR/DNI - d/w patient and family and confirm this code status.     DISPO: Home to [facility]. Patient had a lot of assist at her assisted living facility at baseline.  Evaluation occurring 1/15 - requires too much assist. DIL and son looking into group homes vs CG at [facility]. Group home coming 1/17/2024 to evaluate for acceptance.   1/19 Patient has been accepeted to Group home, unable to move in until Monday 1/22      JEANINE: DC to group home 1/22, accepeted to Group Home on 1/19, unable to Move in until Monday    ___________________________    Erlinda Yao D.O.    ____________________________________    Patient was seen for 36  minutes on unit/floor of which > 50% of time was spent on counseling and coordination of care regarding the above, including prognosis, risk reduction, benefits of treatment, and options for next stage of care.

## 2024-01-19 NOTE — THERAPY
Occupational Therapy  Daily Treatment     Patient Name: Debra Rodrigues  Age:  88 y.o., Sex:  female  Medical Record #: [id number]  Today's Date: 1/19/2024     Precautions  Precautions: Fall Risk  Comments: macular degenaration, Hx of CVA         Subjective    Pt was seated in w/c upon arrival and agreeable for OT session. Doctor Najma confirmed that pt will not be leaving until 1/22 d/t the facility not having any bed available at this time.      Objective       01/19/24 0901   OT Charge Group   OT Self Care / ADL (Units) 3   OT Therapeutic Exercise (Units) 1   OT Total Time Spent   OT Individual Total Time Spent (Mins) 60   Functional Level of Assist   Grooming Modified Independent   Grooming Description Increased time;Set-up of equipment   Toileting Maximal Assist   Toileting Description Assist for hygiene;Assist to pull pants up;Assist for standing balance;Increased time;Verbal cueing   Toilet Transfers Maximal Assist   Toilet Transfer Description Adaptive equipment;Grab bar;Increased time;Assist with one limb;Set-up of equipment;Verbal cueing   Sitting Upper Body Exercises   Other Exercise Nustep ~10min level 0  (Pt needed multiple rest breaks in between d/t fatigue)   Bed Mobility    Sit to Supine Maximal Assist   Interdisciplinary Plan of Care Collaboration   IDT Collaboration with  Certified Nursing Assistant   Patient Position at End of Therapy In Bed;Bed Alarm On;Tray Table within Reach;Call Light within Reach;Phone within Reach   Collaboration Comments communicated that pt was back in bed d/t fatigue     ADLs: Pt continues to need VC for LB sequencing during transfers and hand positions for steadying during static standing. Increase time needed for toielting tasks. Pt needed assistance w/ mal care post BM, however was able to wipe the front.     Assessment    Pt tolerated session fairly w/ focus on ADLs and thera-ex.Pt continues to need 1 step commands for sequencing for ADLs as well as transfers d/t  visual deficits and task processing.      Strengths: Alert and oriented, Able to follow instructions, Pleasant and cooperative, Willingly participates in therapeutic activities  Barriers: Decreased endurance, Generalized weakness, Impaired activity tolerance, Impaired balance, Limited mobility, Visual impairment    Plan    Standing balance/ tolerance  Functional transfers   Strengthening/endurance    clothing management.    DME       Passport items to be completed:  Perform bathroom transfers, complete dressing, complete feeding, get ready for the day, prepare a simple meal, participate in household tasks, adapt home for safety needs, demonstrate home exercise program, complete caregiver training     Occupational Therapy Goals (Active)       Problem: Dressing       Dates: Start:  01/06/24         Goal: STG-Within one week, patient will dress UB at a Min A level.        Dates: Start:  01/06/24            Goal: STG-Within one week, patient will dress LB at a Mod A level with AE/AD PRN.        Dates: Start:  01/06/24               Problem: Functional Transfers       Dates: Start:  01/06/24         Goal: STG-Within one week, patient will transfer to toilet at a Mod A level with AD/DME PRN.        Dates: Start:  01/06/24               Problem: OT Long Term Goals       Dates: Start:  01/06/24         Goal: LTG-By discharge, patient will complete basic self care tasks at a SUP to Mod A level with AE/AD/DME PRN.        Dates: Start:  01/06/24            Goal: LTG-By discharge, patient will perform bathroom transfers at a SUP to Min A level with LRD and DME PRN.        Dates: Start:  01/06/24               Problem: Toileting       Dates: Start:  01/06/24         Goal: STG-Within one week, patient will complete toileting tasks at a Mod A level with AE/AD PRN.        Dates: Start:  01/06/24

## 2024-01-19 NOTE — PROGRESS NOTES
"Patient refused to get up of bed for lunch. Patient states \"I'm too tired and I'm not hungry\".   "

## 2024-01-19 NOTE — DISCHARGE PLANNING
Patient is moving to [facility] on 1/22/24. Paperwork faxed today. No DME needs. HH ordered through [facility]. Will continue to assist with the discharge planning.

## 2024-01-19 NOTE — CARE PLAN
Problem: Fall Risk - Rehab  Goal: Patient will remain free from falls  Outcome: Progressing  Patient verbalized understanding to utilize call light for needs, requests, ambulation, and toileting.     Problem: Pain - Standard  Goal: Alleviation of pain or a reduction in pain to the patient’s comfort goal  Outcome: Progressing  Patient verbalized understanding to alert staff when in pain and to participate in her pain management regimen.     The patient is Stable - Low risk of patient condition declining or worsening    Shift Goals  Clinical Goals: Safety, BP control  Patient Goals: Sleep, safety

## 2024-01-19 NOTE — THERAPY
Speech Language Pathology  Daily Treatment     Patient Name: Debra Rodrigues  Age:  88 y.o., Sex:  female  Medical Record #: [id number]  Today's Date: 1/19/2024     Precautions  Precautions: Fall Risk  Comments: macular degenaration, Hx of CVA    Subjective    Patient was anticipated to discharge today, however, her discharge date is now 1/22/24 when she will be able to move to her group home. Resume goal for recall.  Patient declined to get OOB.  Patient reports fatigue.  She was agreeable to therapy at bedside.  However, her roommate also receiving therapy at bedside simultaneously.  Patient struggled to attend while therapy directions and discussions being conducted simultaneously with her roommate.  Attempted for 15 min but needed to return when room was quieter for an additional 15 min. Noted to nursing that patient is increasingly wanting to be in bed, and reporting fatigue.  Inquired why this may be happening.  Nurse reported no new medical issues, only patient fatigue.  Noted decline in patient's initiation and recall.     Objective       01/19/24 1101   Treatment Charges   SLP Cognitive Skill Development First 15 Minutes 1   SLP Cognitive Skill Development Additional 15 Minutes 1   SLP Total Time Spent   SLP Individual Total Time Spent (Mins) 30   Cognition   Simple Attention Moderate (3)   Prospective Memory Moderate (3)   Functional Memory Activities Minimal (4)   Interdisciplinary Plan of Care Collaboration   IDT Collaboration with  Nursing   Patient Position at End of Therapy In Bed;Chair Alarm On   Collaboration Comments Noted to nursing that patient is increasingly wanting to be in bed, and reporting fatigue.  Inquired why this may be happening.  Nurse reported no new medical issues, only patient fatigue.         Assessment    Patient recalled 2/5 previously trained unrelated words.  Patient needed mod cues to recall transfer sequences with 75% acc.     Strengths: Alert and oriented, Effective  communication skills, Supportive family, Willingly participates in therapeutic activities  Barriers: Generalized weakness, Impaired carryover of learning, Impaired functional cognition, Visual impairment (Low initiation and slow to process.)    Plan    Target recall of safety/transfer sequencing.  Complete BIMs/ CAMs prior to discharge    Passport items to be completed:  Express basic needs, understand food/liquid recommendations, consistently follow swallow precautions, manage finances, manage medications, arrive to therapy appointments on time, complete daily memory log entries, solve problems related to safety situations, review education related to hospitalization, complete caregiver training     Speech Therapy Problems (Active)       Problem: Memory STGs       Dates: Start:  01/08/24         Goal: STG-Within one week, patient will recall transfer sequencing with 75% with min A.       Dates: Start:  01/10/24         Goal Note filed on 01/17/24 1152 by Dania Nelson MS,CCC-SLP       Verbalizes with 75% with min A. Patient needs extra time and prompting.                 Problem: Speech/Swallowing LTGs       Dates: Start:  01/06/24         Goal: LTG-By discharge, patient will solve basic problems related to safety to facilitate safe d/c to a functional living environment.       Dates: Start:  01/06/24         Goal Note filed on 01/18/24 1510 by Josselyn Albert, Student       Patient will need verbal cues for safety planning and problem solving in discharge environment.   She will need assist with medication, financial, and health care management.

## 2024-01-19 NOTE — CARE PLAN
Problem: Skin Integrity  Goal: Skin integrity is maintained or improved  Note: Bilateral heels appear red and boggy. Heel protector boot in use. Pictures were taken. Buttocks appeared with mild discoloration, but intact,      Problem: Discharge Barriers/Planning  Goal: Patient's continuum of care needs are met  Note: Patient reports that she feels very fatigued today.      The patient is Stable - Low risk of patient condition declining or worsening    Shift Goals  Clinical Goals: Safety  Patient Goals: Sleep, safety

## 2024-01-19 NOTE — PROGRESS NOTES
Hospital Medicine Daily Progress Note      Chief Complaint  Hypertension    Interval Problem Update  Blood pressures better.    Review of Systems  Review of Systems   Constitutional:  Negative for chills and fever.   HENT: Negative.     Eyes: Negative.    Respiratory:  Negative for cough and shortness of breath.    Cardiovascular:  Negative for chest pain and palpitations.   Gastrointestinal:  Negative for abdominal pain, nausea and vomiting.   Genitourinary: Negative.    Musculoskeletal: Negative.    Skin:  Negative for itching and rash.   Endo/Heme/Allergies:  Negative for polydipsia. Does not bruise/bleed easily.        Physical Exam  Temp:  [36.4 °C (97.5 °F)-36.7 °C (98.1 °F)] 36.4 °C (97.5 °F)  Pulse:  [65-68] 65  Resp:  [18] 18  BP: (110-144)/(51-70) 129/51  SpO2:  [90 %-95 %] 90 %    Physical Exam  Vitals reviewed.   Constitutional:       General: She is not in acute distress.     Appearance: Normal appearance. She is not ill-appearing.   HENT:      Head: Normocephalic and atraumatic.      Right Ear: External ear normal.      Left Ear: External ear normal.      Nose: Nose normal.      Mouth/Throat:      Pharynx: Oropharynx is clear.   Eyes:      General:         Right eye: No discharge.         Left eye: No discharge.      Extraocular Movements: Extraocular movements intact.      Conjunctiva/sclera: Conjunctivae normal.   Cardiovascular:      Rate and Rhythm: Normal rate and regular rhythm.   Pulmonary:      Effort: Pulmonary effort is normal. No respiratory distress.      Breath sounds: Normal breath sounds. No wheezing.   Abdominal:      General: Bowel sounds are normal. There is no distension.      Palpations: Abdomen is soft.      Tenderness: There is no abdominal tenderness. There is no guarding or rebound.   Musculoskeletal:      Cervical back: Normal range of motion and neck supple.      Right lower leg: No edema.      Left lower leg: No edema.   Skin:     General: Skin is warm and dry.    Neurological:      Mental Status: She is alert and oriented to person, place, and time.         Fluids  No intake or output data in the 24 hours ending 01/19/24 1018      Laboratory  Recent Labs     01/18/24  0546   WBC 7.6   RBC 4.82   HEMOGLOBIN 14.4   HEMATOCRIT 45.6   MCV 94.6   MCH 29.9   MCHC 31.6*   RDW 50.2*   PLATELETCT 259   MPV 11.0     Recent Labs     01/18/24  0546   SODIUM 137   POTASSIUM 4.4   CHLORIDE 105   CO2 21   GLUCOSE 94   BUN 15   CREATININE 0.74   CALCIUM 8.7               Assessment/Plan  Abdominal distension  Assessment & Plan  KUB gaseous colonic distension  Clinically improved on Simethicone    Hypoxia- (present on admission)  Assessment & Plan  S/P IV Lasix at [facility] for pulm edema  S/P Augmentin and Doxy for PNA  RT protocol    Acquired hypothyroidism- (present on admission)  Assessment & Plan  Euthyroid on Synthroid    MDD (major depressive disorder), recurrent, in partial remission (HCC)- (present on admission)  Assessment & Plan  On Paxil    Primary hypertension- (present on admission)  Assessment & Plan  On Norvasc, Losartan, Toprol, and Hydralazine  Blood pressure stabilizing    Paroxysmal atrial fibrillation (HCC)- (present on admission)  Assessment & Plan  Echo EF 55%  S/P PPM  On Flecainide  Anticoagulated on Eliquis  Cardiology F/U    DNR    Pt w/o acute medical issues requiring Hospitalist services at this time.  Will sign off.  Please re-consult as needed.  Discussed w/ Dr. Yao.

## 2024-01-19 NOTE — PROGRESS NOTES
NURSING DAILY NOTE    Name: Debra Rodrigues   Date of Admission: 1/5/2024   Admitting Diagnosis: Pneumonia due to infectious organism  Attending Physician: Erlinda Yao D.o.  Allergies: Other misc, Hydrocodone-acetaminophen, and Sulfa drugs    Safety  Patient Assist  Mod A  Patient Precautions  Fall Risk  Precaution Comments  macular degenaration, Hx of CVA  Bed Transfer Status  Moderate Assist  Toilet Transfer Status   Moderate Assist  Assistive Devices  Wheelchair, Rails  Oxygen  None - Room Air  Diet/Therapeutic Dining  Current Diet Order   Procedures    Diet Order Diet: Level 6 - Soft and Bite Sized; Liquid level: Level 0 - Thin     Pill Administration  floated and one at a time   Agitated Behavioral Scale  15  ABS Level of Severity  No Agitation    Fall Risk  Has the patient had a fall this admission?   No  Rubina Echavarria Fall Risk Scoring  21, HIGH RISK  Fall Risk Safety Measures  bed alarm, poor balance, and low vision/ hearing    Vitals  Temperature: 36.4 °C (97.5 °F)  Temp src: Oral  Pulse: 65  Respiration: 18  Blood Pressure : 129/51  Blood Pressure MAP (Calculated): 77 MM HG  BP Location: Right, Upper Arm  Patient BP Position: Supine     Oxygen  Pulse Oximetry: 90 %  O2 (LPM): 0  O2 Delivery Device: None - Room Air    Bowel and Bladder  Last Bowel Movement  01/19/24  Stool Type  Type 5: Soft blob with clear cut edges (passed easily)  Bowel Device  Bathroom, Diaper  Continent  Bladder: Did not void   Bowel: No movement  Bladder Function  Urine Void (mL):  (moderate)  Number of Times Voided: 1  Urine Color: Yellow  Urine Clarity: Unable to Evaluate  Number of Times Incontinent of Urine: 1  Wet Diaper Count: 1  Genitourinary Assessment   Bladder Assessment (WDL):  WDL Except  Cortes Catheter: Not Applicable  Urinary Elimination: Incontinence  Urine Color: Yellow  Urine Clarity: Unable to Evaluate  Number of Bladder Accidents: 1  Total Number of  Bladder of Accidents in Last 7 Days: 4  Number of Times Incontinent of Urine: 1  Bladder Device: Diaper, Bathroom  Time Void: No  Bladder Scan: Post Void  $ Bladder Scan Results (mL): 1 (0 scan)    Skin  Larry Score   17  Sensory Interventions   Bed Types: Standard/Trauma Mattress  Skin Preventative Measures: Pillows in Use for Support / Positioning, Silicone Oxygen Tubing in Use  Moisture Interventions  Moisturizers/Barriers: Barrier Wipes      Pain  Pain Rating Scale  2 - Notice Pain, does not interfere with activities  Pain Location  Neck  Pain Location Orientation  Mid  Pain Interventions   Declines    ADLs    Bathing    (shower done on Day shift)  Linen Change   Partial  Personal Hygiene  Moist Mamie Wipes, Perineal Care  Chlorhexidine Bath      Oral Care  Brushed Teeth  Teeth/Dentures     Shave     Nutrition Percentage Eaten  *  * Meal *  *, Dinner, 0% Consumed, Refused  Environmental Precautions  Treaded Slipper Socks on Patient, Report Given to Other Health Care Providers Regarding Fall Risk, Bed in Low Position  Patient Turns/Positioning  Patient Turns Self from Side to Side  Patient Turns Assistance/Tolerance  Assistance of One  Bed Positions  Bed Controls On, Bed Locked  Head of Bed Elevated  Self regulated      Psychosocial/Neurologic Assessment  Psychosocial Assessment  Psychosocial (WDL):  WDL Except (Hx of MDD)  Patient Behaviors: Fatigue  Neurologic Assessment  Neuro (WDL): Exceptions to WDL  Level of Consciousness: Alert  Orientation Level: Oriented X4  Cognition: Follows commands  Speech: Clear  Pupil Assesment: No  Motor Function/Sensation Assessment: Sensation  RUE Sensation: Full sensation  LUE Sensation: Full sensation  RLE Sensation: Numbness, Tingling  LLE Sensation: Numbness, Tingling  Neuro Additional Assessments: [place] Coma Scale  EENT (WDL):  WDL Except    Cardio/Pulmonary Assessment  Edema   RLE Edema: Generalized  LLE Edema: Generalized  Respiratory Breath Sounds  RUL Breath Sounds:  Clear  RML Breath Sounds: Clear  RLL Breath Sounds: Diminished  ANGÉLICA Breath Sounds: Clear  LLL Breath Sounds: Diminished  Cardiac Assessment   Cardiac (WDL):  WDL Except (Hx of HTN, AFib)

## 2024-01-19 NOTE — CARE PLAN
Problem: Mobility Transfers  Goal: STG-Within one week, patient will perform bed mobility with mod A  Outcome: Met     Problem: PT-Long Term Goals  Goal: LTG-By discharge, patient will propel wheelchair x 150 feet with SPV  Outcome: Discharged - Not Met  Note: Pt can do 65' with Min A  Goal: LTG-By discharge, patient will ambulate x 30 feet with FWW and CGA  Outcome: Discharged - Not Met  Note: Pt inconsistently goes up to 20' with a FWW and Total A  Goal: LTG-By discharge, patient will transfer one surface to another with FWW and CGA  Outcome: Discharged - Not Met  Note: Pt can transfer with FWW and Min A <> Mod A   Goal: LTG-By discharge, patient will transfer in/out of a car with FWW and CGA  Outcome: Discharged - Not Met

## 2024-01-20 ENCOUNTER — APPOINTMENT (OUTPATIENT)
Dept: SPEECH THERAPY | Facility: REHABILITATION | Age: 89
DRG: 194 | End: 2024-01-20
Attending: PHYSICAL MEDICINE & REHABILITATION
Payer: MEDICARE

## 2024-01-20 PROCEDURE — 700102 HCHG RX REV CODE 250 W/ 637 OVERRIDE(OP): Performed by: PHYSICAL MEDICINE & REHABILITATION

## 2024-01-20 PROCEDURE — 97130 THER IVNTJ EA ADDL 15 MIN: CPT

## 2024-01-20 PROCEDURE — 700102 HCHG RX REV CODE 250 W/ 637 OVERRIDE(OP): Performed by: HOSPITALIST

## 2024-01-20 PROCEDURE — A9270 NON-COVERED ITEM OR SERVICE: HCPCS | Performed by: HOSPITALIST

## 2024-01-20 PROCEDURE — 97129 THER IVNTJ 1ST 15 MIN: CPT

## 2024-01-20 PROCEDURE — A9270 NON-COVERED ITEM OR SERVICE: HCPCS | Performed by: PHYSICAL MEDICINE & REHABILITATION

## 2024-01-20 PROCEDURE — 99232 SBSQ HOSP IP/OBS MODERATE 35: CPT | Performed by: PHYSICAL MEDICINE & REHABILITATION

## 2024-01-20 PROCEDURE — 770010 HCHG ROOM/CARE - REHAB SEMI PRIVAT*

## 2024-01-20 RX ADMIN — APIXABAN 5 MG: 5 TABLET, FILM COATED ORAL at 20:13

## 2024-01-20 RX ADMIN — ACETAMINOPHEN 650 MG: 325 TABLET ORAL at 20:13

## 2024-01-20 RX ADMIN — TRAZODONE HYDROCHLORIDE 50 MG: 50 TABLET ORAL at 20:13

## 2024-01-20 RX ADMIN — Medication 1000 UNITS: at 08:19

## 2024-01-20 RX ADMIN — HYDRALAZINE HYDROCHLORIDE 25 MG: 25 TABLET ORAL at 22:18

## 2024-01-20 RX ADMIN — LOSARTAN POTASSIUM 100 MG: 50 TABLET, FILM COATED ORAL at 05:53

## 2024-01-20 RX ADMIN — APIXABAN 5 MG: 5 TABLET, FILM COATED ORAL at 08:20

## 2024-01-20 RX ADMIN — FAMOTIDINE 20 MG: 20 TABLET ORAL at 08:20

## 2024-01-20 RX ADMIN — LEVOTHYROXINE SODIUM 75 MCG: 0.07 TABLET ORAL at 08:20

## 2024-01-20 RX ADMIN — FAMOTIDINE 20 MG: 20 TABLET ORAL at 20:13

## 2024-01-20 RX ADMIN — AMLODIPINE BESYLATE 10 MG: 5 TABLET ORAL at 05:54

## 2024-01-20 RX ADMIN — METOPROLOL SUCCINATE 12.5 MG: 25 TABLET, EXTENDED RELEASE ORAL at 05:54

## 2024-01-20 RX ADMIN — FLECAINIDE ACETATE 100 MG: 100 TABLET ORAL at 08:19

## 2024-01-20 RX ADMIN — HYDRALAZINE HYDROCHLORIDE 25 MG: 25 TABLET ORAL at 05:54

## 2024-01-20 RX ADMIN — PAROXETINE HYDROCHLORIDE 10 MG: 20 TABLET, FILM COATED ORAL at 08:19

## 2024-01-20 RX ADMIN — GABAPENTIN 100 MG: 100 CAPSULE ORAL at 20:13

## 2024-01-20 ASSESSMENT — PAIN DESCRIPTION - PAIN TYPE: TYPE: ACUTE PAIN

## 2024-01-20 NOTE — THERAPY
"Physical Therapy   Daily Treatment     Patient Name: Debra Rodrigues  Age:  88 y.o., Sex:  female  Medical Record #: [id number]  Today's Date: 1/19/2024     Precautions  Precautions: (P) Fall Risk  Comments: (P) Positive for orthostatic hypotension    Subjective    Pt was resting in bed and initially denied PT but was agreeable after encouragement. \" I am really tired today\"     Objective       01/19/24 1231   Precautions   Precautions Fall Risk   Comments Positive for orthostatic hypotension   Gait Functional Level of Assist    Gait Level Of Assist Maximal Assist  (Assist at pelvis for maintaing upright posture, VC for sequnecing stand pivot turn)   Assistive Device Front Wheel Walker   Distance (Feet) 8   # of Times Distance was Traveled 1   Deviation Step To;Decreased Base Of Support;Decreased Heel Strike;Decreased Toe Off   Transfer Functional Level of Assist   Bed, Chair, Wheelchair Transfer Maximal Assist   Bed Chair Wheelchair Transfer Description Increased time;Initial preparation for task;Set-up of equipment;Verbal cueing;Adaptive equipment  (Assist with Standing, VC for pivoting, FWW)   Toilet Transfers Maximal Assist   Toilet Transfer Description Grab bar;Increased time;Initial preparation for task;Verbal cueing;Set-up of equipment  (Assist with pant management, steading, and hygiene)   Supine Lower Body Exercise   Hip Flexion 2 sets of 10;Light Resistance Theraband;Bilateral   Ankle Pumps 1 set of 10;Bilateral   Bed Mobility    Supine to Sit Maximal Assist  (Assit at shoulders and B LE for pivoting in bed)   Sit to Supine Maximal Assist  (Assit at shoulders and B LE for pivoting in bed)   Sit to Stand Maximal Assist  (At pelvis during stand)   Scooting Maximal Assist  (Assist at shoulders)   Interdisciplinary Plan of Care Collaboration   IDT Collaboration with  Nursing   Patient Position at End of Therapy In Bed;Bed Alarm On;Tray Table within Reach;Phone within Reach   Collaboration Comments Handed off " to nursing at the conclusion of the session, notified nursing that pt was positive for orthostatic hypotension   Physical Therapist Assigned   Assigned PT / Treatment Time / Comments Roxann/Claudia. TechA if no student. No 30 min treat and no 0830; 60 okay. Needs breaks after 60 min.     Gait training: W/C and chair set up 8 ft apart, to simulate household distances, Mod<>Max A with FWW, increased time for motivating and participation   Increased time for toileting and bed mobility/transfers  Increased time for dizziness assessment     Assessment    Pt was positive for orthostatic hypotension which limited her ability to participate in her PT session today. Her symptoms improved with laying supine. Pt had diarrhea during session and per nursing pt had low caloric intake.     Strengths: Able to follow instructions, Manages pain appropriately, Motivated for self care and independence, Pleasant and cooperative, Willingly participates in therapeutic activities  Barriers: Decreased endurance, Confused, Fatigue, Generalized weakness, Impaired activity tolerance, Impaired balance, Limited mobility    Plan    Discharge IR[facility]  Focus on scooting on a firm surface (Mat table)  Continue working on her W/c mobility   Continue walking with a FWW with the chair as a target (trial 10 ft intervals)   D/C destination is a group home on 1/22/24    DME  PT DME Recommendations  Wheelchair:  (TBD)  Assistive Device:  (TBD)    Passport items to be completed:  Get in/out of bed safely, in/out of a vehicle, safely use mobility device, walk or wheel around home/community, navigate up and down stairs, show how to get up/down from the ground, ensure home is accessible, demonstrate HEP, complete caregiver training    Physical Therapy Problems (Active)       There are no active problems.

## 2024-01-20 NOTE — THERAPY
Speech Language Pathology  Daily Treatment     Patient Name: Debra Rodrigues  Age:  88 y.o., Sex:  female  Medical Record #: [id number]  Today's Date: 1/20/2024     Precautions  Precautions: Fall Risk  Comments: Positive for orthostatic hypotension    Subjective    Patient asleep in bed upon arrival; agreeable to SLP svcs.     Objective       01/20/24 1001   Treatment Charges   SLP Cognitive Skill Development First 15 Minutes 1   SLP Cognitive Skill Development Additional 15 Minutes 1   SLP Total Time Spent   SLP Individual Total Time Spent (Mins) 30   Precautions   Precautions Fall Risk   Comments Positive for orthostatic hypotension   Cognition   Safety Awareness Minimal (4)   Functional Sequencing for ADL / IADLs Moderate (3)   Interdisciplinary Plan of Care Collaboration   IDT Collaboration with  Nursing;Certified Nursing Assistant   Patient Position at End of Therapy Seated;Chair Alarm On;Call Light within Reach;Tray Table within Reach;Phone within Reach   Collaboration Comments CNA assisted w transfer from bed to WC; RN assisted with transfer from WC to toilet.       Assessment    Assessed pt's safety awareness and fx sequencing during ADLs. Patient required min cues to recall safety precautions related to transfers. Patient required min-mod cues to verbally sequence steps of safe transfers/safe completion of ADLs. Patient demonstrated adequate sequencing of oral care, hair brushing independently.    Strengths: Alert and oriented, Effective communication skills, Supportive family, Willingly participates in therapeutic activities  Barriers: Generalized weakness, Impaired carryover of learning, Impaired functional cognition, Visual impairment (Low initiation and slow to process.)    Plan    BIMS & CAM before anticipated d/c 1/22/2024.    Passport items to be completed:  Express basic needs, understand food/liquid recommendations, consistently follow swallow precautions, manage finances, manage medications,  arrive to therapy appointments on time, complete daily memory log entries, solve problems related to safety situations, review education related to hospitalization, complete caregiver training     Speech Therapy Problems (Active)       Problem: Memory STGs       Dates: Start:  01/08/24         Goal: STG-Within one week, patient will recall transfer sequencing with 80% with min A.       Dates: Start:  01/19/24       Description:             Problem: Speech/Swallowing LTGs       Dates: Start:  01/06/24         Goal: LTG-By discharge, patient will solve basic problems related to safety to facilitate safe d/c to a functional living environment.       Dates: Start:  01/06/24         Goal Note filed on 01/18/24 1510 by Josselyn Albert, Student       Patient will need verbal cues for safety planning and problem solving in discharge environment.   She will need assist with medication, financial, and health care management.

## 2024-01-20 NOTE — PROGRESS NOTES
NURSING DAILY NOTE    Name: Debra Rodrigues   Date of Admission: 1/5/2024   Admitting Diagnosis: Pneumonia due to infectious organism  Attending Physician: Erlinda Yao D.o.  Allergies: Other misc, Hydrocodone-acetaminophen, and Sulfa drugs    Safety  Patient Assist  Mod A  Patient Precautions  Fall Risk  Precaution Comments  Positive for orthostatic hypotension  Bed Transfer Status  Moderate Assist (bed slight elevated)  Toilet Transfer Status   Maximal Assist  Assistive Devices  Rails  Oxygen  None - Room Air  Diet/Therapeutic Dining  Current Diet Order   Procedures    Diet Order Diet: Level 6 - Soft and Bite Sized; Liquid level: Level 0 - Thin     Pill Administration  whole, floated, and one at a time   Agitated Behavioral Scale  15  ABS Level of Severity  No Agitation    Fall Risk  Has the patient had a fall this admission?   No  Rubina Echavarria Fall Risk Scoring  21, HIGH RISK  Fall Risk Safety Measures  bed alarm and chair alarm    Vitals  Temperature: 36.4 °C (97.5 °F)  Temp src: Oral  Pulse: 66  Respiration: 20  Blood Pressure : 139/60  Blood Pressure MAP (Calculated): 86 MM HG  BP Location: Right, Upper Arm  Patient BP Position: Sitting     Oxygen  Pulse Oximetry: 91 %  O2 (LPM): 0  O2 Delivery Device: None - Room Air    Bowel and Bladder  Last Bowel Movement  01/19/24  Stool Type  Type 7: Watery, no solid pieces-entirely liquid  Bowel Device  Bathroom, Diaper  Continent  Bladder: Non-stress incontinence   Bowel: Continent movement  Bladder Function  Urine Void (mL):  (moderate)  Number of Times Voided: 1  Urine Color: Yellow  Urine Clarity: Unable to Evaluate  Number of Times Incontinent of Urine: 1  Wet Diaper Count: 1  Genitourinary Assessment   Bladder Assessment (WDL):  WDL Except  Cortes Catheter: Not Applicable  Urinary Elimination: Incontinence  Urine Color: Yellow  Urine Clarity: Unable to Evaluate  Number of Bladder Accidents: 1  Total  Number of Bladder of Accidents in Last 7 Days: 4  Number of Times Incontinent of Urine: 1  Bladder Device: Diaper, Bathroom  Time Void: No  Bladder Scan: Post Void  $ Bladder Scan Results (mL): 1 (0 scan)    Skin  Larry Score   17  Sensory Interventions   Bed Types: Standard/Trauma Mattress  Skin Preventative Measures: Pillows in Use for Support / Positioning  Moisture Interventions  Moisturizers/Barriers: Barrier Paste      Pain  Pain Rating Scale  0 - No Pain (sleeping)  Pain Location  Neck  Pain Location Orientation  Posterior  Pain Interventions   Medication (see MAR), Repositioned, Rest    ADLs    Bathing   Partial Bed Bath  Linen Change   Partial  Personal Hygiene  Perineal Care  Chlorhexidine Bath      Oral Care  Brushed Teeth  Teeth/Dentures     Shave     Nutrition Percentage Eaten  Snack, Between 50-75% Consumed  Environmental Precautions  Treaded Slipper Socks on Patient, Report Given to Other Health Care Providers Regarding Fall Risk, Bed in Low Position  Patient Turns/Positioning  Patient Turns Self from Side to Side  Patient Turns Assistance/Tolerance  Tolerates Well  Bed Positions  Bed Controls On, Bed Locked  Head of Bed Elevated  Self regulated      Psychosocial/Neurologic Assessment  Psychosocial Assessment  Psychosocial (WDL):  WDL Except (Hx of MDD)  Patient Behaviors: Fatigue  Neurologic Assessment  Neuro (WDL): Exceptions to WDL  Level of Consciousness: Alert  Orientation Level: Oriented X4  Cognition: Follows commands  Speech: Clear  Pupil Assesment: No  Motor Function/Sensation Assessment: Sensation  RUE Sensation: Full sensation  Muscle Strength Right Arm: Good Strength Against Gravity and Moderate Resistance  LUE Sensation: Full sensation  Muscle Strength Left Arm: Fair Strength against Gravity but No Resistance  RLE Sensation: Numbness, Tingling  Muscle Strength Right Leg: Fair Strength against Gravity but No Resistance  LLE Sensation: Numbness, Tingling  Muscle Strength Left Leg: Fair  Strength against Gravity but No Resistance  Neuro Additional Assessments: [place] Coma Scale  EENT (WDL):  WDL Except    Cardio/Pulmonary Assessment  Edema   RLE Edema: Generalized  LLE Edema: Generalized  Respiratory Breath Sounds  RUL Breath Sounds: Clear  RML Breath Sounds: Clear  RLL Breath Sounds: Diminished  ANGÉLICA Breath Sounds: Clear  LLL Breath Sounds: Diminished  Cardiac Assessment   Cardiac (WDL):  WDL Except (Hx of HTN, AFib)

## 2024-01-20 NOTE — PROGRESS NOTES
NURSING DAILY NOTE    Name: Debra Rodrigues   Date of Admission: 1/5/2024   Admitting Diagnosis: Pneumonia due to infectious organism  Attending Physician: Erlinda Yao D.o.  Allergies: Other misc, Hydrocodone-acetaminophen, and Sulfa drugs    Safety  Patient Assist  Mod A  Patient Precautions  Fall Risk  Precaution Comments  Positive for orthostatic hypotension  Bed Transfer Status  Moderate Assist (bed slight elevated)  Toilet Transfer Status   Maximal Assist  Assistive Devices  Wheelchair, Rails  Oxygen  None - Room Air  Diet/Therapeutic Dining  Current Diet Order   Procedures    Diet Order Diet: Level 6 - Soft and Bite Sized; Liquid level: Level 0 - Thin     Pill Administration  whole and floated  Agitated Behavioral Scale  15  ABS Level of Severity  No Agitation    Fall Risk  Has the patient had a fall this admission?   No  Rubina Echavarria Fall Risk Scoring  21, HIGH RISK  Fall Risk Safety Measures  bed alarm and chair alarm    Vitals  Temperature: 36.4 °C (97.5 °F)  Temp src: Oral  Pulse: 65  Respiration: 20  Blood Pressure : 95/62  Blood Pressure MAP (Calculated): 73 MM HG  BP Location: Right, Upper Arm  Patient BP Position: Sitting     Oxygen  Pulse Oximetry: 91 %  O2 (LPM): 0  O2 Delivery Device: None - Room Air    Bowel and Bladder  Last Bowel Movement  01/19/24  Stool Type  Type 5: Soft blob with clear cut edges (passed easily)  Bowel Device  Bathroom, Diaper  Continent  Bladder: Non-stress incontinence   Bowel: Continent movement  Bladder Function  Urine Void (mL):  (moderate)  Number of Times Voided: 1  Urine Color: Yellow  Urine Clarity: Unable to Evaluate  Number of Times Incontinent of Urine: 1  Wet Diaper Count: 1  Genitourinary Assessment   Bladder Assessment (WDL):  WDL Except  Cortes Catheter: Not Applicable  Urinary Elimination: Incontinence  Urine Color: Yellow  Urine Clarity: Unable to Evaluate  Number of Bladder Accidents: 1  Total  Number of Bladder of Accidents in Last 7 Days: 4  Number of Times Incontinent of Urine: 1  Bladder Device: Diaper, Bathroom  Time Void: No  Bladder Scan: Post Void  $ Bladder Scan Results (mL): 1 (0 scan)    Skin  Larry Score   17  Sensory Interventions   Bed Types: Standard/Trauma Mattress  Skin Preventative Measures: Heel Float Boots in Use , Heel Protectors in Use   Moisture Interventions  Moisturizers/Barriers: Barrier Paste      Pain  Pain Rating Scale  4 - Distracts me, can do usual activities  Pain Location  Foot  Pain Location Orientation  Mid  Pain Interventions   Repositioned    ADLs    Bathing    (shower done on Day shift)  Linen Change   Partial  Personal Hygiene  Moist Mamie Wipes, Perineal Care  Chlorhexidine Bath      Oral Care  Brushed Teeth  Teeth/Dentures     Shave     Nutrition Percentage Eaten  *  * Meal *  *, Dinner, 0% Consumed, Refused  Environmental Precautions  Treaded Slipper Socks on Patient, Report Given to Other Health Care Providers Regarding Fall Risk, Bed in Low Position  Patient Turns/Positioning  Patient Turns Self from Side to Side  Patient Turns Assistance/Tolerance  Assistance of One  Bed Positions  Bed Controls On, Bed Locked  Head of Bed Elevated  Self regulated      Psychosocial/Neurologic Assessment  Psychosocial Assessment  Psychosocial (WDL):  WDL Except (Hx of MDD)  Patient Behaviors: Fatigue  Neurologic Assessment  Neuro (WDL): Exceptions to WDL  Level of Consciousness: Alert  Orientation Level: Oriented X4  Cognition: Follows commands  Speech: Clear  Pupil Assesment: No  Motor Function/Sensation Assessment: Sensation  RUE Sensation: Full sensation  Muscle Strength Right Arm: Good Strength Against Gravity and Moderate Resistance  LUE Sensation: Full sensation  Muscle Strength Left Arm: Fair Strength against Gravity but No Resistance  RLE Sensation: Numbness, Tingling  Muscle Strength Right Leg: Fair Strength against Gravity but No Resistance  LLE Sensation: Numbness,  Tingling  Muscle Strength Left Leg: Fair Strength against Gravity but No Resistance  Neuro Additional Assessments: [place] Coma Scale  EENT (WDL):  WDL Except    Cardio/Pulmonary Assessment  Edema   RLE Edema: Generalized  LLE Edema: Generalized  Respiratory Breath Sounds  RUL Breath Sounds: Clear  RML Breath Sounds: Clear  RLL Breath Sounds: Diminished  ANGÉLICA Breath Sounds: Clear  LLL Breath Sounds: Diminished  Cardiac Assessment   Cardiac (WDL):  WDL Except (Hx of HTN, AFib)

## 2024-01-20 NOTE — CARE PLAN
"  Problem: Fall Risk - Rehab  Goal: Patient will remain free from falls  Note: Rubina Echavarria Fall risk Assessment Score: 21    High fall risk Interventions   - Alarming seatbelt  - Wander guard  - Bed and strip alarm   - Yellow sign by the door   - Yellow wrist band \"Fall risk\"  - Room near to the nurse station  - Do not leave patient unattended in the bathroom  - Fall risk education provided      Problem: Skin Integrity  Goal: Patient's skin integrity will be maintained or improve  Note: Russo boots on to bilateral heels.     Problem: Pain - Standard  Goal: Alleviation of pain or a reduction in pain to the patient’s comfort goal  Note: Educate patient of non-pharmacological comfort measures: repositioning, relaxation/breathing technique, cold compress and activities.         The patient is Watcher - Medium risk of patient condition declining or worsening    Shift Goals  Clinical Goals: Safety  Patient Goals: Sleep, safety      "

## 2024-01-20 NOTE — PROGRESS NOTES
Physical Medicine & Rehabilitation Progress Note  Encounter Date: 1/20/2024    Chief Complaint:   Pneumonia    Interval Events (Subjective):  No acute events overnight    Objective:  VITAL SIGNS: VITAL SIGNS:   Vitals:    01/20/24 0521 01/20/24 1045 01/20/24 1400 01/20/24 1454   BP: 136/68 100/54 99/62 99/62   Pulse: 65 65 65 65   Resp: 18 18     Temp: 36.3 °C (97.3 °F) 36.6 °C (97.9 °F)     TempSrc: Oral Oral     SpO2: 90% 96%     Weight:       Height:             Gen: NAD  Psych: Mood and affect appropriate  CV: RRR,    Resp: CTAB, no upper airway sounds  Abd: NTND  Neuro: Alert and oriented x 3    Laboratory Values:  Recent Results (from the past 72 hour(s))   CBC WITH DIFFERENTIAL    Collection Time: 01/18/24  5:46 AM   Result Value Ref Range    WBC 7.6 4.8 - 10.8 K/uL    RBC 4.82 4.20 - 5.40 M/uL    Hemoglobin 14.4 12.0 - 16.0 g/dL    Hematocrit 45.6 37.0 - 47.0 %    MCV 94.6 81.4 - 97.8 fL    MCH 29.9 27.0 - 33.0 pg    MCHC 31.6 (L) 32.2 - 35.5 g/dL    RDW 50.2 (H) 35.9 - 50.0 fL    Platelet Count 259 164 - 446 K/uL    MPV 11.0 9.0 - 12.9 fL    Neutrophils-Polys 54.30 44.00 - 72.00 %    Lymphocytes 25.70 22.00 - 41.00 %    Monocytes 12.80 0.00 - 13.40 %    Eosinophils 5.10 0.00 - 6.90 %    Basophils 1.40 0.00 - 1.80 %    Immature Granulocytes 0.70 0.00 - 0.90 %    Nucleated RBC 0.00 0.00 - 0.20 /100 WBC    Neutrophils (Absolute) 4.12 1.82 - 7.42 K/uL    Lymphs (Absolute) 1.95 1.00 - 4.80 K/uL    Monos (Absolute) 0.97 (H) 0.00 - 0.85 K/uL    Eos (Absolute) 0.39 0.00 - 0.51 K/uL    Baso (Absolute) 0.11 0.00 - 0.12 K/uL    Immature Granulocytes (abs) 0.05 0.00 - 0.11 K/uL    NRBC (Absolute) 0.00 K/uL   Basic Metabolic Panel    Collection Time: 01/18/24  5:46 AM   Result Value Ref Range    Sodium 137 135 - 145 mmol/L    Potassium 4.4 3.6 - 5.5 mmol/L    Chloride 105 96 - 112 mmol/L    Co2 21 20 - 33 mmol/L    Glucose 94 65 - 99 mg/dL    Bun 15 8 - 22 mg/dL    Creatinine 0.74 0.50 - 1.40 mg/dL    Calcium 8.7 8.5  - 10.5 mg/dL    Anion Gap 11.0 7.0 - 16.0   Quantiferon Gold Plus TB (4 tube)    Collection Time: 01/18/24  5:46 AM   Result Value Ref Range    QFT NIL 0.06 IU/mL    QFT TB1 - NIL -0.01 <=0.34 IU/mL    QFT TB2 - NIL -0.01 <=0.34 IU/mL    QFT Mitogen - NIL 3.09 IU/mL    QFT Gold Plus Negative Negative   ESTIMATED GFR    Collection Time: 01/18/24  5:46 AM   Result Value Ref Range    GFR (CKD-EPI) 77 >60 mL/min/1.73 m 2       Medications:  Scheduled Medications   Medication Dose Frequency    hydrALAZINE  25 mg Q8HRS    amLODIPine  10 mg Q DAY    losartan  100 mg Q12HRS    metoprolol SR  12.5 mg DAILY    Pharmacy Consult Request  1 Each PHARMACY TO DOSE    apixaban  5 mg BID    famotidine  20 mg Q12HRS    flecainide  100 mg DAILY    traZODone  50 mg QHS    PARoxetine  10 mg DAILY    levothyroxine  75 mcg DAILY    gabapentin  100 mg Q EVENING    vitamin D3  1,000 Units DAILY     PRN medications: senna-docusate **AND** polyethylene glycol/lytes **AND** magnesium hydroxide **AND** bisacodyl, Respiratory Therapy Consult, hydrALAZINE, ondansetron **OR** ondansetron, acetaminophen    Bowel:  Last Documented BM Date:Last BM: 01/20/24  Last Documented BM Description: Stool Description: Small;Brown    Bladder:  Last Documented Urine Void (mL):    Last Documented Bladder Scan:    Last Documented Bladder Scan Results (mL):      Physical Therapy:  Bed Mobility    Transfers        Mobility     Stairs    Barriers to Discharge Home:      Occupational Therapy:  Grooming     Bathing     UB Dressing     LB Dressing     Toileting     Shower & Transfer     Barriers to Discharge Home:    Diet:  Current Diet Order   Procedures    Diet Order Diet: Level 6 - Soft and Bite Sized; Liquid level: Level 0 - Thin     Medical Decision Making and Plan:  Community-acquired pneumonia  Continue comprehensive inpatient rehabilitation  Completed Augmentin and doxycycline    Cardiac.  Labs reviewed.  Vitals reviewed.  History of stroke, atrial fibrillation,  hypertension pacemaker.  Continue Eliquis, flecainide, hydralazine, amlodipine    Pain  Continue gabapentin    DVT prophylaxis on Eliquis    GI prophylaxis on Prilosec    I, Luis Gonzalez M.D., personally performed a complete drug regimen review and no potential clinically significant medication issues were identified.  _____________________________________    Luis Gonzalez MD  [facility] - Physical Medicine & Rehabilitation     _____________________________________

## 2024-01-20 NOTE — CARE PLAN
"The patient is Watcher - Medium risk of patient condition declining or worsening    Shift Goals  Clinical Goals: Safety  Patient Goals: Safety, rest      Problem: Knowledge Deficit - Standard  Goal: Patient and family/care givers will demonstrate understanding of plan of care, disease process/condition, diagnostic tests and medications  Outcome: Progressing    Patient educated and states understanding of POC and disease process.  All questions answered at this time.     Problem: Fall Risk - Rehab  Goal: Patient will remain free from falls  Outcome: Progressing     Rubina Echavarria Fall risk Assessment Score: 21    High fall risk Interventions   - Alarming seatbelt  - Wander guard  - Bed and strip alarm   - Yellow sign by the door   - Yellow wrist band \"Fall risk\"  - Room near to the nurse station  - Do not leave patient unattended in the bathroom  - Fall risk education provided  "

## 2024-01-21 ENCOUNTER — APPOINTMENT (OUTPATIENT)
Dept: OCCUPATIONAL THERAPY | Facility: REHABILITATION | Age: 89
DRG: 194 | End: 2024-01-21
Attending: PHYSICAL MEDICINE & REHABILITATION
Payer: MEDICARE

## 2024-01-21 ENCOUNTER — APPOINTMENT (OUTPATIENT)
Dept: PHYSICAL THERAPY | Facility: REHABILITATION | Age: 89
DRG: 194 | End: 2024-01-21
Attending: PHYSICAL MEDICINE & REHABILITATION
Payer: MEDICARE

## 2024-01-21 ENCOUNTER — APPOINTMENT (OUTPATIENT)
Dept: SPEECH THERAPY | Facility: REHABILITATION | Age: 89
DRG: 194 | End: 2024-01-21
Attending: PHYSICAL MEDICINE & REHABILITATION
Payer: MEDICARE

## 2024-01-21 PROCEDURE — 97130 THER IVNTJ EA ADDL 15 MIN: CPT

## 2024-01-21 PROCEDURE — 770010 HCHG ROOM/CARE - REHAB SEMI PRIVAT*

## 2024-01-21 PROCEDURE — 700102 HCHG RX REV CODE 250 W/ 637 OVERRIDE(OP): Performed by: HOSPITALIST

## 2024-01-21 PROCEDURE — A9270 NON-COVERED ITEM OR SERVICE: HCPCS | Performed by: HOSPITALIST

## 2024-01-21 PROCEDURE — 97116 GAIT TRAINING THERAPY: CPT | Mod: CQ

## 2024-01-21 PROCEDURE — 97530 THERAPEUTIC ACTIVITIES: CPT | Mod: CQ

## 2024-01-21 PROCEDURE — A9270 NON-COVERED ITEM OR SERVICE: HCPCS | Performed by: PHYSICAL MEDICINE & REHABILITATION

## 2024-01-21 PROCEDURE — 97129 THER IVNTJ 1ST 15 MIN: CPT

## 2024-01-21 PROCEDURE — 97535 SELF CARE MNGMENT TRAINING: CPT

## 2024-01-21 PROCEDURE — 700102 HCHG RX REV CODE 250 W/ 637 OVERRIDE(OP): Performed by: PHYSICAL MEDICINE & REHABILITATION

## 2024-01-21 PROCEDURE — 99232 SBSQ HOSP IP/OBS MODERATE 35: CPT | Performed by: PHYSICAL MEDICINE & REHABILITATION

## 2024-01-21 RX ADMIN — LEVOTHYROXINE SODIUM 75 MCG: 0.07 TABLET ORAL at 09:32

## 2024-01-21 RX ADMIN — APIXABAN 5 MG: 5 TABLET, FILM COATED ORAL at 20:26

## 2024-01-21 RX ADMIN — LOSARTAN POTASSIUM 100 MG: 50 TABLET, FILM COATED ORAL at 05:31

## 2024-01-21 RX ADMIN — HYDRALAZINE HYDROCHLORIDE 25 MG: 25 TABLET ORAL at 14:41

## 2024-01-21 RX ADMIN — GABAPENTIN 100 MG: 100 CAPSULE ORAL at 20:27

## 2024-01-21 RX ADMIN — TRAZODONE HYDROCHLORIDE 50 MG: 50 TABLET ORAL at 20:26

## 2024-01-21 RX ADMIN — HYDRALAZINE HYDROCHLORIDE 25 MG: 25 TABLET ORAL at 20:27

## 2024-01-21 RX ADMIN — LOSARTAN POTASSIUM 100 MG: 50 TABLET, FILM COATED ORAL at 17:22

## 2024-01-21 RX ADMIN — FAMOTIDINE 20 MG: 20 TABLET ORAL at 09:31

## 2024-01-21 RX ADMIN — PAROXETINE HYDROCHLORIDE 10 MG: 20 TABLET, FILM COATED ORAL at 09:31

## 2024-01-21 RX ADMIN — APIXABAN 5 MG: 5 TABLET, FILM COATED ORAL at 09:32

## 2024-01-21 RX ADMIN — FAMOTIDINE 20 MG: 20 TABLET ORAL at 20:26

## 2024-01-21 RX ADMIN — FLECAINIDE ACETATE 100 MG: 100 TABLET ORAL at 09:32

## 2024-01-21 RX ADMIN — METOPROLOL SUCCINATE 12.5 MG: 25 TABLET, EXTENDED RELEASE ORAL at 05:32

## 2024-01-21 RX ADMIN — Medication 1000 UNITS: at 09:32

## 2024-01-21 RX ADMIN — ACETAMINOPHEN 650 MG: 325 TABLET ORAL at 02:48

## 2024-01-21 RX ADMIN — AMLODIPINE BESYLATE 10 MG: 5 TABLET ORAL at 05:31

## 2024-01-21 RX ADMIN — HYDRALAZINE HYDROCHLORIDE 25 MG: 25 TABLET ORAL at 05:32

## 2024-01-21 ASSESSMENT — GAIT ASSESSMENTS
DISTANCE (FEET): 12
DEVIATION: ANTALGIC;STEP TO;DECREASED BASE OF SUPPORT;BRADYKINETIC;DECREASED HEEL STRIKE;DECREASED TOE OFF
ASSISTIVE DEVICE: FRONT WHEEL WALKER
GAIT LEVEL OF ASSIST: MINIMAL ASSIST

## 2024-01-21 ASSESSMENT — BRIEF INTERVIEW FOR MENTAL STATUS (BIMS)
INITIAL REPETITION OF BED BLUE SOCK - FIRST ATTEMPT: 3
ASKED TO RECALL BLUE: YES, NO CUE REQUIRED
ASKED TO RECALL SOCK: YES, NO CUE REQUIRED
BIMS SUMMARY SCORE: 14
WHAT MONTH IS IT: ACCURATE WITHIN 5 DAYS
WHAT DAY OF THE WEEK IS IT: INCORRECT
BIMS SUMMARY SCORE: 13
ASKED TO RECALL BED: YES, NO CUE REQUIRED
ASKED TO RECALL SOCK: YES, NO CUE REQUIRED
INITIAL REPETITION OF BED BLUE SOCK - FIRST ATTEMPT: 3
WHAT YEAR IS IT: CORRECT
WHAT MONTH IS IT: MISSED BY 6 DAYS TO 1 MONTH
WHAT YEAR IS IT: CORRECT
ASKED TO RECALL BLUE: YES, NO CUE REQUIRED
WHAT DAY OF THE WEEK IS IT: INCORRECT
ASKED TO RECALL BED: YES, NO CUE REQUIRED

## 2024-01-21 ASSESSMENT — ACTIVITIES OF DAILY LIVING (ADL)
BED_CHAIR_WHEELCHAIR_TRANSFER_DESCRIPTION: ADAPTIVE EQUIPMENT;INCREASED TIME;SUPERVISION FOR SAFETY;VERBAL CUEING
TOILETING_LEVEL_OF_ASSIST: REQUIRES PHYSICAL ASSIST WITH TOILETING
SHOWER_TRANSFER_LEVEL_OF_ASSIST: REQUIRES PHYSICAL ASSIST WITH SHOWER TRANSFER
TOILET_TRANSFER_LEVEL_OF_ASSIST: REQUIRES PHYSICAL ASSIST WITH TOILET TRANSFER

## 2024-01-21 ASSESSMENT — PAIN DESCRIPTION - PAIN TYPE: TYPE: ACUTE PAIN

## 2024-01-21 NOTE — PROGRESS NOTES
Physical Medicine & Rehabilitation Progress Note  Encounter Date: 1/21/2024    Chief Complaint:   Pneumonia    Interval Events (Subjective):  No acute events overnight.  Patient reports sleeping well.  She is happy with therapy.    Objective:  VITAL SIGNS: VITAL SIGNS:   Vitals:    01/20/24 2013 01/20/24 2128 01/21/24 0335 01/21/24 0457   BP: 116/68 120/70 125/69 (!) 155/70   Pulse: 65  65 65   Resp:   18 20   Temp:   36.8 °C (98.2 °F) 36.4 °C (97.6 °F)   TempSrc:   Temporal Oral   SpO2:       Weight:       Height:             Gen: NAD  Psych: Mood and affect appropriate  CV: RRR,    Resp: CTAB, no upper airway sounds  Abd: NTND  Neuro: Alert and oriented x 3    Laboratory Values:  No results found for this or any previous visit (from the past 72 hour(s)).      Medications:  Scheduled Medications   Medication Dose Frequency    hydrALAZINE  25 mg Q8HRS    amLODIPine  10 mg Q DAY    losartan  100 mg Q12HRS    metoprolol SR  12.5 mg DAILY    Pharmacy Consult Request  1 Each PHARMACY TO DOSE    apixaban  5 mg BID    famotidine  20 mg Q12HRS    flecainide  100 mg DAILY    traZODone  50 mg QHS    PARoxetine  10 mg DAILY    levothyroxine  75 mcg DAILY    gabapentin  100 mg Q EVENING    vitamin D3  1,000 Units DAILY     PRN medications: senna-docusate **AND** polyethylene glycol/lytes **AND** magnesium hydroxide **AND** bisacodyl, Respiratory Therapy Consult, hydrALAZINE, ondansetron **OR** ondansetron, acetaminophen    Bowel:  Last Documented BM Date:Last BM: 01/21/24  Last Documented BM Description: Stool Description: Large;Soft    Bladder:  Last Documented Urine Void (mL):    Last Documented Bladder Scan:    Last Documented Bladder Scan Results (mL):      Physical Therapy:  Bed Mobility    Transfers        Mobility     Stairs    Barriers to Discharge Home:      Occupational Therapy:  Grooming     Bathing     UB Dressing     LB Dressing     Toileting     Shower & Transfer     Barriers to Discharge  Home:    Diet:  Current Diet Order   Procedures    Diet Order Diet: Level 6 - Soft and Bite Sized; Liquid level: Level 0 - Thin     Medical Decision Making and Plan:  Community-acquired pneumonia.  Labs reviewed.  Vitals reviewed.  Continue comprehensive inpatient rehabilitation  Completed Augmentin and doxycycline    Cardiac.     History of stroke, atrial fibrillation, hypertension pacemaker.  Continue Eliquis, flecainide, hydralazine, amlodipine    Pain  Stable.  Continue gabapentin       DVT prophylaxis on Eliquis    GI prophylaxis on Prilosec    I, Luis Gonzalez M.D., personally performed a complete drug regimen review and no potential clinically significant medication issues were identified.  _____________________________________    Luis Gonzalez MD  [facility] - Physical Medicine & Rehabilitation     _____________________________________

## 2024-01-21 NOTE — THERAPY
"Speech Language Pathology  Daily Treatment     Patient Name: Debra Rodrigues  Age:  88 y.o., Sex:  female  Medical Record #: [id number]  Today's Date: 1/21/2024     Precautions  Precautions: Fall Risk  Comments: Positive for orthostatic hypotension    Subjective    Pt received sitting up at bedside in wheelchair. Pt with breakfast tray in front of her, but refused further intake.     In the middle of session, pt disclosed feeling upset about having to leave here. Upon further inquiry. Pt reported feeling concern regarding her level of support at the group home and unsure of whether they are set up with accommodations.      Objective       01/21/24 1031   Treatment Charges   SLP Cognitive Skill Development First 15 Minutes 1   SLP Cognitive Skill Development Additional 15 Minutes 3   SLP Total Time Spent   SLP Individual Total Time Spent (Mins) 60   Cognition   Functional Problem Solving Moderate (3)   Functional Sequencing for ADL / IADLs Moderate (3)   Interdisciplinary Plan of Care Collaboration   Patient Position at End of Therapy Seated;Chair Alarm On;Call Light within Reach;Tray Table within Reach;Phone within Reach         Assessment    Pt able to provide safe transfer sequencing steps given min-mod verbal cues. Transfer included from bed to wheelchair, from bed to walker, from wheelchair to bed, and wheelchair to toilet. However, when practicing/talking SLP through the step to transger from wheelchair to bed in room (fxl acting out task), pt required mod-max A and frequently reported \"I don't know.\"    Strengths: Alert and oriented, Effective communication skills, Supportive family, Willingly participates in therapeutic activities  Barriers: Generalized weakness, Impaired carryover of learning, Impaired functional cognition, Visual impairment (Low initiation and slow to process.)    Plan    Recall of functional transfer strategies.     Passport items to be completed:  Express basic needs, understand " food/liquid recommendations, consistently follow swallow precautions, manage finances, manage medications, arrive to therapy appointments on time, complete daily memory log entries, solve problems related to safety situations, review education related to hospitalization, complete caregiver training     Speech Therapy Problems (Active)       Problem: Memory STGs       Dates: Start:  01/08/24         Goal: STG-Within one week, patient will recall transfer sequencing with 80% with min A.       Dates: Start:  01/19/24       Description:             Problem: Speech/Swallowing LTGs       Dates: Start:  01/06/24         Goal: LTG-By discharge, patient will solve basic problems related to safety to facilitate safe d/c to a functional living environment.       Dates: Start:  01/06/24         Goal Note filed on 01/18/24 1510 by Josselyn Albert, Student       Patient will need verbal cues for safety planning and problem solving in discharge environment.   She will need assist with medication, financial, and health care management.

## 2024-01-21 NOTE — CARE PLAN
The patient is Watcher - Medium risk of patient condition declining or worsening    Shift Goals  Clinical Goals: Safety  Patient Goals: Safety, rest      Problem: Mobility  Goal: Patient's capacity to carry out activities will improve  Outcome: Progressing    Patient transfers with moderate to maximum assistance.  She tires easily.     Problem: VTE Prevention  Goal: Patient will remain free from venous thromboembolism (VTE)  Outcome: Progressing    Patient receiving Eliquis for a-fib.  No s/s of DVTs noted.     Problem: Psychosocial  Goal: Patient's ability to identify and develop effective coping behaviors will improve  Outcome: Progressing     Patient appears calm without complaints of distress or anxiety.

## 2024-01-21 NOTE — PROGRESS NOTES
Patient is asking for stronger medication for pain relief - states she has had good results with tramadol in the past - will report to days to discuss with MD.

## 2024-01-21 NOTE — THERAPY
Physical Therapy   Daily Treatment     Patient Name: Debra Rodrigues  Age:  88 y.o., Sex:  female  Medical Record #: [id number]  Today's Date: 1/21/2024     Precautions  Precautions: Fall Risk  Comments: Positive for orthostatic hypotension    Subjective    Agreeable to PT     Objective       01/21/24 1331   PT Charge Group   PT Gait Training (Units) 1   PT Therapeutic Activities (Units) 3   Supervising Physical Therapist Roxann Echavarria   PT Total Time Spent   PT Individual Total Time Spent (Mins) 60   Gait Functional Level of Assist    Gait Level Of Assist Minimal Assist   Assistive Device Front Wheel Walker   Distance (Feet) 12   # of Times Distance was Traveled 1   Deviation Antalgic;Step To;Decreased Base Of Support;Bradykinetic;Decreased Heel Strike;Decreased Toe Off   Wheelchair Functional Level of Assist   Wheelchair Assist Stand by Assist   Distance Wheelchair (Feet or Distance) 10x2   Wheelchair Description Extra time;Leg rest management;Impaired coordination;Limited by fatigue;Supervision for safety;Verbal cueing   Transfer Functional Level of Assist   Bed, Chair, Wheelchair Transfer Minimal Assist  (inc assist with fatigue)   Bed Chair Wheelchair Transfer Description Adaptive equipment;Increased time;Supervision for safety;Verbal cueing  (SPT FWW)   Bed Mobility    Supine to Sit Maximal Assist   Sit to Supine Maximal Assist   Sit to Stand Minimal Assist   Scooting Contact Guard Assist   Rolling Moderate Assist to Rt.;Moderate Assist to Lt.  (flat therapy mat, no rails)   Skilled Intervention Facilitation;Sequencing;Tactile Cuing;Verbal Cuing   Interdisciplinary Plan of Care Collaboration   IDT Collaboration with  Family / Caregiver;Therapy Tech   Patient Position at End of Therapy In Bed;Call Light within Reach;Tray Table within Reach   Collaboration Comments telephone call to Maurice Henry to set up car transfer day of DC FT @ 1300. Therapy tech assisted, not needed for mobility, scheduling   Roll Left and  Right   Assistance Needed Physical assistance   Physical Assistance Level 26%-50%   CARE Score - Roll Left and Right 3   Sit to Lying   Assistance Needed Physical assistance   Physical Assistance Level 51%-75%   CARE Score - Sit to Lying 2   Lying to Sitting on Side of Bed   Assistance Needed Physical assistance   Physical Assistance Level 51%-75%   CARE Score - Lying to Sitting on Side of Bed 2   Sit to Stand   Assistance Needed Adaptive equipment;Physical assistance   Physical Assistance Level 25% or less   CARE Score - Sit to Stand 3   Chair/Bed-to-Chair Transfer   Assistance Needed Adaptive equipment;Physical assistance   Physical Assistance Level 25% or less   CARE Score - Chair/Bed-to-Chair Transfer 3   Walk 10 Feet   Assistance Needed Adaptive equipment;Incidental touching   Physical Assistance Level No physical assistance   CARE Score - Walk 10 Feet 4   Walk 50 Feet with Two Turns   Reason if not Attempted Activity not applicable   CARE Score - Walk 50 Feet with Two Turns 9   Walk 150 Feet   Reason if not Attempted Activity not applicable   CARE Score - Walk 150 Feet 9   Walking 10 Feet on Uneven Surfaces   Reason if not Attempted Activity not applicable   CARE Score - Walking 10 Feet on Uneven Surfaces 9   1 Step (Curb)   Reason if not Attempted Activity not applicable   CARE Score - 1 Step (Curb) 9   4 Steps   Reason if not Attempted Activity not applicable   CARE Score - 4 Steps 9   12 Steps   Reason if not Attempted Activity not applicable   CARE Score - 12 Steps 9   Picking Up Object   Reason if not Attempted Safety concerns   CARE Score - Picking Up Object 88   Wheel 50 Feet with Two Turns   Assistance Needed Physical assistance   Physical Assistance Level 76% or more   CARE Score - Wheel 50 Feet with Two Turns 2   Wheel 150 Feet   Reason if not Attempted Activity not applicable   CARE Score - Wheel 150 Feet 9         Assessment    Self limits wc mobility due to UE fatigue  Strengths: Able to follow  instructions, Manages pain appropriately, Motivated for self care and independence, Pleasant and cooperative, Willingly participates in therapeutic activities  Barriers: Decreased endurance, Confused, Fatigue, Generalized weakness, Impaired activity tolerance, Impaired balance, Limited mobility    Plan    Car transfer with Carl Richards. Enter [facility]    DME  PT DME Recommendations  Wheelchair:  (TBD)  Assistive Device:  (TBD)    Passport items to be completed:  Get in/out of bed safely, in/out of a vehicle, safely use mobility device, walk or wheel around home/community, navigate up and down stairs, show how to get up/down from the ground, ensure home is accessible, demonstrate HEP, complete caregiver training    Physical Therapy Problems (Active)       There are no active problems.

## 2024-01-21 NOTE — CARE PLAN
Problem: OT Long Term Goals  Goal: LTG-By discharge, patient will complete basic self care tasks at a SUP to Mod A level with AE/AD/DME PRN.   Outcome: Not Met  Goal: LTG-By discharge, patient will perform bathroom transfers at a SUP to Min A level with LRD and DME PRN.   Outcome: Not Met

## 2024-01-21 NOTE — PROGRESS NOTES
NURSING DAILY NOTE    Name: Debra Rodrigues   Date of Admission: 1/5/2024   Admitting Diagnosis: Pneumonia due to infectious organism  Attending Physician: Eugenia Ruiz D.o.  Allergies: Other misc, Hydrocodone-acetaminophen, and Sulfa drugs    Safety  Patient Assist  Max 1  Patient Precautions  Fall Risk  Precaution Comments  Positive for orthostatic hypotension  Bed Transfer Status  Moderate Assist (bed slight elevated)  Toilet Transfer Status   Maximal Assist  Assistive Devices  Rails  Oxygen  None - Room Air  Diet/Therapeutic Dining  Current Diet Order   Procedures    Diet Order Diet: Level 6 - Soft and Bite Sized; Liquid level: Level 0 - Thin     Pill Administration  whole and floated  Agitated Behavioral Scale  15  ABS Level of Severity  No Agitation    Fall Risk  Has the patient had a fall this admission?   No  Rubina Echavarria Fall Risk Scoring  21, HIGH RISK  Fall Risk Safety Measures  bed alarm and chair alarm    Vitals  Temperature: 36.6 °C (97.9 °F)  Temp src: Oral  Pulse: 65  Respiration: 18  Blood Pressure : 99/62  Blood Pressure MAP (Calculated): 74 MM HG  BP Location: Right, Upper Arm  Patient BP Position: Supine     Oxygen  Pulse Oximetry: 96 %  O2 (LPM): 0  O2 Delivery Device: None - Room Air    Bowel and Bladder  Last Bowel Movement  01/20/24  Stool Type  Type 5: Soft blob with clear cut edges (passed easily)  Bowel Device  Bathroom  Continent  Bladder: Non-stress incontinence   Bowel: Continent movement  Bladder Function  Urine Void (mL):  (moderate)  Number of Times Voided: 1  Urine Color: Yellow  Urine Clarity: Unable to Evaluate  Number of Times Incontinent of Urine: 1  Wet Diaper Count: 1  Genitourinary Assessment   Bladder Assessment (WDL):  WDL Except  Cortes Catheter: Not Applicable  Urinary Elimination: Incontinence  Urine Color: Yellow  Urine Clarity: Unable to Evaluate  Number of Bladder Accidents: 1  Total Number of Bladder  of Accidents in Last 7 Days: 4  Number of Times Incontinent of Urine: 1  Bladder Device: Diaper, Bathroom  Time Void: No  Bladder Scan: Post Void  $ Bladder Scan Results (mL): 1 (0 scan)    Skin  Larry Score   17  Sensory Interventions   Bed Types: Standard/Trauma Mattress  Skin Preventative Measures: Pillows in Use for Support / Positioning  Moisture Interventions  Moisturizers/Barriers: Barrier Paste      Pain  Pain Rating Scale  0 - No Pain  Pain Location  Neck  Pain Location Orientation  Posterior  Pain Interventions   Declines    ADLs    Bathing   Partial Bed Bath  Linen Change   Partial  Personal Hygiene  Change Mamie Pads, Moist Mamie Wipes, Perineal Care  Chlorhexidine Bath      Oral Care  Brushed Teeth  Teeth/Dentures     Shave     Nutrition Percentage Eaten  Snack, Between 50-75% Consumed  Environmental Precautions  Treaded Slipper Socks on Patient, Report Given to Other Health Care Providers Regarding Fall Risk, Bed in Low Position  Patient Turns/Positioning  Patient Turns Self from Side to Side  Patient Turns Assistance/Tolerance  Tolerates Well  Bed Positions  Bed Controls On, Bed Locked  Head of Bed Elevated  Self regulated      Psychosocial/Neurologic Assessment  Psychosocial Assessment  Psychosocial (WDL):  WDL Except  Patient Behaviors: Fatigue  Neurologic Assessment  Neuro (WDL): Exceptions to WDL  Level of Consciousness: Alert  Orientation Level: Oriented X4  Cognition: Follows commands  Speech: Clear  Pupil Assesment: No  Motor Function/Sensation Assessment: Sensation  RUE Sensation: Full sensation  Muscle Strength Right Arm: Good Strength Against Gravity and Moderate Resistance  LUE Sensation: Full sensation  Muscle Strength Left Arm: Fair Strength against Gravity but No Resistance  RLE Sensation: Numbness, Tingling  Muscle Strength Right Leg: Fair Strength against Gravity but No Resistance  LLE Sensation: Numbness, Tingling  Muscle Strength Left Leg: Fair Strength against Gravity but No  Resistance  Neuro Additional Assessments: [place] Coma Scale  EENT (WDL):  WDL Except    Cardio/Pulmonary Assessment  Edema   RLE Edema: Generalized  LLE Edema: Generalized  Respiratory Breath Sounds  RUL Breath Sounds: Clear  RML Breath Sounds: Clear  RLL Breath Sounds: Diminished  ANGÉLICA Breath Sounds: Clear  LLL Breath Sounds: Diminished  Cardiac Assessment   Cardiac (WDL):  WDL Except (Hx HTN, A-fib)

## 2024-01-21 NOTE — CARE PLAN
Problem: Fall Risk - Rehab  Goal: Patient will remain free from falls  Outcome: Progressing     Problem: Skin Integrity  Goal: Skin integrity is maintained or improved  Outcome: Progressing   The patient is Stable - Low risk of patient condition declining or worsening    Shift Goals  Clinical Goals: Safety  Patient Goals: pain relief, sleep    Progress made toward(s) clinical / shift goals:  Patent is resting in bed with heel float boots in place - waffle ordered tonight for vickie of 16    Patient is not progressing towards the following goals:

## 2024-01-21 NOTE — THERAPY
Occupational Therapy   Discharge Summary     Patient Name: Debra Rodrigues  Age:  88 y.o., Sex:  female  Medical Record #: [id number]  Today's Date: 1/21/2024     Precautions  Precautions: Fall Risk  Comments: Positive for orthostatic hypotension         Subjective  Pt declined shower, reported that she needs to use the bathroom and change her clothes.      Objective     01/21/24 0831   OT Charge Group   OT Self Care / ADL (Units) 4   OT Total Time Spent   OT Individual Total Time Spent (Mins) 60   Functional Level of Assist   Bed, Chair, Wheelchair Transfer Moderate Assist  (EOB > w/c via FWW)   Interdisciplinary Plan of Care Collaboration   IDT Collaboration with  Therapy [facility]  (tech A for safety with fxl txfrs)   Patient Position at End of Therapy Seated;Chair Alarm On;Self Releasing Lap Belt Applied;Call Light within Reach   Eating   Assistance Needed Independent   Physical Assistance Level No physical assistance   CARE Score - Eating 6   Oral Hygiene   Assistance Needed Set-up / clean-up   Physical Assistance Level No physical assistance   CARE Score - Oral Hygiene 5   Toileting Hygiene   Assistance Needed Physical assistance   Physical Assistance Level Total assistance   CARE Score - Toileting Hygiene 1   Shower/Bathe Self   Assistance Needed Physical assistance   Physical Assistance Level 26%-50%   CARE Score - Shower/Bathe Self 3   Upper Body Dressing   Assistance Needed Physical assistance   Physical Assistance Level 26%-50%   CARE Score - Upper Body Dressing 3   Lower Body Dressing   Assistance Needed Physical assistance   Physical Assistance Level 76% or more   CARE Score - Lower Body Dressing 2   Putting On/Taking Off Footwear   Assistance Needed Physical assistance   Physical Assistance Level Total assistance   CARE Score - Putting On/Taking Off Footwear 1   Toilet Transfer   Assistance Needed Physical assistance   Physical Assistance Level 76% or more   CARE Score - Toilet Transfer 2   Cognitive  "Pattern Assessment   Cognitive Pattern Assessment Used BIMS   Brief Interview for Mental Status (BIMS)   Repetition of Three Words (First Attempt) 3   Temporal Orientation: Year Correct   Temporal Orientation: Month Missed by 6 days to 1 month   Temporal Orientation: Day Incorrect   Recall: \"Sock\" Yes, no cue required   Recall: \"Blue\" Yes, no cue required   Recall: \"Bed\" Yes, no cue required   BIMS Summary Score 13   Confusion Assessment Method (CAM)   Is there evidence of an acute change in mental status from the patient's baseline? No   Inattention Behavior not present   Disorganized thinking Behavior not present   Altered level of consciousness Behavior not present   Discharge Summary    Discharge Location  Group Home   Patient Discharging with Assist of Paid Caregiver   Level of Supervision Required 24 Hour Supervision   Recommended Equipment for Discharge Front-Wheeled Walker;Manual Wheelchair;Shower Chair;Raised Toilet Seat with Arms;3 in 1 Commode;Grab Bars by Toilet;Grab Bars in Tub / Shower;Hand Held Shower Head   Recommended Services Upon Discharge Home Health Occupational Therapy   Long Term Goals Met 0   Long Term Goals Not Met 2   Reason(s) for Goals Not Met Pt family communicated that care facility assisted with all ADLs.   Criteria for Termination of Services Maximum Function Achieved for Inpatient Rehabilitation   Discharge Instructions to Patient   Level of Assist Required for Eating Able to Complete Eating without Assist   Level of Assist Required for Grooming Able to Complete Grooming without Assist   Level of Assist Required for Dressing Requires Physical Assist with Dressing   Equipment for Dressing Reacher   Level of Assist Required for Toileting Requires Physical Assist with Toileting   Level of Assist Required for Toilet Transfer Requires Physical Assist with Toilet Transfer   Equipment for Toilet Transfer Raised Toilet Seat with Arms;Grab Bars by Toilet   Level of Assist Required for Bathing " Requires Supervision with Bathing   Equipment for Bathing Tub Transfer Bench;Grab Bars in Tub / Shower;Hand Held Shower Head;Long Handled Sponge   Level of Assist Required for Shower Transfer Requires Physical Assist with Shower Transfer   Equipment for Shower Transfer Grab Bars in Tub / Shower;Shower Chair   Level of Assist Required for Home Mgmt Requires Physical Assist with Home Management   Level of Assist Required for Meal Prep Requires Physical Assist with Meal Preparation   Driving Please Contact Physician Prior to Driving   Home Exercise Program Refer to Home Exercise Program Handout for Details       Assessment  Pt will benefit from ongoing physical assist with self-care for safety.   Strengths: Alert and oriented, Able to follow instructions, Pleasant and cooperative, Willingly participates in therapeutic activities  Barriers: Decreased endurance, Generalized weakness, Impaired activity tolerance, Impaired balance, Limited mobility, Visual impairment    Plan  Anticipate d/c to home tomorrow    Passport items to be completed:  Perform bathroom transfers, complete dressing, complete feeding, get ready for the day, prepare a simple meal, participate in household tasks, adapt home for safety needs, demonstrate home exercise program, complete caregiver training     Occupational Therapy Goals (Active)       Problem: Dressing       Dates: Start:  01/06/24         Goal: STG-Within one week, patient will dress UB at a Min A level.        Dates: Start:  01/06/24            Goal: STG-Within one week, patient will dress LB at a Mod A level with AE/AD PRN.        Dates: Start:  01/06/24               Problem: Functional Transfers       Dates: Start:  01/06/24         Goal: STG-Within one week, patient will transfer to toilet at a Mod A level with AD/DME PRN.        Dates: Start:  01/06/24               Problem: OT Long Term Goals       Dates: Start:  01/06/24         Goal: LTG-By discharge, patient will complete basic  self care tasks at a SUP to Mod A level with AE/AD/DME PRN.        Dates: Start:  01/06/24            Goal: LTG-By discharge, patient will perform bathroom transfers at a SUP to Min A level with LRD and DME PRN.        Dates: Start:  01/06/24               Problem: Toileting       Dates: Start:  01/06/24         Goal: STG-Within one week, patient will complete toileting tasks at a Mod A level with AE/AD PRN.        Dates: Start:  01/06/24

## 2024-01-22 ENCOUNTER — APPOINTMENT (OUTPATIENT)
Dept: PHYSICAL THERAPY | Facility: REHABILITATION | Age: 89
End: 2024-01-22
Attending: PHYSICAL MEDICINE & REHABILITATION
Payer: MEDICARE

## 2024-01-22 VITALS
SYSTOLIC BLOOD PRESSURE: 131 MMHG | HEIGHT: 66 IN | HEART RATE: 94 BPM | BODY MASS INDEX: 31.9 KG/M2 | RESPIRATION RATE: 18 BRPM | OXYGEN SATURATION: 93 % | DIASTOLIC BLOOD PRESSURE: 52 MMHG | WEIGHT: 198.5 LBS | TEMPERATURE: 98 F

## 2024-01-22 LAB
FLUAV RNA SPEC QL NAA+PROBE: NEGATIVE
FLUBV RNA SPEC QL NAA+PROBE: NEGATIVE
RSV RNA SPEC QL NAA+PROBE: NEGATIVE
SARS-COV-2 RNA RESP QL NAA+PROBE: NOTDETECTED

## 2024-01-22 PROCEDURE — A9270 NON-COVERED ITEM OR SERVICE: HCPCS | Performed by: PHYSICAL MEDICINE & REHABILITATION

## 2024-01-22 PROCEDURE — 0241U HCHG SARS-COV-2 COVID-19 NFCT DS RESP RNA 4 TRGT ED POC: CPT

## 2024-01-22 PROCEDURE — A9270 NON-COVERED ITEM OR SERVICE: HCPCS | Performed by: HOSPITALIST

## 2024-01-22 PROCEDURE — 700102 HCHG RX REV CODE 250 W/ 637 OVERRIDE(OP): Performed by: HOSPITALIST

## 2024-01-22 PROCEDURE — 700102 HCHG RX REV CODE 250 W/ 637 OVERRIDE(OP): Performed by: PHYSICAL MEDICINE & REHABILITATION

## 2024-01-22 PROCEDURE — 99239 HOSP IP/OBS DSCHRG MGMT >30: CPT | Performed by: PHYSICAL MEDICINE & REHABILITATION

## 2024-01-22 PROCEDURE — 97530 THERAPEUTIC ACTIVITIES: CPT | Mod: CQ

## 2024-01-22 RX ORDER — TRAZODONE HYDROCHLORIDE 50 MG/1
50 TABLET ORAL
Qty: 30 TABLET | Refills: 2 | Status: SHIPPED | OUTPATIENT
Start: 2024-01-22

## 2024-01-22 RX ORDER — FAMOTIDINE 20 MG/1
20 TABLET, FILM COATED ORAL 2 TIMES DAILY
Qty: 60 TABLET | Refills: 2 | Status: SHIPPED | OUTPATIENT
Start: 2024-01-22

## 2024-01-22 RX ORDER — FLECAINIDE ACETATE 100 MG/1
100 TABLET ORAL DAILY
Qty: 30 TABLET | Refills: 2 | Status: SHIPPED | OUTPATIENT
Start: 2024-01-22

## 2024-01-22 RX ORDER — LEVOTHYROXINE SODIUM 0.07 MG/1
75 TABLET ORAL DAILY
Qty: 30 TABLET | Refills: 2 | Status: SHIPPED | OUTPATIENT
Start: 2024-01-22

## 2024-01-22 RX ORDER — VITAMIN B COMPLEX
2000 TABLET ORAL DAILY
Qty: 60 TABLET | Refills: 2 | Status: SHIPPED | OUTPATIENT
Start: 2024-01-22

## 2024-01-22 RX ORDER — APIXABAN 5 MG/1
5 TABLET, FILM COATED ORAL 2 TIMES DAILY
Qty: 60 TABLET | Refills: 2 | Status: ACTIVE | OUTPATIENT
Start: 2024-01-22

## 2024-01-22 RX ORDER — PAROXETINE 10 MG/1
10 TABLET, FILM COATED ORAL DAILY
Qty: 30 TABLET | Refills: 2 | Status: SHIPPED | OUTPATIENT
Start: 2024-01-22

## 2024-01-22 RX ORDER — LOSARTAN POTASSIUM 100 MG/1
100 TABLET ORAL EVERY 12 HOURS
Qty: 60 TABLET | Refills: 2 | Status: SHIPPED | OUTPATIENT
Start: 2024-01-22 | End: 2024-02-15

## 2024-01-22 RX ORDER — METOPROLOL SUCCINATE 25 MG/1
12.5 TABLET, EXTENDED RELEASE ORAL DAILY
Qty: 15 TABLET | Refills: 2 | Status: SHIPPED | OUTPATIENT
Start: 2024-01-22 | End: 2024-02-13 | Stop reason: SDUPTHER

## 2024-01-22 RX ORDER — HYDRALAZINE HYDROCHLORIDE 25 MG/1
25 TABLET, FILM COATED ORAL 3 TIMES DAILY
Qty: 90 TABLET | Refills: 2 | Status: SHIPPED | OUTPATIENT
Start: 2024-01-22

## 2024-01-22 RX ORDER — GABAPENTIN 100 MG/1
100 CAPSULE ORAL
Qty: 30 CAPSULE | Refills: 2 | Status: SHIPPED | OUTPATIENT
Start: 2024-01-22

## 2024-01-22 RX ORDER — AMLODIPINE BESYLATE 10 MG/1
10 TABLET ORAL DAILY
Qty: 30 TABLET | Refills: 2 | Status: SHIPPED | OUTPATIENT
Start: 2024-01-23 | End: 2024-02-14 | Stop reason: SDUPTHER

## 2024-01-22 RX ADMIN — HYDRALAZINE HYDROCHLORIDE 25 MG: 25 TABLET ORAL at 05:24

## 2024-01-22 RX ADMIN — LOSARTAN POTASSIUM 100 MG: 50 TABLET, FILM COATED ORAL at 05:23

## 2024-01-22 RX ADMIN — AMLODIPINE BESYLATE 10 MG: 5 TABLET ORAL at 05:23

## 2024-01-22 RX ADMIN — PAROXETINE HYDROCHLORIDE 10 MG: 20 TABLET, FILM COATED ORAL at 08:03

## 2024-01-22 RX ADMIN — LEVOTHYROXINE SODIUM 75 MCG: 0.07 TABLET ORAL at 08:03

## 2024-01-22 RX ADMIN — FLECAINIDE ACETATE 100 MG: 100 TABLET ORAL at 08:03

## 2024-01-22 RX ADMIN — METOPROLOL SUCCINATE 12.5 MG: 25 TABLET, EXTENDED RELEASE ORAL at 05:24

## 2024-01-22 RX ADMIN — APIXABAN 5 MG: 5 TABLET, FILM COATED ORAL at 08:04

## 2024-01-22 RX ADMIN — FAMOTIDINE 20 MG: 20 TABLET ORAL at 08:03

## 2024-01-22 RX ADMIN — Medication 1000 UNITS: at 08:04

## 2024-01-22 ASSESSMENT — ACTIVITIES OF DAILY LIVING (ADL)
TOILET_TRANSFER_DESCRIPTION: GRAB BAR;INCREASED TIME;INITIAL PREPARATION FOR TASK;SET-UP OF EQUIPMENT;SUPERVISION FOR SAFETY;VERBAL CUEING

## 2024-01-22 NOTE — DISCHARGE SUMMARY
Physical Medicine & Rehabilitation Discharge Summary    Admission Date: 1/5/2024    Discharge Date: 1/22/2024    Attending Provider: Eugenia Ruiz DO, MS    Admission Diagnosis:   Active Hospital Problems    Diagnosis     *Pneumonia due to infectious organism     Hypokalemia     Hypoxia     Azotemia     Abdominal distension     Lower leg edema     Paroxysmal atrial fibrillation (HCC)     Primary hypertension     MDD (major depressive disorder), recurrent, in partial remission (HCC)     Cognitive impairment     Acquired hypothyroidism     Pacemaker        Discharge Diagnosis:  Active Hospital Problems    Diagnosis     *Pneumonia due to infectious organism     Hypokalemia     Hypoxia     Azotemia     Abdominal distension     Lower leg edema     Paroxysmal atrial fibrillation (HCC)     Primary hypertension     MDD (major depressive disorder), recurrent, in partial remission (HCC)     Cognitive impairment     Acquired hypothyroidism     Pacemaker        HPI per Admission History & Physical:  Debra Rodrigues is an 88-year-old female with past medical history significant for depression, atrial fibrillation, PPM, hypothyroidism, hypertension, chronic neuropathy who presented to the emergency department at [facility] on January 2, 2024 with chief complaint of generalized weakness, cough, congestion which has been ongoing since approximately 3 days prior.  She resides at [facility] and staff noticed that she was weaker with some confusion.  Upon arrival to the emergency department she was satting at 82% on room air with a temperature of 100.6 °F.  She was placed on 5 L of oxygen.  She had some mild swelling in her legs, patient reported not any different than usual.  Chest x-ray showed bilateral pleural effusions and right-sided infiltrate.  CTA of the chest was negative for pulmonary embolism but did show pulmonary edema and pleural effusions.  Echocardiogram was normal for age.   Patient diagnosed with community-acquired pneumonia and started on antibiotics.  Patient was found to be functioning below her baseline per PT/OT and acute rehabilitation was recommended.  Patient admitted to ARU 1/5/2024.     On admission to ARU patient reports she is tired. Her son and DIL are at bedside. Patient was able to go to the bathroom on her own. She had started to sleep in her recliner, it was safer than the bed. Family notes she had been slowly declining, doing less exercise, and gaining some weight. Patient has painful neuropathy which is chronic. Had been on Gabapentin.    Patient was admitted to [facility] on 1/5/2024.     Hospital Course by Problem List:  Community-acquired pneumonia  Flu, RSV, COVID-negative  Respiratory cultures PENDING (unclear if collected at [facility])  Blood cultures collected 1/2/2024 no growth to date  PT and OT for mobility and ADLs. Per guidelines, 15 hours per week between PT, OT and/or SLP.  Follow-up geriatrician  Continue Augmentin through 1/8 -completed  Continue doxycycline through 1/9-completed     Atrial fibrillation  Permanent pacemaker  Recently referred to cardiology for local establishment (had been in [facility])  Continue Eliquis 5 mg twice daily  Continue flecainide 100 mg daily  Continue with oral 25 mg XL daily--> 12.5 mg 1/8     History of Stroke  Left Hemiparesis  Monitor     Impaired cognition  Poor motivation/attention, increased fatigue  ?  Encephalopathy secondary to ammonia  SLP to establish baseline  1/11 trial amantadine twice daily, avoid other stimulants due to elevated blood pressure  1/12 DIL reporting more clarity.  Continue amantadine.  1/15 patient more alert.     Hypertension  Continue Cozaar 50 mg every 12 hours--> 100 mg every 12 hours 1/11   Continue Toprol XL 25 mg daily -reduced to 12.5 mg daily due to bradycardia 1/8  Norvasc 5 mg daily added 1/8 --> increase to 10 mg 1/15  1/15: Continue amlodipine 10 mg + Cozaar 100 mg  every 12 hours + metoprolol 12.5 mg XL daily     Hypokalemia  1/10 3.2, supplemented per hospitalist x1 and is on scheduled potassium  1/11 continue scheduled potassium  1/12 potassium normal at 3.9 6  1/15 potassium normal at 4.0.  Continue supplementation     Hypothyroidism  Continue Synthroid 75 mcg daily     Depression  Insomnia  Continue trazodone 50 mg nightly (QTc 454)  Continue Paxil 10 mg daily     Pain  Tylenol as needed  Chronic neuropathy - Gabapentin at night     Skin  Patient at risk for skin breakdown due to debility in areas including sacrum, achilles, elbows and head in addition to other sites. Nursing to assess skin daily.      GI Ppx  Patient on Prilosec for GERD prophylaxis.    1/9 change diet to soft and bite sized as patient having difficulty with regular consistencies due to dentures.  Add Ensure supplement.  1/10 consulted dietary to improve nutrition     Bowel   Patient on Senna-docusate for constipation prophylaxis.      Bladder  TV/PVR/BS PRN        LABS 1/15: CBC + BMP  Potassium normal on supplementation, BMP and CMP otherwise normal and nonconcerning        DVT PROPHYLAXIS: On Eliquis for atrial fibrillation     HOSPITALIST FOLLOWING: YES     CODE STATUS: Reviewed note from geriatrics 11/30/2023 CODE STATUS DNAR/DNI - d/w patient and family and confirm this code status.     DISPO: Group Home     JEANINE: 1/22/24     ADDITIONAL MEDICAL NEEDS ON D/C (IV abx, O2, etc): NONE     MEDS SENT TO: [facility]     DISCHARGE SPECIALIST FOLLOW UP: Patient to scheduled follow up with their PCP within 2 weeks from discharge from the [facility].     Functional Status at Discharge  Eating:  Supervision  Eating Description:  Set-up of equipment or meal/tube feeding  Grooming:  Modified Independent  Grooming Description:  Increased time, Set-up of equipment  Bathing:  Moderate Assist  Bathing Description:  Grab bar, Hand held shower, Hand rails, Tub bench, Assit with  perineal, Increased time, Verbal cueing  Upper Body Dressing:  Moderate Assist  Upper Body Dressing Description:  Increased time, Verbal cueing, Assist with pulling shirt over head  Lower Body Dressing:  Maximal Assist  Lower Body Dressing Description:  Grab bar, Assist with threading into pant leg, Increased time, Verbal cueing  Discharge Location : Group Home  Patient Discharging with Assist of: Paid Caregiver  Level of Supervision Required: 24 Hour Supervision  Recommended Equipment for Discharge: Front-Wheeled Walker;Manual Wheelchair;Shower Chair;Raised Toilet Seat with Arms;3 in 1 Commode;Grab Bars by Toilet;Grab Bars in Tub / Shower;Hand Held Shower Head  Recommended Services Upon Discharge: Home Health Occupational Therapy  Long Term Goals Met: 0  Long Term Goals Not Met: 2  Reason(s) for Goals Not Met: Pt family communicated that care facility assisted with all ADLs.  Criteria for Termination of Services: Maximum Function Achieved for Inpatient Rehabilitation  Walk:  Minimal Assist  Distance Walked:  12  Number of Times Distance Was Traveled:  1  Assistive Device:  Front Wheel Walker  Gait Deviation:  Antalgic, Step To, Decreased Base Of Support, Bradykinetic, Decreased Heel Strike, Decreased Toe Off  Wheelchair:  Stand by Assist  Distance Propelled:  10x2   Wheelchair Description:  Extra time, Leg rest management, Impaired coordination, Limited by fatigue, Supervision for safety, Verbal cueing  Stairs Unable to Participate  Stairs Description    Discharge Location: Group Home  Patient Discharging with Assist of: Paid Caregiver  Level of Supervision Required Upon Discharge: Twenty Four Hour Supervision  Recommended Equipment for Discharge: Manual Wheelchair;Front-Wheeled Walker  Recommeded Services Upon Discharge: Home Health Physical Therapy  Long Term Goals Met: 0  Long Term Goals Not Met: 4  Reason(s) for Goals Not Met: requires up to Max A for mobility  Criteria for Termination of Services: Maximum  Function Achieved for Inpatient Rehabilitation  Comprehension:  Minimal Assist (Extra time)  Comprehension Description:  Verbal cues  Expression:  Supervision  Expression Description:  Other (comment) (Extra time.)  Social Interaction:  Modified Independent  Social Interaction Description:  Increased time  Problem Solving:  Moderate Assist  Problem Solving Description:  Increased time, Bed/chair alarm, Seat belt, Supervision, Therapy schedule, Verbal cueing  Memory:  Moderate Assist  Memory Description:  Increased time, Bed/chair alarm, Seat belt, Supervision, Therapy schedule, Verbal cueing  Progress since Admit: Patient completed outcomes assessment with SCCAN administered.  Patient achieved a total raw score of 60 characteristic of Moderate Impairment ( improved from 56, moderate impairment, on initial evaluation).   The patient achieved the following percentage scores for given subtests:  Oral Expression 79 (improved from 74),  Orientation 83 (declined from 92 ), Memory 37 (unchanged from 37), Speech Comprehension 62 (unchanged from 62 ), Reading Comprehension 92 (improved from 83 ), Writing 86 (unchanged from 86), Attention 44 (improved from 38 ), and Problem solving 70 (improved from 48 ).  Patient displayed slight decline in Orientation and slight improvement in attention and oral expression. She displayed mild improvement in verbal problem solving but continues to demonstrate Moderate to Severe impairment of initiation.  Macular degeneration and low vision are significant barriers.  Patient wears glasses but she reports that they do not help. Patient is able to write short sentences with mild to moderately impaired legibility but is unable to read her own writing and even enlarged print due to vision impairment. Patient will need assist with IADL’s, and cuing for safety and safety sequencing.  Discharge Location : Group Home  Patient Discharging with Assist of: Caregiver  Level of Supervision Required: 24  Hour Supervision  Recommended Services Upon Discharge: Home Health Speech Therapy  Long Term Goals Not Met: Patient completed outcomes assessment with SCCAN administered.  Patient achieved a total raw score of 60 characteristic of Moderate Impairment ( improved from 56, moderate impairment, on initial evaluation).   The patient achieved the following percentage scores for given subtests:  Oral Expression 79 (improved from 74),  Orientation 83 (declined from 92 ), Memory 37 (unchanged from 37), Speech Comprehension 62 (unchanged from 62 ), Reading Comprehension 92 (improved from 83 ), Writing 86 (unchanged from 86), Attention 44 (improved from 38 ), and Problem solving 70 (improved from 48 ).  Patient displayed slight decline in Orientation and slight improvement in attention and oral expression. She displayed mild improvement in verbal problem solving but continues to demonstrate Moderate to Severe impairment of initiation.  Macular degeneration and low vision are significant barriers.  Patient wears glasses but she reports that they do not help. Patient is able to write short sentences with mild to moderately impaired legibility but is unable to read her own writing and even enlarged print due to vision impairment. Patient will need assist with IADL’s, and cuing for safety and safety sequencing.  Reason(s) for Goals Not Met: Impaired initiation, impaired carryover. Low vision.  Low task endurance.  Criteria for Termination of Services: Maximum Function Achieved for Inpatient Rehabilitation    I, Eugenia Ruiz D.O., personally performed a complete drug regimen review and no potential clinically significant medication issues were identified.   Discharge Medication:     Medication List        START taking these medications        Instructions   amLODIPine 10 MG Tabs  Start taking on: January 23, 2024  Commonly known as: Norvasc   Take 1 Tablet by mouth every day.  Dose: 10 mg     hydrALAZINE 25 MG Tabs  Commonly  known as: Apresoline   Take 1 Tablet by mouth 3 times a day.  Dose: 25 mg            CHANGE how you take these medications        Instructions   gabapentin 100 MG Caps  What changed:   when to take this  additional instructions  Another medication with the same name was removed. Continue taking this medication, and follow the directions you see here.  Commonly known as: Neurontin   Take 1 Capsule by mouth at bedtime.  Dose: 100 mg     losartan 100 MG Tabs  What changed:   medication strength  how much to take  when to take this  Commonly known as: Cozaar   Take 1 Tablet by mouth every 12 hours.  Dose: 100 mg     metoprolol SR 25 MG Tb24  What changed: how much to take  Commonly known as: Toprol XL   Take 0.5 Tablets by mouth every day.  Dose: 12.5 mg            CONTINUE taking these medications        Instructions   acetaminophen 325 MG Tabs  Commonly known as: Tylenol   Take 2 Tablets by mouth every four hours as needed for Mild Pain or Moderate Pain.  Dose: 650 mg     Eliquis 5mg Tabs  Generic drug: apixaban   Take 1 Tablet by mouth 2 times a day.  Dose: 5 mg     famotidine 20 MG Tabs  Commonly known as: Pepcid   Take 1 Tablet by mouth 2 times a day.  Dose: 20 mg     flecainide 100 MG Tabs  Commonly known as: Tambocor   Take 1 Tablet by mouth every day.  Dose: 100 mg     levothyroxine 75 MCG Tabs  Commonly known as: Synthroid   Take 1 Tablet by mouth every day.  Dose: 75 mcg     PARoxetine 10 MG Tabs  Commonly known as: Paxil   Take 1 Tablet by mouth every day.  Dose: 10 mg     traZODone 50 MG Tabs  Commonly known as: Desyrel   Take 1 Tablet by mouth at bedtime.  Dose: 50 mg     vitamin D3 1000 Unit (25 mcg) Tabs  Commonly known as: Cholecalciferol   Take 2 Tablets by mouth every day.  Dose: 2,000 Units            STOP taking these medications      amoxicillin-clavulanate 875-125 MG Tabs  Commonly known as: Augmentin     ascorbic acid 500 MG Tabs  Commonly known as: Ascorbic Acid     doxycycline monohydrate 100 MG  tablet  Commonly known as: Adoxa     fluticasone 50 MCG/ACT nasal spray  Commonly known as: Flonase     guaiFENesin dextromethorphan 100-10 MG/5ML Syrp syrup  Commonly known as: Robitussin DM     magnesium oxide 400 (240 Mg) MG Tabs     nystatin 502985 UNIT/GM Crea topical cream  Commonly known as: Mycostatin     PreserVision AREDS 2 Caps              Discharge Diet:  Current Diet Order   Procedures    Diet Order Diet: Level 6 - Soft and Bite Sized; Liquid level: Level 0 - Thin       Discharge Activity:  Per PT/OT    Disposition:  Patient to discharge to Group Home.    Equipment:  Per PT/OT    Follow-up & Discharge Instructions:  Follow up with your primary care provider (PCP) within 7-10 days of discharge to review your medications and take over your care.     If you develop chest pain, fever, chills, change in neurologic function (weakness, sensation changes, vision changes), or other concerning sxs, seek immediate medical attention or call 911.      Future Appointments   Date Time Provider Department Center   1/22/2024  1:00 PM Luci Polk PTA [facility] None       Condition on Discharge:  Good    More than 35 minutes was spent on discharging this patient, including face-to-face time, prescription management, and the dictation of this note.    Dr. Eugenia Ruiz DO, MS  Department of Physical Medicine & Rehabilitation    Date of Service: 1/22/2024

## 2024-01-22 NOTE — PROGRESS NOTES
Patient care assumed. Report received from [facility] BLACK Jean. Patient is alert and calm, resting in bed. Call light and bedside table within reach. Will continue to monitor.

## 2024-01-22 NOTE — CARE PLAN
"  Problem: Fall Risk - Rehab  Goal: Patient will remain free from falls  Note: Rubina Echavarria Fall risk Assessment Score: 21  High fall risk Interventions   - Alarming seatbelt  - Bed and strip alarm   - Yellow sign by the door   - Yellow wrist band \"Fall risk\"  - Room near to the nurse station  - Do not leave patient unattended in the bathroom  - Fall risk education provided      Problem: Bowel Elimination  Goal: Patient will participate in bowel management program  Note: Pt is continent of bowel per report.LBM 1/21.Will continue to monitor.         "

## 2024-01-22 NOTE — THERAPY
Physical Therapy   Daily Treatment     Patient Name: Debra Rodrigues  Age:  88 y.o., Sex:  female  Medical Record #: [id number]  Today's Date: 1/22/2024     Precautions  Precautions: Fall Risk  Comments: Positive for orthostatic hypotension    Subjective    Pt was in w/c upon arrival w/ RN and family presented, requested to use the restroom.     Objective       01/22/24 1301   PT Charge Group   PT Therapeutic Activities (Units) 2   Supervising Physical Therapist Roxann Echavarria   PT Total Time Spent   PT Individual Total Time Spent (Mins) 30   Cognition    Level of Consciousness Alert   Transfer Functional Level of Assist   Toilet Transfers Moderate Assist   Toilet Transfer Description Grab bar;Increased time;Initial preparation for task;Set-up of equipment;Supervision for safety;Verbal cueing   Bed Mobility    Sit to Stand Minimal Assist   Interdisciplinary Plan of Care Collaboration   IDT Collaboration with  Family / Caregiver   Patient Position at End of Therapy Seated;Family / Friend in Room  (pt's in personal vehicle, ready to be d/c)     Education provided to DIL regarding gaitbelt and repositioning in seat. DIL stated pt is being d/c to group home so no need for hands on FT.  Completed car transfer into personal vehicle w/ modA.    Assessment    Pt was able to follow instruction for car xfer and completed w/ min/modA. Ready to be d/c after session per RN.      Strengths: Able to follow instructions, Manages pain appropriately, Motivated for self care and independence, Pleasant and cooperative, Willingly participates in therapeutic activities  Barriers: Decreased endurance, Confused, Fatigue, Generalized weakness, Impaired activity tolerance, Impaired balance, Limited mobility    Plan    D/c today to group home follow up w/ HHPT    DME  PT DME Recommendations  Wheelchair:  (TBD)  Assistive Device:  (TBD)      Physical Therapy Problems (Active)       There are no active problems.

## 2024-01-22 NOTE — PROGRESS NOTES
NURSING DAILY NOTE    Name: Debra Rodrigues   Date of Admission: 1/5/2024   Admitting Diagnosis: Pneumonia due to infectious organism  Attending Physician: Eugenia Ruiz D.o.  Allergies: Other misc, Hydrocodone-acetaminophen, and Sulfa drugs    Safety  Patient Assist  Max 1  Patient Precautions  Fall Risk  Precaution Comments  Positive for orthostatic hypotension  Bed Transfer Status  Minimal Assist (inc assist with fatigue)  Toilet Transfer Status   Maximal Assist  Assistive Devices  Rails, Wheelchair  Oxygen  None - Room Air  Diet/Therapeutic Dining  Current Diet Order   Procedures    Diet Order Diet: Level 6 - Soft and Bite Sized; Liquid level: Level 0 - Thin     Pill Administration  floated  Agitated Behavioral Scale  15  ABS Level of Severity  No Agitation    Fall Risk  Has the patient had a fall this admission?   No  Rubina Echavarria Fall Risk Scoring  21, HIGH RISK  Fall Risk Safety Measures  bed alarm and chair alarm    Vitals  Temperature: 36.7 °C (98 °F)  Temp src: Oral  Pulse: 68  Respiration: 20  Blood Pressure : 130/55  Blood Pressure MAP (Calculated): 80 MM HG  BP Location: Right, Upper Arm  Patient BP Position: Supine     Oxygen  Pulse Oximetry: 91 %  O2 (LPM): 0  O2 Delivery Device: None - Room Air    Bowel and Bladder  Last Bowel Movement  01/21/24  Stool Type  Type 6: Fluffy pieces with ragged edges, a mushy stool  Bowel Device  Bathroom  Continent  Bladder: Non-stress incontinence   Bowel: Continent movement  Bladder Function  Urine Void (mL):  (moderate)  Number of Times Voided: 1  Urine Color: Unable To Evaluate  Urine Clarity: Unable to Evaluate  Number of Times Incontinent of Urine: 1  Wet Diaper Count: 1  Genitourinary Assessment   Bladder Assessment (WDL):  WDL Except  Cortes Catheter: Not Applicable  Urinary Elimination: Incontinence  Urine Color: Unable To Evaluate  Urine Clarity: Unable to Evaluate  Number of Bladder Accidents:  1  Total Number of Bladder of Accidents in Last 7 Days: 4  Number of Times Incontinent of Urine: 1  Bladder Device: Diaper, Bathroom  Time Void: No  Bladder Scan: Post Void  $ Bladder Scan Results (mL): 1 (0 scan)    Skin  Larry Score   16  Sensory Interventions   Bed Types: Standard/Trauma Mattress  Skin Preventative Measures: Heel Float Boots in Use , Pillows in Use for Support / Positioning  Moisture Interventions  Moisturizers/Barriers: Barrier Paste      Pain  Pain Rating Scale  0 - No Pain  Pain Location  Leg  Pain Location Orientation  Right  Pain Interventions   Repositioned, Rest    ADLs    Bathing   Patient Refused Bathing  Linen Change   Partial  Personal Hygiene  Perineal Care, Moist Mamie Wipes  Chlorhexidine Bath      Oral Care  Brushed Teeth  Teeth/Dentures     Shave     Nutrition Percentage Eaten  Dinner, Between % Consumed  Environmental Precautions  Treaded Slipper Socks on Patient, Report Given to Other Health Care Providers Regarding Fall Risk, Bed in Low Position  Patient Turns/Positioning  Patient Turns Self from Side to Side  Patient Turns Assistance/Tolerance  Tolerates Well  Bed Positions  Bed Controls On, Bed Locked  Head of Bed Elevated  Self regulated      Psychosocial/Neurologic Assessment  Psychosocial Assessment  Psychosocial (WDL):  WDL Except  Patient Behaviors: Fatigue  Neurologic Assessment  Neuro (WDL): Exceptions to WDL  Level of Consciousness: Alert  Orientation Level: Oriented X4  Cognition: Follows commands  Speech: Clear  Pupil Assesment: No  Motor Function/Sensation Assessment: Sensation  RUE Sensation: Full sensation  Muscle Strength Right Arm: Good Strength Against Gravity and Moderate Resistance  LUE Sensation: Full sensation  Muscle Strength Left Arm: Fair Strength against Gravity but No Resistance  RLE Sensation: Numbness, Tingling  Muscle Strength Right Leg: Fair Strength against Gravity but No Resistance  LLE Sensation: Numbness, Tingling  Muscle Strength Left  Leg: Fair Strength against Gravity but No Resistance  Neuro Additional Assessments: Jaylan Coma Scale  EENT (WDL):  WDL Except    Cardio/Pulmonary Assessment  Edema   RLE Edema: Generalized  LLE Edema: Generalized  Respiratory Breath Sounds  RUL Breath Sounds: Clear  RML Breath Sounds: Clear  RLL Breath Sounds: Diminished  ANGÉLICA Breath Sounds: Clear  LLL Breath Sounds: Diminished  Cardiac Assessment   Cardiac (WDL):  WDL Except (Hx HTN, a-fib)

## 2024-01-22 NOTE — PROGRESS NOTES
Patient discharged to [facility] per order.  Discharge instructions reviewed with patient; she verbalizes understanding and signed copies placed in chart.  Patient has all belongings; signed copy of form in chart.  Patient left facility at 1325 via w/c accompanied by rehab staff and daughter in law to drive patient to [facility].  Have enjoyed working with this pleasant patient.

## 2024-01-22 NOTE — PROGRESS NOTES
NURSING DAILY NOTE    Name: Debra Rodrigues   Date of Admission: 1/5/2024   Admitting Diagnosis: Pneumonia due to infectious organism  Attending Physician: Eugenia Ruiz D.o.  Allergies: Other misc, Hydrocodone-acetaminophen, and Sulfa drugs    Safety  Patient Assist  Max 1  Patient Precautions  Fall Risk  Precaution Comments  Positive for orthostatic hypotension  Bed Transfer Status  Minimal Assist (inc assist with fatigue)  Toilet Transfer Status   Maximal Assist  Assistive Devices  Rails  Oxygen  None - Room Air  Diet/Therapeutic Dining  Current Diet Order   Procedures    Diet Order Diet: Level 6 - Soft and Bite Sized; Liquid level: Level 0 - Thin     Pill Administration  whole and floated  Agitated Behavioral Scale  15  ABS Level of Severity  No Agitation    Fall Risk  Has the patient had a fall this admission?   No  Rubina Echavarria Fall Risk Scoring  21, HIGH RISK  Fall Risk Safety Measures  bed alarm and chair alarm    Vitals  Temperature: 36.7 °C (98 °F)  Temp src: Oral  Pulse: 65  Respiration: 20  Blood Pressure : (!) 154/59  Blood Pressure MAP (Calculated): 91 MM HG  BP Location: Right, Upper Arm  Patient BP Position: Supine     Oxygen  Pulse Oximetry: 91 %  O2 (LPM): 0  O2 Delivery Device: None - Room Air    Bowel and Bladder  Last Bowel Movement  01/21/24  Stool Type  Type 6: Fluffy pieces with ragged edges, a mushy stool  Bowel Device  Bathroom  Continent  Bladder: Non-stress incontinence   Bowel: Continent movement  Bladder Function  Urine Void (mL):  (moderate)  Number of Times Voided: 1  Urine Color: Unable To Evaluate  Urine Clarity: Unable to Evaluate  Number of Times Incontinent of Urine: 1  Wet Diaper Count: 1  Genitourinary Assessment   Bladder Assessment (WDL):  WDL Except  Cortes Catheter: Not Applicable  Urinary Elimination: Incontinence  Urine Color: Unable To Evaluate  Urine Clarity: Unable to Evaluate  Number of Bladder  Accidents: 1  Total Number of Bladder of Accidents in Last 7 Days: 4  Number of Times Incontinent of Urine: 1  Bladder Device: Diaper, Bathroom  Time Void: No  Bladder Scan: Post Void  $ Bladder Scan Results (mL): 1 (0 scan)    Skin  Larry Score   16  Sensory Interventions   Bed Types: Standard/Trauma Mattress  Skin Preventative Measures: Pillows in Use for Support / Positioning  Moisture Interventions  Moisturizers/Barriers: Barrier Paste      Pain  Pain Rating Scale  0 - No Pain  Pain Location  Leg  Pain Location Orientation  Right  Pain Interventions   Declines    ADLs    Bathing   Patient Refused Bathing  Linen Change   Partial  Personal Hygiene  Perineal Care, Moist Mamie Wipes  Chlorhexidine Bath      Oral Care  Brushed Teeth  Teeth/Dentures     Shave     Nutrition Percentage Eaten  Dinner, Between % Consumed  Environmental Precautions  Treaded Slipper Socks on Patient, Report Given to Other Health Care Providers Regarding Fall Risk, Bed in Low Position  Patient Turns/Positioning  Patient Turns Self from Side to Side  Patient Turns Assistance/Tolerance  Tolerates Well  Bed Positions  Bed Controls On, Bed Locked  Head of Bed Elevated  Self regulated      Psychosocial/Neurologic Assessment  Psychosocial Assessment  Psychosocial (WDL):  WDL Except  Patient Behaviors: Fatigue  Neurologic Assessment  Neuro (WDL): Exceptions to WDL  Level of Consciousness: Alert  Orientation Level: Oriented X4  Cognition: Follows commands  Speech: Clear  Pupil Assesment: No  Motor Function/Sensation Assessment: Sensation  RUE Sensation: Full sensation  Muscle Strength Right Arm: Good Strength Against Gravity and Moderate Resistance  LUE Sensation: Full sensation  Muscle Strength Left Arm: Fair Strength against Gravity but No Resistance  RLE Sensation: Numbness, Tingling  Muscle Strength Right Leg: Fair Strength against Gravity but No Resistance  LLE Sensation: Numbness, Tingling  Muscle Strength Left Leg: Fair Strength against  Gravity but No Resistance  Neuro Additional Assessments: [place] Coma Scale  EENT (WDL):  WDL Except    Cardio/Pulmonary Assessment  Edema   RLE Edema: Generalized  LLE Edema: Generalized  Respiratory Breath Sounds  RUL Breath Sounds: Clear  RML Breath Sounds: Clear  RLL Breath Sounds: Diminished  ANGÉLICA Breath Sounds: Clear  LLL Breath Sounds: Diminished  Cardiac Assessment   Cardiac (WDL):  WDL Except (Hx HTN, a-fib)

## 2024-01-22 NOTE — DOCUMENTATION QUERY
DOCUMENTATION QUERY    PROVIDERS: Please select “Cosign w/ note”to reply to query.    To better represent the severity of illness of your patient, please review the following information and exercise your independent professional judgment in responding to this query.     Encephalopathy is documented in the Medical Record. Please specify type.    -Due to medications or drugs  -Metabolic encephalopathy  -Toxic encephalopathy  -Other type of encephalopathy  -Other explanation::Other explanation -##(please specify other explanation)  -Unable to determine      The medical record reflects the following:   Clinical Findings  Impaired cognition  ?  Encephalopathy secondary to ammonia  SLP to establish baseline    ?Encephalopathy secondary to ammonia     Poor motivation/attention, increased fatigue  ?  Encephalopathy secondary to ammonia  SLP to establish baseline  1/11 trial amantadine twice daily, avoid other stimulants due to elevated blood pressure  1/12 DIL reporting more clarity.  Continue amantadine.  1/15 patient more alert.   Treatment  SLP Eval, amantadine   Risk Factors  Impaired cognition, advanced age    Location within medical record  History and Physical, Progress Notes, and Discharge Summary     Thank you,   Luis Garcia, CCS

## 2024-02-02 ENCOUNTER — TELEPHONE (OUTPATIENT)
Dept: CARDIOLOGY | Facility: MEDICAL CENTER | Age: 89
End: 2024-02-02
Payer: MEDICARE

## 2024-02-02 PROBLEM — D68.318 COAGULATION DISORDER DUE TO CIRCULATING ANTICOAGULANTS (HCC): Status: ACTIVE | Noted: 2024-02-02

## 2024-02-02 PROBLEM — R14.0 ABDOMINAL DISTENSION: Status: RESOLVED | Noted: 2024-01-05 | Resolved: 2024-02-02

## 2024-02-02 PROBLEM — R79.89 AZOTEMIA: Status: RESOLVED | Noted: 2024-01-05 | Resolved: 2024-02-02

## 2024-02-02 PROBLEM — S91.001A OPEN WOUND OF RIGHT ANKLE: Status: RESOLVED | Noted: 2023-08-02 | Resolved: 2024-02-02

## 2024-02-02 PROBLEM — J18.9 PNEUMONIA DUE TO INFECTIOUS ORGANISM: Status: RESOLVED | Noted: 2024-01-02 | Resolved: 2024-02-02

## 2024-02-02 PROBLEM — R09.02 HYPOXIA: Status: RESOLVED | Noted: 2024-01-05 | Resolved: 2024-02-02

## 2024-02-02 PROBLEM — E87.6 HYPOKALEMIA: Status: RESOLVED | Noted: 2024-01-10 | Resolved: 2024-02-02

## 2024-02-02 NOTE — TELEPHONE ENCOUNTER
Did patient answer the phone? NO     Was a voice mail left? YES    Has patient had labs, imaging, or cardiac testing outside [facility]?     Where has patient had labs, imaging, or cardiac testing done?     Did [facility] fax for records request? NO -     Fax number used to request records

## 2024-02-05 NOTE — TELEPHONE ENCOUNTER
ADD    Caller: Lauryn - patients daughter in law    Topic/issue: Patients daughter called back. Patients prior cardiologist was Dr. Lombard in [place], [place]. Fax number was provided for records.     Callback Number: [phone]    Thank you,     Maryellen QUINONEZ.

## 2024-02-05 NOTE — TELEPHONE ENCOUNTER
Caller:  Elham (daughter in law)    Topic/issue: Please give Dr. Lombard  a call, they will provide records.  [phone]    Callback Number:  [phone]    Thank you,   Qiana LARSON

## 2024-02-07 NOTE — TELEPHONE ENCOUNTER
Fax request for records to [facility] fax # [phone]

## 2024-02-15 ENCOUNTER — HOSPITAL ENCOUNTER (OUTPATIENT)
Facility: MEDICAL CENTER | Age: 89
End: 2024-02-17
Attending: EMERGENCY MEDICINE | Admitting: HOSPITALIST
Payer: MEDICARE

## 2024-02-15 ENCOUNTER — APPOINTMENT (OUTPATIENT)
Dept: RADIOLOGY | Facility: MEDICAL CENTER | Age: 89
End: 2024-02-15
Attending: EMERGENCY MEDICINE
Payer: MEDICARE

## 2024-02-15 ENCOUNTER — APPOINTMENT (OUTPATIENT)
Dept: RADIOLOGY | Facility: MEDICAL CENTER | Age: 89
End: 2024-02-15
Attending: HOSPITALIST
Payer: MEDICARE

## 2024-02-15 DIAGNOSIS — E66.9 OBESITY (BMI 30-39.9): ICD-10-CM

## 2024-02-15 DIAGNOSIS — L89.629 DECUBITUS ULCER OF HEEL, BILATERAL: ICD-10-CM

## 2024-02-15 DIAGNOSIS — L89.619 DECUBITUS ULCER OF HEEL, BILATERAL: ICD-10-CM

## 2024-02-15 DIAGNOSIS — L97.409 ULCER OF HEEL, UNSPECIFIED LATERALITY, UNSPECIFIED ULCER STAGE (HCC): ICD-10-CM

## 2024-02-15 LAB
ALBUMIN SERPL BCP-MCNC: 2.9 G/DL (ref 3.2–4.9)
ALBUMIN/GLOB SERPL: 0.8 G/DL
ALP SERPL-CCNC: 98 U/L (ref 30–99)
ALT SERPL-CCNC: 42 U/L (ref 2–50)
ANION GAP SERPL CALC-SCNC: 13 MMOL/L (ref 7–16)
AST SERPL-CCNC: 30 U/L (ref 12–45)
BASOPHILS # BLD AUTO: 1 % (ref 0–1.8)
BASOPHILS # BLD: 0.06 K/UL (ref 0–0.12)
BILIRUB SERPL-MCNC: 0.8 MG/DL (ref 0.1–1.5)
BUN SERPL-MCNC: 19 MG/DL (ref 8–22)
CALCIUM ALBUM COR SERPL-MCNC: 10 MG/DL (ref 8.5–10.5)
CALCIUM SERPL-MCNC: 9.1 MG/DL (ref 8.5–10.5)
CHLORIDE SERPL-SCNC: 106 MMOL/L (ref 96–112)
CO2 SERPL-SCNC: 22 MMOL/L (ref 20–33)
CREAT SERPL-MCNC: 0.78 MG/DL (ref 0.5–1.4)
EOSINOPHIL # BLD AUTO: 0.31 K/UL (ref 0–0.51)
EOSINOPHIL NFR BLD: 5.1 % (ref 0–6.9)
ERYTHROCYTE [DISTWIDTH] IN BLOOD BY AUTOMATED COUNT: 49.1 FL (ref 35.9–50)
GFR SERPLBLD CREATININE-BSD FMLA CKD-EPI: 73 ML/MIN/1.73 M 2
GLOBULIN SER CALC-MCNC: 3.8 G/DL (ref 1.9–3.5)
GLUCOSE SERPL-MCNC: 98 MG/DL (ref 65–99)
HCT VFR BLD AUTO: 44.7 % (ref 37–47)
HGB BLD-MCNC: 15 G/DL (ref 12–16)
IMM GRANULOCYTES # BLD AUTO: 0.04 K/UL (ref 0–0.11)
IMM GRANULOCYTES NFR BLD AUTO: 0.7 % (ref 0–0.9)
LACTATE SERPL-SCNC: 0.9 MMOL/L (ref 0.5–2)
LYMPHOCYTES # BLD AUTO: 1.42 K/UL (ref 1–4.8)
LYMPHOCYTES NFR BLD: 23.3 % (ref 22–41)
MCH RBC QN AUTO: 30.8 PG (ref 27–33)
MCHC RBC AUTO-ENTMCNC: 33.6 G/DL (ref 32.2–35.5)
MCV RBC AUTO: 91.8 FL (ref 81.4–97.8)
MONOCYTES # BLD AUTO: 0.84 K/UL (ref 0–0.85)
MONOCYTES NFR BLD AUTO: 13.8 % (ref 0–13.4)
NEUTROPHILS # BLD AUTO: 3.42 K/UL (ref 1.82–7.42)
NEUTROPHILS NFR BLD: 56.1 % (ref 44–72)
NRBC # BLD AUTO: 0 K/UL
NRBC BLD-RTO: 0 /100 WBC (ref 0–0.2)
PLATELET # BLD AUTO: 285 K/UL (ref 164–446)
PMV BLD AUTO: 11.2 FL (ref 9–12.9)
POTASSIUM SERPL-SCNC: 3.8 MMOL/L (ref 3.6–5.5)
PROT SERPL-MCNC: 6.7 G/DL (ref 6–8.2)
RBC # BLD AUTO: 4.87 M/UL (ref 4.2–5.4)
SODIUM SERPL-SCNC: 141 MMOL/L (ref 135–145)
WBC # BLD AUTO: 6.1 K/UL (ref 4.8–10.8)

## 2024-02-15 PROCEDURE — 80053 COMPREHEN METABOLIC PANEL: CPT

## 2024-02-15 PROCEDURE — 99285 EMERGENCY DEPT VISIT HI MDM: CPT

## 2024-02-15 PROCEDURE — 73650 X-RAY EXAM OF HEEL: CPT | Mod: RT

## 2024-02-15 PROCEDURE — 99223 1ST HOSP IP/OBS HIGH 75: CPT | Performed by: HOSPITALIST

## 2024-02-15 PROCEDURE — G0378 HOSPITAL OBSERVATION PER HR: HCPCS

## 2024-02-15 PROCEDURE — 93922 UPR/L XTREMITY ART 2 LEVELS: CPT | Mod: 26 | Performed by: INTERNAL MEDICINE

## 2024-02-15 PROCEDURE — 87040 BLOOD CULTURE FOR BACTERIA: CPT

## 2024-02-15 PROCEDURE — 93922 UPR/L XTREMITY ART 2 LEVELS: CPT

## 2024-02-15 PROCEDURE — 83605 ASSAY OF LACTIC ACID: CPT

## 2024-02-15 PROCEDURE — A9270 NON-COVERED ITEM OR SERVICE: HCPCS | Performed by: HOSPITALIST

## 2024-02-15 PROCEDURE — 85025 COMPLETE CBC W/AUTO DIFF WBC: CPT

## 2024-02-15 PROCEDURE — 36415 COLL VENOUS BLD VENIPUNCTURE: CPT

## 2024-02-15 PROCEDURE — 700102 HCHG RX REV CODE 250 W/ 637 OVERRIDE(OP): Performed by: HOSPITALIST

## 2024-02-15 PROCEDURE — 73650 X-RAY EXAM OF HEEL: CPT | Mod: LT

## 2024-02-15 RX ORDER — TRAZODONE HYDROCHLORIDE 50 MG/1
50 TABLET ORAL
Status: DISCONTINUED | OUTPATIENT
Start: 2024-02-15 | End: 2024-02-17 | Stop reason: HOSPADM

## 2024-02-15 RX ORDER — AMOXICILLIN 250 MG
2 CAPSULE ORAL EVERY EVENING
Status: DISCONTINUED | OUTPATIENT
Start: 2024-02-15 | End: 2024-02-17 | Stop reason: HOSPADM

## 2024-02-15 RX ORDER — LABETALOL HYDROCHLORIDE 5 MG/ML
10 INJECTION, SOLUTION INTRAVENOUS EVERY 4 HOURS PRN
Status: DISCONTINUED | OUTPATIENT
Start: 2024-02-15 | End: 2024-02-17 | Stop reason: HOSPADM

## 2024-02-15 RX ORDER — PAROXETINE 10 MG/1
10 TABLET, FILM COATED ORAL DAILY
Status: DISCONTINUED | OUTPATIENT
Start: 2024-02-16 | End: 2024-02-17 | Stop reason: HOSPADM

## 2024-02-15 RX ORDER — LOSARTAN POTASSIUM 100 MG/1
100 TABLET ORAL DAILY
COMMUNITY

## 2024-02-15 RX ORDER — LEVOTHYROXINE SODIUM 0.07 MG/1
75 TABLET ORAL DAILY
Status: DISCONTINUED | OUTPATIENT
Start: 2024-02-16 | End: 2024-02-17 | Stop reason: HOSPADM

## 2024-02-15 RX ORDER — ONDANSETRON 4 MG/1
4 TABLET, ORALLY DISINTEGRATING ORAL EVERY 4 HOURS PRN
Status: DISCONTINUED | OUTPATIENT
Start: 2024-02-15 | End: 2024-02-17 | Stop reason: HOSPADM

## 2024-02-15 RX ORDER — FLECAINIDE ACETATE 100 MG/1
100 TABLET ORAL DAILY
Status: DISCONTINUED | OUTPATIENT
Start: 2024-02-16 | End: 2024-02-17 | Stop reason: HOSPADM

## 2024-02-15 RX ORDER — ACETAMINOPHEN 325 MG/1
650 TABLET ORAL EVERY 6 HOURS PRN
Status: DISCONTINUED | OUTPATIENT
Start: 2024-02-15 | End: 2024-02-17 | Stop reason: HOSPADM

## 2024-02-15 RX ORDER — HYDRALAZINE HYDROCHLORIDE 50 MG/1
25 TABLET, FILM COATED ORAL 3 TIMES DAILY
Status: DISCONTINUED | OUTPATIENT
Start: 2024-02-15 | End: 2024-02-17 | Stop reason: HOSPADM

## 2024-02-15 RX ORDER — ZINC SULFATE 50(220)MG
220 CAPSULE ORAL DAILY
Status: DISCONTINUED | OUTPATIENT
Start: 2024-02-15 | End: 2024-02-17 | Stop reason: HOSPADM

## 2024-02-15 RX ORDER — AMLODIPINE BESYLATE 10 MG/1
10 TABLET ORAL DAILY
Status: DISCONTINUED | OUTPATIENT
Start: 2024-02-16 | End: 2024-02-17 | Stop reason: HOSPADM

## 2024-02-15 RX ORDER — LOSARTAN POTASSIUM 50 MG/1
100 TABLET ORAL DAILY
Status: DISCONTINUED | OUTPATIENT
Start: 2024-02-16 | End: 2024-02-17 | Stop reason: HOSPADM

## 2024-02-15 RX ORDER — POLYETHYLENE GLYCOL 3350 17 G/17G
1 POWDER, FOR SOLUTION ORAL
Status: DISCONTINUED | OUTPATIENT
Start: 2024-02-15 | End: 2024-02-17 | Stop reason: HOSPADM

## 2024-02-15 RX ORDER — VITAMIN B COMPLEX
2000 TABLET ORAL DAILY
Status: DISCONTINUED | OUTPATIENT
Start: 2024-02-16 | End: 2024-02-17 | Stop reason: HOSPADM

## 2024-02-15 RX ORDER — GABAPENTIN 100 MG/1
100 CAPSULE ORAL
Status: DISCONTINUED | OUTPATIENT
Start: 2024-02-15 | End: 2024-02-17 | Stop reason: HOSPADM

## 2024-02-15 RX ORDER — FAMOTIDINE 20 MG/1
20 TABLET, FILM COATED ORAL 2 TIMES DAILY
Status: DISCONTINUED | OUTPATIENT
Start: 2024-02-15 | End: 2024-02-17 | Stop reason: HOSPADM

## 2024-02-15 RX ORDER — METOPROLOL SUCCINATE 25 MG/1
12.5 TABLET, EXTENDED RELEASE ORAL DAILY
Status: DISCONTINUED | OUTPATIENT
Start: 2024-02-16 | End: 2024-02-17 | Stop reason: HOSPADM

## 2024-02-15 RX ORDER — ASCORBIC ACID 500 MG
1000 TABLET ORAL DAILY
Status: DISCONTINUED | OUTPATIENT
Start: 2024-02-15 | End: 2024-02-17 | Stop reason: HOSPADM

## 2024-02-15 RX ORDER — ONDANSETRON 2 MG/ML
4 INJECTION INTRAMUSCULAR; INTRAVENOUS EVERY 4 HOURS PRN
Status: DISCONTINUED | OUTPATIENT
Start: 2024-02-15 | End: 2024-02-17 | Stop reason: HOSPADM

## 2024-02-15 RX ADMIN — TRAZODONE HYDROCHLORIDE 50 MG: 50 TABLET ORAL at 20:20

## 2024-02-15 RX ADMIN — GABAPENTIN 100 MG: 100 CAPSULE ORAL at 20:20

## 2024-02-15 RX ADMIN — APIXABAN 5 MG: 5 TABLET, FILM COATED ORAL at 18:39

## 2024-02-15 RX ADMIN — DOCUSATE SODIUM 50 MG AND SENNOSIDES 8.6 MG 2 TABLET: 8.6; 5 TABLET, FILM COATED ORAL at 18:39

## 2024-02-15 RX ADMIN — HYDRALAZINE HYDROCHLORIDE 25 MG: 50 TABLET ORAL at 20:20

## 2024-02-15 RX ADMIN — FAMOTIDINE 20 MG: 20 TABLET, FILM COATED ORAL at 18:39

## 2024-02-15 ASSESSMENT — COGNITIVE AND FUNCTIONAL STATUS - GENERAL
TURNING FROM BACK TO SIDE WHILE IN FLAT BAD: A LOT
PERSONAL GROOMING: A LITTLE
HELP NEEDED FOR BATHING: A LOT
DRESSING REGULAR UPPER BODY CLOTHING: A LOT
STANDING UP FROM CHAIR USING ARMS: A LOT
EATING MEALS: A LITTLE
DRESSING REGULAR LOWER BODY CLOTHING: A LOT
MOVING TO AND FROM BED TO CHAIR: A LOT
DAILY ACTIVITIY SCORE: 14
WALKING IN HOSPITAL ROOM: A LOT
TOILETING: A LOT
SUGGESTED CMS G CODE MODIFIER MOBILITY: CL
CLIMB 3 TO 5 STEPS WITH RAILING: A LOT
MOVING FROM LYING ON BACK TO SITTING ON SIDE OF FLAT BED: A LOT
MOBILITY SCORE: 12
SUGGESTED CMS G CODE MODIFIER DAILY ACTIVITY: CK

## 2024-02-15 ASSESSMENT — LIFESTYLE VARIABLES
CONSUMPTION TOTAL: POSITIVE
HAVE YOU EVER FELT YOU SHOULD CUT DOWN ON YOUR DRINKING: NO
EVER FELT BAD OR GUILTY ABOUT YOUR DRINKING: NO
HAVE PEOPLE ANNOYED YOU BY CRITICIZING YOUR DRINKING: NO
TOTAL SCORE: 0
ALCOHOL_USE: YES
EVER HAD A DRINK FIRST THING IN THE MORNING TO STEADY YOUR NERVES TO GET RID OF A HANGOVER: NO
DOES PATIENT WANT TO STOP DRINKING: NO
HOW MANY TIMES IN THE PAST YEAR HAVE YOU HAD 5 OR MORE DRINKS IN A DAY: 5
ON A TYPICAL DAY WHEN YOU DRINK ALCOHOL HOW MANY DRINKS DO YOU HAVE: 1
AVERAGE NUMBER OF DAYS PER WEEK YOU HAVE A DRINK CONTAINING ALCOHOL: 5
TOTAL SCORE: 0
TOTAL SCORE: 0

## 2024-02-15 ASSESSMENT — CHA2DS2 SCORE
SEX: FEMALE
VASCULAR DISEASE: NO
CHF OR LEFT VENTRICULAR DYSFUNCTION: NO
DIABETES: NO
HYPERTENSION: YES
PRIOR STROKE OR TIA OR THROMBOEMBOLISM: NO
CHA2DS2 VASC SCORE: 4
AGE 75 OR GREATER: YES
AGE 65 TO 74: NO

## 2024-02-15 ASSESSMENT — PAIN DESCRIPTION - PAIN TYPE: TYPE: ACUTE PAIN

## 2024-02-15 ASSESSMENT — FIBROSIS 4 INDEX: FIB4 SCORE: 1.32

## 2024-02-15 NOTE — ED NOTES
Blood culture and lactic collected and sent.   Lab at bedside colleccting second set of cultures.   Pt rotated so now resting on L side.   Heels continue to be floated off bed.

## 2024-02-15 NOTE — ED PROVIDER NOTES
"  ER Provider Note    Scribed for Lia Rossi M.d. by Ann Harvey. 2/15/2024  12:49 PM    Primary Care Provider: ASHLEY Silva    CHIEF COMPLAINT  Chief Complaint   Patient presents with    Foot Pain     Pt bib EMS from [facility]: [facility]  # [phone] 5/10 pain to heels bilaterally. Deep tissue pressure injuries noted to  bilateral heels R>L     Weakness     States hasn't been able to ambulate with her walker for few weeks now.      EXTERNAL RECORDS REVIEWED  Outpatient Notes The patient follows geriatrics speciality here. She was last seen by them January 26th. The patient has neuropathy, hyperthyroidism, Afib on Eliquis, pace maker, depression, and hypertension. Didn't note anything about feet hurting at that time. Home Health discharged due to group home being rude. Reported that group home caretakers do not follow recommendations and are not off-loading the patient correctly.    took photograph and uploadedimages     HPI/ROS  LIMITATION TO HISTORY   Select: : None  OUTSIDE HISTORIAN(S):  Family is present at bedside and provide some of the patient's history.     Debra Rodrigues is a 88 y.o. female who presents to the ED via EMS from [facility] complaining of bilateral heel wounds. The patient reports that she does not have pain in her feet. The son-in-law reports that the patient was seen here 5 weeks ago for pneumonia and was transferred to [facility] rehab center upon discharge.  She spent 2 and half weeks in rehab and then was told that she could no longer go back to her assisted living facility because she was a \"two-person assist.\"  After discharge from rehab the family member notes that she was moved to a group home 3 weeks ago and has been bed ridden since. Family member reports that the patient moves around using a walker or uses a wheelchair before, however notes that she has been slowly reducing in mobility over the past 3 years. He states that he is worried about the " "care she is receiving at her group home.  Family member states that he visited the patient last week and she did not have any heel wounds at that time.  Patient denies any fever, chills, nausea, or vomiting. Denies any history of diabetes.    PAST MEDICAL HISTORY  Past Medical History:   Diagnosis Date    Abdominal distension 01/05/2024    Acid reflux disease     Acquired hypothyroidism 04/19/2023    Allergy     Anxiety     Arrhythmia 01/01/2009    Atrial fibrillation, resolved    Arthritis     At risk for sleep apnea     Azotemia 01/05/2024    Blood transfusion without reported diagnosis     Bowel habit changes     diarrhea    Breath shortness     Cancer ([facility]) 01/01/2009    left breast s/p mastectomy    CATARACT     surgically corrected, bilateral    Depression     GERD (gastroesophageal reflux disease)     Head ache     Heart burn     Heart valve disease     MVP    Hemorrhagic disorder ([facility])     anticoagulated on eliquis    Hypertension     Hypokalemia 01/10/2024    Hypoxia 01/05/2024    IBD (inflammatory bowel disease)     Macular degeneration     Muscle disorder     Open wound of right ankle 08/02/2023    Other thrombophilia ([facility]) 04/19/2023    Pacemaker 01/01/2009    Pneumonia due to infectious organism 01/02/2024    Stroke ([facility])     2019 - no deficits    Unspecified urinary incontinence     \"my bladder leaks when I lay down\"    Urinary bladder disorder     Urinary incontinence        SURGICAL HISTORY  Past Surgical History:   Procedure Laterality Date    BREAST RECONSTRUCTION  11/23/2010    Performed by ANNITA KELLEY at [facility]    TISSUE EXPANDER PLACE/REMOVE  11/23/2010    Performed by ANNITA KELLEY at [facility]    MASTOPEXY  11/23/2010    Performed by ANNITA KELLEY at [facility]    BREAST RECONSTRUCTION  4/20/2010    Performed by ANNITA KELLEY at [facility]    TISSUE EXPANDER PLACE/REMOVE  4/20/2010    Performed by ANNITA KELLEY " at SURGERY [facility]    MASTECTOMY  5/13/2009    left    PACEMAKER INSERTION  2009    KNEE ARTHROSCOPY  1995    right    RAYNA BY LAPAROSCOPY  1995    HYSTERECTOMY, TOTAL ABDOMINAL  1972    BSO    MASTOIDECTOMY  1943    left    TONSILLECTOMY AND ADENOIDECTOMY  1942    CATARACT EXTRACTION WITH IOL      bilateral       FAMILY HISTORY  Family History   Problem Relation Age of Onset    Heart Disease Unknown     Hypertension Unknown     Cancer Unknown        SOCIAL HISTORY   reports that she has never smoked. She has never used smokeless tobacco. She reports current alcohol use of about 3.5 oz of alcohol per week. She reports that she does not use drugs.    CURRENT MEDICATIONS  Previous Medications    ACETAMINOPHEN (TYLENOL) 325 MG TAB    Take 2 Tablets by mouth every four hours as needed (pain).    AMLODIPINE (NORVASC) 10 MG TAB    Take 1 Tablet by mouth every day.    ELIQUIS 5 MG TAB    Take 1 Tablet by mouth 2 times a day.    FAMOTIDINE (PEPCID) 20 MG TAB    Take 1 Tablet by mouth 2 times a day.    FLECAINIDE (TAMBOCOR) 100 MG TAB    Take 1 Tablet by mouth every day.    GABAPENTIN (NEURONTIN) 100 MG CAP    Take 1 Capsule by mouth at bedtime.    HYDRALAZINE (APRESOLINE) 25 MG TAB    Take 1 Tablet by mouth 3 times a day.    LEVOTHYROXINE (SYNTHROID) 75 MCG TAB    Take 1 Tablet by mouth every day.    LOSARTAN (COZAAR) 100 MG TAB    Take 1 Tablet by mouth every 12 hours.    MENTHOL-ZINC OXIDE (CALMOSEPTINE EX)    Apply 1 Dose topically 2 times a day. Apply thin layer of calmoseptine twice daily to groin buttocks area with brief changes. Use house supply.    METOPROLOL SR (TOPROL XL) 25 MG TABLET SR 24 HR    Take 0.5 Tablets by mouth every day.    PAROXETINE (PAXIL) 10 MG TAB    Take 1 Tablet by mouth every day.    TRAZODONE (DESYREL) 50 MG TAB    Take 1 Tablet by mouth at bedtime.    VITAMIN D3 (CHOLECALCIFEROL) 1000 UNIT (25 MCG) TAB    Take 2 Tablets by mouth every day.       ALLERGIES  Other misc,  "Hydrocodone-acetaminophen, and Sulfa drugs    PHYSICAL EXAM  /60   Pulse 65   Temp 36.7 °C (98.1 °F)   Resp 18   Ht 1.676 m (5' 6\")   Wt 89.8 kg (198 lb)   SpO2 92%   BMI 31.96 kg/m²     Constitutional: Alert in no apparent distress.  HENT: Normocephalic, Bilateral external ears normal. Nose normal.   Eyes: Pupils are equal and reactive. Conjunctiva normal, non-icteric.   Thorax & Lungs: Easy unlabored respirations  Skin: Visualized skin is  Dry, No erythema, No rash.       Extremities:   Please see photographs as noted below.  Patient haseschars to bilateral heels.  There is no significant surrounding erythema.  No purulent drainage.  No swelling to the feet.  2+ pedal pulses equal bilaterally.  No pretibial edema.  Calves are nontender.  : Patient has a developing sacral decubitus ulcer noted on her lower sacrum/buttock  Neurologic: Alert, clear speech.  Psychiatric: Affect and Mood normal    DIAGNOSTIC STUDIES    Labs:   I have independently interpreted these labs.    Results for orders placed or performed during the hospital encounter of 02/15/24   CBC WITH DIFFERENTIAL   Result Value Ref Range    WBC 6.1 4.8 - 10.8 K/uL    RBC 4.87 4.20 - 5.40 M/uL    Hemoglobin 15.0 12.0 - 16.0 g/dL    Hematocrit 44.7 37.0 - 47.0 %    MCV 91.8 81.4 - 97.8 fL    MCH 30.8 27.0 - 33.0 pg    MCHC 33.6 32.2 - 35.5 g/dL    RDW 49.1 35.9 - 50.0 fL    Platelet Count 285 164 - 446 K/uL    MPV 11.2 9.0 - 12.9 fL    Neutrophils-Polys 56.10 44.00 - 72.00 %    Lymphocytes 23.30 22.00 - 41.00 %    Monocytes 13.80 (H) 0.00 - 13.40 %    Eosinophils 5.10 0.00 - 6.90 %    Basophils 1.00 0.00 - 1.80 %    Immature Granulocytes 0.70 0.00 - 0.90 %    Nucleated RBC 0.00 0.00 - 0.20 /100 WBC    Neutrophils (Absolute) 3.42 1.82 - 7.42 K/uL    Lymphs (Absolute) 1.42 1.00 - 4.80 K/uL    Monos (Absolute) 0.84 0.00 - 0.85 K/uL    Eos (Absolute) 0.31 0.00 - 0.51 K/uL    Baso (Absolute) 0.06 0.00 - 0.12 K/uL    Immature Granulocytes (abs) " 0.04 0.00 - 0.11 K/uL    NRBC (Absolute) 0.00 K/uL   COMP METABOLIC PANEL   Result Value Ref Range    Sodium 141 135 - 145 mmol/L    Potassium 3.8 3.6 - 5.5 mmol/L    Chloride 106 96 - 112 mmol/L    Co2 22 20 - 33 mmol/L    Anion Gap 13.0 7.0 - 16.0    Glucose 98 65 - 99 mg/dL    Bun 19 8 - 22 mg/dL    Creatinine 0.78 0.50 - 1.40 mg/dL    Calcium 9.1 8.5 - 10.5 mg/dL    Correct Calcium 10.0 8.5 - 10.5 mg/dL    AST(SGOT) 30 12 - 45 U/L    ALT(SGPT) 42 2 - 50 U/L    Alkaline Phosphatase 98 30 - 99 U/L    Total Bilirubin 0.8 0.1 - 1.5 mg/dL    Albumin 2.9 (L) 3.2 - 4.9 g/dL    Total Protein 6.7 6.0 - 8.2 g/dL    Globulin 3.8 (H) 1.9 - 3.5 g/dL    A-G Ratio 0.8 g/dL   Lactic Acid   Result Value Ref Range    Lactic Acid 0.9 0.5 - 2.0 mmol/L   ESTIMATED GFR   Result Value Ref Range    GFR (CKD-EPI) 73 >60 mL/min/1.73 m 2      Radiology:   The attending emergency physician has independently interpreted the diagnostic imaging associated with this visit and am waiting the final reading from the radiologist.     Preliminary interpretation is a follows: ER MD is reviewed the patient's heel x-rays.  No obvious osteomyelitis.    Radiologist interpretation:   US-ANDREW SINGLE LEVEL BILAT   Final Result      DX-OS CALCIS (HEEL) 2+ RIGHT   Final Result      No evidence of fracture or bony destructive change.      DX-OS CALCIS (HEEL) 2+ LEFT   Final Result      No evidence of fracture or bony destructive change.           COURSE & MEDICAL DECISION MAKING     ED Observation Status? No; Patient does not meet criteria for ED Observation.     INITIAL ASSESSMENT, COURSE AND PLAN  Care Narrative: Patient presents to the ER with eschars/blackened ulcers to bilateral heels.  Family member reports that last week she did not have any such wounds on her heels.  She has been at a group home for the last 3 weeks and family members are concerned that the group home is not appropriately caring for her.  The patient has become more more immobile over  the last several years.  She used to walk with a walker, although now it is  quite difficult for her to get around due to generalized weakness.  She was seen by her primary care practitioner [facility] on January [facility] and there was no mention of any he warrants at that time.  Family member reports that they saw her about a week ago and did not notice any wounds at that time either.  Given this information, it seems as though the patient does develop fairly significant eschars to bilateral heels within the last week or so.  There is no surrounding erythema.  No purulence.  No foul smell.  Patient has not had fevers or chills.  She says that the wounds do not hurt.  At this time I do not think they are infected but I definitely think she is getting need some very aggressive wound care to prevent any further worsening of these wounds or osteomyelitis.  She also has a sacral decubitus ulcer which is developed over her backside.  Family members said they did not notice that prior to her moving into this group home.  I think the patient will need wound care for both her sacral decubitus ulcer as well as her heel ulcers.  She will also need placement in a different type of facility which might be able to better care for her wounds.  I spoke with Dr. Bolton, hospitalist on-call, he will kindly evaluate the patient for hospitalization.    2:00 PM - Patient presents to the ED with bilateral heel wounds. She denies any fever, chills, nausea, or vomiting. See HPI for further details. Discussed the plan of care with the patient, including ordering labs and imaging to further evaluate. Patient verbalizes understanding and agreement to this plan of care.      3:02 PM - Paged hospitalist.     3:19 PM - I discussed the patient's case and the above findings with Dr. Bolton (Hospitalist) who agrees to evaluate the patient for admission.     ADDITIONAL PROBLEM LIST  Problem #1: Wounds to bilateral heels.    DISPOSITION AND  DISCUSSIONS  I have discussed management of the patient with the following physicians and JOSE ANTONIO's: Dr. Bolton (Hospitalist)     Discussion of management with other hospitals or appropriate source(s): None    Escalation of care considered, and ultimately not performed: IV fluids.  Patient is not septic or toxic.  She not hypotensive or tachycardic.  She is not dehydrated.  She is tolerating p.o.'s.  No need for IV fluids.    Barriers to care at this time, including but not limited to:  None .     Decision tools and prescription drugs considered including, but not limited to: Antibiotics wounds do not look infected at this time.  I do not think she needs any antibiotics at this time. .    DISPOSITION:  Patient will be hospitalized by Dr. Bolton in guarded condition.     FINAL DIANGOSIS  1. Ulcer of heel, unspecified laterality, unspecified ulcer stage (HCC) Acute        Ann BLANCA (Scrpily), am scribing for, and in the presence of, Lia Rossi M.D..    Electronically signed by: Ann Harvey (Thien), 2/15/2024    Lia BLANCA M.D. personally performed the services described in this documentation, as scribed by Ann Harvey in my presence, and it is both accurate and complete.      The note accurately reflects work and decisions made by me.  Lia Rossi M.D.  2/15/2024  4:23 PM

## 2024-02-15 NOTE — ED TRIAGE NOTES
Chief Complaint   Patient presents with    Foot Pain     Pt bib EMS from [facility]: Transition to [place]  # [phone] 5/10 pain to heels bilaterally. Deep tissue pressure injuries noted to  bilateral heels R>L     Weakness     States hasn't been able to ambulate with her walker for few weeks now.

## 2024-02-16 ENCOUNTER — APPOINTMENT (OUTPATIENT)
Dept: CARDIOLOGY | Facility: MEDICAL CENTER | Age: 89
End: 2024-02-16
Attending: PHYSICIAN ASSISTANT
Payer: MEDICARE

## 2024-02-16 LAB
ANION GAP SERPL CALC-SCNC: 13 MMOL/L (ref 7–16)
BUN SERPL-MCNC: 16 MG/DL (ref 8–22)
CALCIUM SERPL-MCNC: 8.7 MG/DL (ref 8.5–10.5)
CHLORIDE SERPL-SCNC: 104 MMOL/L (ref 96–112)
CO2 SERPL-SCNC: 20 MMOL/L (ref 20–33)
CREAT SERPL-MCNC: 0.63 MG/DL (ref 0.5–1.4)
ERYTHROCYTE [DISTWIDTH] IN BLOOD BY AUTOMATED COUNT: 48.4 FL (ref 35.9–50)
GFR SERPLBLD CREATININE-BSD FMLA CKD-EPI: 85 ML/MIN/1.73 M 2
GLUCOSE SERPL-MCNC: 102 MG/DL (ref 65–99)
HCT VFR BLD AUTO: 48.2 % (ref 37–47)
HGB BLD-MCNC: 15.8 G/DL (ref 12–16)
MCH RBC QN AUTO: 30.2 PG (ref 27–33)
MCHC RBC AUTO-ENTMCNC: 32.8 G/DL (ref 32.2–35.5)
MCV RBC AUTO: 92 FL (ref 81.4–97.8)
PLATELET # BLD AUTO: 224 K/UL (ref 164–446)
PMV BLD AUTO: 11.2 FL (ref 9–12.9)
POTASSIUM SERPL-SCNC: 3.5 MMOL/L (ref 3.6–5.5)
RBC # BLD AUTO: 5.24 M/UL (ref 4.2–5.4)
SODIUM SERPL-SCNC: 137 MMOL/L (ref 135–145)
WBC # BLD AUTO: 6.5 K/UL (ref 4.8–10.8)

## 2024-02-16 PROCEDURE — 80048 BASIC METABOLIC PNL TOTAL CA: CPT

## 2024-02-16 PROCEDURE — 97167 OT EVAL HIGH COMPLEX 60 MIN: CPT

## 2024-02-16 PROCEDURE — 97162 PT EVAL MOD COMPLEX 30 MIN: CPT

## 2024-02-16 PROCEDURE — 700102 HCHG RX REV CODE 250 W/ 637 OVERRIDE(OP): Performed by: HOSPITALIST

## 2024-02-16 PROCEDURE — G0378 HOSPITAL OBSERVATION PER HR: HCPCS

## 2024-02-16 PROCEDURE — A9270 NON-COVERED ITEM OR SERVICE: HCPCS | Performed by: HOSPITALIST

## 2024-02-16 PROCEDURE — 97602 WOUND(S) CARE NON-SELECTIVE: CPT

## 2024-02-16 PROCEDURE — 97166 OT EVAL MOD COMPLEX 45 MIN: CPT

## 2024-02-16 PROCEDURE — 36415 COLL VENOUS BLD VENIPUNCTURE: CPT

## 2024-02-16 PROCEDURE — 85027 COMPLETE CBC AUTOMATED: CPT

## 2024-02-16 PROCEDURE — 99233 SBSQ HOSP IP/OBS HIGH 50: CPT | Performed by: HOSPITALIST

## 2024-02-16 RX ADMIN — METOPROLOL SUCCINATE 12.5 MG: 25 TABLET, EXTENDED RELEASE ORAL at 05:54

## 2024-02-16 RX ADMIN — PAROXETINE HYDROCHLORIDE 10 MG: 10 TABLET, FILM COATED ORAL at 05:54

## 2024-02-16 RX ADMIN — Medication 220 MG: at 05:54

## 2024-02-16 RX ADMIN — AMLODIPINE BESYLATE 10 MG: 10 TABLET ORAL at 05:54

## 2024-02-16 RX ADMIN — HYDRALAZINE HYDROCHLORIDE 25 MG: 50 TABLET ORAL at 20:50

## 2024-02-16 RX ADMIN — FAMOTIDINE 20 MG: 20 TABLET, FILM COATED ORAL at 05:54

## 2024-02-16 RX ADMIN — TRAZODONE HYDROCHLORIDE 50 MG: 50 TABLET ORAL at 20:50

## 2024-02-16 RX ADMIN — APIXABAN 5 MG: 5 TABLET, FILM COATED ORAL at 17:52

## 2024-02-16 RX ADMIN — OXYCODONE HYDROCHLORIDE AND ACETAMINOPHEN 1000 MG: 500 TABLET ORAL at 05:54

## 2024-02-16 RX ADMIN — HYDRALAZINE HYDROCHLORIDE 25 MG: 50 TABLET ORAL at 15:00

## 2024-02-16 RX ADMIN — LEVOTHYROXINE SODIUM 75 MCG: 0.07 TABLET ORAL at 05:58

## 2024-02-16 RX ADMIN — LOSARTAN POTASSIUM 100 MG: 50 TABLET, FILM COATED ORAL at 05:54

## 2024-02-16 RX ADMIN — APIXABAN 5 MG: 5 TABLET, FILM COATED ORAL at 05:54

## 2024-02-16 RX ADMIN — GABAPENTIN 100 MG: 100 CAPSULE ORAL at 20:50

## 2024-02-16 RX ADMIN — FLECAINIDE ACETATE 100 MG: 100 TABLET ORAL at 05:54

## 2024-02-16 RX ADMIN — HYDRALAZINE HYDROCHLORIDE 25 MG: 50 TABLET ORAL at 08:32

## 2024-02-16 RX ADMIN — DOCUSATE SODIUM 50 MG AND SENNOSIDES 8.6 MG 2 TABLET: 8.6; 5 TABLET, FILM COATED ORAL at 17:52

## 2024-02-16 RX ADMIN — FAMOTIDINE 20 MG: 20 TABLET, FILM COATED ORAL at 17:52

## 2024-02-16 RX ADMIN — Medication 2000 UNITS: at 05:54

## 2024-02-16 ASSESSMENT — COGNITIVE AND FUNCTIONAL STATUS - GENERAL
SUGGESTED CMS G CODE MODIFIER MOBILITY: CM
DRESSING REGULAR UPPER BODY CLOTHING: A LOT
TURNING FROM BACK TO SIDE WHILE IN FLAT BAD: UNABLE
PERSONAL GROOMING: A LITTLE
EATING MEALS: A LOT
DAILY ACTIVITIY SCORE: 12
STANDING UP FROM CHAIR USING ARMS: A LOT
WALKING IN HOSPITAL ROOM: TOTAL
TOILETING: TOTAL
SUGGESTED CMS G CODE MODIFIER DAILY ACTIVITY: CL
MOBILITY SCORE: 7
DRESSING REGULAR LOWER BODY CLOTHING: A LOT
HELP NEEDED FOR BATHING: A LOT
MOVING FROM LYING ON BACK TO SITTING ON SIDE OF FLAT BED: UNABLE
CLIMB 3 TO 5 STEPS WITH RAILING: TOTAL
MOVING TO AND FROM BED TO CHAIR: UNABLE

## 2024-02-16 ASSESSMENT — PAIN DESCRIPTION - PAIN TYPE
TYPE: ACUTE PAIN
TYPE: ACUTE PAIN

## 2024-02-16 ASSESSMENT — ACTIVITIES OF DAILY LIVING (ADL): TOILETING: REQUIRES ASSIST

## 2024-02-16 ASSESSMENT — GAIT ASSESSMENTS: GAIT LEVEL OF ASSIST: UNABLE TO PARTICIPATE

## 2024-02-16 NOTE — PROGRESS NOTES
Patient is Aox3-4, pulled out her PIV line, when asked she said it's uncomfortable. RN attempted PIV reinsertion, patient refused despite encouragement and education. On call Hospitalist made aware.

## 2024-02-16 NOTE — PROGRESS NOTES
4 Eyes Skin Assessment Completed by DAMIAN Alejo and DAMIAN Sweeney.    Head WDL  Ears WDL  Nose WDL  Mouth WDL  Neck WDL  Breast/Chest WDL  Shoulder Blades WDL  Spine WDL  (R) Arm/Elbow/Hand WDL  (L) Arm/Elbow/Hand Upper arm scab  Abdomen Redness and Rash  Groin Redness  Scrotum/Coccyx/Buttocks Redness  (R) Leg WDL  (L) Leg WDL  (R) Heel/Foot/Toe Heel Ulcer(s)  (L) Heel/Foot/Toe Heel Ulcer(s)          Devices In Places Blood Pressure Cuff, Pulse Ox, SCD's, and Nasal Cannula      Interventions In Place Gray Ear Foams, Heel Mepilex, Sacral Mepilex, TAP System, and Pillows    Possible Skin Injury Yes    Pictures Uploaded Into Epic Yes  Wound Consult Placed Yes  RN Wound Prevention Protocol Ordered Yes

## 2024-02-16 NOTE — ASSESSMENT & PLAN NOTE
PT/OT  Denies depression as cause of her lack of mobiilty  Patient is not sure why he she did not previously cooperate with home health but she says she will do so in the future.

## 2024-02-16 NOTE — H&P
Hospital Medicine History & Physical Note    Date of Service  2/15/2024    Primary Care Physician  AZALEA Silva.      Code Status  DNAR/DNI    Chief Complaint  Chief Complaint   Patient presents with    Foot Pain     Pt bib EMS from long term: Transition to [facility]  # [phone] 5/10 pain to heels bilaterally. Deep tissue pressure injuries noted to  bilateral heels R>L     Weakness     States hasn't been able to ambulate with her walker for few weeks now.        History of Presenting Illness  Debra Rodrigues is a 88 y.o. female with PAF, HTD, depression, hypothyroid, pacemaker.  She presented 2/15/2024 with worsening physical capacity and is vastly bedbound now.  There just was an admit 1/5-1/22/24 at the hospital for a pneumonia. She was recently placed at a group home by her family as she was falling frequently at home.  At the group home she has developed bilateral eschar on the heels and sacral pressure ulcer stage I.  Her feet are warm and have good 2+ dorsalis pedal pulses.  She states poor oral intake.    I discussed the plan of care with patient and family.    Review of Systems  ROS    Past Medical History   has a past medical history of Abdominal distension (01/05/2024), Acid reflux disease, Acquired hypothyroidism (04/19/2023), Allergy, Anxiety, Arrhythmia (01/01/2009), Arthritis, At risk for sleep apnea, Azotemia (01/05/2024), Blood transfusion without reported diagnosis, Bowel habit changes, Breath shortness, Cancer ([facility]) (01/01/2009), CATARACT, Depression, GERD (gastroesophageal reflux disease), Head ache, Heart burn, Heart valve disease, Hemorrhagic disorder ([facility]), Hypertension, Hypokalemia (01/10/2024), Hypoxia (01/05/2024), IBD (inflammatory bowel disease), Macular degeneration, Muscle disorder, Open wound of right ankle (08/02/2023), Other thrombophilia ([facility]) (04/19/2023), Pacemaker (01/01/2009), Pneumonia due to infectious organism (01/02/2024), Stroke ([facility]), Unspecified urinary  incontinence, Urinary bladder disorder, and Urinary incontinence.    Surgical History   has a past surgical history that includes tonsillectomy and adenoidectomy (1942); mastoidectomy (1943); hysterectomy, total abdominal (1972); knee arthroscopy (1995); julio by laparoscopy (1995); pacemaker insertion (2009); mastectomy (5/13/2009); cataract extraction with iol; breast reconstruction (4/20/2010); tissue expander place/remove (4/20/2010); breast reconstruction (11/23/2010); tissue expander place/remove (11/23/2010); and mastopexy (11/23/2010).     Family History  family history includes Cancer in her unknown relative; Heart Disease in her unknown relative; Hypertension in her unknown relative.   Family history reviewed with patient. There is no family history that is pertinent to the chief complaint.     Social History   reports that she has never smoked. She has never used smokeless tobacco. She reports current alcohol use of about 3.5 oz of alcohol per week. She reports that she does not use drugs.    Allergies  Allergies   Allergen Reactions    Other Misc Anaphylaxis     x2 in 2005,pt tested,Cause unknown    Hydrocodone Vomiting    Oyster Extract Unspecified     Listed on MAR     Sulfa Drugs Rash     Rash        Medications  Prior to Admission Medications   Prescriptions Last Dose Informant Patient Reported? Taking?   ELIQUIS 5 MG Tab 2/15/2024 at 0800 MAR from Other Facility No Yes   Sig: Take 1 Tablet by mouth 2 times a day.   Menthol-Zinc Oxide (CALMOSEPTINE EX) 2/15/2024 at 0800 MAR from Other Facility Yes Yes   Sig: Apply 1 Dose topically 2 times a day. Apply thin layer of calmoseptine twice daily to groin buttocks area with brief changes. Use house supply.   PARoxetine (PAXIL) 10 MG Tab 2/15/2024 at 0800 MAR from Other Facility No Yes   Sig: Take 1 Tablet by mouth every day.   acetaminophen (TYLENOL) 325 MG Tab PRN at PRN MAR from Other Facility No No   Sig: Take 2 Tablets by mouth every four hours as  needed (pain).   amLODIPine (NORVASC) 10 MG Tab 2/15/2024 at 0800 MAR from Other Facility No Yes   Sig: Take 1 Tablet by mouth every day.   famotidine (PEPCID) 20 MG Tab 2/15/2024 at 0800 MAR from Other Facility No Yes   Sig: Take 1 Tablet by mouth 2 times a day.   flecainide (TAMBOCOR) 100 MG Tab 2/15/2024 at 0800 MAR from Other Facility No Yes   Sig: Take 1 Tablet by mouth every day.   gabapentin (NEURONTIN) 100 MG Cap 2/14/2024 at 2000 MAR from Other Facility No Yes   Sig: Take 1 Capsule by mouth at bedtime.   hydrALAZINE (APRESOLINE) 25 MG Tab 2/15/2024 at 0800 MAR from Other Facility No Yes   Sig: Take 1 Tablet by mouth 3 times a day.   levothyroxine (SYNTHROID) 75 MCG Tab 2/15/2024 at 0800 MAR from Other Facility No Yes   Sig: Take 1 Tablet by mouth every day.   losartan (COZAAR) 100 MG Tab 2/15/2024 at 0800 MAR from Other Facility Yes Yes   Sig: Take 100 mg by mouth every day.   metoprolol SR (TOPROL XL) 25 MG TABLET SR 24 HR 2/15/2024 at 0800 MAR from Other Facility No Yes   Sig: Take 0.5 Tablets by mouth every day.   traZODone (DESYREL) 50 MG Tab 2/14/2024 at 2000 MAR from Other Facility No Yes   Sig: Take 1 Tablet by mouth at bedtime.   vitamin D3 (CHOLECALCIFEROL) 1000 Unit (25 mcg) Tab 2/15/2024 at 0800 MAR from Other Facility No Yes   Sig: Take 2 Tablets by mouth every day.      Facility-Administered Medications: None       Physical Exam  Temp:  [35.9 °C (96.6 °F)-36.7 °C (98.1 °F)] 36.2 °C (97.2 °F)  Pulse:  [65-66] 65  Resp:  [16-18] 18  BP: (117-147)/(52-72) 147/52  SpO2:  [89 %-95 %] 95 %  Blood Pressure : 123/59   Temperature: 36.7 °C (98.1 °F)   Pulse: 66   Respiration: 16   Pulse Oximetry: 93 %       Physical Exam    Laboratory:  Recent Labs     02/15/24  1335   WBC 6.1   RBC 4.87   HEMOGLOBIN 15.0   HEMATOCRIT 44.7   MCV 91.8   MCH 30.8   MCHC 33.6   RDW 49.1   PLATELETCT 285   MPV 11.2     Recent Labs     02/15/24  1335   SODIUM 141   POTASSIUM 3.8   CHLORIDE 106   CO2 22   GLUCOSE 98   BUN 19  "  CREATININE 0.78   CALCIUM 9.1     Recent Labs     02/15/24  1335   ALTSGPT 42   ASTSGOT 30   ALKPHOSPHAT 98   TBILIRUBIN 0.8   GLUCOSE 98         No results for input(s): \"NTPROBNP\" in the last 72 hours.      No results for input(s): \"TROPONINT\" in the last 72 hours.    Imaging:  US-ANDREW SINGLE LEVEL BILAT   Final Result      DX-OS CALCIS (HEEL) 2+ RIGHT   Final Result      No evidence of fracture or bony destructive change.      DX-OS CALCIS (HEEL) 2+ LEFT   Final Result      No evidence of fracture or bony destructive change.            Assessment/Plan:  Justification for Admission Status  I anticipate this patient is appropriate for observation status at this time because Needs of PT/OT for physical ability to trasnsfer on her own and to help off load her pressure ulcers.  She will have an ANDREW to assess healing capacity of heel wounds    Patient will need a Med/Surg bed on MEDICAL service .  The need is secondary to monitor. Of skin checks and turning.    * Decubitus ulcer of heel, bilateral- (present on admission)  Assessment & Plan  Check a ANDREW  Needs outpatient wound care.  I have ordered wound care for here  Iodine likely to be placed.  Future I+D by wound care once further desquamation.  Vit C and zinc  Float the heels  Frequently turn patient and off load pressure ulcer spots.    Impaired mobility- (present on admission)  Assessment & Plan  PT/OT  Denies depression as cause of her lack of mobiilty    Pacemaker- (present on admission)  Assessment & Plan  Noted on right upper chest    Acquired hypothyroidism- (present on admission)  Assessment & Plan  Synthroid 75mcg daily    MDD (major depressive disorder), recurrent, in partial remission (HCC)- (present on admission)  Assessment & Plan  Paxil    Polyneuropathy in other diseases classified elsewhere (HCC)- (present on admission)  Assessment & Plan  Gabapentin 100mg q HS  Paroxetine 10mg qd    Primary hypertension- (present on admission)  Assessment & " Plan  Amlodipine 10mg qd, losartan 100mg qd, metoprolol Sr 12.5mg qd  Monitor BP    Paroxysmal atrial fibrillation (HCC)- (present on admission)  Assessment & Plan  Rate control on metoprolol        VTE prophylaxis: SCDs/TEDs

## 2024-02-16 NOTE — ASSESSMENT & PLAN NOTE
ANDREW normal  Needs outpatient wound care.  I have ordered wound care for here  Iodine likely to be placed.  Future I+D by wound care once further desquamation.  Vit C and zinc  Float the heels  Frequently turn patient and off load pressure ulcer spots.

## 2024-02-16 NOTE — THERAPY
"Occupational Therapy   Initial Evaluation     Patient Name: Debra Rodrigues  Age:  88 y.o., Sex:  female  Medical Record #: [id number]  Today's Date: 2/16/2024     Precautions  Precautions: Fall Risk, Swallow Precautions    Assessment  Patient is 88 y.o. female admitted for AMS and foot pain, found to have B heel decubitus ulcers and sacral decubitus ulcer. Per chart, pt admitted from nursing home where they were providing 24/7 assist and pt was \"vastly bedbound now\"; per chart, \"Home Health discharged due to group home being rude\". PMHx of recent admit 1/5-1/22 for PNA, as well as MDD, pacemaker, and polyneuropathy. Prior to previous admit, she was at an [facility]. Per pt, she was getting assist with all ADLs. However, pt admitted with sacral wound and B heel wounds, so unclear if will be returning. Currently limited by decreased functional mobility, activity tolerance, cognition, strength, AROM, coordination, balance, and pain which are currently affecting pt's ability to complete ADLs at baseline. Will continue to follow.     D/t pt's limited participation and per family, her preference to \"have people do things for her\", including eating, she may be appropriate for palliative care consult for [facility].    Plan    Occupational Therapy Initial Treatment Plan   Treatment Interventions: Self Care / Activities of Daily Living, Adaptive Equipment, Neuro Re-Education / Balance, Therapeutic Exercises, Therapeutic Activity  Treatment Frequency: 2 Times per Week  Duration: Until Therapy Goals Met    DC Equipment Recommendations: Unable to determine at this time  Discharge Recommendations: Recommend post-acute placement for additional occupational therapy services prior to discharge home (unless pt is able to return to prior facility with 24/7 SPV/assistance, then recommend OT)      Objective     02/16/24 0913   Prior Living Situation   Prior Services Continuous (24 Hour) Care Giving Per Service   Housing / Facility Group Home  (per " chart)   Bathroom Set up Walk In Shower;Shower Chair   Equipment Owned 4-Wheel Walker;Single Point Cane;Wheelchair;Tub / Shower Seat   Lives with - Patient's Self Care Capacity Attendant / Paid Care Giver   Comments Per chart, pt admitted from [facility] where they were providing 24/7 assist. Prior to previous admit, she was at an [facility]. Per pt, she was getting assist with all ADLs. However, pt admitted with sacral wound and B heel wounds, so unclear if will be returning.   Prior Level of ADL Function   Self Feeding Requires Assist   Grooming / Hygiene Requires Assist   Bathing Requires Assist   Dressing Requires Assist   Toileting Requires Assist   Prior Level of IADL Function   Medication Management Dependent   Laundry Dependent   Kitchen Mobility Dependent   Finances Dependent   Home Management Dependent   Shopping Dependent   Prior Level Of Mobility Supervision With Device in Home;Uses Wheel Chair for in Home Mobility  (pt reports was ambulating, but also using w/c for further distances. Unclear if pt has been OOB since prior admit d/t wounds)   History of Falls   History of Falls Yes   Date of Last Fall   (per chart, pt admitted to  d/t frequent falls)   Precautions   Precautions Fall Risk;Swallow Precautions   Vitals   O2 (LPM) 1   O2 Delivery Device Silicone Nasal Cannula   Vitals Comments Found with O2 on at entry. SpO2 in low 90s w/spot checks at EOB on RA; requested to have O2 removed; redonned once supine. RN aware.   Pain 0 - 10 Group   Location Hip   Location Orientation Left   Therapist Pain Assessment Post Activity;During Activity;Nurse Notified  (not quantified)   Cognition    Cognition / Consciousness X   Speech/ Communication Delayed Responses   Level of Consciousness Alert   Safety Awareness Impaired   New Learning Impaired   Attention Impaired   Sequencing Impaired   Initiation Impaired   Comments pleasant and cooperative, but once requested STS, pt less cooperative and bed seeking. limited  "initiation and unclear if able to initiate eating food from tray despite setup   Passive ROM Upper Body   Passive ROM Upper Body WDL   Active ROM Upper Body   Active ROM Upper Body  X   Dominant Hand Right   Comments L shoulder limited at baseline d/t prior CVA. encouraged continued AROM in supine for L hip pain mgmt   Strength Upper Body   Upper Body Strength  X   Comments RUE 4/5, LUE 3/5   Sensation Upper Body   Upper Extremity Sensation  WDL   Comments denied   Coordination Upper Body   Coordination X   Comments GM LUE limited by weakness, RUE FM limited   Balance Assessment   Sitting Balance (Static) Fair   Sitting Balance (Dynamic) Fair -   Weight Shift Sitting Fair   Comments refused STS; R lean, but unable/unwilling to correct d/t hip pain and prior CVA   Bed Mobility    Supine to Sit Maximal Assist   Sit to Supine Moderate Assist   Scooting Maximal Assist   Rolling Minimum Assist to Lt.;Minimal Assist to Rt.   Comments HOB flat   ADL Assessment   Eating Maximal Assist  (family found feeding pt; reports she \"prefers people do things for her\". Prior to their arrival, she req assist for setup, but then appeared not hungry or unable to initiate/sequence eating)   Grooming Minimal Assist;Seated   Lower Body Dressing Maximal Assist   Toileting Total Assist  (purewick and bedpan)   Functional Mobility   Sit to Stand Refused   Toilet Transfers Unable to Participate   Mobility EOB and lateral scoots only with max A   Edema / Skin Assessment   Edema / Skin  Not Assessed   Activity Tolerance   Comments limited by fatigue, pain, and cognition   Patient / Family Goals   Patient / Family Goal #1 to get stronger   Short Term Goals   Short Term Goal # 1 Seated full g/h routine w/ SPV   Short Term Goal # 2 eating w/SPV at EOB   Short Term Goal # 3 UB dressing with min A   Short Term Goal # 4 BSC txf with mod A   Education Group   Education Provided Role of Occupational Therapist;Pathology of bedrest   Role of Occupational " Therapist Patient Response Patient;Acceptance;Explanation;Reinforcement Needed;No Learning Evidence   Pathology of Bedrest Patient Response Patient;Acceptance;Explanation;Reinforcement Needed;No Learning Evidence   Occupational Therapy Initial Treatment Plan    Treatment Interventions Self Care / Activities of Daily Living;Adaptive Equipment;Neuro Re-Education / Balance;Therapeutic Exercises;Therapeutic Activity   Treatment Frequency 2 Times per Week   Duration Until Therapy Goals Met   Problem List   Problem List Decreased Active Daily Living Skills;Decreased Upper Extremity Strength Right;Decreased Homemaking Skills;Decreased Upper Extremity Strength Left;Decreased Upper Extremity AROM Left;Decreased Functional Mobility;Decreased Activity Tolerance;Safety Awareness Deficits / Cognition;Impaired Posture / Trunk Alignment;Impaired Coordination Left Upper Extremity;Impaired Cognitive Function;Impaired Postural Control / Balance

## 2024-02-16 NOTE — THERAPY
Physical Therapy   Initial Evaluation     Patient Name: Debra Rodrigues  Age:  88 y.o., Sex:  female  Medical Record #: [id number]  Today's Date: 2/16/2024       Precautions: Fall Risk;Swallow Precautions  Comments: BLE Heel Eschar, Stage 1 Sacral Pressure Ulcer    Assessment  Patient is 88 y.o. female who presented 2/15 from Group Home with decreased physical capacity/mostly bed bound status. Patient was admitted prior 1/5-1/22 with pneumonia.    PMH includes pacemaker, major depressive disorder, HTN, DDD, and falls.    Patient presents below baseline and further decline since recent admission 1 month ago. Requires gross Max for bed mobility and scooting - unable to stand and WB through BLEs due to complaints of L hip pain and generalized weakness, decreased activity tolerance, and overall decrease in functional mobility capacity.  Patient is unmotivated to mobilize OOB and requests to be fed via nursing report.     If unable to return to group home with assistance, recommend post-acute placement and/or palliative care. Will continue to provide acute PT services while admitted.     Plan    Physical Therapy Initial Treatment Plan   Treatment Plan : (P) Bed Mobility, Gait Training, Neuro Re-Education / Balance, Self Care / Home Evaluation, Therapeutic Activities, Therapeutic Exercise, Equipment  Treatment Frequency: (P) 2 Times per Week  Duration: (P) Until Therapy Goals Met    DC Equipment Recommendations: (P) Unable to determine at this time  Discharge Recommendations: (P) Recommend post-acute placement for additional physical therapy services prior to discharge home        Objective       02/16/24 0912   Charge Group   PT Evaluation PT Evaluation Mod   Total Time Spent   PT Total Time Yes   PT Evaluation Time Spent (Mins) 20   PT Therapeutic Exercise Time Spent (Mins) 8    Services   Is patient using  services for this encounter? No   Initial Contact Note    Initial Contact Note Order  Received and Verified, Physical Therapy Evaluation in Progress with Full Report to Follow.   Precautions   Precautions Fall Risk   Comments BLE Heel Eschar, Stage 1 Sacral Pressure Ulcer   Vitals   O2 (LPM) 1   O2 Delivery Device Silicone Nasal Cannula   Vitals Comments Mobilized EOB on RA, SpO2 92%   Pain   Intervention Rest;Distraction;Other (Comments)  (Gentle AROM)   Pain 0 - 10 Group   Location Hip   Location Orientation Left   Description Aching   Comfort Goal Comfort with Movement;Perform Activity   Therapist Pain Assessment During Activity   Non Verbal Descriptors   Non Verbal Scale  Sleeping;Calm   Prior Living Situation   Prior Services Continuous (24 Hour) Care Giving Per Service   Housing / Facility Group Home  (per chart)   Steps Into Home 0   Steps In Home 0   Bathroom Set up Walk In Shower;Shower Chair   Equipment Owned 4-Wheel Walker;Single Point Cane;Wheelchair   Lives with - Patient's Self Care Capacity Unrelated Adult;Attendant / Paid Care Giver   Comments Questionable historian. Info from chart   Prior Level of Functional Mobility   Bed Mobility Required Assist   Transfer Status Required Assist   Ambulation Required Assist   Ambulation Distance short household  (Pt is questionable historian. Reports able to walk to bathroom 5-10 feet with 4WW and 1 person, though chart review reports mostly bedbound)   Assistive Devices Used 4-Wheel Walker   Wheelchair Dependent   Stairs Unable To Determine At This Time   Comments Pt states she requires assistance with all functional mobility. Could walk to/from bathroom with 4WW and 1 person, but otherwise pushed in wheelchair   History of Falls   History of Falls Yes   Date of Last Fall   (per chart review: up to 1x/month due to balance)   Cognition    Cognition / Consciousness X   Level of Consciousness Alert   New Learning Impaired   Attention Impaired   Sequencing Impaired   Comments pleasant, cooperative, requires step-by-step/hand-on-hand cuing for  mobilization   Strength Lower Body   Lower Body Strength  X   Comments Grossly 2+/5  bilaterally   Sensation Lower Body   Lower Extremity Sensation   WDL   Comments No reports of N/T or other abnormalities   Lower Body Muscle Tone   Lower Body Muscle Tone  WDL   Neurological Concerns   Neurological Concerns No   Coordination Lower Body    Coordination Lower Body  X   Comments limited by pain   Other Treatments   Other Treatments Provided Education regarding role of PT, and general BLE AROM as a strategy to decreased pain/stiffness when laying in bed. Therex for LLE hip AROM exercises including marches, and twists (hip IR/ER)   Balance Assessment   Sitting Balance (Static) Fair -  (with UE support on bed rails/bed surface)   Sitting Balance (Dynamic) Poor +   Standing Balance (Static) Dependent   Standing Balance (Dynamic) Dependent   Weight Shift Sitting Fair   Weight Shift Standing Absent   Comments Initially required ModA sitting EOB, progressed to close SBA once feet touching ground. Initiated scooting forwards but ultimately required MaxA. Declined to stand due to L hip pain. Required MaxA x1 + Chelsy x1 to scoot towards the HOB with Max verbal cuing/sequencing/facilitation.   Bed Mobility    Supine to Sit Maximal Assist   Sit to Supine Maximal Assist   Scooting Maximal Assist   Rolling Maximum Assist to Lt.;Maximal Assist to Rt.  (Max verbal cuing with hand-on-hand and step-by-step sequencing)   Comments HOB flat, use of rails   Gait Analysis   Gait Level Of Assist Unable to Participate   Functional Mobility   Sit to Stand Refused   Mobility supine > EOB > scooting to HOB > supine   Comments Requires max verbal cuing for sequencing. Refused to transfer OOB due to L hip pain   How much difficulty does the patient currently have...   Turning over in bed (including adjusting bedclothes, sheets and blankets)? 1   Sitting down on and standing up from a chair with arms (e.g., wheelchair, bedside commode, etc.) 1  "  Moving from lying on back to sitting on the side of the bed? 1   How much help from another person does the patient currently need...   Moving to and from a bed to a chair (including a wheelchair)? 2   Need to walk in a hospital room? 1   Climbing 3-5 steps with a railing? 1   6 clicks Mobility Score 7   Activity Tolerance   Sitting in Chair unable to participate   Sitting Edge of Bed > 5 minutes   Standing unable to participate   Comments limited by pain, generalized weakness, and cognition   Patient / Family Goals    Patient / Family Goal #1 Return to assisted living facility   Short Term Goals    Short Term Goal # 1 Patient will be able to complete rolling L/R in bed with HOB flat and Chelsy to decrease dependence on caregivers in 6 visits.   Short Term Goal # 2 Patient will be able to complete sit < > stand with LRAD and Chelsy to facilitate return to PLOF in 6 visits.   Short Term Goal # 3 Patient will be able to ambulate 10 feet with FWW and CGA to facilitate return to PLOF in 6 visits.   Short Term Goal # 4 Patient will tolerate sitting in bed side chair for 1 hour to promote OOB tolerance in 6 visits.   Education Group   Education Provided Role of Physical Therapist;Exercises - Seated   Role of Physical Therapist Patient Response Patient;Acceptance;Explanation;Verbal Demonstration   Exercises - Seated Patient Response Patient;Acceptance;Explanation;Demonstration;Verbal Demonstration;Action Demonstration   Additional Comments Included education on wound prevention in bilateral heels with offweighting and \"floating\" on pillow to avoid friction and sheer forces   Physical Therapy Initial Treatment Plan    Treatment Plan  Bed Mobility;Gait Training;Neuro Re-Education / Balance;Self Care / Home Evaluation;Therapeutic Activities;Therapeutic Exercise;Equipment   Treatment Frequency 2 Times per Week   Duration Until Therapy Goals Met   Problem List    Problems Impaired Bed Mobility;Impaired Transfers;Impaired " Ambulation;Functional Strength Deficit;Impaired Balance;Impaired Coordination;Decreased Activity Tolerance;Safety Awareness Deficits / Cognition;Motor Planning / Sequencing   Anticipated Discharge Equipment and Recommendations   DC Equipment Recommendations Unable to determine at this time   Discharge Recommendations Recommend post-acute placement for additional physical therapy services prior to discharge home   Interdisciplinary Plan of Care Collaboration   IDT Collaboration with  Occupational Therapist;Nursing   Patient Position at End of Therapy In Bed;Bed Alarm On;Call Light within Reach;Tray Table within Reach;Other (Comments);Phone within Reach  (Doctor in room)   Collaboration Comments Confirmed with OT, RN updated   Session Information   Date / Session Number  2/16 - 1 (1/2, 2/22)

## 2024-02-16 NOTE — CARE PLAN
The patient is Stable - Low risk of patient condition declining or worsening    Shift Goals  Clinical Goals: Maintain skin integrity, prevent further skin breakdown within the shift  Patient Goals: Rest, Sleep  Family Goals: ROB    Progress made toward(s) clinical / shift goals:  Pressure wounds documented and cleansed, dressing cdi. Offloading, turning and repositioning done. Safety precautions observed, call bell placed within easy reach.    Patient is not progressing towards the following goals:      Problem: Skin Integrity  Goal: Skin integrity is maintained or improved  Description: Target End Date:  Prior to discharge or change in level of care    Document interventions on Skin Risk/Larry flowsheet groups and corresponding LDA    1.  Assess and monitor skin integrity, appearance and/or temperature  2.  Assess risk factors for impaired skin integrity and/or pressures ulcers  3.  Implement precautions to protect skin integrity in collaboration with interdisciplinary team  4.  Implement pressure ulcer prevention protocol if at risk for skin breakdown  5.  Confirm wound care consult if at risk for skin breakdown  6.  Ensure patient use of pressure relieving devices  (Low air loss bed, waffle overlay, heel protectors, ROHO cushion, etc)  Outcome: Not Progressing

## 2024-02-16 NOTE — DIETARY
Nutrition Update: Pt with noted wounds.     Day 0 of admit.  Debra Rodrigues is a 88 y.o. female with admitting DX of Decubitus ulcer of heel, bilateral;   Patient being followed to optimize nutrition.      Current Diet: Regular.   Per ADL, minimal documentation, pt ate 50-75% of dinner last night.   Pertinent Labs: K+ 3.5.   Pertinent Meds: Vit C 1000 mg daily, Vit D3, Zinc Sulfate.  Pt with bilateral decubitus ulcers, sacrum wound. Wound care consult pending.   RD met briefly with pt, discussed importance of adequate nutrition for wound healing, pt verbalized understanding and agreeable to try Ensure Plus once daily to help optimize intake. Pt states she only ate ~ 25% of Breakfast this am  Pt reports her eating her normal intake PTA of 3 meals/day and wt has been stable with no known wt loss, pt states UBW is ~150#.    No measured wts available.         Problem: Nutritional:  Goal: Achieve adequate nutritional intake  Description: Patient will consume >50% of meals  Outcome: Not met.     Plan/Rec:  Added Ensure Plus once daily per pt request.  RD to follow for wound care RN assessment.   Provide measured wt as feasible.    RD following.

## 2024-02-16 NOTE — DISCHARGE PLANNING
[facility] Transitional Care Coordination    Referral from:  Dr. Costello  Insurance Provider on Facesheet:  Medicare/[employer]  Potential Rehab diagnosis:  Debility    Chart review indicates patient has ongoing medical management and therapy needs    D/C Support:  Chart reflects lives group home    No Physiatry consult per protocol.  Current documentation does not reflect tolerance/participation for IRF level therapy regimen.  If there are interval changes, please reach out to [facility] [id number].  Please reach out if PMR consult requested for medical management.      Last Covid test:    Thank you for the referral.

## 2024-02-16 NOTE — ED NOTES
Owner of group home asked this RN to come to bedside to answer questions about wounds to bony prominences.

## 2024-02-17 ENCOUNTER — PHARMACY VISIT (OUTPATIENT)
Dept: PHARMACY | Facility: MEDICAL CENTER | Age: 89
End: 2024-02-17
Payer: COMMERCIAL

## 2024-02-17 VITALS
OXYGEN SATURATION: 92 % | SYSTOLIC BLOOD PRESSURE: 118 MMHG | RESPIRATION RATE: 18 BRPM | WEIGHT: 198 LBS | HEART RATE: 66 BPM | BODY MASS INDEX: 31.82 KG/M2 | HEIGHT: 66 IN | TEMPERATURE: 96.5 F | DIASTOLIC BLOOD PRESSURE: 57 MMHG

## 2024-02-17 PROBLEM — E66.9 OBESITY (BMI 30-39.9): Status: ACTIVE | Noted: 2024-02-17

## 2024-02-17 LAB
ALBUMIN SERPL BCP-MCNC: 2.9 G/DL (ref 3.2–4.9)
BUN SERPL-MCNC: 20 MG/DL (ref 8–22)
CALCIUM ALBUM COR SERPL-MCNC: 9.7 MG/DL (ref 8.5–10.5)
CALCIUM SERPL-MCNC: 8.8 MG/DL (ref 8.5–10.5)
CHLORIDE SERPL-SCNC: 105 MMOL/L (ref 96–112)
CO2 SERPL-SCNC: 20 MMOL/L (ref 20–33)
CREAT SERPL-MCNC: 0.7 MG/DL (ref 0.5–1.4)
ERYTHROCYTE [DISTWIDTH] IN BLOOD BY AUTOMATED COUNT: 48.2 FL (ref 35.9–50)
GFR SERPLBLD CREATININE-BSD FMLA CKD-EPI: 83 ML/MIN/1.73 M 2
GLUCOSE SERPL-MCNC: 100 MG/DL (ref 65–99)
HCT VFR BLD AUTO: 43.7 % (ref 37–47)
HGB BLD-MCNC: 14.4 G/DL (ref 12–16)
MCH RBC QN AUTO: 30.5 PG (ref 27–33)
MCHC RBC AUTO-ENTMCNC: 33 G/DL (ref 32.2–35.5)
MCV RBC AUTO: 92.6 FL (ref 81.4–97.8)
PHOSPHATE SERPL-MCNC: 3 MG/DL (ref 2.5–4.5)
PLATELET # BLD AUTO: 241 K/UL (ref 164–446)
PMV BLD AUTO: 11 FL (ref 9–12.9)
POTASSIUM SERPL-SCNC: 3.5 MMOL/L (ref 3.6–5.5)
RBC # BLD AUTO: 4.72 M/UL (ref 4.2–5.4)
SARS-COV-2 RNA RESP QL NAA+PROBE: NOTDETECTED
SODIUM SERPL-SCNC: 137 MMOL/L (ref 135–145)
SPECIMEN SOURCE: NORMAL
WBC # BLD AUTO: 6.3 K/UL (ref 4.8–10.8)

## 2024-02-17 PROCEDURE — A9270 NON-COVERED ITEM OR SERVICE: HCPCS | Performed by: HOSPITALIST

## 2024-02-17 PROCEDURE — 80069 RENAL FUNCTION PANEL: CPT

## 2024-02-17 PROCEDURE — 700102 HCHG RX REV CODE 250 W/ 637 OVERRIDE(OP): Mod: JZ | Performed by: HOSPITALIST

## 2024-02-17 PROCEDURE — 87635 SARS-COV-2 COVID-19 AMP PRB: CPT

## 2024-02-17 PROCEDURE — 99239 HOSP IP/OBS DSCHRG MGMT >30: CPT | Performed by: HOSPITALIST

## 2024-02-17 PROCEDURE — G0378 HOSPITAL OBSERVATION PER HR: HCPCS

## 2024-02-17 PROCEDURE — 700102 HCHG RX REV CODE 250 W/ 637 OVERRIDE(OP): Performed by: HOSPITALIST

## 2024-02-17 PROCEDURE — A9270 NON-COVERED ITEM OR SERVICE: HCPCS | Mod: JZ | Performed by: HOSPITALIST

## 2024-02-17 PROCEDURE — A9270 NON-COVERED ITEM OR SERVICE: HCPCS

## 2024-02-17 PROCEDURE — 85027 COMPLETE CBC AUTOMATED: CPT

## 2024-02-17 PROCEDURE — RXMED WILLOW AMBULATORY MEDICATION CHARGE: Performed by: HOSPITALIST

## 2024-02-17 PROCEDURE — 700102 HCHG RX REV CODE 250 W/ 637 OVERRIDE(OP)

## 2024-02-17 PROCEDURE — 36415 COLL VENOUS BLD VENIPUNCTURE: CPT

## 2024-02-17 RX ORDER — POTASSIUM CHLORIDE 20 MEQ/1
40 TABLET, EXTENDED RELEASE ORAL ONCE
Status: COMPLETED | OUTPATIENT
Start: 2024-02-17 | End: 2024-02-17

## 2024-02-17 RX ORDER — NYSTATIN 100000 [USP'U]/G
POWDER TOPICAL 2 TIMES DAILY
Status: DISCONTINUED | OUTPATIENT
Start: 2024-02-17 | End: 2024-02-17 | Stop reason: HOSPADM

## 2024-02-17 RX ORDER — NYSTATIN 100000 [USP'U]/G
1 POWDER TOPICAL 2 TIMES DAILY
Qty: 30 G | Refills: 0 | Status: SHIPPED | OUTPATIENT
Start: 2024-02-17 | End: 2024-03-03

## 2024-02-17 RX ADMIN — APIXABAN 5 MG: 5 TABLET, FILM COATED ORAL at 16:13

## 2024-02-17 RX ADMIN — LOSARTAN POTASSIUM 100 MG: 50 TABLET, FILM COATED ORAL at 04:18

## 2024-02-17 RX ADMIN — HYDRALAZINE HYDROCHLORIDE 25 MG: 50 TABLET ORAL at 16:13

## 2024-02-17 RX ADMIN — APIXABAN 5 MG: 5 TABLET, FILM COATED ORAL at 04:18

## 2024-02-17 RX ADMIN — POTASSIUM CHLORIDE 40 MEQ: 1500 TABLET, EXTENDED RELEASE ORAL at 08:36

## 2024-02-17 RX ADMIN — FAMOTIDINE 20 MG: 20 TABLET, FILM COATED ORAL at 04:18

## 2024-02-17 RX ADMIN — METOPROLOL SUCCINATE 12.5 MG: 25 TABLET, EXTENDED RELEASE ORAL at 04:18

## 2024-02-17 RX ADMIN — OXYCODONE HYDROCHLORIDE AND ACETAMINOPHEN 1000 MG: 500 TABLET ORAL at 04:18

## 2024-02-17 RX ADMIN — Medication 2000 UNITS: at 04:18

## 2024-02-17 RX ADMIN — FLECAINIDE ACETATE 100 MG: 100 TABLET ORAL at 04:18

## 2024-02-17 RX ADMIN — Medication 220 MG: at 04:18

## 2024-02-17 RX ADMIN — NYSTATIN: 100000 POWDER TOPICAL at 05:48

## 2024-02-17 RX ADMIN — HYDRALAZINE HYDROCHLORIDE 25 MG: 50 TABLET ORAL at 08:36

## 2024-02-17 RX ADMIN — LEVOTHYROXINE SODIUM 75 MCG: 0.07 TABLET ORAL at 04:14

## 2024-02-17 RX ADMIN — AMLODIPINE BESYLATE 10 MG: 10 TABLET ORAL at 04:18

## 2024-02-17 RX ADMIN — NYSTATIN: 100000 POWDER TOPICAL at 16:13

## 2024-02-17 RX ADMIN — FAMOTIDINE 20 MG: 20 TABLET, FILM COATED ORAL at 16:13

## 2024-02-17 RX ADMIN — PAROXETINE HYDROCHLORIDE 10 MG: 10 TABLET, FILM COATED ORAL at 04:18

## 2024-02-17 RX ADMIN — DOCUSATE SODIUM 50 MG AND SENNOSIDES 8.6 MG 2 TABLET: 8.6; 5 TABLET, FILM COATED ORAL at 16:12

## 2024-02-17 ASSESSMENT — PAIN DESCRIPTION - PAIN TYPE: TYPE: ACUTE PAIN

## 2024-02-17 NOTE — WOUND TEAM
[facility]  Inpatient Services  Initial Wound and Skin Care Evaluation    Admission Date: 2/15/2024     Last order of IP CONSULT TO WOUND CARE was found on 2/15/2024 from Hospital Encounter on 2/15/2024     HPI, PMH, SH: Reviewed    Past Surgical History:   Procedure Laterality Date    BREAST RECONSTRUCTION  11/23/2010    Performed by ANNITA KELLEY at SURGERY [facility]    TISSUE EXPANDER PLACE/REMOVE  11/23/2010    Performed by ANNITA KELLEY at SURGERY [facility]    MASTOPEXY  11/23/2010    Performed by ANNITA KELLEY at SURGERY [facility]    BREAST RECONSTRUCTION  4/20/2010    Performed by ANNITA KELLEY at SURGERY [facility]    TISSUE EXPANDER PLACE/REMOVE  4/20/2010    Performed by ANNITA KELLEY at [facility]    MASTECTOMY  5/13/2009    left    PACEMAKER INSERTION  2009    KNEE ARTHROSCOPY  1995    right    RAYNA BY LAPAROSCOPY  1995    HYSTERECTOMY, TOTAL ABDOMINAL  1972    BSO    MASTOIDECTOMY  1943    left    TONSILLECTOMY AND ADENOIDECTOMY  1942    CATARACT EXTRACTION WITH IOL      bilateral     Social History     Tobacco Use    Smoking status: Never    Smokeless tobacco: Never   Substance Use Topics    Alcohol use: Yes     Alcohol/week: 3.5 oz     Types: 7 Standard drinks or equivalent per week     Comment: one per day     Chief Complaint   Patient presents with    Foot Pain     Pt bib EMS from snf: [facility] to [place]  # [phone] 5/10 pain to heels bilaterally. Deep tissue pressure injuries noted to  bilateral heels R>L     Weakness     States hasn't been able to ambulate with her walker for few weeks now.      Diagnosis: Decubitus ulcer of heel, bilateral [L89.619, L89.629]    Unit where seen by Wound Team: S615/02     WOUND CONSULT RELATED TO:  Sacrum & Bilateral heels    WOUND TEAM PLAN OF CARE - Frequency of Follow-up:   Nursing to follow dressing orders written for wound care. Contact wound team if area fails to  progress, deteriorates or with any questions/concerns if something comes up before next scheduled follow up (See below as to whether wound is following and frequency of wound follow up)   Weekly - Bilateral heels, sacrum    WOUND HISTORY:   Debra Rodrigues is a 88 y.o. female with PAF, HTD, depression, hypothyroid, pacemaker.  She presented 2/15/2024 with worsening physical capacity and is vastly bedbound now.  There just was an admit 1/5-1/22/24 at the hospital for a pneumonia. She was recently placed at a group home by her family as she was falling frequently at home.  At the group home she has developed bilateral eschar on the heels and sacral pressure ulcer stage I.  Her feet are warm and have good 2+ dorsalis pedal pulses.  She states poor oral intake.     WOUND ASSESSMENT/LDA  Wound Heel Right non blanchable (Active)   No Date First Assessed or Time First Assessed found.   Location: Heel  Laterality: Right  Wound Description (Comments): non blanchable      Assessments 2/16/2024  4:00 PM   Wound Image     Site Assessment Black;Dry;Eschar   Periwound Assessment Clean;Dry;Intact;Flaky   Margins Undefined edges   Closure Open to air   Drainage Amount None   Treatments Cleansed;Site care;Nonselective debridement   Offloading/DME Offloading Boot   Wound Cleansing Povidone-Iodine   Periwound Protectant Not Applicable   Dressing Status Open to Air   Dressing Cleansing/Solutions Not Applicable   Dressing Options Open to Air   Dressing Change/Treatment Frequency Every Shift, and As Needed   Wound Team Following Weekly   Wound Length (cm) 5.5 cm   Wound Width (cm) 6 cm   Wound Surface Area (cm^2) 33 cm^2   Shape circular   Wound Odor None   Pulses 1+   WOUND NURSE ONLY - Time Spent with Patient (mins) 45       Wound Heel Left non blanchable (Active)   No Date First Assessed or Time First Assessed found.   Location: Heel  Laterality: Left  Wound Description (Comments): non blanchable      Assessments 2/16/2024  4:00  PM   Wound Image     Site Assessment Black;Dry;Boggy   Periwound Assessment Pink;Clean;Dry;Intact   Margins Attached edges;Undefined edges   Closure Adhesive bandage   Drainage Amount None   Treatments Cleansed;Nonselective debridement;Site care   Offloading/DME Offloading Boot   Wound Cleansing Povidone-Iodine   Periwound Protectant Not Applicable   Dressing Status Open to Air   Dressing Cleansing/Solutions Not Applicable   Dressing Options Open to Air   Dressing Change/Treatment Frequency Every 48 hrs, and As Needed   Wound Team Following Weekly   Wound Length (cm) 3.3 cm   Wound Width (cm) 3 cm   Wound Surface Area (cm^2) 9.9 cm^2   Shape circular   Wound Odor None   Pulses 1+   WOUND NURSE ONLY - Time Spent with Patient (mins) 45       Wound 02/15/24 Pressure Injury Sacrum Bilateral non-blanching (Active)   Date First Assessed/Time First Assessed: 02/15/24 1900   Present on Original Admission: Yes  Hand Hygiene Completed: Yes  Primary Wound Type: Pressure Injury  Location: Sacrum  Laterality: Bilateral  Wound Description (Comments): non-blanching      Assessments 2/16/2024  4:00 PM   Wound Image     Site Assessment Red;Fragile   Periwound Assessment Pink;Clean;Dry;Intact   Margins Attached edges   Closure Adhesive bandage   Drainage Amount None   Treatments Cleansed;Site care;Offloading   Offloading/DME Other (comment)   Wound Cleansing Foam Cleanser/Washcloth   Periwound Protectant Skin Protectant Wipes to Periwound   Dressing Status Clean;Dry;Intact   Dressing Changed Reapplied   Dressing Cleansing/Solutions Not Applicable   Dressing Options Offloading Dressing - Sacral   Dressing Change/Treatment Frequency Every 72 hrs, and As Needed   NEXT Dressing Change/Treatment Date 02/19/24   Wound Team Following Weekly   Pressure Injury Stage Stage 2   WOUND NURSE ONLY - Time Spent with Patient (mins) 45       Wound 02/16/24 Hip Proximal Left POA sDTI (Active)   Date First Assessed/Time First Assessed: 02/16/24 1600    Location: Hip  Wound Orientation: Proximal  Laterality: Left  Wound Description (Comments): POA sDTI      Assessments 2024  4:00 PM   Wound Image     Site Assessment Intact;Red;Purple   Periwound Assessment Pink   Margins Attached edges   Closure Open to air   Drainage Amount None   Treatments Offloading   Offloading/DME Other (comment)   Wound Cleansing Not Applicable   Periwound Protectant Not Applicable   Dressing Status Open to Air   Wound Team Following Weekly   WOUND NURSE ONLY - Time Spent with Patient (mins) 45        Vascular:    ANDREW:   ANDREW Results, Last 30 Days US-ANDREW SINGLE LEVEL BILAT    Result Date: 2/15/2024  Narrative  Vascular Laboratory  Conclusions  Bilateral.  The ankle-brachial indices are normal.  CHRISTINE LANDA  Age:    88    Gender:     F  MRN:    [id number]  :    1935      BSA:  Exam Date:     02/15/2024 16:38  Room #:     Inpatient  Priority:     Stat  Ht (in):             Wt (lb):  Ordering Physician:        KATERIN ALVAREZ  Referring Physician:       KIM Tolbert  Sonographer:               Uma Montez RVT  Study Type:                Complete Bilateral  Technical Quality:         Adequate  Indications:     Ulcer of heel and midfoot  CPT Codes:       49498  ICD Codes:       707.14  History:         Pressure wounds of both heels. No prior exam.  Limitations:     Patient declined brachial pressure of left arm.                 RIGHT  Waveform            Systolic BPs (mmHg)                             140           Brachial  Biphasic                                 Common Femoral  Biphasic                                 Popliteal  Biphasic                   144           Posterior Tibial  Biphasic                   138           Dorsalis Pedis                                           Digit                             1.03          ANDREW                                           TBI                       LEFT  Waveform        Systolic BPs (mmHg)                                            Brachial  Biphasic                                 Common Femoral  Biphasic                                 Popliteal  Biphasic                   145           Posterior Tibial  Biphasic                   160           Dorsalis Pedis                                           Digit                             1.14          ANDREW                                           TBI  Findings  Bilateral.  The ankle-brachial indices are normal.  Doppler waveforms of the common femoral, popliteal, posterior tibial, and  dorsalis pedis arteries are of high amplitude and multiphasic.  Additional testing was not performed in accordance with lower extremity  arterial evaluation protocol.  Sedrick Saucedo MD  (Electronically Signed)  Final Date:      15 February 2024                   17:25      Lab Values:    Lab Results   Component Value Date/Time    WBC 6.5 02/16/2024 03:55 AM    RBC 5.24 02/16/2024 03:55 AM    HEMOGLOBIN 15.8 02/16/2024 03:55 AM    HEMATOCRIT 48.2 (H) 02/16/2024 03:55 AM         Culture Results show:  No results found for this or any previous visit (from the past 720 hour(s)).    Pain Level/Medicated:  None, Tolerated without pain medication       INTERVENTIONS BY WOUND TEAM:  Chart and images reviewed. Discussed with bedside RN. All areas of concern (based on picture review, LDA review and discussion with bedside RN) have been thoroughly assessed. Documentation of areas based on significant findings. This RN in to assess patient. Performed standard wound care which includes appropriate positioning, dressing removal and non-selective debridement. Pictures and measurements obtained weekly if/when required.    Wound:  L heel  Mamie wound: Cleansed with Wound cleanser and Gauze, Prepped with No Sting  Primary Dressing:  Betadine  Secondary (Outer) Dressing: Aquacel AG, offloading dressing     Wound:  R heel  Cleansed/Non-selectively Debrided with:  Wound cleanser and Gauze  Mamie wound: Cleansed with  Wound cleanser and Gauze, Prepped with No Sting  Primary Dressing:  Betadine, STACI     Wound:  Sacrum  Cleansed/Non-selectively Debrided with:  Wound cleanser and Gauze  Mal wound: Cleansed with Wound cleanser and Gauze, Prepped with No Sting  Primary Dressing:  Sacral offloading dressing    Advanced Wound Care Discharge Planning  Number of Clinicians necessary to complete wound care: 1-2  Is patient requiring IV pain medications for dressing changes:  No   Length of time for dressing change 20 min. (This does not include chart review, pre-medication time, set up, clean up or time spent charting.)    Interdisciplinary consultation: Patient, Bedside RN, Adelina PARTIDA (Wound RN).  Pressure injury and staging reviewed with N/A.    EVALUATION / RATIONALE FOR TREATMENT:     Date:  02/16/24  Wound Status:  Initial evaluation    The patient had POA partial thickness wounds to bilateral sacrum that were non-blanching. Offloading dressing in place. The R heel had a large area with eschar noted, but was dry. Due to this, betadine used, and will be left open to air. The L heel had eschar present also, but the area was boggy, and soft to the touch. This area is likely to open up. Betadine was placed over eschar, and Aquacel AG used to cover area. Aquacel Ag Hydrofiber applied to manage bioburden, absorb exudate, and maintain a moist wound environment without laterally wicking exudate therefore reducing mal-wound maceration. The patient also had a POA sDTI on the left hip/thigh area. There were 2 long, purple, non-blanching areas on hip that appear to be created by a device. This area will be left open to air, and offloaded with pillows, or dressings.            Goals: Steady decrease in wound area and depth weekly.    NURSING PLAN OF CARE ORDERS:  Dressing changes: See Dressing Care orders  Skin care: See Skin Care orders    NUTRITION RECOMMENDATIONS   Wound Team Recommendations:  N/A    DIET ORDERS (From admission to next 24h)        Start     Ordered    02/16/24 1421  Supplements  ONCE        Question Answer Comment   Which Supplement Ensure    Ensure: Ensure Plus Carton Please send chocolate Ensure once daily with breakfast meal.       02/16/24 1421    02/15/24 1622  Diet Order Diet: Regular  ALL MEALS        Question:  Diet:  Answer:  Regular    02/15/24 1623                    PREVENTATIVE INTERVENTIONS:    Q shift Larry - performed per nursing policy  Q shift pressure point assessments - performed per nursing policy    Surface/Positioning  Reposition q 2 hours - Currently in Place  TAPs Turning system - Currently in Place    Offloading/Redistribution  Sacral offloading dressing (Silicone dressing) - Currently in Place  Heel float boots (Prevalon boot) - Currently in Place      Containment/Moisture Prevention    Dri-eliana pad - Currently in Place  Purwick/Condom Cath - Currently in Place    Anticipated discharge plans:  TBD and N/A        Vac Discharge Needs:  Vac Discharge plan is purely a recommendation from wound team and not a requirement for discharge unless otherwise stated by physician.  Not Applicable Pt not on a wound vac

## 2024-02-17 NOTE — PROGRESS NOTES
Bedside report received from DAMIAN Villalpando. Patient resting in bed. Call bell within reach, bed frame alarm on and in place. No further needs at this time.

## 2024-02-17 NOTE — PROGRESS NOTES
Hospital Medicine Daily Progress Note    Date of Service  2/16/2024    Chief Complaint  Debra Rodrigues is a 88 y.o. female admitted 2/15/2024 with decubitus ulcers     Hospital Course  No notes on file    Interval Problem Update  2/16: Ordering left heel boot.  Consulted rehab but patient is not a candidate.  Ordered resume home health.  Discussed with patient and son and daughter-in-law at bedside and discussed need for motivation and participation.  Patient agrees and says she will try hard on this.  Discussed that without this, she may need to go on hospice in the future which she does not want.  Total time 58 minutes.    I have discussed this patient's plan of care and discharge plan at IDT rounds today with Case Management, Nursing, Nursing leadership, and other members of the IDT team.    Disposition  The patient is not medically cleared for discharge to home or a post-acute facility.      I have placed the appropriate orders for post-discharge needs.    Review of Systems  Review of Systems   Unable to perform ROS: Medical condition   Musculoskeletal:  Positive for joint pain.        Physical Exam  Temp:  [35.9 °C (96.6 °F)-36.4 °C (97.6 °F)] 36.4 °C (97.6 °F)  Pulse:  [65-67] 65  Resp:  [17-18] 17  BP: (132-147)/(52-72) 142/62  SpO2:  [90 %-95 %] 92 %    Physical Exam  Constitutional:       Appearance: She is well-developed.   HENT:      Head: Normocephalic.   Cardiovascular:      Rate and Rhythm: Normal rate.      Heart sounds:      No gallop.   Pulmonary:      Effort: No respiratory distress.      Breath sounds: No wheezing.   Abdominal:      General: There is no distension.      Tenderness: There is no abdominal tenderness.   Musculoskeletal:         General: Tenderness present.   Skin:     General: Skin is warm.   Neurological:      Mental Status: She is alert. Mental status is at baseline.         Fluids    Intake/Output Summary (Last 24 hours) at 2/16/2024 1724  Last data filed at 2/16/2024  0903  Gross per 24 hour   Intake 870 ml   Output 200 ml   Net 670 ml       Laboratory  Recent Labs     02/15/24  1335 02/16/24  0355   WBC 6.1 6.5   RBC 4.87 5.24   HEMOGLOBIN 15.0 15.8   HEMATOCRIT 44.7 48.2*   MCV 91.8 92.0   MCH 30.8 30.2   MCHC 33.6 32.8   RDW 49.1 48.4   PLATELETCT 285 224   MPV 11.2 11.2     Recent Labs     02/15/24  1335 02/16/24  0355   SODIUM 141 137   POTASSIUM 3.8 3.5*   CHLORIDE 106 104   CO2 22 20   GLUCOSE 98 102*   BUN 19 16   CREATININE 0.78 0.63   CALCIUM 9.1 8.7                   Imaging  US-ANDREW SINGLE LEVEL BILAT   Final Result      DX-OS CALCIS (HEEL) 2+ RIGHT   Final Result      No evidence of fracture or bony destructive change.      DX-OS CALCIS (HEEL) 2+ LEFT   Final Result      No evidence of fracture or bony destructive change.           Assessment/Plan  * Decubitus ulcer of heel, bilateral- (present on admission)  Assessment & Plan  ANDREW normal  Needs outpatient wound care.  I have ordered wound care for here  Iodine likely to be placed.  Future I+D by wound care once further desquamation.  Vit C and zinc  Float the heels  Frequently turn patient and off load pressure ulcer spots.    Impaired mobility- (present on admission)  Assessment & Plan  PT/OT  Denies depression as cause of her lack of mobiilty  Patient is not sure why he she did not previously cooperate with home health but she says she will do so in the future.    Pacemaker- (present on admission)  Assessment & Plan  Noted on right upper chest    Acquired hypothyroidism- (present on admission)  Assessment & Plan  Synthroid 75mcg daily    Falls- (present on admission)  Assessment & Plan  Due to weakness    MDD (major depressive disorder), recurrent, in partial remission (HCC)- (present on admission)  Assessment & Plan  Paxil    Polyneuropathy in other diseases classified elsewhere (HCC)- (present on admission)  Assessment & Plan  Gabapentin 100mg q HS  Paroxetine 10mg qd    Primary hypertension- (present on  admission)  Assessment & Plan  Amlodipine 10mg qd, losartan 100mg qd, metoprolol Sr 12.5mg qd  Monitor BP    Paroxysmal atrial fibrillation (HCC)- (present on admission)  Assessment & Plan  Rate control on metoprolol         VTE prophylaxis: apixiban    I have performed a physical exam and reviewed and updated ROS and Plan today (2/16/2024). In review of yesterday's note (2/15/2024), there are no changes except as documented above.

## 2024-02-17 NOTE — CARE PLAN
The patient is Stable - Low risk of patient condition declining or worsening    Shift Goals  Clinical Goals: Patient will be turned every 2 hours to maintain skin integrity this shift  Patient Goals: rest  Family Goals: ROB    Progress made toward(s) clinical / shift goals:      Problem: Skin Integrity  Goal: Skin integrity is maintained or improved this shift  Outcome: Progressing  Patient turned and protective measure in place, heel float boots, mepilex on sacrum and heels. Wedges used to turn patient.      Problem: Fall Risk  Goal: Patient will remain free from falls this shift  Outcome: Progressing   No falls    Patient is not progressing towards the following goals:

## 2024-02-17 NOTE — CARE PLAN
The patient is Stable - Low risk of patient condition declining or worsening    Shift Goals  Clinical Goals: patient will improve skin integrity by end of shift  Patient Goals: feel better  Family Goals: updated on poc    Progress made toward(s) clinical / shift goals:  Skin care completed. Q2t. Freq incontinence care. Son at bedside- aware of poc - dc to group home pending covid swab.     Patient is not progressing towards the following goals:

## 2024-02-17 NOTE — DISCHARGE PLANNING
Received choice form @: [id number]  Agency/Facility name: [facility]  Sent referral per choice form @: [id number]

## 2024-02-17 NOTE — DISCHARGE PLANNING
Case Management Discharge Planning    Admission Date: 2/15/2024  GMLOS:    ALOS: 0    6-Clicks ADL Score: 12  6-Clicks Mobility Score: 7  PT and/or OT Eval ordered: Yes  Post-acute Referrals Ordered: Yes  Post-acute Choice Obtained: Yes  Has referral(s) been sent to post-acute provider:  Yes      Anticipated Discharge Dispo: Discharge Disposition: D/T to home under [facility]HA care in anticipation of covered skilled care (06)    DME Needed: No    Action(s) Taken: [facility] spoke with Miguel from [facility] [phone]. Miguel requested a negative Covid test prior to returning to group [place] along with the Nystatin to DC with. Pt will have [facility] following her in the group [place]. Per Miguel, the pt's family, and PT/OT, Pt has limited motivation for AODL's. Miguel reports that the pt presents with limited willingness to participate in care. Miguel reported that she is a directional participant and that if you tell her to participate at each step of care with support she will participate. Miguel reported that him and his caregivers have created a better care plan for the pt prior to DC back to the  as they are concerned about her wounds from laying in bed. [facility] to participate in this plan of care for the pt to support participation in care and wound healing. SW to set up transportation for 1500.     Addendum @ 1505: LMSW requested a 1630 DC transport time. Pending negative COVID test.     Addendum @ 1605: SW resent request for transport to [facility]. LMSW requested ED CM assistance with coordinating DC. Pt pending transport confirmation. Bedside RN aware. COVID not detected in test.     Escalations Completed: None    Medically Clear: Yes    Next Steps: LMSW to follow for transport.     Barriers to Discharge: Transportation    Is the patient up for discharge tomorrow: No

## 2024-02-17 NOTE — PROGRESS NOTES
All discharge education given. Any questions answered. SonMaurice, at bedside for all dc education. All belongings gathered. Meds to beds delivered to bedside.

## 2024-02-17 NOTE — CARE PLAN
The patient is Stable - Low risk of patient condition declining or worsening    Shift Goals  Clinical Goals: Patient will be turned and repositioned every 2 hours within the shift to [facility] skin integrity  Patient Goals: Rest  Family Goals: ROB    Progress made toward(s) clinical / shift goals: Offloading, turning and repositioning done, maintained on Low airloss mattress. Dressing to pressure wounds CDI. Safety precautions observed, call bell placed within easy reach.     Patient is not progressing towards the following goals:      Problem: Skin Integrity  Goal: Skin integrity is maintained or improved  Description: Target End Date:  Prior to discharge or change in level of care    Document interventions on Skin Risk/Larry flowsheet groups and corresponding LDA    1.  Assess and monitor skin integrity, appearance and/or temperature  2.  Assess risk factors for impaired skin integrity and/or pressures ulcers  3.  Implement precautions to protect skin integrity in collaboration with interdisciplinary team  4.  Implement pressure ulcer prevention protocol if at risk for skin breakdown  5.  Confirm wound care consult if at risk for skin breakdown  6.  Ensure patient use of pressure relieving devices  (Low air loss bed, waffle overlay, heel protectors, ROHO cushion, etc)  Outcome: Not Progressing

## 2024-02-18 NOTE — DISCHARGE SUMMARY
Hospital Medicine Discharge Note     Admit Date:  2/15/2024       Discharge Date:   2/17/2024    Attending Physician:  Chaim Costello M.D.     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 *      Hospital Summary (Brief Narrative):       88 y.o. female with PAF, HTD, depression, hypothyroid, pacemaker.  She presented 2/15/2024 with worsening physical capacity and is vastly bedbound now.  There just was an admit 1/5-1/22/24 at the hospital for a pneumonia. She was recently placed at a group home by her family as she was falling frequently at home.  At the group home she has developed bilateral eschar on the heels and sacral pressure ulcer stage I.   She was admitted and seen by wound care.  ANDREW was normal.  The case was discussed with the patient and family in detail.  The patient was given essentially an ultimatum that she needs to cooperate with the new home health which has been ordered.  If she does not and continues to decline then she may need to consider hospice in the future.  Patient is agreeable and does not want to be on hospice and will work with therapy.  Group home will also work on preventing development of further pressure ulcers on patient.  Home health will help provide wound care.      Consultants:      None    Procedures:        None    Discharge  Medications:           Medication List        START taking these medications        Instructions   nystatin powder  Commonly known as: Mycostatin   Apply 1 g topically 2 times a day for 9 days.  Dose: 1 Application            CONTINUE taking these medications        Instructions   acetaminophen 325 MG Tabs  Commonly known as: Tylenol   Take 2 Tablets by mouth every four hours as needed (pain).  Dose: 650 mg     amLODIPine 10 MG Tabs  Commonly known as: Norvasc   Take 1 Tablet by mouth every day.  Dose: 10 mg     CALMOSEPTINE EX   Apply 1 Dose topically 2 times a day. Apply thin layer of calmoseptine twice daily to groin buttocks area with brief changes. Use house supply.  Dose: 1 Dose     Eliquis 5 MG Tabs  Generic drug: apixaban   Take 1 Tablet by mouth 2 times a day.  Dose: 5 mg     famotidine 20 MG Tabs  Commonly known as: Pepcid   Take 1 Tablet by mouth 2 times a day.  Dose: 20 mg     flecainide 100 MG Tabs  Commonly known as: Tambocor   Take 1 Tablet by mouth every day.  Dose: 100 mg     gabapentin 100 MG Caps  Commonly known as: Neurontin   Take 1 Capsule by mouth at bedtime.  Dose: 100 mg     hydrALAZINE 25 MG Tabs  Commonly known as: Apresoline   Take 1 Tablet by mouth 3 times a day.  Dose: 25 mg     levothyroxine 75 MCG Tabs  Commonly known as: Synthroid   Take 1 Tablet by mouth every day.  Dose: 75 mcg     losartan 100 MG Tabs  Commonly known as: Cozaar   Take 100 mg by mouth every day.  Dose: 100 mg     metoprolol SR 25 MG Tb24  Commonly known as: Toprol XL   Take 0.5 Tablets by mouth every day.  Dose: 12.5 mg     PARoxetine 10 MG Tabs  Commonly known as: Paxil   Take 1 Tablet by mouth every day.  Dose: 10 mg     traZODone 50 MG Tabs  Commonly known as: Desyrel   Take 1 Tablet by mouth at bedtime.  Dose: 50 mg     vitamin D3 1000 Unit (25 mcg) Tabs  Commonly known as: Cholecalciferol   Take 2 Tablets by mouth every day.  Dose: 2,000 Units            Disposition:   Discharge home    Activity:   As  tolerated    Code status:   DNAR/DNI    Primary Care Provider:    ASHLEY Silva    Follow up appointment details :      ASHLEY Silva  [address]  [place] [address]  [phone]    Schedule an appointment as soon as possible for a visit      No future appointments.    Pending Studies:        None    Time spent on discharge day patient visit: 44 minutes    #################################################    Interval history/exam for day of discharge:    Vitals:    02/17/24 0356 02/17/24 0700 02/17/24 0835 02/17/24 1600   BP: (!) 144/75 131/65 138/61 118/57   Pulse: 65 65 65 66   Resp: 17 16  18   Temp: 36.1 °C (97 °F) 36 °C (96.8 °F)  35.8 °C (96.5 °F)   TempSrc: Temporal Temporal  Temporal   SpO2: 92% 91%  92%   Weight:       Height:         Weight/BMI: Body mass index is 31.96 kg/m².  Pulse Oximetry: 92 %, O2 (LPM): 0, O2 Delivery Device: None - Room Air    Most Recent Labs:    Lab Results   Component Value Date/Time    WBC 6.3 02/17/2024 01:58 AM    RBC 4.72 02/17/2024 01:58 AM    HEMOGLOBIN 14.4 02/17/2024 01:58 AM    HEMATOCRIT 43.7 02/17/2024 01:58 AM    MCV 92.6 02/17/2024 01:58 AM    MCH 30.5 02/17/2024 01:58 AM    MCHC 33.0 02/17/2024 01:58 AM    MPV 11.0 02/17/2024 01:58 AM    NEUTSPOLYS 56.10 02/15/2024 01:35 PM    LYMPHOCYTES 23.30 02/15/2024 01:35 PM    MONOCYTES 13.80 (H) 02/15/2024 01:35 PM    EOSINOPHILS 5.10 02/15/2024 01:35 PM    BASOPHILS 1.00 02/15/2024 01:35 PM      Lab Results   Component Value Date/Time    SODIUM 137 02/17/2024 01:58 AM    POTASSIUM 3.5 (L) 02/17/2024 01:58 AM    CHLORIDE 105 02/17/2024 01:58 AM    CO2 20 02/17/2024 01:58 AM    GLUCOSE 100 (H) 02/17/2024 01:58 AM    BUN 20 02/17/2024 01:58 AM    CREATININE 0.70 02/17/2024 01:58 AM    GLOMRATE 85 07/01/2022 05:37 AM      Lab Results   Component Value Date/Time    ALTSGPT 42 02/15/2024 01:35 PM    ASTSGOT 30 02/15/2024 01:35 PM    ALKPHOSPHAT 98 02/15/2024 01:35 PM    TBILIRUBIN 0.8 02/15/2024 01:35 PM  "   ALBUMIN 2.9 (L) 02/17/2024 01:58 AM    GLOBULIN 3.8 (H) 02/15/2024 01:35 PM     No results found for: \"PROTHROMBTM\", \"INR\"     Imaging/ Testing:      US-ANDREW SINGLE LEVEL BILAT   Final Result      DX-OS CALCIS (HEEL) 2+ RIGHT   Final Result      No evidence of fracture or bony destructive change.      DX-OS CALCIS (HEEL) 2+ LEFT   Final Result      No evidence of fracture or bony destructive change.          Instructions:      The patient was instructed to return to the ER in the event of worsening symptoms. I have counseled the patient on the importance of compliance and the patient has agreed to proceed with all medical recommendations and follow up plan indicated above.   The patient understands that all medications come with benefits and risks. Risks may include permanent injury or death and these risks can be minimized with close reassessment and monitoring.        "

## 2024-02-18 NOTE — DISCHARGE PLANNING
SW received update from [facility] that transport arranged for 4:45pm. RN notified.

## 2024-03-05 ENCOUNTER — APPOINTMENT (OUTPATIENT)
Dept: RADIOLOGY | Facility: MEDICAL CENTER | Age: 89
DRG: 689 | End: 2024-03-05
Attending: EMERGENCY MEDICINE
Payer: MEDICARE

## 2024-03-05 ENCOUNTER — HOSPITAL ENCOUNTER (INPATIENT)
Facility: MEDICAL CENTER | Age: 89
LOS: 3 days | DRG: 689 | End: 2024-03-08
Attending: EMERGENCY MEDICINE | Admitting: STUDENT IN AN ORGANIZED HEALTH CARE EDUCATION/TRAINING PROGRAM
Payer: MEDICARE

## 2024-03-05 DIAGNOSIS — G31.9 CEREBRAL ATROPHY (HCC): ICD-10-CM

## 2024-03-05 DIAGNOSIS — E87.1 HYPONATREMIA: ICD-10-CM

## 2024-03-05 DIAGNOSIS — N76.0 ACUTE VAGINITIS: ICD-10-CM

## 2024-03-05 DIAGNOSIS — N39.0 URINARY TRACT INFECTION WITHOUT HEMATURIA, SITE UNSPECIFIED: ICD-10-CM

## 2024-03-05 DIAGNOSIS — J96.01 ACUTE RESPIRATORY FAILURE WITH HYPOXIA (HCC): ICD-10-CM

## 2024-03-05 DIAGNOSIS — R41.82 MENTAL STATUS, DECREASED: ICD-10-CM

## 2024-03-05 DIAGNOSIS — R54 AGE-RELATED PHYSICAL DEBILITY: ICD-10-CM

## 2024-03-05 DIAGNOSIS — N39.0 ACUTE UTI: ICD-10-CM

## 2024-03-05 DIAGNOSIS — R41.89 COGNITIVE IMPAIRMENT: ICD-10-CM

## 2024-03-05 DIAGNOSIS — E86.0 DEHYDRATION: ICD-10-CM

## 2024-03-05 DIAGNOSIS — I10 PRIMARY HYPERTENSION: ICD-10-CM

## 2024-03-05 DIAGNOSIS — E87.6 HYPOKALEMIA: ICD-10-CM

## 2024-03-05 PROBLEM — Z71.89 ACP (ADVANCE CARE PLANNING): Status: ACTIVE | Noted: 2024-03-05

## 2024-03-05 PROBLEM — E87.70 VOLUME OVERLOAD: Status: ACTIVE | Noted: 2024-03-05

## 2024-03-05 PROBLEM — I48.91 ATRIAL FIBRILLATION WITH RVR (HCC): Status: ACTIVE | Noted: 2024-03-05

## 2024-03-05 PROBLEM — G93.40 ACUTE ENCEPHALOPATHY: Status: ACTIVE | Noted: 2024-03-05

## 2024-03-05 LAB
ALBUMIN SERPL BCP-MCNC: 3.3 G/DL (ref 3.2–4.9)
ALBUMIN/GLOB SERPL: 1 G/DL
ALP SERPL-CCNC: 100 U/L (ref 30–99)
ALT SERPL-CCNC: 105 U/L (ref 2–50)
AMORPH CRY #/AREA URNS HPF: PRESENT /HPF
ANION GAP SERPL CALC-SCNC: 13 MMOL/L (ref 7–16)
APPEARANCE UR: ABNORMAL
AST SERPL-CCNC: 59 U/L (ref 12–45)
BACTERIA #/AREA URNS HPF: ABNORMAL /HPF
BASOPHILS # BLD AUTO: 0.3 % (ref 0–1.8)
BASOPHILS # BLD: 0.03 K/UL (ref 0–0.12)
BILIRUB SERPL-MCNC: 0.9 MG/DL (ref 0.1–1.5)
BILIRUB UR QL STRIP.AUTO: NEGATIVE
BUN SERPL-MCNC: 21 MG/DL (ref 8–22)
CALCIUM ALBUM COR SERPL-MCNC: 9.8 MG/DL (ref 8.5–10.5)
CALCIUM SERPL-MCNC: 9.2 MG/DL (ref 8.5–10.5)
CHLORIDE SERPL-SCNC: 113 MMOL/L (ref 96–112)
CO2 SERPL-SCNC: 22 MMOL/L (ref 20–33)
COLOR UR: YELLOW
CREAT SERPL-MCNC: 0.97 MG/DL (ref 0.5–1.4)
CRP SERPL HS-MCNC: <0.3 MG/DL (ref 0–0.75)
EKG IMPRESSION: NORMAL
EOSINOPHIL # BLD AUTO: 0.01 K/UL (ref 0–0.51)
EOSINOPHIL NFR BLD: 0.1 % (ref 0–6.9)
EPI CELLS #/AREA URNS HPF: NEGATIVE /HPF
ERYTHROCYTE [DISTWIDTH] IN BLOOD BY AUTOMATED COUNT: 52.1 FL (ref 35.9–50)
ERYTHROCYTE [SEDIMENTATION RATE] IN BLOOD BY WESTERGREN METHOD: 22 MM/HOUR (ref 0–25)
GFR SERPLBLD CREATININE-BSD FMLA CKD-EPI: 56 ML/MIN/1.73 M 2
GLOBULIN SER CALC-MCNC: 3.4 G/DL (ref 1.9–3.5)
GLUCOSE SERPL-MCNC: 130 MG/DL (ref 65–99)
GLUCOSE UR STRIP.AUTO-MCNC: NEGATIVE MG/DL
HCT VFR BLD AUTO: 45.8 % (ref 37–47)
HGB BLD-MCNC: 15.2 G/DL (ref 12–16)
HYALINE CASTS #/AREA URNS LPF: ABNORMAL /LPF
IMM GRANULOCYTES # BLD AUTO: 0.1 K/UL (ref 0–0.11)
IMM GRANULOCYTES NFR BLD AUTO: 0.9 % (ref 0–0.9)
INR PPP: 1.54 (ref 0.87–1.13)
KETONES UR STRIP.AUTO-MCNC: NEGATIVE MG/DL
LACTATE SERPL-SCNC: 1.6 MMOL/L (ref 0.5–2)
LDH SERPL L TO P-CCNC: 561 U/L (ref 107–266)
LEUKOCYTE ESTERASE UR QL STRIP.AUTO: ABNORMAL
LYMPHOCYTES # BLD AUTO: 1.74 K/UL (ref 1–4.8)
LYMPHOCYTES NFR BLD: 15.7 % (ref 22–41)
MAGNESIUM SERPL-MCNC: 2 MG/DL (ref 1.5–2.5)
MCH RBC QN AUTO: 30.3 PG (ref 27–33)
MCHC RBC AUTO-ENTMCNC: 33.2 G/DL (ref 32.2–35.5)
MCV RBC AUTO: 91.4 FL (ref 81.4–97.8)
MICRO URNS: ABNORMAL
MONOCYTES # BLD AUTO: 1.46 K/UL (ref 0–0.85)
MONOCYTES NFR BLD AUTO: 13.2 % (ref 0–13.4)
NEUTROPHILS # BLD AUTO: 7.74 K/UL (ref 1.82–7.42)
NEUTROPHILS NFR BLD: 69.8 % (ref 44–72)
NITRITE UR QL STRIP.AUTO: POSITIVE
NRBC # BLD AUTO: 0 K/UL
NRBC BLD-RTO: 0 /100 WBC (ref 0–0.2)
NT-PROBNP SERPL IA-MCNC: 6388 PG/ML (ref 0–125)
PH UR STRIP.AUTO: 7 [PH] (ref 5–8)
PHOSPHATE SERPL-MCNC: 2.6 MG/DL (ref 2.5–4.5)
PLATELET # BLD AUTO: 242 K/UL (ref 164–446)
PMV BLD AUTO: 11 FL (ref 9–12.9)
POTASSIUM SERPL-SCNC: 3.1 MMOL/L (ref 3.6–5.5)
PROCALCITONIN SERPL-MCNC: <0.05 NG/ML
PROT SERPL-MCNC: 6.7 G/DL (ref 6–8.2)
PROT UR QL STRIP: NEGATIVE MG/DL
PROTHROMBIN TIME: 18.7 SEC (ref 12–14.6)
RBC # BLD AUTO: 5.01 M/UL (ref 4.2–5.4)
RBC # URNS HPF: ABNORMAL /HPF
RBC UR QL AUTO: ABNORMAL
SODIUM SERPL-SCNC: 148 MMOL/L (ref 135–145)
SP GR UR STRIP.AUTO: 1.01
T4 FREE SERPL-MCNC: 1.47 NG/DL (ref 0.93–1.7)
TROPONIN T SERPL-MCNC: 32 NG/L (ref 6–19)
TROPONIN T SERPL-MCNC: 32 NG/L (ref 6–19)
TSH SERPL DL<=0.005 MIU/L-ACNC: 0.73 UIU/ML (ref 0.38–5.33)
UROBILINOGEN UR STRIP.AUTO-MCNC: 0.2 MG/DL
WBC # BLD AUTO: 11.1 K/UL (ref 4.8–10.8)
WBC #/AREA URNS HPF: ABNORMAL /HPF

## 2024-03-05 PROCEDURE — 83605 ASSAY OF LACTIC ACID: CPT

## 2024-03-05 PROCEDURE — 84100 ASSAY OF PHOSPHORUS: CPT

## 2024-03-05 PROCEDURE — 87086 URINE CULTURE/COLONY COUNT: CPT

## 2024-03-05 PROCEDURE — 84443 ASSAY THYROID STIM HORMONE: CPT

## 2024-03-05 PROCEDURE — 700102 HCHG RX REV CODE 250 W/ 637 OVERRIDE(OP): Performed by: STUDENT IN AN ORGANIZED HEALTH CARE EDUCATION/TRAINING PROGRAM

## 2024-03-05 PROCEDURE — 81001 URINALYSIS AUTO W/SCOPE: CPT

## 2024-03-05 PROCEDURE — 87186 SC STD MICRODIL/AGAR DIL: CPT

## 2024-03-05 PROCEDURE — 85025 COMPLETE CBC W/AUTO DIFF WBC: CPT

## 2024-03-05 PROCEDURE — 87077 CULTURE AEROBIC IDENTIFY: CPT

## 2024-03-05 PROCEDURE — 700111 HCHG RX REV CODE 636 W/ 250 OVERRIDE (IP): Mod: JZ | Performed by: EMERGENCY MEDICINE

## 2024-03-05 PROCEDURE — 96365 THER/PROPH/DIAG IV INF INIT: CPT

## 2024-03-05 PROCEDURE — 700111 HCHG RX REV CODE 636 W/ 250 OVERRIDE (IP): Performed by: STUDENT IN AN ORGANIZED HEALTH CARE EDUCATION/TRAINING PROGRAM

## 2024-03-05 PROCEDURE — 83615 LACTATE (LD) (LDH) ENZYME: CPT

## 2024-03-05 PROCEDURE — 99285 EMERGENCY DEPT VISIT HI MDM: CPT

## 2024-03-05 PROCEDURE — 770020 HCHG ROOM/CARE - TELE (206)

## 2024-03-05 PROCEDURE — 99223 1ST HOSP IP/OBS HIGH 75: CPT | Mod: AI | Performed by: STUDENT IN AN ORGANIZED HEALTH CARE EDUCATION/TRAINING PROGRAM

## 2024-03-05 PROCEDURE — 84439 ASSAY OF FREE THYROXINE: CPT

## 2024-03-05 PROCEDURE — 700105 HCHG RX REV CODE 258: Performed by: STUDENT IN AN ORGANIZED HEALTH CARE EDUCATION/TRAINING PROGRAM

## 2024-03-05 PROCEDURE — 87040 BLOOD CULTURE FOR BACTERIA: CPT

## 2024-03-05 PROCEDURE — 83880 ASSAY OF NATRIURETIC PEPTIDE: CPT

## 2024-03-05 PROCEDURE — 36415 COLL VENOUS BLD VENIPUNCTURE: CPT

## 2024-03-05 PROCEDURE — 94640 AIRWAY INHALATION TREATMENT: CPT

## 2024-03-05 PROCEDURE — 93005 ELECTROCARDIOGRAM TRACING: CPT | Performed by: EMERGENCY MEDICINE

## 2024-03-05 PROCEDURE — 71275 CT ANGIOGRAPHY CHEST: CPT

## 2024-03-05 PROCEDURE — 96366 THER/PROPH/DIAG IV INF ADDON: CPT

## 2024-03-05 PROCEDURE — 85610 PROTHROMBIN TIME: CPT

## 2024-03-05 PROCEDURE — 96368 THER/DIAG CONCURRENT INF: CPT

## 2024-03-05 PROCEDURE — 84484 ASSAY OF TROPONIN QUANT: CPT

## 2024-03-05 PROCEDURE — 700117 HCHG RX CONTRAST REV CODE 255: Performed by: EMERGENCY MEDICINE

## 2024-03-05 PROCEDURE — 80053 COMPREHEN METABOLIC PANEL: CPT

## 2024-03-05 PROCEDURE — 96375 TX/PRO/DX INJ NEW DRUG ADDON: CPT

## 2024-03-05 PROCEDURE — 51701 INSERT BLADDER CATHETER: CPT

## 2024-03-05 PROCEDURE — 85652 RBC SED RATE AUTOMATED: CPT

## 2024-03-05 PROCEDURE — 700101 HCHG RX REV CODE 250: Performed by: STUDENT IN AN ORGANIZED HEALTH CARE EDUCATION/TRAINING PROGRAM

## 2024-03-05 PROCEDURE — A9270 NON-COVERED ITEM OR SERVICE: HCPCS | Performed by: STUDENT IN AN ORGANIZED HEALTH CARE EDUCATION/TRAINING PROGRAM

## 2024-03-05 PROCEDURE — 83735 ASSAY OF MAGNESIUM: CPT

## 2024-03-05 PROCEDURE — 84145 PROCALCITONIN (PCT): CPT

## 2024-03-05 PROCEDURE — 86140 C-REACTIVE PROTEIN: CPT

## 2024-03-05 PROCEDURE — 71045 X-RAY EXAM CHEST 1 VIEW: CPT

## 2024-03-05 PROCEDURE — 700101 HCHG RX REV CODE 250: Performed by: EMERGENCY MEDICINE

## 2024-03-05 RX ORDER — HALOPERIDOL 5 MG/ML
1 INJECTION INTRAMUSCULAR EVERY 4 HOURS PRN
Status: DISCONTINUED | OUTPATIENT
Start: 2024-03-05 | End: 2024-03-08 | Stop reason: HOSPADM

## 2024-03-05 RX ORDER — TRAZODONE HYDROCHLORIDE 50 MG/1
50 TABLET ORAL
Status: DISCONTINUED | OUTPATIENT
Start: 2024-03-05 | End: 2024-03-08 | Stop reason: HOSPADM

## 2024-03-05 RX ORDER — POTASSIUM CHLORIDE 7.45 MG/ML
10 INJECTION INTRAVENOUS ONCE
Status: DISCONTINUED | OUTPATIENT
Start: 2024-03-05 | End: 2024-03-05

## 2024-03-05 RX ORDER — IPRATROPIUM BROMIDE AND ALBUTEROL SULFATE 2.5; .5 MG/3ML; MG/3ML
3 SOLUTION RESPIRATORY (INHALATION)
Status: DISCONTINUED | OUTPATIENT
Start: 2024-03-05 | End: 2024-03-08 | Stop reason: HOSPADM

## 2024-03-05 RX ORDER — ONDANSETRON 4 MG/1
4 TABLET, ORALLY DISINTEGRATING ORAL EVERY 4 HOURS PRN
Status: DISCONTINUED | OUTPATIENT
Start: 2024-03-05 | End: 2024-03-08 | Stop reason: HOSPADM

## 2024-03-05 RX ORDER — HYDROXYZINE HYDROCHLORIDE 25 MG/1
25 TABLET, FILM COATED ORAL 3 TIMES DAILY PRN
Status: DISCONTINUED | OUTPATIENT
Start: 2024-03-05 | End: 2024-03-08 | Stop reason: HOSPADM

## 2024-03-05 RX ORDER — PAROXETINE HYDROCHLORIDE 20 MG/1
10 TABLET, FILM COATED ORAL DAILY
Status: DISCONTINUED | OUTPATIENT
Start: 2024-03-06 | End: 2024-03-08 | Stop reason: HOSPADM

## 2024-03-05 RX ORDER — DILTIAZEM HYDROCHLORIDE 60 MG/1
60 TABLET, FILM COATED ORAL ONCE
Status: DISCONTINUED | OUTPATIENT
Start: 2024-03-05 | End: 2024-03-05

## 2024-03-05 RX ORDER — MORPHINE SULFATE 4 MG/ML
2 INJECTION INTRAVENOUS EVERY 4 HOURS PRN
Status: DISCONTINUED | OUTPATIENT
Start: 2024-03-05 | End: 2024-03-08 | Stop reason: HOSPADM

## 2024-03-05 RX ORDER — POTASSIUM CHLORIDE 7.45 MG/ML
10 INJECTION INTRAVENOUS
Status: COMPLETED | OUTPATIENT
Start: 2024-03-05 | End: 2024-03-05

## 2024-03-05 RX ORDER — FOLIC ACID 1 MG/1
1 TABLET ORAL DAILY
Status: DISCONTINUED | OUTPATIENT
Start: 2024-03-06 | End: 2024-03-08 | Stop reason: HOSPADM

## 2024-03-05 RX ORDER — FLECAINIDE ACETATE 100 MG/1
100 TABLET ORAL DAILY
Status: DISCONTINUED | OUTPATIENT
Start: 2024-03-06 | End: 2024-03-08 | Stop reason: HOSPADM

## 2024-03-05 RX ORDER — MAGNESIUM SULFATE HEPTAHYDRATE 40 MG/ML
2 INJECTION, SOLUTION INTRAVENOUS ONCE
Status: COMPLETED | OUTPATIENT
Start: 2024-03-05 | End: 2024-03-05

## 2024-03-05 RX ORDER — LANOLIN ALCOHOL/MO/W.PET/CERES
400 CREAM (GRAM) TOPICAL 2 TIMES DAILY
Status: DISCONTINUED | OUTPATIENT
Start: 2024-03-06 | End: 2024-03-08 | Stop reason: HOSPADM

## 2024-03-05 RX ORDER — POTASSIUM CHLORIDE 20 MEQ/1
40 TABLET, EXTENDED RELEASE ORAL ONCE
Status: DISCONTINUED | OUTPATIENT
Start: 2024-03-05 | End: 2024-03-05

## 2024-03-05 RX ORDER — METOPROLOL TARTRATE 1 MG/ML
5 INJECTION, SOLUTION INTRAVENOUS ONCE
Status: COMPLETED | OUTPATIENT
Start: 2024-03-05 | End: 2024-03-05

## 2024-03-05 RX ORDER — FAMOTIDINE 20 MG/1
20 TABLET, FILM COATED ORAL 2 TIMES DAILY
Status: DISCONTINUED | OUTPATIENT
Start: 2024-03-05 | End: 2024-03-05

## 2024-03-05 RX ORDER — SODIUM CHLORIDE, SODIUM LACTATE, POTASSIUM CHLORIDE, AND CALCIUM CHLORIDE .6; .31; .03; .02 G/100ML; G/100ML; G/100ML; G/100ML
500 INJECTION, SOLUTION INTRAVENOUS
Status: DISCONTINUED | OUTPATIENT
Start: 2024-03-05 | End: 2024-03-08 | Stop reason: HOSPADM

## 2024-03-05 RX ORDER — VITAMIN B COMPLEX
2000 TABLET ORAL DAILY
Status: DISCONTINUED | OUTPATIENT
Start: 2024-03-06 | End: 2024-03-08 | Stop reason: HOSPADM

## 2024-03-05 RX ORDER — IPRATROPIUM BROMIDE AND ALBUTEROL SULFATE 2.5; .5 MG/3ML; MG/3ML
3 SOLUTION RESPIRATORY (INHALATION)
Status: DISCONTINUED | OUTPATIENT
Start: 2024-03-05 | End: 2024-03-05

## 2024-03-05 RX ORDER — ONDANSETRON 4 MG/1
4 TABLET, FILM COATED ORAL EVERY 4 HOURS PRN
COMMUNITY
End: 2024-03-18

## 2024-03-05 RX ORDER — NITROGLYCERIN 0.4 MG/1
0.4 TABLET SUBLINGUAL
Status: DISCONTINUED | OUTPATIENT
Start: 2024-03-05 | End: 2024-03-08 | Stop reason: HOSPADM

## 2024-03-05 RX ORDER — FUROSEMIDE 10 MG/ML
20 INJECTION INTRAMUSCULAR; INTRAVENOUS
Status: DISCONTINUED | OUTPATIENT
Start: 2024-03-05 | End: 2024-03-08 | Stop reason: HOSPADM

## 2024-03-05 RX ORDER — CEFTRIAXONE 2 G/1
2000 INJECTION, POWDER, FOR SOLUTION INTRAMUSCULAR; INTRAVENOUS ONCE
Status: COMPLETED | OUTPATIENT
Start: 2024-03-05 | End: 2024-03-05

## 2024-03-05 RX ORDER — ONDANSETRON 2 MG/ML
4 INJECTION INTRAMUSCULAR; INTRAVENOUS EVERY 4 HOURS PRN
Status: DISCONTINUED | OUTPATIENT
Start: 2024-03-05 | End: 2024-03-08 | Stop reason: HOSPADM

## 2024-03-05 RX ORDER — GAUZE BANDAGE 2" X 2"
100 BANDAGE TOPICAL 2 TIMES DAILY
Status: DISCONTINUED | OUTPATIENT
Start: 2024-03-05 | End: 2024-03-08 | Stop reason: HOSPADM

## 2024-03-05 RX ORDER — HYDRALAZINE HYDROCHLORIDE 25 MG/1
25 TABLET, FILM COATED ORAL 3 TIMES DAILY
Status: DISCONTINUED | OUTPATIENT
Start: 2024-03-06 | End: 2024-03-05

## 2024-03-05 RX ORDER — METOCLOPRAMIDE HYDROCHLORIDE 5 MG/ML
10 INJECTION INTRAMUSCULAR; INTRAVENOUS ONCE
Status: COMPLETED | OUTPATIENT
Start: 2024-03-05 | End: 2024-03-05

## 2024-03-05 RX ORDER — LABETALOL HYDROCHLORIDE 5 MG/ML
10 INJECTION, SOLUTION INTRAVENOUS EVERY 4 HOURS PRN
Status: DISCONTINUED | OUTPATIENT
Start: 2024-03-05 | End: 2024-03-08 | Stop reason: HOSPADM

## 2024-03-05 RX ORDER — PROCHLORPERAZINE EDISYLATE 5 MG/ML
10 INJECTION INTRAMUSCULAR; INTRAVENOUS EVERY 6 HOURS PRN
Status: DISCONTINUED | OUTPATIENT
Start: 2024-03-05 | End: 2024-03-08 | Stop reason: HOSPADM

## 2024-03-05 RX ORDER — DILTIAZEM HYDROCHLORIDE 5 MG/ML
0.25 INJECTION INTRAVENOUS ONCE
Status: COMPLETED | OUTPATIENT
Start: 2024-03-05 | End: 2024-03-05

## 2024-03-05 RX ORDER — ACETAMINOPHEN 325 MG/1
650 TABLET ORAL EVERY 6 HOURS PRN
Status: DISCONTINUED | OUTPATIENT
Start: 2024-03-05 | End: 2024-03-08 | Stop reason: HOSPADM

## 2024-03-05 RX ORDER — ONDANSETRON 2 MG/ML
8 INJECTION INTRAMUSCULAR; INTRAVENOUS ONCE
Status: COMPLETED | OUTPATIENT
Start: 2024-03-05 | End: 2024-03-05

## 2024-03-05 RX ORDER — METOCLOPRAMIDE HYDROCHLORIDE 5 MG/ML
10 INJECTION INTRAMUSCULAR; INTRAVENOUS EVERY 6 HOURS PRN
Status: DISCONTINUED | OUTPATIENT
Start: 2024-03-05 | End: 2024-03-08 | Stop reason: HOSPADM

## 2024-03-05 RX ORDER — METOPROLOL TARTRATE 1 MG/ML
5 INJECTION, SOLUTION INTRAVENOUS
Status: DISCONTINUED | OUTPATIENT
Start: 2024-03-05 | End: 2024-03-08 | Stop reason: HOSPADM

## 2024-03-05 RX ORDER — CHOLECALCIFEROL (VITAMIN D3) 125 MCG
1000 CAPSULE ORAL DAILY
Status: DISCONTINUED | OUTPATIENT
Start: 2024-03-06 | End: 2024-03-08 | Stop reason: HOSPADM

## 2024-03-05 RX ORDER — GABAPENTIN 100 MG/1
100 CAPSULE ORAL
Status: DISCONTINUED | OUTPATIENT
Start: 2024-03-05 | End: 2024-03-08 | Stop reason: HOSPADM

## 2024-03-05 RX ADMIN — CEFTRIAXONE SODIUM 2000 MG: 2 INJECTION, POWDER, FOR SOLUTION INTRAMUSCULAR; INTRAVENOUS at 15:19

## 2024-03-05 RX ADMIN — POTASSIUM CHLORIDE 10 MEQ: 7.46 INJECTION, SOLUTION INTRAVENOUS at 19:29

## 2024-03-05 RX ADMIN — DILTIAZEM HYDROCHLORIDE 22.45 MG: 5 INJECTION INTRAVENOUS at 16:36

## 2024-03-05 RX ADMIN — ONDANSETRON 8 MG: 2 INJECTION INTRAMUSCULAR; INTRAVENOUS at 17:31

## 2024-03-05 RX ADMIN — CEFAZOLIN 2 G: 2 INJECTION, POWDER, FOR SOLUTION INTRAMUSCULAR; INTRAVENOUS at 18:15

## 2024-03-05 RX ADMIN — FAMOTIDINE 20 MG: 10 INJECTION, SOLUTION INTRAVENOUS at 18:09

## 2024-03-05 RX ADMIN — POTASSIUM CHLORIDE 10 MEQ: 7.46 INJECTION, SOLUTION INTRAVENOUS at 20:29

## 2024-03-05 RX ADMIN — METOPROLOL TARTRATE 5 MG: 5 INJECTION INTRAVENOUS at 17:34

## 2024-03-05 RX ADMIN — POTASSIUM CHLORIDE 10 MEQ: 7.46 INJECTION, SOLUTION INTRAVENOUS at 17:14

## 2024-03-05 RX ADMIN — IOHEXOL 50 ML: 350 INJECTION, SOLUTION INTRAVENOUS at 17:15

## 2024-03-05 RX ADMIN — METOCLOPRAMIDE 10 MG: 5 INJECTION, SOLUTION INTRAMUSCULAR; INTRAVENOUS at 18:09

## 2024-03-05 RX ADMIN — IPRATROPIUM BROMIDE 0.5 MG: 0.5 SOLUTION RESPIRATORY (INHALATION) at 18:47

## 2024-03-05 RX ADMIN — TRAZODONE HYDROCHLORIDE 50 MG: 50 TABLET ORAL at 21:36

## 2024-03-05 RX ADMIN — FUROSEMIDE 20 MG: 10 INJECTION INTRAMUSCULAR; INTRAVENOUS at 19:40

## 2024-03-05 RX ADMIN — APIXABAN 5 MG: 5 TABLET, FILM COATED ORAL at 19:39

## 2024-03-05 RX ADMIN — METOPROLOL TARTRATE 25 MG: 25 TABLET, FILM COATED ORAL at 19:38

## 2024-03-05 RX ADMIN — POTASSIUM CHLORIDE 10 MEQ: 7.46 INJECTION, SOLUTION INTRAVENOUS at 21:39

## 2024-03-05 RX ADMIN — MAGNESIUM SULFATE HEPTAHYDRATE 2 G: 2 INJECTION, SOLUTION INTRAVENOUS at 19:47

## 2024-03-05 RX ADMIN — GABAPENTIN 100 MG: 100 CAPSULE ORAL at 21:36

## 2024-03-05 RX ADMIN — POTASSIUM CHLORIDE 10 MEQ: 7.46 INJECTION, SOLUTION INTRAVENOUS at 18:16

## 2024-03-05 RX ADMIN — Medication 100 MG: at 19:39

## 2024-03-05 ASSESSMENT — ENCOUNTER SYMPTOMS
DIZZINESS: 1
FLANK PAIN: 0
PALPITATIONS: 1
BRUISES/BLEEDS EASILY: 0
ABDOMINAL PAIN: 0
COUGH: 0
SHORTNESS OF BREATH: 1
BLURRED VISION: 0
NAUSEA: 0
VOMITING: 0
DOUBLE VISION: 0
HEARTBURN: 1
MYALGIAS: 0
DEPRESSION: 0
SPEECH CHANGE: 0
CHILLS: 0
WEAKNESS: 1
FEVER: 0
HEADACHES: 0
FOCAL WEAKNESS: 0

## 2024-03-05 ASSESSMENT — LIFESTYLE VARIABLES
ALCOHOL_USE: YES
HOW MANY TIMES IN THE PAST YEAR HAVE YOU HAD 5 OR MORE DRINKS IN A DAY: 0
EVER FELT BAD OR GUILTY ABOUT YOUR DRINKING: NO
HAVE YOU EVER FELT YOU SHOULD CUT DOWN ON YOUR DRINKING: NO
SUBSTANCE_ABUSE: 0
AVERAGE NUMBER OF DAYS PER WEEK YOU HAVE A DRINK CONTAINING ALCOHOL: 7
TOTAL SCORE: 0
ON A TYPICAL DAY WHEN YOU DRINK ALCOHOL HOW MANY DRINKS DO YOU HAVE: 1
DOES PATIENT WANT TO STOP DRINKING: NO
TOTAL SCORE: 0
TOTAL SCORE: 0
HAVE PEOPLE ANNOYED YOU BY CRITICIZING YOUR DRINKING: NO
CONSUMPTION TOTAL: NEGATIVE
EVER HAD A DRINK FIRST THING IN THE MORNING TO STEADY YOUR NERVES TO GET RID OF A HANGOVER: NO

## 2024-03-05 ASSESSMENT — PATIENT HEALTH QUESTIONNAIRE - PHQ9
SUM OF ALL RESPONSES TO PHQ9 QUESTIONS 1 AND 2: 4
3. TROUBLE FALLING OR STAYING ASLEEP OR SLEEPING TOO MUCH: NOT AT ALL
2. FEELING DOWN, DEPRESSED, IRRITABLE, OR HOPELESS: MORE THAN HALF THE DAYS
5. POOR APPETITE OR OVEREATING: MORE THAN HALF THE DAYS
1. LITTLE INTEREST OR PLEASURE IN DOING THINGS: MORE THAN HALF THE DAYS
6. FEELING BAD ABOUT YOURSELF - OR THAT YOU ARE A FAILURE OR HAVE LET YOURSELF OR YOUR FAMILY DOWN: NOT AL ALL
7. TROUBLE CONCENTRATING ON THINGS, SUCH AS READING THE NEWSPAPER OR WATCHING TELEVISION: MORE THAN HALF THE DAYS
9. THOUGHTS THAT YOU WOULD BE BETTER OFF DEAD, OR OF HURTING YOURSELF: NOT AT ALL
4. FEELING TIRED OR HAVING LITTLE ENERGY: NEARLY EVERY DAY

## 2024-03-05 ASSESSMENT — COGNITIVE AND FUNCTIONAL STATUS - GENERAL
MOBILITY SCORE: 10
TOILETING: A LOT
STANDING UP FROM CHAIR USING ARMS: A LOT
CLIMB 3 TO 5 STEPS WITH RAILING: TOTAL
SUGGESTED CMS G CODE MODIFIER DAILY ACTIVITY: CK
DRESSING REGULAR LOWER BODY CLOTHING: A LOT
DAILY ACTIVITIY SCORE: 16
DRESSING REGULAR UPPER BODY CLOTHING: A LOT
MOVING TO AND FROM BED TO CHAIR: A LOT
MOVING FROM LYING ON BACK TO SITTING ON SIDE OF FLAT BED: A LOT
WALKING IN HOSPITAL ROOM: TOTAL
TURNING FROM BACK TO SIDE WHILE IN FLAT BAD: A LOT
HELP NEEDED FOR BATHING: A LOT
SUGGESTED CMS G CODE MODIFIER MOBILITY: CL

## 2024-03-05 ASSESSMENT — COPD QUESTIONNAIRES
HAVE YOU SMOKED AT LEAST 100 CIGARETTES IN YOUR ENTIRE LIFE: NO/DON'T KNOW
DO YOU EVER COUGH UP ANY MUCUS OR PHLEGM?: NO/ONLY WITH OCCASIONAL COLDS OR INFECTIONS
COPD SCREENING SCORE: 3
DURING THE PAST 4 WEEKS HOW MUCH DID YOU FEEL SHORT OF BREATH: NONE/LITTLE OF THE TIME

## 2024-03-05 ASSESSMENT — FIBROSIS 4 INDEX
FIB4 SCORE: 1.71
FIB4 SCORE: 2.12

## 2024-03-05 NOTE — ED TRIAGE NOTES
Chief Complaint   Patient presents with    Weakness     Pt BIB EMS from [facility]. Per report pt has been feeling more fatigued and with nausea for the  past week. Pt transferred to ER for further eval.     Nausea    Fatigue

## 2024-03-05 NOTE — ED PROVIDER NOTES
ER Provider Note    Scribed for Juice Harman D.O. by Akil Moya. 3/5/2024  2:28 PM    Primary Care Provider: ASHLEY Silva    CHIEF COMPLAINT  Chief Complaint   Patient presents with    Weakness     Pt BIB EMS from [facility]. Per report pt has been feeling more fatigued and with nausea for the  past week. Pt transferred to ER for further eval.     Nausea    Fatigue       HPI/ROS  LIMITATION TO HISTORY   Disorientation and no memory of recent events.    OUTSIDE HISTORIAN(S):  EMS provided history of initial encounter and past few days as relayed to them by staff at the patient's group [place].     Debra Rodrigues is a 89 y.o. female who presents to the Emergency Department via EMS for evaluation of failure to thrive, onset today. Per EMS the patient is currently living at [facility]. For the past few days staff at this facility has noticed the patient appeared to be getting increasingly weak and was complaining of fatigue and nausea for the past week. Then today the staff was asking the patient questions and she appeared to not be acting like herself. This prompted them to call EMS. Upon EMS arrival the patient's oxygen saturation was in the 80s on room air and she smelled yeasty. Staff at the [facility] report no known history of recurrent UTIs. While in the ED the patient states she feels horrible. However she denies any nausea, abdominal pain, or other known pain or injuries. The patient does not wear supplemental oxygen at baseline but arrives on 2 liters due to low saturation.     ROS as per HPI.    PAST MEDICAL HISTORY  Past Medical History:   Diagnosis Date    Abdominal distension 01/05/2024    Acid reflux disease     Acquired hypothyroidism 04/19/2023    Allergy     Anxiety     Arrhythmia 01/01/2009    Atrial fibrillation, resolved    Arthritis     At risk for sleep apnea     Azotemia 01/05/2024    Blood transfusion without reported diagnosis     Bowel habit changes      "diarrhea    Breath shortness     Cancer ([facility]) 01/01/2009    left breast s/p mastectomy    CATARACT     surgically corrected, bilateral    Depression     GERD (gastroesophageal reflux disease)     Head ache     Heart burn     Heart valve disease     MVP    Hemorrhagic disorder ([facility])     anticoagulated on eliquis    Hypertension     Hypokalemia 01/10/2024    Hypoxia 01/05/2024    IBD (inflammatory bowel disease)     Macular degeneration     Muscle disorder     Open wound of right ankle 08/02/2023    Other thrombophilia ([facility]) 04/19/2023    Pacemaker 01/01/2009    Pneumonia due to infectious organism 01/02/2024    Stroke ([facility])     2019 - no deficits    Unspecified urinary incontinence     \"my bladder leaks when I lay down\"    Urinary bladder disorder     Urinary incontinence        SURGICAL HISTORY  Past Surgical History:   Procedure Laterality Date    BREAST RECONSTRUCTION  11/23/2010    Performed by ANNITA KELLEY at SURGERY [facility]    TISSUE EXPANDER PLACE/REMOVE  11/23/2010    Performed by ANNITA KELLEY at SURGERY [facility]    MASTOPEXY  11/23/2010    Performed by ANNITA KELLEY at SURGERY [facility]    BREAST RECONSTRUCTION  4/20/2010    Performed by ANNITA KELLEY at SURGERY [facility]    TISSUE EXPANDER PLACE/REMOVE  4/20/2010    Performed by ANNITA KELLEY at SURGERY [facility]    MASTECTOMY  5/13/2009    left    PACEMAKER INSERTION  2009    KNEE ARTHROSCOPY  1995    right    RAYNA BY LAPAROSCOPY  1995    HYSTERECTOMY, TOTAL ABDOMINAL  1972    BSO    MASTOIDECTOMY  1943    left    TONSILLECTOMY AND ADENOIDECTOMY  1942    CATARACT EXTRACTION WITH IOL      bilateral       FAMILY HISTORY  Family History   Problem Relation Age of Onset    Heart Disease Unknown     Hypertension Unknown     Cancer Unknown        SOCIAL HISTORY   reports that she has never smoked. She has never used smokeless tobacco. She reports current alcohol use of about 3.5 oz of alcohol per week. " "She reports that she does not use drugs.    CURRENT MEDICATIONS  Previous Medications    ACETAMINOPHEN (TYLENOL) 325 MG TAB    Take 2 Tablets by mouth every four hours as needed (pain).    AMLODIPINE (NORVASC) 10 MG TAB    Take 1 Tablet by mouth every day.    ELIQUIS 5 MG TAB    Take 1 Tablet by mouth 2 times a day.    FAMOTIDINE (PEPCID) 20 MG TAB    Take 1 Tablet by mouth 2 times a day.    FLECAINIDE (TAMBOCOR) 100 MG TAB    Take 1 Tablet by mouth every day.    GABAPENTIN (NEURONTIN) 100 MG CAP    Take 1 Capsule by mouth at bedtime.    HYDRALAZINE (APRESOLINE) 25 MG TAB    Take 1 Tablet by mouth 3 times a day.    LEVOTHYROXINE (SYNTHROID) 75 MCG TAB    Take 1 Tablet by mouth every day.    LOSARTAN (COZAAR) 100 MG TAB    Take 100 mg by mouth every day.    MENTHOL-ZINC OXIDE (CALMOSEPTINE EX)    Apply 1 Dose topically 2 times a day. Apply thin layer of calmoseptine twice daily to groin buttocks area with brief changes. Use house supply.    METOPROLOL SR (TOPROL XL) 25 MG TABLET SR 24 HR    Take 0.5 Tablets by mouth every day.    ONDANSETRON (ZOFRAN ODT) 4 MG TABLET DISPERSIBLE    Take 1 Tablet by mouth every four hours as needed for Nausea/Vomiting.    ORAL ELECTROLYTES (PEDIALYTE) SOLUTION    Take 240 mL by mouth every hour while awake.    PAROXETINE (PAXIL) 10 MG TAB    Take 1 Tablet by mouth every day.    TRAZODONE (DESYREL) 50 MG TAB    Take 1 Tablet by mouth at bedtime.    VITAMIN D3 (CHOLECALCIFEROL) 1000 UNIT (25 MCG) TAB    Take 2 Tablets by mouth every day.       ALLERGIES  Other misc, Hydrocodone, Oyster extract, and Sulfa drugs    PHYSICAL EXAM  /66   Pulse 95   Temp 36.1 °C (97 °F) (Temporal)   Resp 20   Ht 1.676 m (5' 6\")   Wt 89.8 kg (198 lb)   SpO2 93%   BMI 31.96 kg/m²     General: No acute distress.  HENT: Normocephalic, Mucus membranes are moist.   Chest: Lungs have even and unlabored respirations, Clear to auscultation.   Cardiovascular: Regular rate and regular rhythm, No peripheral " cyanosis.  Abdomen: Non distended.  Neuro: Awake, Answering questions, Disoriented to year and recent events.   Psychiatric: Calm and cooperative.     EXTERNAL RECORDS REVIEWED  Review of patient's past medical records show the patient was hospitalized on 1/5/24 for pneumonia. She also has chronic decubitus ulcers of bilateral heels.     INITIAL ASSESSMENT  Patient has been having increased weakness. There is concern for sepsis, electrolyte imbalance, and anemia. Patient's pulse oximeter was low upon EMS arrival, there is concern for pneumonia, CHF, and PE. Patient did respond well to supplemental oxygen.     ED Observation Status? No; Patient does not meet criteria for ED Observation.     DIAGNOSTIC STUDIES    Labs:   Results for orders placed or performed during the hospital encounter of 03/05/24   Lactic Acid   Result Value Ref Range    Lactic Acid 1.6 0.5 - 2.0 mmol/L   CBC with Differential   Result Value Ref Range    WBC 11.1 (H) 4.8 - 10.8 K/uL    RBC 5.01 4.20 - 5.40 M/uL    Hemoglobin 15.2 12.0 - 16.0 g/dL    Hematocrit 45.8 37.0 - 47.0 %    MCV 91.4 81.4 - 97.8 fL    MCH 30.3 27.0 - 33.0 pg    MCHC 33.2 32.2 - 35.5 g/dL    RDW 52.1 (H) 35.9 - 50.0 fL    Platelet Count 242 164 - 446 K/uL    MPV 11.0 9.0 - 12.9 fL    Neutrophils-Polys 69.80 44.00 - 72.00 %    Lymphocytes 15.70 (L) 22.00 - 41.00 %    Monocytes 13.20 0.00 - 13.40 %    Eosinophils 0.10 0.00 - 6.90 %    Basophils 0.30 0.00 - 1.80 %    Immature Granulocytes 0.90 0.00 - 0.90 %    Nucleated RBC 0.00 0.00 - 0.20 /100 WBC    Neutrophils (Absolute) 7.74 (H) 1.82 - 7.42 K/uL    Lymphs (Absolute) 1.74 1.00 - 4.80 K/uL    Monos (Absolute) 1.46 (H) 0.00 - 0.85 K/uL    Eos (Absolute) 0.01 0.00 - 0.51 K/uL    Baso (Absolute) 0.03 0.00 - 0.12 K/uL    Immature Granulocytes (abs) 0.10 0.00 - 0.11 K/uL    NRBC (Absolute) 0.00 K/uL   Complete Metabolic Panel   Result Value Ref Range    Sodium 148 (H) 135 - 145 mmol/L    Potassium 3.1 (L) 3.6 - 5.5 mmol/L     Chloride 113 (H) 96 - 112 mmol/L    Co2 22 20 - 33 mmol/L    Anion Gap 13.0 7.0 - 16.0    Glucose 130 (H) 65 - 99 mg/dL    Bun 21 8 - 22 mg/dL    Creatinine 0.97 0.50 - 1.40 mg/dL    Calcium 9.2 8.5 - 10.5 mg/dL    Correct Calcium 9.8 8.5 - 10.5 mg/dL    AST(SGOT) 59 (H) 12 - 45 U/L    ALT(SGPT) 105 (H) 2 - 50 U/L    Alkaline Phosphatase 100 (H) 30 - 99 U/L    Total Bilirubin 0.9 0.1 - 1.5 mg/dL    Albumin 3.3 3.2 - 4.9 g/dL    Total Protein 6.7 6.0 - 8.2 g/dL    Globulin 3.4 1.9 - 3.5 g/dL    A-G Ratio 1.0 g/dL   Urinalysis    Specimen: Urine   Result Value Ref Range    Color Yellow     Character Turbid (A)     Specific Gravity 1.012 <1.035    Ph 7.0 5.0 - 8.0    Glucose Negative Negative mg/dL    Ketones Negative Negative mg/dL    Protein Negative Negative mg/dL    Bilirubin Negative Negative    Urobilinogen, Urine 0.2 Negative    Nitrite Positive (A) Negative    Leukocyte Esterase Large (A) Negative    Occult Blood Trace (A) Negative    Micro Urine Req Microscopic    proBrain Natriuretic Peptide, NT   Result Value Ref Range    NT-proBNP 6388 (H) 0 - 125 pg/mL   TROPONIN   Result Value Ref Range    Troponin T 32 (H) 6 - 19 ng/L   ESTIMATED GFR   Result Value Ref Range    GFR (CKD-EPI) 56 (A) >60 mL/min/1.73 m 2   URINE MICROSCOPIC (W/UA)   Result Value Ref Range    -150 (A) /hpf    RBC 0-2 /hpf    Bacteria Many (A) None /hpf    Epithelial Cells Negative /hpf    Amorphous Crystal Present /hpf    Hyaline Cast 0-2 /lpf   EKG   Result Value Ref Range    Report       [facility] Emergency Dept.    Test Date:  2024  Pt Name:    CRHISTINE LANDA              Department: ER  MRN:        [id number]                      Room:        19  Gender:     Female                       Technician: [id number]  :        [id number]                   Requested By:MIKE TEJADA  Order #:    [id number]                    [place] MD: MIKE TEJADA D.O.    Measurements  Intervals                                 Axis  Rate:       116                          P:          0  GA:         0                            QRS:        -74  QRSD:       112                          T:          113  QT:         349  QTc:        485    Interpretive Statements  Afib/flut and V-paced complexes  No further rhythm analysis attempted due to paced rhythm  Borderline IVCD with LAD  Nonspecific repol abnormality, lateral leads  Compared to ECG 01/02/2024 15:11:39  Early repolarization now present  Atrial-paced complex(es) or rhythm no longer present  Left-axis destinee ation no longer present  ST (T wave) deviation no longer present  Myocardial infarct finding no longer present  Electronically Signed On 03- 17:16:35 PST by MIKE TEJADA D.O.        EKG:   I have independently interpreted the above EKG.    Radiology:   The attending emergency physician has independently interpreted the diagnostic imaging associated with this visit and am waiting the final reading from the radiologist.   Preliminary interpretation is as follows: Small pleural effusion  Radiologist interpretation:   CT-CTA CHEST PULMONARY ARTERY W/ RECONS   Final Result      1.  No pulmonary embolus. No acute abnormality in the chest.   2.  Stable since prior chest CT right breast mass measuring 1.1 cm. Recommend mammographic follow-up.      DX-CHEST-PORTABLE (1 VIEW)   Final Result      1.  Small left pleural effusion with adjacent airspace disease.   2.  Trace right pleural effusion.   3.  Cardiomegaly.           COURSE & MEDICAL DECISION MAKING     COURSE AND PLAN  2:28 PM - Patient seen and examined at bedside. Discussed plan of care, including CT imaging and other diagnostic studies. Patient agrees to the plan of care. The patient will be medicated with Rocephin injection 2,000 mg. Ordered for EKG, CT-CTA chest pulmonary artery with recons, DX-Chest, proBNP, Troponin, Lactic acid, CBC with differential, CMP, UA, Urine culture, and Blood culture x2to evaluate her  symptoms. '    3:37 PM Potassium is 3.1 and Sodium is high, and BNP in elevated there is concerns for intervascular fluid depletion, but due to increased BNP no IV fluid bolus will be given, Will give IV potassium.    4:30 PM Patient will be medicated with Cardizem injection 22.45 mg.     ED Summary: Patient was hypoxic prior to arrival.  Her chest x-ray showed no cause for this, there was concerns of sepsis sepsis protocol was initiated she does have a urinary tract infection.  There is no explanation for the low oxygen level so CTA was done there is no pulmonary embolism.  Her BNP is mildly elevated but there is no signs of pulmonary edema.  Her sodium is high, potassium is low IV potassium was initiated, and IV fluids have not been initiated until potassium does improve.  I spoke with the hospitalist for admission the patient will be admitted for further evaluation and treatment.    CRITICAL CARE  The very real possibility of a deterioration of this patient's condition required the highest level of my preparedness for sudden, emergent intervention.  I provided critical care services, which included medication orders, frequent reevaluations of the patient's condition and response to treatment, ordering and reviewing test results, and discussing the case with various consultants.  The critical care time associated with the care of the patient was 35 minutes. Review chart for interventions. This time is exclusive of any other billable procedures.        DISPOSITION AND DISCUSSIONS  I have discussed management of the patient with the following physicians and JOSE ANTONIO's: Hospitalist      Patient is admitted to the hospitalist in guarded condition    FINAL DIAGNOSIS  1. Dehydration    2. Hypokalemia    3. Hyponatremia    4. Mental status, decreased    5. Urinary tract infection without hematuria, site unspecified    6. Acute respiratory failure with hypoxia (HCC)      The critical care time associated with the care of the  patient was 35 minutes.     IAkil (Scribe), am scribing for, and in the presence of, Juice Harman D.O..    Electronically signed by: Akil Moya (Scribe), 3/5/2024    Juice BLANCA D.O. personally performed the services described in this documentation, as scribed by Akil Moya in my presence, and it is both accurate and complete.     The note accurately reflects work and decisions made by me.  Juice Harman D.O.  3/5/2024  5:30 PM

## 2024-03-06 ENCOUNTER — APPOINTMENT (OUTPATIENT)
Dept: RADIOLOGY | Facility: MEDICAL CENTER | Age: 89
DRG: 689 | End: 2024-03-06
Attending: STUDENT IN AN ORGANIZED HEALTH CARE EDUCATION/TRAINING PROGRAM
Payer: MEDICARE

## 2024-03-06 LAB
ALBUMIN SERPL BCP-MCNC: 3.3 G/DL (ref 3.2–4.9)
ALBUMIN/GLOB SERPL: 1 G/DL
ALP SERPL-CCNC: 103 U/L (ref 30–99)
ALT SERPL-CCNC: 92 U/L (ref 2–50)
AMMONIA PLAS-SCNC: 20 UMOL/L (ref 11–45)
ANION GAP SERPL CALC-SCNC: 14 MMOL/L (ref 7–16)
AST SERPL-CCNC: 52 U/L (ref 12–45)
BASOPHILS # BLD AUTO: 0.5 % (ref 0–1.8)
BASOPHILS # BLD: 0.06 K/UL (ref 0–0.12)
BILIRUB SERPL-MCNC: 0.7 MG/DL (ref 0.1–1.5)
BUN SERPL-MCNC: 19 MG/DL (ref 8–22)
CALCIUM ALBUM COR SERPL-MCNC: 9.6 MG/DL (ref 8.5–10.5)
CALCIUM SERPL-MCNC: 9 MG/DL (ref 8.5–10.5)
CANDIDA DNA VAG QL PROBE+SIG AMP: NEGATIVE
CHLORIDE SERPL-SCNC: 106 MMOL/L (ref 96–112)
CO2 SERPL-SCNC: 22 MMOL/L (ref 20–33)
CREAT SERPL-MCNC: 0.89 MG/DL (ref 0.5–1.4)
EKG IMPRESSION: NORMAL
EOSINOPHIL # BLD AUTO: 0.03 K/UL (ref 0–0.51)
EOSINOPHIL NFR BLD: 0.3 % (ref 0–6.9)
ERYTHROCYTE [DISTWIDTH] IN BLOOD BY AUTOMATED COUNT: 51.9 FL (ref 35.9–50)
FLUAV RNA SPEC QL NAA+PROBE: NEGATIVE
FLUBV RNA SPEC QL NAA+PROBE: NEGATIVE
G VAGINALIS DNA VAG QL PROBE+SIG AMP: POSITIVE
GFR SERPLBLD CREATININE-BSD FMLA CKD-EPI: 62 ML/MIN/1.73 M 2
GLOBULIN SER CALC-MCNC: 3.4 G/DL (ref 1.9–3.5)
GLUCOSE SERPL-MCNC: 120 MG/DL (ref 65–99)
HCT VFR BLD AUTO: 47 % (ref 37–47)
HGB BLD-MCNC: 15.4 G/DL (ref 12–16)
HIV 1+2 AB+HIV1 P24 AG SERPL QL IA: NORMAL
IMM GRANULOCYTES # BLD AUTO: 0.1 K/UL (ref 0–0.11)
IMM GRANULOCYTES NFR BLD AUTO: 0.9 % (ref 0–0.9)
LYMPHOCYTES # BLD AUTO: 1.89 K/UL (ref 1–4.8)
LYMPHOCYTES NFR BLD: 17 % (ref 22–41)
MAGNESIUM SERPL-MCNC: 2.5 MG/DL (ref 1.5–2.5)
MCH RBC QN AUTO: 30.4 PG (ref 27–33)
MCHC RBC AUTO-ENTMCNC: 32.8 G/DL (ref 32.2–35.5)
MCV RBC AUTO: 92.7 FL (ref 81.4–97.8)
MONOCYTES # BLD AUTO: 1.24 K/UL (ref 0–0.85)
MONOCYTES NFR BLD AUTO: 11.2 % (ref 0–13.4)
NEUTROPHILS # BLD AUTO: 7.77 K/UL (ref 1.82–7.42)
NEUTROPHILS NFR BLD: 70.1 % (ref 44–72)
NRBC # BLD AUTO: 0 K/UL
NRBC BLD-RTO: 0 /100 WBC (ref 0–0.2)
PHOSPHATE SERPL-MCNC: 2.4 MG/DL (ref 2.5–4.5)
PLATELET # BLD AUTO: 244 K/UL (ref 164–446)
PMV BLD AUTO: 11.2 FL (ref 9–12.9)
POTASSIUM SERPL-SCNC: 3.4 MMOL/L (ref 3.6–5.5)
PROT SERPL-MCNC: 6.7 G/DL (ref 6–8.2)
RBC # BLD AUTO: 5.07 M/UL (ref 4.2–5.4)
RSV RNA SPEC QL NAA+PROBE: NEGATIVE
SARS-COV-2 RNA RESP QL NAA+PROBE: NOTDETECTED
SODIUM SERPL-SCNC: 142 MMOL/L (ref 135–145)
SPECIMEN SOURCE: NORMAL
T PALLIDUM AB SER QL IA: NORMAL
T VAGINALIS DNA VAG QL PROBE+SIG AMP: NEGATIVE
TROPONIN T SERPL-MCNC: 37 NG/L (ref 6–19)
WBC # BLD AUTO: 11.1 K/UL (ref 4.8–10.8)

## 2024-03-06 PROCEDURE — 83735 ASSAY OF MAGNESIUM: CPT

## 2024-03-06 PROCEDURE — 700101 HCHG RX REV CODE 250: Performed by: STUDENT IN AN ORGANIZED HEALTH CARE EDUCATION/TRAINING PROGRAM

## 2024-03-06 PROCEDURE — 93922 UPR/L XTREMITY ART 2 LEVELS: CPT | Mod: 26 | Performed by: INTERNAL MEDICINE

## 2024-03-06 PROCEDURE — A9270 NON-COVERED ITEM OR SERVICE: HCPCS | Performed by: HOSPITALIST

## 2024-03-06 PROCEDURE — 93922 UPR/L XTREMITY ART 2 LEVELS: CPT

## 2024-03-06 PROCEDURE — 86780 TREPONEMA PALLIDUM: CPT

## 2024-03-06 PROCEDURE — 93005 ELECTROCARDIOGRAM TRACING: CPT | Performed by: STUDENT IN AN ORGANIZED HEALTH CARE EDUCATION/TRAINING PROGRAM

## 2024-03-06 PROCEDURE — 85025 COMPLETE CBC W/AUTO DIFF WBC: CPT

## 2024-03-06 PROCEDURE — 82140 ASSAY OF AMMONIA: CPT

## 2024-03-06 PROCEDURE — 87660 TRICHOMONAS VAGIN DIR PROBE: CPT

## 2024-03-06 PROCEDURE — 92610 EVALUATE SWALLOWING FUNCTION: CPT

## 2024-03-06 PROCEDURE — 36415 COLL VENOUS BLD VENIPUNCTURE: CPT

## 2024-03-06 PROCEDURE — 700102 HCHG RX REV CODE 250 W/ 637 OVERRIDE(OP): Performed by: HOSPITALIST

## 2024-03-06 PROCEDURE — 700111 HCHG RX REV CODE 636 W/ 250 OVERRIDE (IP): Performed by: STUDENT IN AN ORGANIZED HEALTH CARE EDUCATION/TRAINING PROGRAM

## 2024-03-06 PROCEDURE — 700102 HCHG RX REV CODE 250 W/ 637 OVERRIDE(OP): Performed by: STUDENT IN AN ORGANIZED HEALTH CARE EDUCATION/TRAINING PROGRAM

## 2024-03-06 PROCEDURE — 99233 SBSQ HOSP IP/OBS HIGH 50: CPT | Performed by: HOSPITALIST

## 2024-03-06 PROCEDURE — 94669 MECHANICAL CHEST WALL OSCILL: CPT

## 2024-03-06 PROCEDURE — 84100 ASSAY OF PHOSPHORUS: CPT

## 2024-03-06 PROCEDURE — 770020 HCHG ROOM/CARE - TELE (206)

## 2024-03-06 PROCEDURE — 84484 ASSAY OF TROPONIN QUANT: CPT

## 2024-03-06 PROCEDURE — 87480 CANDIDA DNA DIR PROBE: CPT

## 2024-03-06 PROCEDURE — 93010 ELECTROCARDIOGRAM REPORT: CPT | Performed by: INTERNAL MEDICINE

## 2024-03-06 PROCEDURE — G0475 HIV COMBINATION ASSAY: HCPCS

## 2024-03-06 PROCEDURE — 80053 COMPREHEN METABOLIC PANEL: CPT

## 2024-03-06 PROCEDURE — 0241U HCHG SARS-COV-2 COVID-19 NFCT DS RESP RNA 4 TRGT MIC: CPT

## 2024-03-06 PROCEDURE — A9270 NON-COVERED ITEM OR SERVICE: HCPCS | Performed by: STUDENT IN AN ORGANIZED HEALTH CARE EDUCATION/TRAINING PROGRAM

## 2024-03-06 PROCEDURE — 87510 GARDNER VAG DNA DIR PROBE: CPT

## 2024-03-06 RX ORDER — FLUCONAZOLE 100 MG/1
150 TABLET ORAL ONCE
Status: DISCONTINUED | OUTPATIENT
Start: 2024-03-06 | End: 2024-03-06

## 2024-03-06 RX ORDER — OMEPRAZOLE 20 MG/1
20 CAPSULE, DELAYED RELEASE ORAL DAILY
Status: DISCONTINUED | OUTPATIENT
Start: 2024-03-06 | End: 2024-03-08 | Stop reason: HOSPADM

## 2024-03-06 RX ORDER — FLUCONAZOLE 100 MG/1
150 TABLET ORAL ONCE
Status: COMPLETED | OUTPATIENT
Start: 2024-03-06 | End: 2024-03-06

## 2024-03-06 RX ORDER — NYSTATIN 100000 [USP'U]/G
POWDER TOPICAL 2 TIMES DAILY
Status: DISCONTINUED | OUTPATIENT
Start: 2024-03-06 | End: 2024-03-08 | Stop reason: HOSPADM

## 2024-03-06 RX ORDER — POTASSIUM CHLORIDE 20 MEQ/1
20 TABLET, EXTENDED RELEASE ORAL 2 TIMES DAILY
Status: DISCONTINUED | OUTPATIENT
Start: 2024-03-06 | End: 2024-03-08 | Stop reason: HOSPADM

## 2024-03-06 RX ADMIN — METOPROLOL TARTRATE 25 MG: 25 TABLET, FILM COATED ORAL at 16:24

## 2024-03-06 RX ADMIN — OMEPRAZOLE 20 MG: 20 CAPSULE, DELAYED RELEASE ORAL at 08:57

## 2024-03-06 RX ADMIN — APIXABAN 5 MG: 5 TABLET, FILM COATED ORAL at 06:08

## 2024-03-06 RX ADMIN — FAMOTIDINE 20 MG: 10 INJECTION, SOLUTION INTRAVENOUS at 06:11

## 2024-03-06 RX ADMIN — FUROSEMIDE 20 MG: 10 INJECTION INTRAMUSCULAR; INTRAVENOUS at 06:11

## 2024-03-06 RX ADMIN — CYANOCOBALAMIN TAB 500 MCG 1000 MCG: 500 TAB at 06:09

## 2024-03-06 RX ADMIN — FLUCONAZOLE 150 MG: 100 TABLET ORAL at 16:25

## 2024-03-06 RX ADMIN — Medication 100 MG: at 06:10

## 2024-03-06 RX ADMIN — FUROSEMIDE 20 MG: 10 INJECTION INTRAMUSCULAR; INTRAVENOUS at 16:26

## 2024-03-06 RX ADMIN — GABAPENTIN 100 MG: 100 CAPSULE ORAL at 21:56

## 2024-03-06 RX ADMIN — TRAZODONE HYDROCHLORIDE 50 MG: 50 TABLET ORAL at 21:56

## 2024-03-06 RX ADMIN — Medication 100 MG: at 16:24

## 2024-03-06 RX ADMIN — Medication 2000 UNITS: at 06:13

## 2024-03-06 RX ADMIN — FOLIC ACID 1 MG: 1 TABLET ORAL at 06:08

## 2024-03-06 RX ADMIN — PAROXETINE HYDROCHLORIDE 10 MG: 10 TABLET, FILM COATED ORAL at 06:09

## 2024-03-06 RX ADMIN — CEFAZOLIN 2 G: 10 INJECTION, POWDER, FOR SOLUTION INTRAVENOUS at 02:11

## 2024-03-06 RX ADMIN — Medication 400 MG: at 16:25

## 2024-03-06 RX ADMIN — APIXABAN 5 MG: 5 TABLET, FILM COATED ORAL at 16:24

## 2024-03-06 RX ADMIN — POTASSIUM CHLORIDE 20 MEQ: 1500 TABLET, EXTENDED RELEASE ORAL at 16:25

## 2024-03-06 RX ADMIN — LEVOTHYROXINE SODIUM 75 MCG: 0.07 TABLET ORAL at 06:08

## 2024-03-06 RX ADMIN — CEFAZOLIN 2 G: 10 INJECTION, POWDER, FOR SOLUTION INTRAVENOUS at 08:57

## 2024-03-06 RX ADMIN — FLECAINIDE ACETATE 100 MG: 100 TABLET ORAL at 06:10

## 2024-03-06 RX ADMIN — METOPROLOL TARTRATE 25 MG: 25 TABLET, FILM COATED ORAL at 06:08

## 2024-03-06 RX ADMIN — Medication 400 MG: at 06:10

## 2024-03-06 RX ADMIN — CEFAZOLIN 2 G: 10 INJECTION, POWDER, FOR SOLUTION INTRAVENOUS at 16:26

## 2024-03-06 ASSESSMENT — ENCOUNTER SYMPTOMS
PALPITATIONS: 0
ORTHOPNEA: 0
CLAUDICATION: 0
VOMITING: 1
HEARTBURN: 0
DIARRHEA: 0
COUGH: 0
WEAKNESS: 1
NAUSEA: 0
NECK PAIN: 0
FEVER: 0
MYALGIAS: 0
CHILLS: 0

## 2024-03-06 ASSESSMENT — FIBROSIS 4 INDEX: FIB4 SCORE: 1.98

## 2024-03-06 NOTE — H&P
Hospital Medicine History & Physical Note    Date of Service  3/5/2024    Primary Care Physician  AZALEA Silva.    Consultants  None    Code Status  DNAR/DNI    Chief Complaint  Chief Complaint   Patient presents with    Weakness     Pt BIB EMS from [facility]. Per report pt has been feeling more fatigued and with nausea for the  past week. Pt transferred to ER for further eval.     Nausea    Fatigue       History of Presenting Illness  Debra Rodrigues is a 89 y.o. female from [facility] with history of chronic A-fib on flecainide and Eliquis, hypothyroidism, hypertension, remote history of breast cancer now in remission who presented 3/5/2024 with evaluation for generalized weakness, altered mental status.  Patient was referred to ER from [facility] due to altered mental status and generalized weakness.  Per family (son and daughter-in-law), patient is normally able to carry out conversation at baseline, currently acutely altered.  Family also reported patient to have generalized weakness, difficulty ambulating due to pain and extremity, essentially bedridden for the past several weeks.  In ER, noted to have A-fib/a flutter but rate as high as 180s on telemetry monitoring in the emergency room.  She does have notable pyuria on UA, also noted to have deranged electrolytes and mildly elevated troponin T and proBNP.  She is also requiring as much is 6 L nasal cannula support, does not use oxygen at baseline.  Admission requested by ERP.  Admitted to medicine service for further evaluation and treatment.    ED course: Zofran 8 mg IV, KCL10 mEq IV, Cardizem 22.4 mg IV push, metoprolol 5 mg IV, ceftriaxone 2 g    I discussed the plan of care with patient, family, bedside RN, and pharmacy.    Review of Systems  Review of Systems   Constitutional:  Positive for malaise/fatigue. Negative for chills and fever.   HENT:  Positive for hearing loss. Negative for tinnitus.    Eyes:  Negative for blurred  vision and double vision.   Respiratory:  Positive for shortness of breath. Negative for cough.    Cardiovascular:  Positive for palpitations. Negative for chest pain.   Gastrointestinal:  Positive for heartburn. Negative for abdominal pain, nausea and vomiting.   Genitourinary:  Positive for frequency and urgency. Negative for dysuria, flank pain and hematuria.   Musculoskeletal:  Negative for joint pain and myalgias.   Skin:  Negative for itching and rash.   Neurological:  Positive for dizziness and weakness. Negative for speech change, focal weakness and headaches.   Endo/Heme/Allergies:  Negative for environmental allergies. Does not bruise/bleed easily.   Psychiatric/Behavioral:  Negative for depression and substance abuse.    All other systems reviewed and are negative.      Past Medical History   has a past medical history of Abdominal distension (01/05/2024), Acid reflux disease, Acquired hypothyroidism (04/19/2023), Allergy, Anxiety, Arrhythmia (01/01/2009), Arthritis, At risk for sleep apnea, Azotemia (01/05/2024), Blood transfusion without reported diagnosis, Bowel habit changes, Breath shortness, Cancer ([facility]) (01/01/2009), CATARACT, Depression, GERD (gastroesophageal reflux disease), Head ache, Heart burn, Heart valve disease, Hemorrhagic disorder ([facility]), Hypertension, Hypokalemia (01/10/2024), Hypoxia (01/05/2024), IBD (inflammatory bowel disease), Macular degeneration, Muscle disorder, Open wound of right ankle (08/02/2023), Other thrombophilia ([facility]) (04/19/2023), Pacemaker (01/01/2009), Pneumonia due to infectious organism (01/02/2024), Stroke ([facility]), Unspecified urinary incontinence, Urinary bladder disorder, and Urinary incontinence.    Surgical History   has a past surgical history that includes tonsillectomy and adenoidectomy (1942); mastoidectomy (1943); hysterectomy, total abdominal (1972); knee arthroscopy (1995); julio by laparoscopy (1995); pacemaker insertion (2009); mastectomy (5/13/2009);  cataract extraction with iol; breast reconstruction (4/20/2010); tissue expander place/remove (4/20/2010); breast reconstruction (11/23/2010); tissue expander place/remove (11/23/2010); and mastopexy (11/23/2010).     Family History  family history includes Cancer in her unknown relative; Heart Disease in her unknown relative; Hypertension in her unknown relative.   Family history reviewed with patient. There is family history that is pertinent to the chief complaint.     Social History   reports that she has never smoked. She has never used smokeless tobacco. She reports current alcohol use of about 3.5 oz of alcohol per week. She reports that she does not use drugs.    Allergies  Allergies   Allergen Reactions    Other Misc Anaphylaxis     x2 in 2005,pt tested,Cause unknown    Hydrocodone Vomiting    Oyster Extract Unspecified     Listed on MAR     Sulfa Drugs Rash     Rash        Medications  Prior to Admission Medications   Prescriptions Last Dose Informant Patient Reported? Taking?   ELIQUIS 5 MG Tab 3/5/2024 at 0800 MAR from Other Facility No Yes   Sig: Take 1 Tablet by mouth 2 times a day.   Menthol-Zinc Oxide (CALMOSEPTINE EX) 3/5/2024 at 0800 MAR from Other Facility Yes Yes   Sig: Apply 1 Dose topically 2 times a day. Apply thin layer of calmoseptine twice daily to groin buttocks area with brief changes. Use house supply.   PARoxetine (PAXIL) 10 MG Tab 3/5/2024 at 0800 MAR from Other Facility No Yes   Sig: Take 1 Tablet by mouth every day.   acetaminophen (TYLENOL) 325 MG Tab PRN at PRN MAR from Other Facility No No   Sig: Take 2 Tablets by mouth every four hours as needed (pain).   amLODIPine (NORVASC) 10 MG Tab 3/5/2024 at 0800 MAR from Other Facility No Yes   Sig: Take 1 Tablet by mouth every day.   famotidine (PEPCID) 20 MG Tab 3/5/2024 at 0800 MAR from Other Facility No Yes   Sig: Take 1 Tablet by mouth 2 times a day.   flecainide (TAMBOCOR) 100 MG Tab 3/5/2024 at 0800 MAR from Other Facility No Yes    Sig: Take 1 Tablet by mouth every day.   gabapentin (NEURONTIN) 100 MG Cap 3/4/2024 at 2000 MAR from Other Facility No Yes   Sig: Take 1 Capsule by mouth at bedtime.   hydrALAZINE (APRESOLINE) 25 MG Tab 3/5/2024 at 0800 MAR from Other Facility No Yes   Sig: Take 1 Tablet by mouth 3 times a day.   levothyroxine (SYNTHROID) 75 MCG Tab 3/5/2024 at 0800 MAR from Other Facility No Yes   Sig: Take 1 Tablet by mouth every day.   losartan (COZAAR) 100 MG Tab 3/5/2024 at 0800 MAR from Other Facility Yes Yes   Sig: Take 100 mg by mouth every day.   metoprolol SR (TOPROL XL) 25 MG TABLET SR 24 HR 3/5/2024 at 0800 MAR from Other Facility No Yes   Sig: Take 0.5 Tablets by mouth every day.   ondansetron (ZOFRAN) 4 MG Tab tablet 3/5/2024 at PRN MAR from Other Facility Yes Yes   Sig: Take 4 mg by mouth every four hours as needed for Nausea/Vomiting.   oral electrolyte (EQUALYTE) solution PRN at PRN MAR from Other Facility Yes Yes   Sig: Take 240 mL by mouth as needed. Indications: Disorder of Electrolyte Loss   traZODone (DESYREL) 50 MG Tab 3/4/2024 at 2000 MAR from Other Facility No Yes   Sig: Take 1 Tablet by mouth at bedtime.   vitamin D3 (CHOLECALCIFEROL) 1000 Unit (25 mcg) Tab 3/5/2024 at 0800 MAR from Other Facility No Yes   Sig: Take 2 Tablets by mouth every day.      Facility-Administered Medications: None       Physical Exam  Temp:  [36.1 °C (97 °F)] 36.1 °C (97 °F)  Pulse:  [] 185  Resp:  [13-20] 13  BP: (113-156)/(66-87) 137/68  SpO2:  [91 %-96 %] 94 %  Blood Pressure : 113/87   Temperature: 36.1 °C (97 °F)   Pulse: (!) 125   Respiration: 13   Pulse Oximetry: 91 %       Physical Exam  Vitals and nursing note reviewed.   Constitutional:       General: She is not in acute distress.     Appearance: She is ill-appearing.   HENT:      Head: Normocephalic and atraumatic.      Ears:      Comments: Presbycusis     Nose: Nose normal.      Mouth/Throat:      Mouth: Mucous membranes are moist.      Pharynx: Oropharynx is  clear.   Eyes:      General: No scleral icterus.     Extraocular Movements: Extraocular movements intact.   Cardiovascular:      Rate and Rhythm: Tachycardia present. Rhythm irregular.      Pulses: Normal pulses.      Heart sounds:      No friction rub.   Pulmonary:      Effort: No respiratory distress.      Breath sounds: Rales present. No wheezing.      Comments: Mild bibasilar crackles  Chest:      Chest wall: No tenderness.   Abdominal:      General: There is distension.      Tenderness: There is no abdominal tenderness. There is no guarding or rebound.   Musculoskeletal:      Cervical back: Neck supple. No tenderness.      Right lower leg: No edema.      Left lower leg: No edema.      Comments: Chronic venous stasis appearance of lower extremities    Decubitus ulcer of heels   Skin:     General: Skin is warm and dry.      Capillary Refill: Capillary refill takes less than 2 seconds.   Neurological:      General: No focal deficit present.      Mental Status: She is alert.      Comments: AAO to self, time, place  Slow speech  Minimally verbal   Psychiatric:         Mood and Affect: Mood normal.         Laboratory:  Recent Labs     03/05/24  1450   WBC 11.1*   RBC 5.01   HEMOGLOBIN 15.2   HEMATOCRIT 45.8   MCV 91.4   MCH 30.3   MCHC 33.2   RDW 52.1*   PLATELETCT 242   MPV 11.0     Recent Labs     03/05/24  1450   SODIUM 148*   POTASSIUM 3.1*   CHLORIDE 113*   CO2 22   GLUCOSE 130*   BUN 21   CREATININE 0.97   CALCIUM 9.2     Recent Labs     03/05/24  1450   ALTSGPT 105*   ASTSGOT 59*   ALKPHOSPHAT 100*   TBILIRUBIN 0.9   GLUCOSE 130*         Recent Labs     03/05/24  1450   NTPROBNP 6388*         Recent Labs     03/05/24  1450   TROPONINT 32*       Imaging:  CT-CTA CHEST PULMONARY ARTERY W/ RECONS   Final Result      1.  No pulmonary embolus. No acute abnormality in the chest.   2.  Stable since prior chest CT right breast mass measuring 1.1 cm. Recommend mammographic follow-up.      DX-CHEST-PORTABLE (1 VIEW)    Final Result      1.  Small left pleural effusion with adjacent airspace disease.   2.  Trace right pleural effusion.   3.  Cardiomegaly.      US-EXTREMITY ARTERY LOWER BILAT W/ANDREW (COMBO)    (Results Pending)       X-Ray:  I have personally reviewed the images and compared with prior images.  EKG:  I have personally reviewed the images and compared with prior images.    Assessment/Plan:  Justification for Admission Status  I anticipate this patient will require at least 2 midnights hospitalization, therefore appropriate for inpatient status.      * Atrial fibrillation with RVR (HCC)- (present on admission)  Assessment & Plan  RVR resolve with administered IV cardiazem 22.45mg and IV metoprolol 5mg  Continue Tambocor  Changed metoprolol succinate to tartrate, increased to 25 mg twice daily  --as needed IV metoprolol  Continue Eliquis    Trend CE, EKG  Maintain K>4 and Mg>2    Acute UTI  Assessment & Plan  UA pyuria, altered mental status earlier  S/p ceftriaxone 2 g in ER  Antibiotic: Ancef  Follow cultures    Volume overload  Assessment & Plan  Elevated troponin T, proBNP, small left pleural effusion  Gentle diuresis-Lasix 20 mg IV twice daily    Acute encephalopathy  Assessment & Plan  Likely secondary to UTI-being treated  Rule out other toxic/metabolic etiology  PO thiamine    Decubitus ulcer of heel, bilateral- (present on admission)  Assessment & Plan  Wound care    Elevated troponin- (present on admission)  Assessment & Plan  Probable demand ischemia in setting of uncontrolled A-fib and UTI, acute illness  Trend, EKG  TTE in 01/2024: EF 55%    Acute respiratory failure with hypoxia (HCC)- (present on admission)  Assessment & Plan  On 6L -- wean off as tolerated  EF 55% in 01/2024  Small left pleural effusion -- gentle diuresis  Nebs, pulmonary hygiene  Aspiration precautions    Acquired hypothyroidism- (present on admission)  Assessment & Plan  Synthroid    ACP (advance care planning)  Assessment &  Plan  Goal of care discussed with patient and patient's family members (son and daughter-in-law) in ER.  Family confirms patient is DNR/DNI POLST.  Agreeable for noninvasive medical management.    Age-related physical debility  Assessment & Plan  Was at group home, however notable decline in ADLs for the past several weeks  PT/OT    Status post left mastectomy- (present on admission)  Assessment & Plan  Per history for breast cancer    Hypertension- (present on admission)  Assessment & Plan  Metoprolol and IV lasix for now  Resume other meds once hemodynamically stable        VTE prophylaxis: Eliquis

## 2024-03-06 NOTE — ASSESSMENT & PLAN NOTE
Goal of care discussed with patient and patient's family members (son and daughter-in-law) in ER.  Family confirms patient is DNR/DNI POLST.  Agreeable for noninvasive medical management.

## 2024-03-06 NOTE — ASSESSMENT & PLAN NOTE
Elevated troponin T, proBNP, small left pleural effusion  Gentle diuresis-Lasix 20 mg IV twice daily-continue to monitor BNP and I's and O's

## 2024-03-06 NOTE — ASSESSMENT & PLAN NOTE
RVR resolve with administered IV cardiazem 22.45mg and IV metoprolol 5mg  Continue Tambocor  Changed metoprolol succinate to tartrate, increased to 25 mg twice daily  --as needed IV metoprolol  Continue Eliquis    Maintain K>4 and Mg>2

## 2024-03-06 NOTE — CARE PLAN
The patient is Stable - Low risk of patient condition declining or worsening    Shift Goals  Clinical Goals: Abx, diureses, Q4 neuro check  Patient Goals: Rest  Family Goals: Updates    Progress made toward(s) clinical / shift goals:        Problem: Self Care  Goal: Patient will have the ability to perform ADLs independently or with assistance (bathe, groom, dress, toilet and feed)  Outcome: Progressing     Problem: Fluid Volume  Goal: Fluid volume balance will be maintained  Outcome: Progressing     Problem: Knowledge Deficit - Standard  Goal: Patient and family/care givers will demonstrate understanding of plan of care, disease process/condition, diagnostic tests and medications  Outcome: Progressing     Problem: Skin Integrity  Goal: Skin integrity is maintained or improved  Outcome: Progressing

## 2024-03-06 NOTE — ASSESSMENT & PLAN NOTE
Probable demand ischemia in setting of uncontrolled A-fib and UTI, acute illness  Trend, EKG  TTE in 01/2024: EF 55%

## 2024-03-06 NOTE — ASSESSMENT & PLAN NOTE
UA pyuria, altered mental status earlier  S/p ceftriaxone 2 g in ER  Antibiotic: Ancef  Follow cultures

## 2024-03-06 NOTE — ED NOTES
Patient transferred to the floor accompanied by , Pt AAO X 4 , respirations even and unlabored, on 2 liters O2 via nasal canula . Oxygen safety measures in place and RN traced the line, Fall risk interventions in place.

## 2024-03-06 NOTE — THERAPY
"Speech Language Pathology   Clinical Swallow Evaluation     Patient Name: Debra Rodrigues  AGE:  89 y.o., SEX:  female  Medical Record #: [id number]  Date of Service: 3/6/2024      History of Present Illness  88 y/o female admitted from group home on 2/5 with generalized weakness, AMS. Noted to have afib, pyuria.     CMHx: afib with RVR, HTN, hypothyroidism, acute respiratory failure with hypoxia, UTI, encephalopathy, debility  PMHx: Polyneuropathy, cognitive impairment    [facility] for cognitive therapy.    CT/CTA Chest/Pulmonary Artery 3/5:  \"1.  No pulmonary embolus. No acute abnormality in the chest.  2.  Stable since prior chest CT right breast mass measuring 1.1 cm. Recommend mammographic follow-up.\"    General Information:  Vitals  O2 (LPM): 3  O2 Delivery Device: Silicone Nasal Cannula  Level of Consciousness: Alert, Awake  Patient Behaviors: Drowsy, Subdued  Orientation: Oriented to self, place, month independently. Oriented to year with min cue from SLP  Follows Directives: Yes      Prior Living Situation & Level of Function:  Housing / Facility: Long Term Care [facility]  Lives with - Patient's Self Care Capacity: Attendant / Paid Care Giver  Comments: transferred from group home per EMR and per patient   Communication: Prior cognitive deficits  Swallowing: Pt reports reg diet that \"they cut up for me.\" Reports history of intermittent reflux but no other complication       Oral Mechanism Evaluation:  Dentition: Good, Natural dentition   Facial Symmetry: Equal  Facial Sensation: Equal     Labial Observations: WFL   Lingual Observations: Midline  Motor Speech: WFL            Laryngeal Function:  Secretion Management: Adequate  Voice Quality:  Presbyphonic  Max Phonation Time (seconds): 4 seconds   (Average for eldery female: 10-15 seconds)   Cough: Perceptually WNL         Subjective  Pt agreeable and cooperative with SLP evaluation tasks. \"I need some " "water.\"      Assessment  Current Method of Nutrition: Oral diet (puree/slightly thick)   Slightly thick liquids are not available for adult trays at [facility]  Positioning: Ramirez's (60-90 degrees)  Bolus Administration: SLP, Patient  O2 (LPM): 3 O2 Delivery Device: Silicone Nasal Cannula  Factor(s) Affecting Performance: None  Tracheostomy : No    Swallowing Trials:  Thin Liquid (TN0): WFL  Pureed (PU4): WFL  Easy to Chew (EC7): Impaired      Comments:   Pt w/ adequate self-feeding. Appropriate oral bolus stripping and containment. Presumed complete AP transit with effective bolus formation evidenced by complete clearance of bolus from oral cavity. Upon presentation of EC7 cracker in multiple attempts, patient did not attempt to bite; she instead held piece in her mouth. With presentation of a smaller piece that did not require biting, mastication was adequate. No overt s/sx of aspiration noted w/ single and sequential sips of TN0 water. No increase in WOB, no cough/clear, no change in vocal quality. One to two swallows appreciated per bolus. Intermittent belch with trials of thin, no reported feeling of globus or reflux.       Clinical Impressions  Pt presents with mild oral phase dysphagia and no s/sx of pharyngeal phases dysphagia. Oral phase dysphagia is likely chronic d/t chronic cognitive deficits. Per patient report, soft and bite-sized is most likely approaching baseline diet. SLP to follow at a distance to ensure this is least restrictive and to complete further evaluation should changes or concern arise.       Recommendations  Soft and bite-sized with thin liquids  Instrumentation: None indicated at this time  Medication: As tolerated  Supervision: Distant supervision - check on patient 2-3 times per meal  Positioning: Fully upright and midline during oral intake, Remain upright for 30 minutes after oral intake  Risk Management : Small bites/sips, Slow rate of intake, Physical mobility, as tolerated  Oral " "Care: BID         SLP Treatment Plan  Treatment Plan: Dysphagia Treatment, Patient/Family/Caregiver Training  SLP Frequency: 2x Per Week  Estimated Duration: Until Therapy Goals Met      Anticipated Discharge Needs  Discharge Recommendations: Anticipate that the patient will have no further speech therapy needs after discharge from the hospital   Therapy Recommendations Upon DC: Not Indicated        Patient / Family Goals  Patient / Family Goal #1: \"I need some water.\"  Short Term Goals  Short Term Goal # 1: Pt will consume SB/TN diet with no overt s/sx of aspiration or worsening of lung status.      Rere Bernal, SLP   "

## 2024-03-06 NOTE — PROGRESS NOTES
4 Eyes Skin Assessment Completed by DAMIAN Pringle and ENEIDA Cheng.    Head Blanching and Redness  Ears Redness and Blanching  Nose Redness and Blanching  Mouth WDL  Neck WDL  Breast/Chest Redness, Abrasion, Blanching, Scab, and Scar  Shoulder Blades WDL  Spine Redness and Blanching  (R) Arm/Elbow/Hand Redness, Blanching, Bruising, and Scab  (L) Arm/Elbow/Hand Redness, Blanching, Bruising, and Scab  Abdomen Redness, Blanching, Non-Blanching, Scab, and Abrasion, Moisture fissure, none blanching pressure wound hip  Groin Redness, Non-Blanching, and Excoriation  Scrotum/Coccyx/Buttocks Redness, Non-Blanching, Excoriation, and Moisture Fissure  (R) Leg Redness, Non-Blanching, Bruising, Swelling, and Edema, hematoma   (L) Leg Redness, Blanching, Scab, Bruising, Swelling, and Abrasion  (R) Heel/Foot/Toe Redness, Non-Blanching, Bruising, and Scab, non stagable DTI heel, eschar  (L) Heel/Foot/Toe Redness, Non-Blanching, Discoloration, Boggy, Ulcer(s), Bruising, Swelling, Scar, and Scab, non-stagable heel, eschar          Devices In Places Tele Box, Blood Pressure Cuff, Pulse Ox, and Nasal Cannula      Interventions In Place NC W/Ear Foams, Sacral Mepilex, Heel Float Boots, Elbow Mepilex, Q2 Turns, Low Air Loss Mattress, and Barrier Cream    Possible Skin Injury Yes    Pictures Uploaded Into Epic Yes  Wound Consult Placed Yes  RN Wound Prevention Protocol Ordered Yes

## 2024-03-06 NOTE — ASSESSMENT & PLAN NOTE
wean off as tolerated  EF 55% in 01/2024  Small left pleural effusion -- gentle diuresis  Nebs, pulmonary hygiene  Aspiration precautions

## 2024-03-06 NOTE — HOSPITAL COURSE
Debra Rodrigues is a 89 y.o. female from [facility] with history of chronic A-fib on flecainide and Eliquis, hypothyroidism, hypertension, remote history of breast cancer now in remission who presented 3/5/2024 with evaluation for generalized weakness, altered mental status.  Patient was referred to ER from [facility] due to altered mental status and generalized weakness.  Per family (son and daughter-in-law), patient is normally able to carry out conversation at baseline, currently acutely altered.  Family also reported patient to have generalized weakness, difficulty ambulating due to pain and extremity, essentially bedridden for the past several weeks.  In ER, noted to have A-fib/a flutter but rate as high as 180s on telemetry monitoring in the emergency room.  She does have notable pyuria on UA, also noted to have deranged electrolytes and mildly elevated troponin T and proBNP.  She is also requiring as much is 6 L nasal cannula support, does not use oxygen at baseline.  Admission requested by ERP.  Admitted to medicine service for further evaluation and treatment.     ED course: Zofran 8 mg IV, KCL10 mEq IV, Cardizem 22.4 mg IV push, metoprolol 5 mg IV, ceftriaxone 2 g

## 2024-03-06 NOTE — ED NOTES
Assumed care of patient, patient bedside report received from DAMIAN Lal . Pt AAO X 4 , respirations even and unlabored, on 2 liters O2 via nasal canula . Introduced self as pt RN, POC discussed, call light in reach, Oxygen safety measures in place and RN traced the line, Fall risk interventions in place.

## 2024-03-06 NOTE — FLOWSHEET NOTE
03/06/24 0842   Chest Physiotherapy Treatment   $ PEP/CPT Performed PEP / Flutter   Date Disposable Equipment Next Change Due (Q 30 Days)   (Flutter instruct)

## 2024-03-06 NOTE — ED NOTES
ERP made aware of pt's tachycardia, new orders in place. Pt resting comfortably, family at bedside

## 2024-03-06 NOTE — DISCHARGE PLANNING
Care Transition Team Assessment    CM reviewed chart and participated in IDT rounds.  CM met with patient at bedside.  She awakens easily, but dozes off frequently while visiting with her.    She does live at [facility]; she does not use O2 at Group Home; she has a walker and w/c already.    CM will follow peripherally and provide assistance as needed.      Information Source  Orientation Level: Oriented to place, Oriented to time, Oriented to person  Information Given By: Patient  Informant's Name: Debra  Who is responsible for making decisions for patient? : Patient    Readmission Evaluation  Is this a readmission?: Yes - unplanned readmission    Elopement Risk  Legal Hold: No  Ambulatory or Self Mobile in Wheelchair: No-Not an Elopement Risk    Interdisciplinary Discharge Planning  Lives with - Patient's Self Care Capacity: Attendant / Paid Care Giver  Patient or legal guardian wants to designate a caregiver: No  Support Systems: Family Member(s)  Housing / Facility: Long Term Care Facility  Durable Medical Equipment: Home Oxygen    Discharge Preparedness  What is your plan after discharge?: Group home ([facility])  What are your discharge supports?:  (facility)         Finances  Prescription Coverage: Yes    Vision / Hearing Impairment  Vision Impairment : Yes  Right Eye Vision: Impaired, Wears Glasses  Left Eye Vision: Impaired, Wears Glasses  Hearing Impairment : No              Domestic Abuse  Have you ever been the victim of abuse or violence?: No  Physical Abuse or Sexual Abuse: No  Verbal Abuse or Emotional Abuse: No  Possible Abuse/Neglect Reported to:: Not Applicable              Anticipated Discharge Information  Discharge Disposition: D/T to SNF with Medicare cert in anticipation of skilled care (03)

## 2024-03-06 NOTE — ED NOTES
Med Rec complete per MAR from [facility]   Allergies reviewed  Antibiotics in the past 30 days:no  Anticoagulant in past 14 days:yes  Anticoagulant:Eliquis 5 mg Last dose:03/05/24  Pharmacy patient utilizes:[facility]

## 2024-03-06 NOTE — PROGRESS NOTES
[facility] Medicine Daily Progress Note    Date of Service  3/6/2024    Chief Complaint  Debra Rodrigues is a 89 y.o. female admitted 3/5/2024 with change in mental status    Hospital Course  Debra Rodrigues is a 89 y.o. female from snf with history of chronic A-fib on flecainide and Eliquis, hypothyroidism, hypertension, remote history of breast cancer now in remission who presented 3/5/2024 with evaluation for generalized weakness, altered mental status.  Patient was referred to ER from snf due to altered mental status and generalized weakness.  Per family (son and daughter-in-law), patient is normally able to carry out conversation at baseline, currently acutely altered.  Family also reported patient to have generalized weakness, difficulty ambulating due to pain and extremity, essentially bedridden for the past several weeks.  In ER, noted to have A-fib/a flutter but rate as high as 180s on telemetry monitoring in the emergency room.  She does have notable pyuria on UA, also noted to have deranged electrolytes and mildly elevated troponin T and proBNP.  She is also requiring as much is 6 L nasal cannula support, does not use oxygen at baseline.  Admission requested by ERP.  Admitted to medicine service for further evaluation and treatment.     ED course: Zofran 8 mg IV, KCL10 mEq IV, Cardizem 22.4 mg IV push, metoprolol 5 mg IV, ceftriaxone 2 g       Interval Problem Update  Treating uti-- iv ancef    HR wnl    Patient mental status improved    Family at bedside    Weakness persists    Pt/ot ordered    Patient lives in group home and had home pt/ot- she did not always participate    I have discussed this patient's plan of care and discharge plan at IDT rounds today with Case Management, Nursing, Nursing leadership, and other members of the IDT team.    Consultants/Specialty      Code Status  DNAR/DNI    Disposition  The patient is not medically cleared for discharge to home or a post-acute  facility.  Anticipate discharge to: home with close outpatient follow-up    I have placed the appropriate orders for post-discharge needs.    Review of Systems  Review of Systems   Constitutional:  Negative for chills and fever.   HENT:  Negative for ear discharge and hearing loss.    Respiratory:  Negative for cough.    Cardiovascular:  Negative for chest pain, palpitations, orthopnea, claudication and leg swelling.   Gastrointestinal:  Positive for vomiting. Negative for diarrhea, heartburn and nausea.   Genitourinary:  Negative for dysuria, frequency and urgency.   Musculoskeletal:  Negative for joint pain, myalgias and neck pain.   Neurological:  Positive for weakness.        Physical Exam  Temp:  [35.7 °C (96.3 °F)-36.8 °C (98.2 °F)] 36.6 °C (97.9 °F)  Pulse:  [] 66  Resp:  [13-22] 18  BP: (113-178)/(66-87) 132/75  SpO2:  [91 %-96 %] 92 %    Physical Exam  Constitutional:       General: She is not in acute distress.     Appearance: She is not ill-appearing, toxic-appearing or diaphoretic.   HENT:      Mouth/Throat:      Mouth: Mucous membranes are moist.   Eyes:      Extraocular Movements: Extraocular movements intact.   Cardiovascular:      Rate and Rhythm: Normal rate. Rhythm irregular.      Heart sounds: No murmur heard.  Pulmonary:      Breath sounds: No stridor. No wheezing or rhonchi.   Abdominal:      General: There is no distension.      Palpations: There is no mass.      Tenderness: There is no abdominal tenderness.      Hernia: No hernia is present.   Musculoskeletal:         General: No swelling or tenderness.      Right lower leg: No edema.   Skin:     Coloration: Skin is not jaundiced.      Findings: No bruising.   Neurological:      General: No focal deficit present.      Cranial Nerves: No cranial nerve deficit.      Motor: Weakness present.   Psychiatric:         Mood and Affect: Mood normal.         Behavior: Behavior normal.         Fluids    Intake/Output Summary (Last 24 hours) at  3/6/2024 1136  Last data filed at 3/6/2024 0304  Gross per 24 hour   Intake 1680 ml   Output --   Net 1680 ml       Laboratory  Recent Labs     03/05/24  1450 03/06/24  0021   WBC 11.1* 11.1*   RBC 5.01 5.07   HEMOGLOBIN 15.2 15.4   HEMATOCRIT 45.8 47.0   MCV 91.4 92.7   MCH 30.3 30.4   MCHC 33.2 32.8   RDW 52.1* 51.9*   PLATELETCT 242 244   MPV 11.0 11.2     Recent Labs     03/05/24  1450 03/06/24  0021   SODIUM 148* 142   POTASSIUM 3.1* 3.4*   CHLORIDE 113* 106   CO2 22 22   GLUCOSE 130* 120*   BUN 21 19   CREATININE 0.97 0.89   CALCIUM 9.2 9.0     Recent Labs     03/05/24  1450   INR 1.54*               Imaging  US-ANDREW SINGLE LEVEL BILAT         CT-CTA CHEST PULMONARY ARTERY W/ RECONS   Final Result      1.  No pulmonary embolus. No acute abnormality in the chest.   2.  Stable since prior chest CT right breast mass measuring 1.1 cm. Recommend mammographic follow-up.      DX-CHEST-PORTABLE (1 VIEW)   Final Result      1.  Small left pleural effusion with adjacent airspace disease.   2.  Trace right pleural effusion.   3.  Cardiomegaly.           Assessment/Plan  * Atrial fibrillation with RVR (HCC)- (present on admission)  Assessment & Plan  RVR resolve with administered IV cardiazem 22.45mg and IV metoprolol 5mg  Continue Tambocor  Changed metoprolol succinate to tartrate, increased to 25 mg twice daily  --as needed IV metoprolol  Continue Eliquis    Maintain K>4 and Mg>2    ACP (advance care planning)  Assessment & Plan  Goal of care discussed with patient and patient's family members (son and daughter-in-law) in ER.  Family confirms patient is DNR/DNI POLST.  Agreeable for noninvasive medical management.    Volume overload- (present on admission)  Assessment & Plan  Elevated troponin T, proBNP, small left pleural effusion  Gentle diuresis-Lasix 20 mg IV twice daily    Age-related physical debility- (present on admission)  Assessment & Plan  Was at group home, however notable decline in ADLs for the past several  weeks  PT/OT    Acute encephalopathy- (present on admission)  Assessment & Plan  Likely secondary to UTI-being treated    PO thiamine    Acute UTI- (present on admission)  Assessment & Plan  UA pyuria, altered mental status earlier  S/p ceftriaxone 2 g in ER  Antibiotic: Ancef  Follow cultures    Decubitus ulcer of heel, bilateral- (present on admission)  Assessment & Plan  Wound care    Elevated troponin- (present on admission)  Assessment & Plan  Probable demand ischemia in setting of uncontrolled A-fib and UTI, acute illness  Trend, EKG  TTE in 01/2024: EF 55%    Acute respiratory failure with hypoxia (HCC)- (present on admission)  Assessment & Plan  wean off as tolerated  EF 55% in 01/2024  Small left pleural effusion -- gentle diuresis  Nebs, pulmonary hygiene  Aspiration precautions    Status post left mastectomy- (present on admission)  Assessment & Plan  Per history for breast cancer    Acquired hypothyroidism- (present on admission)  Assessment & Plan  Synthroid    Hypertension- (present on admission)  Assessment & Plan  Metoprolol and IV lasix for now  Resume other meds once hemodynamically stable         VTE prophylaxis:   SCDs/TEDs      I have performed a physical exam and reviewed and updated ROS and Plan today (3/6/2024). In review of yesterday's note (3/5/2024), there are no changes except as documented above.      Greater than 51 minutes spent prepping to see patient (e.g. review of tests) obtaining and/or reviewing separately obtained history. Performing a medically appropriate examination and/ evaluation.  Counseling and educating the patient/family/caregiver.  Ordering medications, tests, or procedures.  Referring and communicating with other health care professionals.  Documenting clinical information in EPIC.  Independently interpreting results and communicating results to patient/family/caregiver.  Care coordination.

## 2024-03-07 PROBLEM — N76.0 ACUTE VAGINITIS: Status: ACTIVE | Noted: 2024-03-07

## 2024-03-07 LAB
ANION GAP SERPL CALC-SCNC: 14 MMOL/L (ref 7–16)
BACTERIA UR CULT: ABNORMAL
BACTERIA UR CULT: ABNORMAL
BUN SERPL-MCNC: 22 MG/DL (ref 8–22)
CALCIUM SERPL-MCNC: 8.2 MG/DL (ref 8.5–10.5)
CHLORIDE SERPL-SCNC: 101 MMOL/L (ref 96–112)
CO2 SERPL-SCNC: 24 MMOL/L (ref 20–33)
CREAT SERPL-MCNC: 0.88 MG/DL (ref 0.5–1.4)
GFR SERPLBLD CREATININE-BSD FMLA CKD-EPI: 63 ML/MIN/1.73 M 2
GLUCOSE SERPL-MCNC: 111 MG/DL (ref 65–99)
NT-PROBNP SERPL IA-MCNC: 2133 PG/ML (ref 0–125)
POTASSIUM SERPL-SCNC: 3.1 MMOL/L (ref 3.6–5.5)
SIGNIFICANT IND 70042: ABNORMAL
SITE SITE: ABNORMAL
SODIUM SERPL-SCNC: 139 MMOL/L (ref 135–145)
SOURCE SOURCE: ABNORMAL

## 2024-03-07 PROCEDURE — 99233 SBSQ HOSP IP/OBS HIGH 50: CPT | Performed by: HOSPITALIST

## 2024-03-07 PROCEDURE — 36415 COLL VENOUS BLD VENIPUNCTURE: CPT

## 2024-03-07 PROCEDURE — 97166 OT EVAL MOD COMPLEX 45 MIN: CPT

## 2024-03-07 PROCEDURE — 83880 ASSAY OF NATRIURETIC PEPTIDE: CPT

## 2024-03-07 PROCEDURE — 97162 PT EVAL MOD COMPLEX 30 MIN: CPT

## 2024-03-07 PROCEDURE — 700111 HCHG RX REV CODE 636 W/ 250 OVERRIDE (IP): Performed by: STUDENT IN AN ORGANIZED HEALTH CARE EDUCATION/TRAINING PROGRAM

## 2024-03-07 PROCEDURE — 700101 HCHG RX REV CODE 250: Performed by: STUDENT IN AN ORGANIZED HEALTH CARE EDUCATION/TRAINING PROGRAM

## 2024-03-07 PROCEDURE — A9270 NON-COVERED ITEM OR SERVICE: HCPCS | Performed by: HOSPITALIST

## 2024-03-07 PROCEDURE — 770001 HCHG ROOM/CARE - MED/SURG/GYN PRIV*

## 2024-03-07 PROCEDURE — 700102 HCHG RX REV CODE 250 W/ 637 OVERRIDE(OP): Performed by: HOSPITALIST

## 2024-03-07 PROCEDURE — 700102 HCHG RX REV CODE 250 W/ 637 OVERRIDE(OP): Mod: JZ

## 2024-03-07 PROCEDURE — A9270 NON-COVERED ITEM OR SERVICE: HCPCS | Performed by: STUDENT IN AN ORGANIZED HEALTH CARE EDUCATION/TRAINING PROGRAM

## 2024-03-07 PROCEDURE — 700102 HCHG RX REV CODE 250 W/ 637 OVERRIDE(OP): Performed by: STUDENT IN AN ORGANIZED HEALTH CARE EDUCATION/TRAINING PROGRAM

## 2024-03-07 PROCEDURE — 80048 BASIC METABOLIC PNL TOTAL CA: CPT

## 2024-03-07 PROCEDURE — A9270 NON-COVERED ITEM OR SERVICE: HCPCS | Mod: JZ

## 2024-03-07 RX ORDER — METRONIDAZOLE 500 MG/1
500 TABLET ORAL 2 TIMES DAILY
Status: DISCONTINUED | OUTPATIENT
Start: 2024-03-07 | End: 2024-03-08 | Stop reason: HOSPADM

## 2024-03-07 RX ORDER — POTASSIUM CHLORIDE 20 MEQ/1
20 TABLET, EXTENDED RELEASE ORAL ONCE
Status: COMPLETED | OUTPATIENT
Start: 2024-03-07 | End: 2024-03-07

## 2024-03-07 RX ADMIN — Medication 100 MG: at 05:34

## 2024-03-07 RX ADMIN — Medication 2000 UNITS: at 05:34

## 2024-03-07 RX ADMIN — FUROSEMIDE 20 MG: 10 INJECTION INTRAMUSCULAR; INTRAVENOUS at 16:04

## 2024-03-07 RX ADMIN — POTASSIUM CHLORIDE 20 MEQ: 1500 TABLET, EXTENDED RELEASE ORAL at 05:47

## 2024-03-07 RX ADMIN — TRAZODONE HYDROCHLORIDE 50 MG: 50 TABLET ORAL at 21:02

## 2024-03-07 RX ADMIN — CEFAZOLIN 2 G: 10 INJECTION, POWDER, FOR SOLUTION INTRAVENOUS at 02:42

## 2024-03-07 RX ADMIN — CEFAZOLIN 2 G: 10 INJECTION, POWDER, FOR SOLUTION INTRAVENOUS at 16:04

## 2024-03-07 RX ADMIN — OMEPRAZOLE 20 MG: 20 CAPSULE, DELAYED RELEASE ORAL at 05:43

## 2024-03-07 RX ADMIN — POTASSIUM CHLORIDE 20 MEQ: 1500 TABLET, EXTENDED RELEASE ORAL at 05:35

## 2024-03-07 RX ADMIN — METRONIDAZOLE 500 MG: 500 TABLET ORAL at 16:03

## 2024-03-07 RX ADMIN — PAROXETINE HYDROCHLORIDE 10 MG: 10 TABLET, FILM COATED ORAL at 05:38

## 2024-03-07 RX ADMIN — FLECAINIDE ACETATE 100 MG: 100 TABLET ORAL at 05:33

## 2024-03-07 RX ADMIN — APIXABAN 5 MG: 5 TABLET, FILM COATED ORAL at 05:36

## 2024-03-07 RX ADMIN — METOPROLOL TARTRATE 25 MG: 25 TABLET, FILM COATED ORAL at 16:04

## 2024-03-07 RX ADMIN — APIXABAN 5 MG: 5 TABLET, FILM COATED ORAL at 16:03

## 2024-03-07 RX ADMIN — CYANOCOBALAMIN TAB 500 MCG 1000 MCG: 500 TAB at 05:38

## 2024-03-07 RX ADMIN — NYSTATIN: 100000 POWDER TOPICAL at 05:51

## 2024-03-07 RX ADMIN — CEFAZOLIN 2 G: 10 INJECTION, POWDER, FOR SOLUTION INTRAVENOUS at 08:49

## 2024-03-07 RX ADMIN — Medication 100 MG: at 16:04

## 2024-03-07 RX ADMIN — Medication 400 MG: at 05:34

## 2024-03-07 RX ADMIN — METRONIDAZOLE 500 MG: 500 TABLET ORAL at 08:49

## 2024-03-07 RX ADMIN — FUROSEMIDE 20 MG: 10 INJECTION INTRAMUSCULAR; INTRAVENOUS at 05:30

## 2024-03-07 RX ADMIN — Medication 400 MG: at 16:04

## 2024-03-07 RX ADMIN — FOLIC ACID 1 MG: 1 TABLET ORAL at 05:43

## 2024-03-07 RX ADMIN — GABAPENTIN 100 MG: 100 CAPSULE ORAL at 21:02

## 2024-03-07 RX ADMIN — NYSTATIN: 100000 POWDER TOPICAL at 16:03

## 2024-03-07 RX ADMIN — POTASSIUM CHLORIDE 20 MEQ: 1500 TABLET, EXTENDED RELEASE ORAL at 16:04

## 2024-03-07 RX ADMIN — LEVOTHYROXINE SODIUM 75 MCG: 0.07 TABLET ORAL at 05:32

## 2024-03-07 ASSESSMENT — ENCOUNTER SYMPTOMS
DIARRHEA: 0
HEARTBURN: 0
VOMITING: 1
COUGH: 0
ORTHOPNEA: 0
NAUSEA: 0
NECK PAIN: 0
FEVER: 0
CHILLS: 0
CLAUDICATION: 0
PALPITATIONS: 0
MYALGIAS: 0
WEAKNESS: 1

## 2024-03-07 ASSESSMENT — COGNITIVE AND FUNCTIONAL STATUS - GENERAL
SUGGESTED CMS G CODE MODIFIER MOBILITY: CN
WALKING IN HOSPITAL ROOM: TOTAL
PERSONAL GROOMING: A LOT
TOILETING: A LOT
MOVING FROM LYING ON BACK TO SITTING ON SIDE OF FLAT BED: TOTAL
CLIMB 3 TO 5 STEPS WITH RAILING: TOTAL
SUGGESTED CMS G CODE MODIFIER DAILY ACTIVITY: CL
MOVING TO AND FROM BED TO CHAIR: TOTAL
MOBILITY SCORE: 6
DAILY ACTIVITIY SCORE: 12
TURNING FROM BACK TO SIDE WHILE IN FLAT BAD: TOTAL
HELP NEEDED FOR BATHING: A LOT
DRESSING REGULAR UPPER BODY CLOTHING: A LOT
STANDING UP FROM CHAIR USING ARMS: TOTAL
DRESSING REGULAR LOWER BODY CLOTHING: A LOT
EATING MEALS: A LOT

## 2024-03-07 ASSESSMENT — GAIT ASSESSMENTS: GAIT LEVEL OF ASSIST: UNABLE TO PARTICIPATE

## 2024-03-07 ASSESSMENT — FIBROSIS 4 INDEX: FIB4 SCORE: 1.98

## 2024-03-07 ASSESSMENT — ACTIVITIES OF DAILY LIVING (ADL): TOILETING: REQUIRES ASSIST

## 2024-03-07 NOTE — DIETARY
"Nutrition services: Day 2 of admit.  Debra Rodrigues is a 89 y.o. female with admitting DX of Atrial fibrillation with RVR   Consult received for poor PO.    Visited pt at bedside. Pt said that she has been eating poorly d/t not being hungry. Pt expressed food preferences. Pt said that she has not had wt loss or poor PO. Pt stated UBW of 150lbs. Current wt is 177lbs. Pt said that she was drinking about half of the Ensure Compacts that she is receiving.   Per nutrition focused physical exam, pt w/ mild scooping in temporalis muscle.     Assessment:  Height: 160 cm (5' 3\")  Weight: 80.6 kg (177 lb 11.1 oz) via bed scale on 3/7  Body mass index is 31.48 kg/m²., BMI classification: Obese class I  Diet/Intake: Level 6 soft/bite-sized w/ Level 0 - thin liquids   PO mostly <25%.    Evaluation:   Per H&P: Pt presented w/ generalized weakness and altered mental status.   PMH: hypothyroidism, HTN, remote hx of breast cancer now in remission   Labs: Na 3.1  Meds: B12, folic acid, lasix, magnesium oxide, reglan PRN, nystatin, omeprazole, zofron PRN, Kdur, compazine PRN, thiamine, trazodone, vitamin D3  Skin: Pressure injury DTI to L hip, sacrum, and heel. No edema  +BM: 3/6; small/smear  Per chart review, wt on 2/15 was via other healthcare provider. On 1/07 and 1/05, this was via bed scale and pt had 2+ RLE, LLE edema. Wt on 11/30 unclear what wt source is and wt on 5/05 was via other healthcare provider.   Wt Readings from Last 6 Encounters:   03/07/24 80.6 kg (177 lb 11.1 oz)   02/15/24 89.8 kg (198 lb)   01/07/24 90 kg (198 lb 8 oz)   01/05/24 87.7 kg (193 lb 5.5 oz)   11/30/23 74.8 kg (165 lb)   05/05/23 74.8 kg (165 lb)     Malnutrition Risk: Does not meet 2 ASPEN criteria.    Recommendations/Plan:  Add pt food preferences.    Encourage intake of >50%  Document intake of all meals and supplements as % taken in ADL's to provide interdisciplinary communication across all shifts.   Monitor weight.  Nutrition rep will " continue to see patient for ongoing meal and snack preferences.     RD following.

## 2024-03-07 NOTE — WOUND TEAM
[facility]  Inpatient Services  Initial Wound and Skin Care Evaluation    Admission Date: 3/5/2024     Last order of IP CONSULT TO WOUND CARE was found on 3/6/2024 from Hospital Encounter on 3/5/2024     HPI, PMH, SH: Reviewed    Past Surgical History:   Procedure Laterality Date    BREAST RECONSTRUCTION  11/23/2010    Performed by ANNITA KELLEY at SURGERY [facility]    TISSUE EXPANDER PLACE/REMOVE  11/23/2010    Performed by ANNITA KELLEY at SURGERY [facility]    MASTOPEXY  11/23/2010    Performed by ANNITA KELLEY at SURGERY [facility]    BREAST RECONSTRUCTION  4/20/2010    Performed by ANNITA KELLEY at [facility]    TISSUE EXPANDER PLACE/REMOVE  4/20/2010    Performed by ANNITA KELLEY at [facility]    MASTECTOMY  5/13/2009    left    PACEMAKER INSERTION  2009    KNEE ARTHROSCOPY  1995    right    RAYNA BY LAPAROSCOPY  1995    HYSTERECTOMY, TOTAL ABDOMINAL  1972    BSO    MASTOIDECTOMY  1943    left    TONSILLECTOMY AND ADENOIDECTOMY  1942    CATARACT EXTRACTION WITH IOL      bilateral     Social History     Tobacco Use    Smoking status: Never    Smokeless tobacco: Never   Substance Use Topics    Alcohol use: Yes     Alcohol/week: 3.5 oz     Types: 7 Standard drinks or equivalent per week     Comment: one per day     Chief Complaint   Patient presents with    Weakness     Pt BIB EMS from [facility]. Per report pt has been feeling more fatigued and with nausea for the  past week. Pt transferred to ER for further eval.     Nausea    Fatigue     Diagnosis: Atrial fibrillation with RVR (HCC) [I48.91]    Unit where seen by Wound Team: T834/02     WOUND CONSULT RELATED TO:  Sacrum, heels    WOUND TEAM PLAN OF CARE - Frequency of Follow-up:   Nursing to follow dressing orders written for wound care. Contact wound team if area fails to progress, deteriorates or with any questions/concerns if something comes up before next scheduled  follow up (See below as to whether wound is following and frequency of wound follow up)    Weekly - Sacrum, Left hip - POA DTI; Bilateral heels POA Unstageable  Not following - right lower leg - (hematoma), groin/pannus (moisture dermatitis)     WOUND HISTORY:          WOUND ASSESSMENT/LDA  Moisture Associated Skin Damage 01/02/24 Panus;Groin;Breast (Active)   First Observed Date/First Observed Time: 01/02/24 2300   Present on Original Admission: Yes  Laterality: Bilateral  Wound Location : Panus;Groin;Breast      Assessments 3/6/2024  1:30 PM   Wound Image     NEXT Weekly Photo (Inpatient Only) 03/13/24   Drainage Amount Scant   Drainage Description Serous   Periwound Assessment Pink;Red;Excoriated   IAD Cleansing Foam Cleanser/Washcloth   Periwound Protectant Antifungal Therapy   IAD Containment Device Purewick   Length (cm) 6   Width (cm) 6   WOUND NURSE ONLY - Time Spent with Patient (mins) 30       Wound 03/06/24 Pressure Injury Heel Bilateral POA Unstageable (Active)   Date First Assessed: 03/06/24   Present on Original Admission: Yes  Hand Hygiene Completed: Yes  Primary Wound Type: Pressure Injury  Location: Heel  Laterality: Bilateral  Wound Description (Comments): POA Unstageable      Assessments 3/6/2024  1:30 PM   Wound Image        Site Assessment Dry;Intact;Black;Yellow;Eschar   Periwound Assessment Dry;Intact;Pink   Margins Attached edges   Closure Open to air   Drainage Amount None   Treatments Cleansed;Nonselective debridement;Site care;Offloading   Offloading/DME Heel float boot   Wound Cleansing Foam Cleanser/Washcloth   Dressing Status Open to Air   Dressing Cleansing/Solutions 3% Betadine   Dressing Options Open to Air   Dressing Change/Treatment Frequency Every Shift, and As Needed   NEXT Weekly Photo (Inpatient Only) 03/13/24   Wound Team Following Weekly   Pressure Injury Stage Unstageable   Wound Length (cm) 4.5 cm   Wound Width (cm) 6 cm   Wound Surface Area (cm^2) 27 cm^2   Wound Bed  Eschar (%) 100 %   Shape oval   Wound Odor None   Exposed Structures ROB   WOUND NURSE ONLY - Time Spent with Patient (mins) 30       Wound 02/15/24 Pressure Injury Sacrum;Thigh Upper Bilateral POA DTI (Active)   Date First Assessed/Time First Assessed: 02/15/24 1900   Present on Original Admission: Yes  Hand Hygiene Completed: Yes  Primary Wound Type: Pressure Injury  Location: Sacrum;Thigh  Wound Orientation: Upper  Laterality: Bilateral  Wound Description (...      Assessments 3/6/2024  1:30 PM   Wound Image     Site Assessment Pink;Red;Purple;Excoriated   Periwound Assessment Pink;Excoriated;Rash   Margins Attached edges   Closure Open to air   Drainage Amount None   Treatments Cleansed;Nonselective debridement;Site care;Offloading   Wound Cleansing Foam Cleanser/Washcloth   Periwound Protectant Barrier Paste   Dressing Status Open to Air   Dressing Cleansing/Solutions Not Applicable   Dressing Options Open to Air   Dressing Change/Treatment Frequency Every Shift, and As Needed   NEXT Weekly Photo (Inpatient Only) 03/13/24   Pressure Injury Stage Deep Tissue   Wound Length (cm) 1 cm   Wound Width (cm) 1 cm   Wound Depth (cm) 0 cm   Wound Surface Area (cm^2) 1 cm^2   Wound Volume (cm^3) 0 cm^3   Shape scattered ovals to bilateral buttocks and upper thighs   Wound Odor None   Pulses N/A   WOUND NURSE ONLY - Time Spent with Patient (mins) 30       Wound 03/06/24 Pressure Injury Hip Anterior Left POA DTI (Active)   Date First Assessed: 03/06/24   Present on Original Admission: Yes  Hand Hygiene Completed: Yes  Primary Wound Type: Pressure Injury  Location: Hip  Wound Orientation: Anterior  Laterality: Left  Wound Description (Comments): POA DTI      Assessments 3/6/2024  1:30 PM   Wound Image     Site Assessment Purple;Dry;Excoriated   Periwound Assessment Non-blanchable erythema;Purple   Margins Attached edges;Defined edges   Closure Open to air   Drainage Amount None   Treatments Nonselective debridement;Site care    Wound Cleansing Foam Cleanser/Washcloth   Periwound Protectant Antifungal Therapy   Dressing Status Open to Air   NEXT Weekly Photo (Inpatient Only) 24   Wound Team Following Weekly   Pressure Injury Stage Deep Tissue   Wound Length (cm) 1 cm   Wound Width (cm) 3.5 cm   Wound Depth (cm) 0 cm   Wound Surface Area (cm^2) 3.5 cm^2   Wound Volume (cm^3) 0 cm^3   Wound Bed Epithelium (%) 100 %   Shape linear   Wound Odor None   WOUND NURSE ONLY - Time Spent with Patient (mins) 60       Wound 24 Traumatic Pretibial Right (Active)   Date First Assessed: 24   Present on Original Admission: Yes  Hand Hygiene Completed: Yes  Primary Wound Type: Traumatic  Location: Pretibial  Laterality: Right      Assessments 3/6/2024  1:30 PM   Wound Image     Site Assessment Purple;Dry;Intact;Edema;Painful   Periwound Assessment Dry;Intact;Blanchable erythema;Edema   Margins Attached edges;Defined edges   Closure Open to air   Drainage Amount None   Treatments Cleansed;Nonselective debridement;Site care;Offloading   Wound Cleansing Foam Cleanser/Washcloth   Dressing Status Open to Air   NEXT Weekly Photo (Inpatient Only) 24   Wound Team Following Not following   Non-staged Wound Description Not applicable   Wound Length (cm) 3 cm   Wound Width (cm) 3 cm   Wound Depth (cm) 0 cm   Wound Surface Area (cm^2) 9 cm^2   Wound Volume (cm^3) 0 cm^3   Wound Bed Epithelium (%) 100 %   Shape round   Wound Odor None        Vascular:    ANDREW:   ANDREW Results, Last 30 Days US-ANDREW SINGLE LEVEL BILAT    Result Date: 2/15/2024  Narrative  Vascular Laboratory  Conclusions  Bilateral.  The ankle-brachial indices are normal.  CHRISTINE LANDA  Age:    88    Gender:     F  MRN:    [id number]  :    1935      BSA:  Exam Date:     02/15/2024 16:38  Room #:     Inpatient  Priority:     Stat  Ht (in):             Wt (lb):  Ordering Physician:        KATERIN ALVAREZ  Referring Physician:       [id number] Christianson  Sonographer:                Uma Montez RVT  Study Type:                Complete Bilateral  Technical Quality:         Adequate  Indications:     Ulcer of heel and midfoot  CPT Codes:       03523  ICD Codes:       707.14  History:         Pressure wounds of both heels. No prior exam.  Limitations:     Patient declined brachial pressure of left arm.                 RIGHT  Waveform            Systolic BPs (mmHg)                             140           Brachial  Biphasic                                 Common Femoral  Biphasic                                 Popliteal  Biphasic                   144           Posterior Tibial  Biphasic                   138           Dorsalis Pedis                                           Digit                             1.03          ANDREW                                           TBI                       LEFT  Waveform        Systolic BPs (mmHg)                                           Brachial  Biphasic                                 Common Femoral  Biphasic                                 Popliteal  Biphasic                   145           Posterior Tibial  Biphasic                   160           Dorsalis Pedis                                           Digit                             1.14          ANDREW                                           TBI  Findings  Bilateral.  The ankle-brachial indices are normal.  Doppler waveforms of the common femoral, popliteal, posterior tibial, and  dorsalis pedis arteries are of high amplitude and multiphasic.  Additional testing was not performed in accordance with lower extremity  arterial evaluation protocol.  Sedrick Saucedo MD  (Electronically Signed)  Final Date:      15 February 2024                   17:25      Lab Values:    Lab Results   Component Value Date/Time    WBC 11.1 (H) 03/06/2024 12:21 AM    RBC 5.07 03/06/2024 12:21 AM    HEMOGLOBIN 15.4 03/06/2024 12:21 AM    HEMATOCRIT 47.0 03/06/2024 12:21 AM    CREACTPROT <0.30 03/05/2024 02:50 PM     MAN 22 03/05/2024 02:50 PM         Culture Results show:  No results found for this or any previous visit (from the past 720 hour(s)).    Pain Level/Medicated:  None, Tolerated without pain medication       INTERVENTIONS BY WOUND TEAM:  Chart and images reviewed. Discussed with bedside RN. All areas of concern (based on picture review, LDA review and discussion with bedside RN) have been thoroughly assessed. Documentation of areas based on significant findings. This RN in to assess patient. Performed standard wound care which includes appropriate positioning, dressing removal and non-selective debridement. Pictures and measurements obtained weekly if/when required.    Wound:  Sacrum   Preparation for Dressing removal: Open to air  Cleansed/Non-selectively Debrided with:  No rinse foam soap and Moist warm washcloth  Mamie wound: Cleansed with No rinse foam soap and Moist warm washcloth, Prepped with Barrier paste  Primary Dressing:  open to air     Wound:  Bilateral heels  Preparation for Dressing removal: Open to air  Cleansed/Non-selectively Debrided with:  No rinse foam soap and Moist warm washcloth  Mamie wound: Cleansed with No rinse foam soap and Moist warm washcloth, Prepped with betadine  Primary Dressing:  prevalon boots     Wound:  Right lower leg  Preparation for Dressing removal: Open to air  Cleansed/Non-selectively Debrided with:  No rinse foam soap and Moist warm washcloth  Mamie wound: Cleansed with No rinse foam soap and Moist warm washcloth, Prepped with Open to Air    Advanced Wound Care Discharge Planning  Number of Clinicians necessary to complete wound care: 1  Is patient requiring IV pain medications for dressing changes:  No   Length of time for dressing change 30 min. (This does not include chart review, pre-medication time, set up, clean up or time spent charting.)    Interdisciplinary consultation: Patient, Bedside RN (More)    EVALUATION / RATIONALE FOR TREATMENT:     Date:   03/06/24  Wound Status:  Initial evaluation    The patient has scattered purple non-blanching discoloration to bilateral buttocks and upper thighs (POA DTI), cleansed and applied barrier paste and left open to air due to incontinence.     Bilateral heels (POA Unstageable Pressure Injuries) with dry intact black and yellow eschar, no drainage and no odor. Cleanse, paint with betadine and offload with prevalon boots.    Right lateral lower leg with large dry purple hematoma and bruising, per patient it is from hitting her leg on her wheelchair. Cleansed and left open to air.      Pink and red excoriation to bilateral groin and pannus fold (moisture associated dermatitis). Nystatin powder and Interdry cloths ordered.          Goals: Steady decrease in wound area and depth weekly.    NURSING PLAN OF CARE ORDERS:  Dressing changes: See Dressing Care orders  Skin care: See Skin Care orders    NUTRITION RECOMMENDATIONS   Wound Team Recommendations:  Protein supplements    DIET ORDERS (From admission to next 24h)       Start     Ordered    03/06/24 1048  Supplements  ALL MEALS        Question Answer Comment   Which Supplement Ensure    Ensure: Ensure Plus Carton chocolate only please       03/06/24 1047    03/06/24 0911  Diet Order Diet: Level 6 - Soft and Bite Sized; Liquid level: Level 0 - Thin  ALL MEALS        Question Answer Comment   Diet: Level 6 - Soft and Bite Sized    Liquid level Level 0 - Thin        03/06/24 0910                    PREVENTATIVE INTERVENTIONS:    Q shift Alrry - performed per nursing policy  Q shift pressure point assessments - performed per nursing policy    Surface/Positioning  Low Airloss - Currently in Place  Reposition q 2 hours - Ordered  TAPs Turning system - Ordered    Offloading/Redistribution  Heel float boots (Prevalon boot) - Ordered  Float Heels off Bed with Pillows - Currently in Place           Containment/Moisture Prevention    Purwick/Condom Cath - Currently in Place  Barrier  paste - Currently in Place    Anticipated discharge plans:  TBD        Vac Discharge Needs:  Vac Discharge plan is purely a recommendation from wound team and not a requirement for discharge unless otherwise stated by physician.  Not Applicable Pt not on a wound vac

## 2024-03-07 NOTE — PROGRESS NOTES
Monitor Summary  Rhythm: 100% PACED  Rate: 64-65  Ectopy: PVC,COUP, TRIGEM  - / 0.11 / 0.47

## 2024-03-07 NOTE — PROGRESS NOTES
[facility] Medicine Daily Progress Note    Date of Service  3/7/2024    Chief Complaint  Debra Rodrigues is a 89 y.o. female admitted 3/5/2024 with change in mental status    Hospital Course  Debra Rodrigues is a 89 y.o. female from residential with history of chronic A-fib on flecainide and Eliquis, hypothyroidism, hypertension, remote history of breast cancer now in remission who presented 3/5/2024 with evaluation for generalized weakness, altered mental status.  Patient was referred to ER from residential due to altered mental status and generalized weakness.  Per family (son and daughter-in-law), patient is normally able to carry out conversation at baseline, currently acutely altered.  Family also reported patient to have generalized weakness, difficulty ambulating due to pain and extremity, essentially bedridden for the past several weeks.  In ER, noted to have A-fib/a flutter but rate as high as 180s on telemetry monitoring in the emergency room.  She does have notable pyuria on UA, also noted to have deranged electrolytes and mildly elevated troponin T and proBNP.  She is also requiring as much is 6 L nasal cannula support, does not use oxygen at baseline.  Admission requested by ERP.  Admitted to medicine service for further evaluation and treatment.     ED course: Zofran 8 mg IV, KCL10 mEq IV, Cardizem 22.4 mg IV push, metoprolol 5 mg IV, ceftriaxone 2 g       Interval Problem Update  Treating uti-- iv ancef    It was noted that patient was having a lot of frothy discharge from her vagina foul-smelling.  I checked a DNA probe growing severe amount of Gardnerella vaginalis.    Patient was given a one-time dose of Diflucan and started on p.o. Flagyl for vaginitis    The patient has some cognitive deficits most likely at her baseline.    Weakness persists        I have discussed this patient's plan of care and discharge plan at IDT rounds today with Case Management, Nursing, Nursing leadership, and other  members of the IDT team.    Consultants/Specialty      Code Status  DNAR/DNI    Disposition  The patient is not medically cleared for discharge to home or a post-acute facility.  Anticipate discharge to: home with close outpatient follow-up    I have placed the appropriate orders for post-discharge needs.    Review of Systems  Review of Systems   Constitutional:  Negative for chills and fever.   HENT:  Negative for ear discharge and hearing loss.    Respiratory:  Negative for cough.    Cardiovascular:  Negative for chest pain, palpitations, orthopnea, claudication and leg swelling.   Gastrointestinal:  Positive for vomiting. Negative for diarrhea, heartburn and nausea.   Genitourinary:  Negative for dysuria, frequency and urgency.   Musculoskeletal:  Negative for joint pain, myalgias and neck pain.   Neurological:  Positive for weakness.        Physical Exam  Temp:  [36.1 °C (97 °F)-36.8 °C (98.2 °F)] 36.4 °C (97.5 °F)  Pulse:  [64-67] 66  Resp:  [16] 16  BP: (110-145)/(57-82) 128/72  SpO2:  [90 %-97 %] 90 %    Physical Exam  Constitutional:       General: She is not in acute distress.     Appearance: She is not ill-appearing, toxic-appearing or diaphoretic.   HENT:      Mouth/Throat:      Mouth: Mucous membranes are moist.   Eyes:      Extraocular Movements: Extraocular movements intact.   Cardiovascular:      Rate and Rhythm: Normal rate. Rhythm irregular.      Heart sounds: No murmur heard.  Pulmonary:      Breath sounds: No stridor. No wheezing or rhonchi.   Abdominal:      General: There is no distension.      Palpations: There is no mass.      Tenderness: There is no abdominal tenderness.      Hernia: No hernia is present.   Musculoskeletal:         General: No swelling or tenderness.      Right lower leg: No edema.   Skin:     Coloration: Skin is not jaundiced.      Findings: No bruising.   Neurological:      General: No focal deficit present.      Cranial Nerves: No cranial nerve deficit.      Motor:  Weakness present.   Psychiatric:         Mood and Affect: Mood normal.         Behavior: Behavior normal.         Fluids    Intake/Output Summary (Last 24 hours) at 3/7/2024 1240  Last data filed at 3/7/2024 0926  Gross per 24 hour   Intake 290 ml   Output 250 ml   Net 40 ml       Laboratory  Recent Labs     03/05/24  1450 03/06/24  0021   WBC 11.1* 11.1*   RBC 5.01 5.07   HEMOGLOBIN 15.2 15.4   HEMATOCRIT 45.8 47.0   MCV 91.4 92.7   MCH 30.3 30.4   MCHC 33.2 32.8   RDW 52.1* 51.9*   PLATELETCT 242 244   MPV 11.0 11.2     Recent Labs     03/05/24  1450 03/06/24  0021 03/07/24  0423   SODIUM 148* 142 139   POTASSIUM 3.1* 3.4* 3.1*   CHLORIDE 113* 106 101   CO2 22 22 24   GLUCOSE 130* 120* 111*   BUN 21 19 22   CREATININE 0.97 0.89 0.88   CALCIUM 9.2 9.0 8.2*     Recent Labs     03/05/24  1450   INR 1.54*               Imaging  US-ANDREW SINGLE LEVEL BILAT   Final Result      CT-CTA CHEST PULMONARY ARTERY W/ RECONS   Final Result      1.  No pulmonary embolus. No acute abnormality in the chest.   2.  Stable since prior chest CT right breast mass measuring 1.1 cm. Recommend mammographic follow-up.      DX-CHEST-PORTABLE (1 VIEW)   Final Result      1.  Small left pleural effusion with adjacent airspace disease.   2.  Trace right pleural effusion.   3.  Cardiomegaly.           Assessment/Plan  * Atrial fibrillation with RVR (HCC)- (present on admission)  Assessment & Plan  RVR resolve with administered IV cardiazem 22.45mg and IV metoprolol 5mg  Continue Tambocor  Changed metoprolol succinate to tartrate, increased to 25 mg twice daily  --as needed IV metoprolol  Continue Eliquis    Maintain K>4 and Mg>2    Acute vaginitis- (present on admission)  Assessment & Plan  P.o. Diflucan x 1    P.o. Flagyl for 7 days    Patient needs better perineal care    ACP (advance care planning)- (present on admission)  Assessment & Plan  Goal of care discussed with patient and patient's family members (son and daughter-in-law) in ER.   Family confirms patient is DNR/DNI POLST.  Agreeable for noninvasive medical management.    Volume overload- (present on admission)  Assessment & Plan  Elevated troponin T, proBNP, small left pleural effusion  Gentle diuresis-Lasix 20 mg IV twice daily-continue to monitor BNP and I's and O's    Age-related physical debility- (present on admission)  Assessment & Plan  Was at group home, however notable decline in ADLs for the past several weeks  PT/OT    Acute encephalopathy- (present on admission)  Assessment & Plan  Likely secondary to UTI-being treated    PO thiamine    Acute UTI- (present on admission)  Assessment & Plan  UA pyuria, altered mental status earlier  S/p ceftriaxone 2 g in ER  Antibiotic: Ancef  Follow cultures    Decubitus ulcer of heel, bilateral- (present on admission)  Assessment & Plan  Wound care    Elevated troponin- (present on admission)  Assessment & Plan  Probable demand ischemia in setting of uncontrolled A-fib and UTI, acute illness  Trend, EKG  TTE in 01/2024: EF 55%    Acute respiratory failure with hypoxia (HCC)- (present on admission)  Assessment & Plan  wean off as tolerated  EF 55% in 01/2024  Small left pleural effusion -- gentle diuresis  Nebs, pulmonary hygiene  Aspiration precautions    Status post left mastectomy- (present on admission)  Assessment & Plan  Per history for breast cancer    Acquired hypothyroidism- (present on admission)  Assessment & Plan  Synthroid    Hypertension- (present on admission)  Assessment & Plan  Metoprolol and IV lasix for now  Resume other meds once hemodynamically stable         VTE prophylaxis:   SCDs/TEDs      I have performed a physical exam and reviewed and updated ROS and Plan today (3/7/2024). In review of yesterday's note (3/6/2024), there are no changes except as documented above.      Greater than 52 minutes spent prepping to see patient (e.g. review of tests) obtaining and/or reviewing separately obtained history. Performing a medically  appropriate examination and/ evaluation.  Counseling and educating the patient/family/caregiver.  Ordering medications, tests, or procedures.  Referring and communicating with other health care professionals.  Documenting clinical information in EPIC.  Independently interpreting results and communicating results to patient/family/caregiver.  Care coordination.

## 2024-03-07 NOTE — THERAPY
"Occupational Therapy   Initial Evaluation     Patient Name: Debra Rodrigues  Age:  89 y.o., Sex:  female  Medical Record #: [id number]  Today's Date: 3/7/2024     Precautions: Fall Risk    Assessment    Patient is 89 y.o. female admitted from Group Home 2/2 change in mental status, per chart pt has been \"essentially bedridden for several weeks.\" Today pt requires maxAx2 to move from supine to sit EOB and MAxA posterior support to maintain sitting position at EOB. Pt limited by significant weakness and declined attempt to stand. Once medically cleared anticipate return to Group Home where staff are familiar with her care and can assist as needed.     Plan    Occupational Therapy Initial Treatment Plan   Treatment Interventions: Self Care / Activities of Daily Living, Therapeutic Exercises, Therapeutic Activity, Adaptive Equipment  Treatment Frequency: 2 Times per Week  Duration: Until Therapy Goals Met    DC Equipment Recommendations: Unable to determine at this time  Discharge Recommendations: Other - (return to group home)     Objective     03/07/24 1108   Prior Living Situation   Prior Services Other (Comments)  (group home)   Housing / Facility Group Home   Equipment Owned Front-Wheel Walker;Wheelchair   Lives with - Patient's Self Care Capacity Attendant / Paid Care Giver   Comments pt lives at a group home where she receives all care necessary   Prior Level of ADL Function   Self Feeding Requires Assist   Grooming / Hygiene Requires Assist   Bathing Dependent   Dressing Requires Assist   Toileting Requires Assist   Prior Level of IADL Function   Medication Management Dependent   Laundry Dependent   Kitchen Mobility Dependent   Finances Dependent   Home Management Dependent   Shopping Dependent   Precautions   Precautions Fall Risk   Pain 0 - 10 Group   Therapist Pain Assessment Post Activity Pain Same as Prior to Activity;Nurse Notified   Cognition    Cognition / Consciousness X   Speech/ Communication Delayed " Responses   Level of Consciousness Alert   New Learning Impaired   Attention Impaired   Initiation Impaired   Strength Upper Body   Upper Body Strength  X   Gross Strength Generalized Weakness, Equal Bilaterally.    Balance Assessment   Sitting Balance (Static) Trace   Sitting Balance (Dynamic) Dependent   Weight Shift Sitting Absent   Comments unable to hold self at EOB without MaxA posterior support   Bed Mobility    Supine to Sit Maximal Assist   Sit to Supine Total Assist   Scooting Total Assist   ADL Assessment   Grooming Maximal Assist   Upper Body Dressing Maximal Assist   Lower Body Dressing Maximal Assist   Toileting Maximal Assist   How much help from another person does the patient currently need...   Putting on and taking off regular lower body clothing? 2   Bathing (including washing, rinsing, and drying)? 2   Toileting, which includes using a toilet, bedpan, or urinal? 2   Putting on and taking off regular upper body clothing? 2   Taking care of personal grooming such as brushing teeth? 2   Eating meals? 2   6 Clicks Daily Activity Score 12   Functional Mobility   Sit to Stand Unable to Participate   Activity Tolerance   Sitting in Chair unable   Sitting Edge of Bed 3min supported   Standing unable   Short Term Goals   Short Term Goal # 1 pt will complete bed-level grooming ADLs with Chelsy   Education Group   Education Provided Role of Occupational Therapist;Activities of Daily Living;Transfers   Role of Occupational Therapist Patient Response Patient;Acceptance;Explanation;Verbal Demonstration   Transfers Patient Response Patient;Acceptance;Explanation;Verbal Demonstration   ADL Patient Response Patient;Acceptance;Explanation;Verbal Demonstration   Occupational Therapy Initial Treatment Plan    Treatment Interventions Self Care / Activities of Daily Living;Therapeutic Exercises;Therapeutic Activity;Adaptive Equipment   Treatment Frequency 2 Times per Week   Duration Until Therapy Goals Met   Problem  List   Problem List Decreased Active Daily Living Skills;Decreased Homemaking Skills;Decreased Upper Extremity Strength Left;Decreased Upper Extremity Strength Right;Decreased Upper Extremity AROM Right;Decreased Upper Extremity AROM Left;Decreased Functional Mobility;Decreased Activity Tolerance;Impaired Cognitive Function;Impaired Coordination Left Upper Extremity;Impaired Coordination Right Upper Extremity;Impaired Postural Control / Balance   Anticipated Discharge Equipment and Recommendations   DC Equipment Recommendations Unable to determine at this time   Discharge Recommendations Other -  (return to [facility] home)

## 2024-03-07 NOTE — DOCUMENTATION QUERY
[facility]                                                                       Query Response Note      PATIENT:               CHRISTINE LANDA  ACCT #:                  [id number]  MRN:                     [id number]  :                      1935  ADMIT DATE:       3/5/2024 2:26 PM  DISCH DATE:          RESPONDING  PROVIDER #:        [id number]           QUERY TEXT:    The wound care consult indicates this patient is being treated for ulcer of the following sites:     1.  pressure injury bilateral heels, unstageable  2.  pressure injury sacrum; thigh upper bilateral deep tissue injuries  3.  Pressure injury Left anterior hip, deep tissue injury     Based on your medical judgement, can you please confirm these clinical findings?      The patient's Clinical Indicators include:  - Findings:  wound care consult 3/6/24:  pressure injury bilateral heels, unstageable present on admission   pressure injury sacrum; thigh upper bilateral deep tissue injuries present on admission  Pressure injury Left anterior hip, deep tissue injury present on admission     - Treatments:  nonselective debridement, site care, offloading, heel float boot, barrier paste    - Risk factors:  UTI, fluid overload, age related physical debility, bed confinement status     Thank You,  Stefany Bhatia RN  Clinical Documentation   [email or web address]  Connect via [email or web address]  Options provided:   -- 1.  pressure injury bilateral heels, unstageable, 2.  pressure injury sacrum; thigh upper bilateral deep tissue injuries, 3.  Pressure injury Left anterior hip, deep tissue injury  present on admission   -- Other explanation, (please specify other explanation)      Query created by: Stefany Bhatia on 3/7/2024 9:01 AM    RESPONSE TEXT:    1. pressure injury bilateral heels, unstageable, 2. pressure injury sacrum; thigh upper bilateral deep tissue injuries, 3.  Pressure injury Left anterior hip, deep tissue injury present on admission       QUERY TEXT:    Encephalopathy is documented in the Medical Record as likely secondary to UTI, patient also has pressure ulcers.  It is documented that the patients mental status has improved and patient is not toxic appearing    Can the type of encephalopathy be further specified    The patient's Clinical Indicators include:  - Findings:  H&P:  Patient was referred to ER from [facility] due to altered mental status and generalized weakness.  Per family (son and daughter-in-law), patient is normally able to carry out conversation at baseline, currently acutely  altered.  Acute encephalopathy likely secondary to UTI, rule out other toxic/metabolic etiology    - Progress note 3/6/24:  Appearance: She is not ill-appearing, toxic-appearing or diaphoretic.  Acute encephalopathy likely secondary to UTI - being treated. Patient mental status improved     - Treatments:  antibiotics, POo thiamine, cultures, UA, wound car consult     - Risk factors:  UTI, pressure ulcers, acute respiratory failure with hypoxia     Thank You,  Stefany Bhatia RN  Clinical Documentation   [email or web address]  Connect via [employer]  Options provided:   -- Metabolic encephalopathy   -- Other type of encephalopathy   -- Other explanation, (please specify other explanation)   -- Unable to determine      Query created by: Stefany Bhatia on 3/7/2024 9:10 AM    RESPONSE TEXT:    Metabolic encephalopathy          Electronically signed by:  IGNACIO PRECIADO MD 3/7/2024 12:00 PM

## 2024-03-07 NOTE — CARE PLAN
The patient is Stable - Low risk of patient condition declining or worsening    Shift Goals  Clinical Goals: awais friend  Patient Goals: Rest  Family Goals: ROB    Progress made toward(s) clinical / shift goals:        Problem: Knowledge Deficit - COPD  Goal: Patient/significant other demonstrates understanding of disease process, utilization of the Action Plan, medications and discharge instruction  Outcome: Progressing     Problem: Risk for Infection - COPD  Goal: Patient will remain free from signs and symptoms of infection  Outcome: Progressing

## 2024-03-07 NOTE — THERAPY
"Physical Therapy   Initial Evaluation     Patient Name: Debra Rodrigues  Age:  89 y.o., Sex:  female  Medical Record #: [id number]  Today's Date: 3/7/2024     Precautions  Precautions: (P) Fall Risk    Assessment  Patient is 89 y.o. female admitted from group home for altered mental status and generalized weakness. PMH includes chronic A-fib, hypothyroidism, hypertension, breast cancer, spinal stenosis, pacemaker, polyneuropathy.      Patient received in bed and agreeable to PT session. Pt required total assistance for bed mobility and was unable to remain upright when seated EOB. Pt somewhat unreliable historian, will need to confirm PLOF. However, anticipate pt is near baseline and would be able to return to the group home where staff are familiar with her care and can assist as needed. Will follow for acute care PT needs.     Plan    Physical Therapy Initial Treatment Plan   Treatment Plan : (P) Bed Mobility, Neuro Re-Education / Balance, Therapeutic Activities, Therapeutic Exercise  Treatment Frequency: (P) 2 Times per Week  Duration: (P) Until Therapy Goals Met    DC Equipment Recommendations: (P) None (will use own WC)  Discharge Recommendations: (P) Anticipate that the patient will have no further physical therapy needs after discharge from the hospital       Subjective    \"I use to live in [place]\".      Objective       03/07/24 1050   Initial Contact Note    Initial Contact Note Order Received and Verified, Physical Therapy Evaluation in Progress with Full Report to Follow.   Precautions   Precautions Fall Risk   Vitals   O2 (LPM) 1   O2 Delivery Device Silicone Nasal Cannula   Pain 0 - 10 Group   Therapist Pain Assessment Post Activity Pain Same as Prior to Activity;Nurse Notified  (pain not rated)   Prior Living Situation   Prior Services Other (Comments)  (group home)   Housing / Facility Group Home   Equipment Owned Front-Wheel Walker;Wheelchair   Lives with - Patient's Self Care Capacity Attendant " / Paid Care Giver   Comments transferred from group home per EMR and per patient   Prior Level of Functional Mobility   Bed Mobility Dependent   Transfer Status Dependent   Ambulation Dependent   Assistive Devices Used Wheelchair   Cognition    Cognition / Consciousness X   Speech/ Communication Delayed Responses   Level of Consciousness Alert   New Learning Impaired   Attention Impaired   Initiation Impaired   Active ROM Lower Body    Active ROM Lower Body  X   Comments 2/2 weakness   Strength Lower Body   Lower Body Strength  X   Comments genealized weakness, equal bilaterally, not functional for STS or transfers   Lower Body Muscle Tone   Lower Body Muscle Tone  WDL   Balance Assessment   Sitting Balance (Static) Trace   Sitting Balance (Dynamic) Dependent   Weight Shift Sitting Absent   Bed Mobility    Supine to Sit Maximal Assist   Sit to Supine Maximal Assist   Scooting Maximal Assist   Comments HOB elevated   Gait Analysis   Gait Level Of Assist Unable to Participate   Functional Mobility   Sit to Stand Unable to Participate   6 Clicks Assessment - How much HELP from from another person do you currently need... (If the patient hasn't done an activity recently, how much help from another person do you think he/she would need if he/she tried?)   Turning from your back to your side while in a flat bed without using bedrails? 1   Moving from lying on your back to sitting on the side of a flat bed without using bedrails? 1   Moving to and from a bed to a chair (including a wheelchair)? 1   Standing up from a chair using your arms (e.g., wheelchair, or bedside chair)? 1   Walking in hospital room? 1   Climbing 3-5 steps with a railing? 1   6 clicks Mobility Score 6   Short Term Goals    Short Term Goal # 1 Pt will be able to perform supine <> sit with modA and HOB slightly elevated in 6 visits to improve bed mobility   Short Term Goal # 2 Pt will be able to demo poor+ sitting balance when sitting EOB for 2 minutes  in 6 visits to improve seated balance   Education Group   Education Provided Role of Physical Therapist   Role of Physical Therapist Patient Response Patient;Acceptance;Explanation;Verbal Demonstration   Physical Therapy Initial Treatment Plan    Treatment Plan  Bed Mobility;Neuro Re-Education / Balance;Therapeutic Activities;Therapeutic Exercise   Treatment Frequency 2 Times per Week   Duration Until Therapy Goals Met   Problem List    Problems Impaired Bed Mobility;Impaired Transfers;Decreased Activity Tolerance   Anticipated Discharge Equipment and Recommendations   DC Equipment Recommendations None  (will use own WC)   Discharge Recommendations Anticipate that the patient will have no further physical therapy needs after discharge from the hospital   Interdisciplinary Plan of Care Collaboration   IDT Collaboration with  Nursing   Patient Position at End of Therapy In Bed;Bed Alarm On;Call Light within Reach;Tray Table within Reach;Phone within Reach   Collaboration Comments RN updated   Session Information   Date / Session Number  3/7 - 1 (1/2, 3/13)

## 2024-03-08 ENCOUNTER — PHARMACY VISIT (OUTPATIENT)
Dept: PHARMACY | Facility: MEDICAL CENTER | Age: 89
End: 2024-03-08
Payer: COMMERCIAL

## 2024-03-08 VITALS
OXYGEN SATURATION: 92 % | TEMPERATURE: 97.7 F | WEIGHT: 177.69 LBS | BODY MASS INDEX: 31.48 KG/M2 | RESPIRATION RATE: 16 BRPM | DIASTOLIC BLOOD PRESSURE: 75 MMHG | HEIGHT: 63 IN | HEART RATE: 64 BPM | SYSTOLIC BLOOD PRESSURE: 132 MMHG

## 2024-03-08 PROBLEM — G93.40 ACUTE ENCEPHALOPATHY: Status: RESOLVED | Noted: 2024-03-05 | Resolved: 2024-03-08

## 2024-03-08 PROBLEM — N39.0 ACUTE UTI: Status: RESOLVED | Noted: 2024-03-05 | Resolved: 2024-03-08

## 2024-03-08 PROBLEM — J96.01 ACUTE RESPIRATORY FAILURE WITH HYPOXIA (HCC): Status: RESOLVED | Noted: 2024-01-02 | Resolved: 2024-03-08

## 2024-03-08 PROBLEM — N76.0 ACUTE VAGINITIS: Status: RESOLVED | Noted: 2024-03-07 | Resolved: 2024-03-08

## 2024-03-08 PROBLEM — Z71.89 ACP (ADVANCE CARE PLANNING): Status: RESOLVED | Noted: 2024-03-05 | Resolved: 2024-03-08

## 2024-03-08 LAB
ANION GAP SERPL CALC-SCNC: 16 MMOL/L (ref 7–16)
BUN SERPL-MCNC: 26 MG/DL (ref 8–22)
CALCIUM SERPL-MCNC: 8.4 MG/DL (ref 8.5–10.5)
CHLORIDE SERPL-SCNC: 102 MMOL/L (ref 96–112)
CO2 SERPL-SCNC: 21 MMOL/L (ref 20–33)
CREAT SERPL-MCNC: 0.73 MG/DL (ref 0.5–1.4)
ERYTHROCYTE [DISTWIDTH] IN BLOOD BY AUTOMATED COUNT: 48 FL (ref 35.9–50)
FLUAV RNA SPEC QL NAA+PROBE: NEGATIVE
FLUBV RNA SPEC QL NAA+PROBE: NEGATIVE
GFR SERPLBLD CREATININE-BSD FMLA CKD-EPI: 79 ML/MIN/1.73 M 2
GLUCOSE SERPL-MCNC: 102 MG/DL (ref 65–99)
HCT VFR BLD AUTO: 47.9 % (ref 37–47)
HGB BLD-MCNC: 16.2 G/DL (ref 12–16)
MCH RBC QN AUTO: 30.5 PG (ref 27–33)
MCHC RBC AUTO-ENTMCNC: 33.8 G/DL (ref 32.2–35.5)
MCV RBC AUTO: 90 FL (ref 81.4–97.8)
NT-PROBNP SERPL IA-MCNC: 802 PG/ML (ref 0–125)
PLATELET # BLD AUTO: 182 K/UL (ref 164–446)
PMV BLD AUTO: 11.3 FL (ref 9–12.9)
POTASSIUM SERPL-SCNC: 4.1 MMOL/L (ref 3.6–5.5)
RBC # BLD AUTO: 5.32 M/UL (ref 4.2–5.4)
RSV RNA SPEC QL NAA+PROBE: NEGATIVE
SARS-COV-2 RNA RESP QL NAA+PROBE: NOTDETECTED
SODIUM SERPL-SCNC: 139 MMOL/L (ref 135–145)
SPECIMEN SOURCE: NORMAL
WBC # BLD AUTO: 8.8 K/UL (ref 4.8–10.8)

## 2024-03-08 PROCEDURE — 700102 HCHG RX REV CODE 250 W/ 637 OVERRIDE(OP): Performed by: HOSPITALIST

## 2024-03-08 PROCEDURE — 99239 HOSP IP/OBS DSCHRG MGMT >30: CPT | Performed by: HOSPITALIST

## 2024-03-08 PROCEDURE — 0241U HCHG SARS-COV-2 COVID-19 NFCT DS RESP RNA 4 TRGT MIC: CPT

## 2024-03-08 PROCEDURE — 700111 HCHG RX REV CODE 636 W/ 250 OVERRIDE (IP): Performed by: STUDENT IN AN ORGANIZED HEALTH CARE EDUCATION/TRAINING PROGRAM

## 2024-03-08 PROCEDURE — 85027 COMPLETE CBC AUTOMATED: CPT

## 2024-03-08 PROCEDURE — 83880 ASSAY OF NATRIURETIC PEPTIDE: CPT

## 2024-03-08 PROCEDURE — RXMED WILLOW AMBULATORY MEDICATION CHARGE: Performed by: HOSPITALIST

## 2024-03-08 PROCEDURE — A9270 NON-COVERED ITEM OR SERVICE: HCPCS | Performed by: STUDENT IN AN ORGANIZED HEALTH CARE EDUCATION/TRAINING PROGRAM

## 2024-03-08 PROCEDURE — A9270 NON-COVERED ITEM OR SERVICE: HCPCS | Performed by: HOSPITALIST

## 2024-03-08 PROCEDURE — 700102 HCHG RX REV CODE 250 W/ 637 OVERRIDE(OP): Performed by: STUDENT IN AN ORGANIZED HEALTH CARE EDUCATION/TRAINING PROGRAM

## 2024-03-08 PROCEDURE — 36415 COLL VENOUS BLD VENIPUNCTURE: CPT

## 2024-03-08 PROCEDURE — 80048 BASIC METABOLIC PNL TOTAL CA: CPT

## 2024-03-08 PROCEDURE — 700101 HCHG RX REV CODE 250: Performed by: STUDENT IN AN ORGANIZED HEALTH CARE EDUCATION/TRAINING PROGRAM

## 2024-03-08 RX ORDER — METRONIDAZOLE 500 MG/1
500 TABLET ORAL 2 TIMES DAILY
Qty: 10 TABLET | Refills: 0 | Status: ACTIVE | OUTPATIENT
Start: 2024-03-08 | End: 2024-03-18

## 2024-03-08 RX ORDER — CEPHALEXIN 500 MG/1
500 CAPSULE ORAL 3 TIMES DAILY
Qty: 6 CAPSULE | Refills: 0 | Status: ACTIVE | OUTPATIENT
Start: 2024-03-08 | End: 2024-03-10

## 2024-03-08 RX ADMIN — Medication 2000 UNITS: at 05:28

## 2024-03-08 RX ADMIN — PAROXETINE HYDROCHLORIDE 10 MG: 10 TABLET, FILM COATED ORAL at 05:27

## 2024-03-08 RX ADMIN — OMEPRAZOLE 20 MG: 20 CAPSULE, DELAYED RELEASE ORAL at 05:32

## 2024-03-08 RX ADMIN — POTASSIUM CHLORIDE 20 MEQ: 1500 TABLET, EXTENDED RELEASE ORAL at 05:27

## 2024-03-08 RX ADMIN — METOPROLOL TARTRATE 25 MG: 25 TABLET, FILM COATED ORAL at 05:26

## 2024-03-08 RX ADMIN — Medication 400 MG: at 05:26

## 2024-03-08 RX ADMIN — Medication 100 MG: at 05:25

## 2024-03-08 RX ADMIN — CYANOCOBALAMIN TAB 500 MCG 1000 MCG: 500 TAB at 05:26

## 2024-03-08 RX ADMIN — METRONIDAZOLE 500 MG: 500 TABLET ORAL at 05:27

## 2024-03-08 RX ADMIN — NYSTATIN: 100000 POWDER TOPICAL at 05:32

## 2024-03-08 RX ADMIN — CEFAZOLIN 2 G: 10 INJECTION, POWDER, FOR SOLUTION INTRAVENOUS at 08:39

## 2024-03-08 RX ADMIN — APIXABAN 5 MG: 5 TABLET, FILM COATED ORAL at 05:26

## 2024-03-08 RX ADMIN — ONDANSETRON 4 MG: 2 INJECTION INTRAMUSCULAR; INTRAVENOUS at 05:57

## 2024-03-08 RX ADMIN — LEVOTHYROXINE SODIUM 75 MCG: 0.07 TABLET ORAL at 05:29

## 2024-03-08 RX ADMIN — FLECAINIDE ACETATE 100 MG: 100 TABLET ORAL at 05:28

## 2024-03-08 RX ADMIN — CEFAZOLIN 2 G: 10 INJECTION, POWDER, FOR SOLUTION INTRAVENOUS at 01:42

## 2024-03-08 RX ADMIN — FOLIC ACID 1 MG: 1 TABLET ORAL at 05:27

## 2024-03-08 RX ADMIN — FUROSEMIDE 20 MG: 10 INJECTION INTRAMUSCULAR; INTRAVENOUS at 05:25

## 2024-03-08 ASSESSMENT — PAIN DESCRIPTION - PAIN TYPE: TYPE: ACUTE PAIN

## 2024-03-08 NOTE — CARE PLAN
The patient is Stable - Low risk of patient condition declining or worsening    Shift Goals  Clinical Goals: awais friend  Patient Goals: Rest  Family Goals: ROB    Progress made toward(s) clinical / shift goals:        Problem: Knowledge Deficit - COPD  Goal: Patient/significant other demonstrates understanding of disease process, utilization of the Action Plan, medications and discharge instruction  Outcome: Progressing     Problem: Skin Integrity  Goal: Skin integrity is maintained or improved  Outcome: Progressing     Problem: Fall Risk  Goal: Patient will remain free from falls  Outcome: Progressing

## 2024-03-08 NOTE — PROGRESS NOTES
Patient being transferred to Medical floor. Report called to Radha SALGADO. Patient taken by transport to room 401.

## 2024-03-08 NOTE — DISCHARGE PLANNING
"Case Management Discharge Planning    Admission Date: 3/5/2024  GMLOS: 3.9  ALOS: 3    6-Clicks ADL Score: 12  6-Clicks Mobility Score: 6  PT and/or OT Eval ordered: Yes  Post-acute Referrals Ordered: Yes  Post-acute Choice Obtained: Yes  Has referral(s) been sent to post-acute provider:  Yes      Anticipated Discharge Dispo: Discharge Disposition: D/T to home under [facility] care in anticipation of covered skilled care (06)    DME Needed: No    Action(s) Taken: Updated Provider/Nurse on Discharge Plan  Per Chart: \" 89 y.o. female from senior care with history of chronic A-fib on flecainide and Eliquis, hypothyroidism, hypertension, remote history of breast cancer now in remission who presented 3/5/2024 with evaluation for generalized weakness, altered mental status.\"    Pt is a new transfer to the Unit.    Pt is from [facility] and owns a FWW and a wheelchair.    Pt is on service with [facility] prior to this hospitalization.   Requested Katherin LEÓN to send to send referral to [facility].    Pt has been re accepted by [facility].  Per Miguel, the  Owner they require a rapid covid test close to discharge time. Informed Dr Lay .Also requested Dr Lay to send Pt's prescriptions to [facility] at Tel [phone] as requested. Per Charge RN , The  Owner agrees with sending Pt's scripts to [facility].     This RN CM also emailed Pt's Discharge Summary to [facility] Owner.  Informed Maurice Pt's Son of this discharge update.     Mohit request for [facility] transport to home sent to Juan.   Per Mohit, [facility] is all set at 16:00 to [facility]      Escalations Completed: None    Medically Clear: Yes    Next Steps:   CM to continue to assist Pt with discharge as needed    Barriers to Discharge:   Pending Rapid Covid test result        Is the patient up for discharge tomorrow: No        "

## 2024-03-08 NOTE — DISCHARGE PLANNING
DC Transport Scheduled    Transport Company Scheduled:  [employer]  Spoke with Roxann at [facility] to schedule transport.    Scheduled Date: 3/8/2024  Scheduled Time: 1600    Destination: Home   Destination address: [address] [place] [address]    Notified care team of scheduled transport via Voalte.     If there are any changes needed to the DC transportation scheduled, please contact [facility] at ext. [phone] between the hours of 3119-1789 Mon-Fri. If outside those hours, contact the ED Case Manager at ext. [phone].

## 2024-03-08 NOTE — DISCHARGE SUMMARY
Discharge Summary    CHIEF COMPLAINT ON ADMISSION  Chief Complaint   Patient presents with    Weakness     Pt BIB EMS from [facility]. Per report pt has been feeling more fatigued and with nausea for the  past week. Pt transferred to ER for further eval.     Nausea    Fatigue       Reason for Admission  ems     Admission Date  3/5/2024    CODE STATUS  DNAR/DNI    HPI & HOSPITAL COURSE    Debra Rodrigues is a 89 y.o. female from [facility] with history of chronic A-fib on flecainide and Eliquis, hypothyroidism, hypertension, remote history of breast cancer now in remission who presented 3/5/2024 with evaluation for generalized weakness, altered mental status.  Patient was referred to ER from [facility] due to altered mental status and generalized weakness.  Per family (son and daughter-in-law), patient is normally able to carry out conversation at baseline, currently acutely altered.  Family also reported patient to have generalized weakness, difficulty ambulating due to pain and extremity, essentially bedridden for the past several weeks.  In ER, noted to have A-fib/a flutter but rate as high as 180s on telemetry monitoring in the emergency room.  She does have notable pyuria on UA, also noted to have deranged electrolytes and mildly elevated troponin T and proBNP.  She is also requiring as much is 6 L nasal cannula support, does not use oxygen at baseline.  Admission requested by ERP.  Admitted to medicine service for further evaluation and treatment.     ED course: Zofran 8 mg IV, KCL10 mEq IV, Cardizem 22.4 mg IV push, metoprolol 5 mg IV, ceftriaxone 2 g     Heart rate remained controlled throughout the rest of the hospital stay    Patient was transition to IV Ancef for her bladder infection.  It grew out Proteus Mirabella's sensitive  to cephalosporins..  At time of discharge it was transitioned to p.o. Keflex.    Patient also had evidence of vaginal discharge she received 1 dose of p.o. Diflucan and  a probe of the vaginal fdischarge grew out Gardnerella vaginalis for which she was initiated on Flagyl p.o. for 7 days.      Patient was seen by PT and OT and patient appears to be at her functional baseline .it is noted that she is not a willing participant most of the time in regards to physical therapy .she will return back to the group home with home health.      She did receive Lasix for diuresis for pulmonary toilet.      Daughter in law inquiring on whether or not , hospice would be appropriate for the patient at this time.( They want her at home if that is the case)--> She was advised, by me, that we should continue to follow the patients trajectory at the group home and follow up with home care RN and her PCP andrew GIBBS.    Therefore, she is discharged in good and stable condition to home with organized home healthcare and close outpatient follow-up.    The patient met 2-midnight criteria for an inpatient stay at the time of discharge.    Discharge Date  3/8    FOLLOW UP ITEMS POST DISCHARGE      DISCHARGE DIAGNOSES  Principal Problem:    Atrial fibrillation with RVR (HCC) (POA: Yes)  Active Problems:    Hypertension (POA: Yes)    Acquired hypothyroidism (POA: Yes)    Status post left mastectomy (POA: Yes)    Elevated troponin (POA: Yes)    Decubitus ulcer of heel, bilateral (POA: Yes)    Age-related physical debility (POA: Yes)    Volume overload (POA: Yes)  Resolved Problems:    Acute respiratory failure with hypoxia (HCC) (POA: Yes)    Acute UTI (POA: Yes)    Acute encephalopathy (POA: Yes)    ACP (advance care planning) (POA: Yes)    Acute vaginitis (POA: Yes)      FOLLOW UP  No future appointments.  No follow-up provider specified.    MEDICATIONS ON DISCHARGE     Medication List        START taking these medications        Instructions   cephALEXin 500 MG Caps  Commonly known as: Keflex   Take 1 Capsule by mouth 3 times a day for 2 days.  Dose: 500 mg     metroNIDAZOLE 500 MG Tabs  Commonly known as:  Flagyl   Take 1 Tablet by mouth 2 times a day.  Dose: 500 mg            CONTINUE taking these medications        Instructions   acetaminophen 325 MG Tabs  Commonly known as: Tylenol   Take 2 Tablets by mouth every four hours as needed (pain).  Dose: 650 mg     amLODIPine 10 MG Tabs  Commonly known as: Norvasc   Take 1 Tablet by mouth every day.  Dose: 10 mg     CALMOSEPTINE EX   Apply 1 Dose topically 2 times a day. Apply thin layer of calmoseptine twice daily to groin buttocks area with brief changes. Use house supply.  Dose: 1 Dose     Eliquis 5 MG Tabs  Generic drug: apixaban   Take 1 Tablet by mouth 2 times a day.  Dose: 5 mg     famotidine 20 MG Tabs  Commonly known as: Pepcid   Take 1 Tablet by mouth 2 times a day.  Dose: 20 mg     flecainide 100 MG Tabs  Commonly known as: Tambocor   Take 1 Tablet by mouth every day.  Dose: 100 mg     gabapentin 100 MG Caps  Commonly known as: Neurontin   Take 1 Capsule by mouth at bedtime.  Dose: 100 mg     hydrALAZINE 25 MG Tabs  Commonly known as: Apresoline   Take 1 Tablet by mouth 3 times a day.  Dose: 25 mg     levothyroxine 75 MCG Tabs  Commonly known as: Synthroid   Take 1 Tablet by mouth every day.  Dose: 75 mcg     losartan 100 MG Tabs  Commonly known as: Cozaar   Take 100 mg by mouth every day.  Dose: 100 mg     metoprolol SR 25 MG Tb24  Commonly known as: Toprol XL   Take 0.5 Tablets by mouth every day.  Dose: 12.5 mg     oral electrolyte solution  Commonly known as: Equalyte   Take 240 mL by mouth as needed. Indications: Disorder of Electrolyte Loss  Dose: 240 mL     PARoxetine 10 MG Tabs  Commonly known as: Paxil   Take 1 Tablet by mouth every day.  Dose: 10 mg     traZODone 50 MG Tabs  Commonly known as: Desyrel   Take 1 Tablet by mouth at bedtime.  Dose: 50 mg     vitamin D3 1000 Unit (25 mcg) Tabs  Commonly known as: Cholecalciferol   Take 2 Tablets by mouth every day.  Dose: 2,000 Units     Zofran 4 MG Tabs tablet  Generic drug: ondansetron   Take 4 mg by  mouth every four hours as needed for Nausea/Vomiting.  Dose: 4 mg              Allergies  Allergies   Allergen Reactions    Other Misc Anaphylaxis     x2 in 2005,pt tested,Cause unknown    Hydrocodone Vomiting    Oyster Extract Unspecified     Listed on MAR     Sulfa Drugs Rash     Rash        DIET  Orders Placed This Encounter   Procedures    Diet Order Diet: Level 6 - Soft and Bite Sized; Liquid level: Level 0 - Thin     Standing Status:   Standing     Number of Occurrences:   1     Order Specific Question:   Diet:     Answer:   Level 6 - Soft and Bite Sized [23]     Order Specific Question:   Liquid level     Answer:   Level 0 - Thin       ACTIVITY  As tolerated.  Weight bearing as tolerated    CONSULTATIONS      PROCEDURES      LABORATORY  Lab Results   Component Value Date    SODIUM 139 03/08/2024    POTASSIUM 4.1 03/08/2024    CHLORIDE 102 03/08/2024    CO2 21 03/08/2024    GLUCOSE 102 (H) 03/08/2024    BUN 26 (H) 03/08/2024    CREATININE 0.73 03/08/2024    GLOMRATE 85 07/01/2022        Lab Results   Component Value Date    WBC 8.8 03/08/2024    HEMOGLOBIN 16.2 (H) 03/08/2024    HEMATOCRIT 47.9 (H) 03/08/2024    PLATELETCT 182 03/08/2024        Total time of the discharge process exceeds 38 minutes.

## 2024-03-08 NOTE — PROGRESS NOTES
Report received from tele 8 RN. Assumed care at 0900.   Pt A&Ox3 disoriented to time.  Tolerating level 6/thin liquid  diet, denies n/v. Hypoactive bowel sounds, + flatus, LBM 3/6/24 per report. IV access through 20G RAC and 20G LAC.  Skin per flowsheets.  Saturating >90% on RA.  Pt ambulates max assist.  Pain is controlled through medication orders. Updated on plan of care. Safety education provided. Bed locked in low. Call light within reach. Rounding in place.

## 2024-03-08 NOTE — PROGRESS NOTES
Pt arrived to U. All needs met at this time. Call light in place.   pt coming in for left lower side pain x2 days. no fevers, no vomiting, no diarrhea. Allergy: penicillin, sesame. PMH: hereditary spherocytosis.

## 2024-03-08 NOTE — DISCHARGE PLANNING
1006  Received Choice Form at: 1005 am  Agency/Facility Name: [facility]   Sent Referral per Choice Form at: 1006 am

## 2024-03-09 NOTE — PROGRESS NOTES
[facility] IV discontinued. Prescriptions given to patient, verbalized understanding, consent signed and in chart. Discharge instructions given regarding medications, activity level, and follow up.  Education provided, patient asked questions and verbalized understanding. Discharge paperwork signed and in chart. Patient is tolerating diet, stable when ambulating, and pain is well controlled. All belongings collected. No further questions or concerns at this time.

## 2024-03-18 ENCOUNTER — APPOINTMENT (OUTPATIENT)
Dept: RADIOLOGY | Facility: MEDICAL CENTER | Age: 89
DRG: 193 | End: 2024-03-18
Attending: EMERGENCY MEDICINE
Payer: MEDICARE

## 2024-03-18 ENCOUNTER — HOSPITAL ENCOUNTER (INPATIENT)
Facility: MEDICAL CENTER | Age: 89
LOS: 4 days | DRG: 193 | End: 2024-03-22
Attending: EMERGENCY MEDICINE | Admitting: INTERNAL MEDICINE
Payer: MEDICARE

## 2024-03-18 ENCOUNTER — APPOINTMENT (OUTPATIENT)
Dept: RADIOLOGY | Facility: MEDICAL CENTER | Age: 89
DRG: 193 | End: 2024-03-18
Payer: MEDICARE

## 2024-03-18 DIAGNOSIS — J18.9 PNEUMONIA DUE TO INFECTIOUS ORGANISM, UNSPECIFIED LATERALITY, UNSPECIFIED PART OF LUNG: ICD-10-CM

## 2024-03-18 DIAGNOSIS — R09.02 HYPOXIA: ICD-10-CM

## 2024-03-18 PROBLEM — I48.20 CHRONIC ATRIAL FIBRILLATION (HCC): Status: ACTIVE | Noted: 2024-03-18

## 2024-03-18 LAB
ALBUMIN SERPL BCP-MCNC: 3 G/DL (ref 3.2–4.9)
ALBUMIN/GLOB SERPL: 1 G/DL
ALP SERPL-CCNC: 93 U/L (ref 30–99)
ALT SERPL-CCNC: 18 U/L (ref 2–50)
ANION GAP SERPL CALC-SCNC: 11 MMOL/L (ref 7–16)
AST SERPL-CCNC: 34 U/L (ref 12–45)
BASOPHILS # BLD AUTO: 0.5 % (ref 0–1.8)
BASOPHILS # BLD: 0.03 K/UL (ref 0–0.12)
BILIRUB SERPL-MCNC: 0.5 MG/DL (ref 0.1–1.5)
BUN SERPL-MCNC: 14 MG/DL (ref 8–22)
CALCIUM ALBUM COR SERPL-MCNC: 9.3 MG/DL (ref 8.5–10.5)
CALCIUM SERPL-MCNC: 8.5 MG/DL (ref 8.5–10.5)
CHLORIDE SERPL-SCNC: 98 MMOL/L (ref 96–112)
CO2 SERPL-SCNC: 24 MMOL/L (ref 20–33)
CREAT SERPL-MCNC: 0.77 MG/DL (ref 0.5–1.4)
D DIMER PPP IA.FEU-MCNC: 0.28 UG/ML (FEU) (ref 0–0.5)
EKG IMPRESSION: NORMAL
EOSINOPHIL # BLD AUTO: 0.19 K/UL (ref 0–0.51)
EOSINOPHIL NFR BLD: 2.9 % (ref 0–6.9)
ERYTHROCYTE [DISTWIDTH] IN BLOOD BY AUTOMATED COUNT: 49.4 FL (ref 35.9–50)
FLUAV RNA SPEC QL NAA+PROBE: NEGATIVE
FLUBV RNA SPEC QL NAA+PROBE: NEGATIVE
GFR SERPLBLD CREATININE-BSD FMLA CKD-EPI: 74 ML/MIN/1.73 M 2
GLOBULIN SER CALC-MCNC: 3.1 G/DL (ref 1.9–3.5)
GLUCOSE SERPL-MCNC: 100 MG/DL (ref 65–99)
HCT VFR BLD AUTO: 42.4 % (ref 37–47)
HGB BLD-MCNC: 14.3 G/DL (ref 12–16)
IMM GRANULOCYTES # BLD AUTO: 0.07 K/UL (ref 0–0.11)
IMM GRANULOCYTES NFR BLD AUTO: 1.1 % (ref 0–0.9)
LYMPHOCYTES # BLD AUTO: 1.75 K/UL (ref 1–4.8)
LYMPHOCYTES NFR BLD: 26.5 % (ref 22–41)
MCH RBC QN AUTO: 30.5 PG (ref 27–33)
MCHC RBC AUTO-ENTMCNC: 33.7 G/DL (ref 32.2–35.5)
MCV RBC AUTO: 90.4 FL (ref 81.4–97.8)
MONOCYTES # BLD AUTO: 0.58 K/UL (ref 0–0.85)
MONOCYTES NFR BLD AUTO: 8.8 % (ref 0–13.4)
NEUTROPHILS # BLD AUTO: 3.98 K/UL (ref 1.82–7.42)
NEUTROPHILS NFR BLD: 60.2 % (ref 44–72)
NRBC # BLD AUTO: 0 K/UL
NRBC BLD-RTO: 0 /100 WBC (ref 0–0.2)
NT-PROBNP SERPL IA-MCNC: 512 PG/ML (ref 0–125)
PLATELET # BLD AUTO: 182 K/UL (ref 164–446)
PMV BLD AUTO: 11.4 FL (ref 9–12.9)
POTASSIUM SERPL-SCNC: 3.2 MMOL/L (ref 3.6–5.5)
PROCALCITONIN SERPL-MCNC: <0.05 NG/ML
PROT SERPL-MCNC: 6.1 G/DL (ref 6–8.2)
RBC # BLD AUTO: 4.69 M/UL (ref 4.2–5.4)
RSV RNA SPEC QL NAA+PROBE: NEGATIVE
SARS-COV-2 RNA RESP QL NAA+PROBE: NOTDETECTED
SODIUM SERPL-SCNC: 133 MMOL/L (ref 135–145)
WBC # BLD AUTO: 6.6 K/UL (ref 4.8–10.8)

## 2024-03-18 PROCEDURE — 84145 PROCALCITONIN (PCT): CPT

## 2024-03-18 PROCEDURE — 770006 HCHG ROOM/CARE - MED/SURG/GYN SEMI*

## 2024-03-18 PROCEDURE — 85379 FIBRIN DEGRADATION QUANT: CPT

## 2024-03-18 PROCEDURE — A9270 NON-COVERED ITEM OR SERVICE: HCPCS | Performed by: EMERGENCY MEDICINE

## 2024-03-18 PROCEDURE — 80053 COMPREHEN METABOLIC PANEL: CPT

## 2024-03-18 PROCEDURE — 99222 1ST HOSP IP/OBS MODERATE 55: CPT | Mod: AI | Performed by: INTERNAL MEDICINE

## 2024-03-18 PROCEDURE — 700111 HCHG RX REV CODE 636 W/ 250 OVERRIDE (IP): Mod: JZ | Performed by: INTERNAL MEDICINE

## 2024-03-18 PROCEDURE — 93005 ELECTROCARDIOGRAM TRACING: CPT | Performed by: EMERGENCY MEDICINE

## 2024-03-18 PROCEDURE — 0241U HCHG SARS-COV-2 COVID-19 NFCT DS RESP RNA 4 TRGT ED POC: CPT

## 2024-03-18 PROCEDURE — A9270 NON-COVERED ITEM OR SERVICE: HCPCS | Performed by: INTERNAL MEDICINE

## 2024-03-18 PROCEDURE — 36415 COLL VENOUS BLD VENIPUNCTURE: CPT

## 2024-03-18 PROCEDURE — 85025 COMPLETE CBC W/AUTO DIFF WBC: CPT

## 2024-03-18 PROCEDURE — 83880 ASSAY OF NATRIURETIC PEPTIDE: CPT

## 2024-03-18 PROCEDURE — 71045 X-RAY EXAM CHEST 1 VIEW: CPT

## 2024-03-18 PROCEDURE — 700105 HCHG RX REV CODE 258: Performed by: INTERNAL MEDICINE

## 2024-03-18 PROCEDURE — 99285 EMERGENCY DEPT VISIT HI MDM: CPT

## 2024-03-18 PROCEDURE — 700102 HCHG RX REV CODE 250 W/ 637 OVERRIDE(OP): Performed by: EMERGENCY MEDICINE

## 2024-03-18 PROCEDURE — 700102 HCHG RX REV CODE 250 W/ 637 OVERRIDE(OP): Performed by: INTERNAL MEDICINE

## 2024-03-18 RX ORDER — GUAIFENESIN 200 MG/10ML
10 LIQUID ORAL EVERY 4 HOURS PRN
Status: DISCONTINUED | OUTPATIENT
Start: 2024-03-18 | End: 2024-03-22 | Stop reason: HOSPADM

## 2024-03-18 RX ORDER — IPRATROPIUM BROMIDE AND ALBUTEROL SULFATE 2.5; .5 MG/3ML; MG/3ML
3 SOLUTION RESPIRATORY (INHALATION)
Status: DISCONTINUED | OUTPATIENT
Start: 2024-03-18 | End: 2024-03-22 | Stop reason: HOSPADM

## 2024-03-18 RX ORDER — GABAPENTIN 100 MG/1
100 CAPSULE ORAL
Status: DISCONTINUED | OUTPATIENT
Start: 2024-03-18 | End: 2024-03-22 | Stop reason: HOSPADM

## 2024-03-18 RX ORDER — GAUZE BANDAGE 2" X 2"
100 BANDAGE TOPICAL
Status: DISCONTINUED | OUTPATIENT
Start: 2024-03-18 | End: 2024-03-22 | Stop reason: HOSPADM

## 2024-03-18 RX ORDER — METOPROLOL SUCCINATE 25 MG/1
12.5 TABLET, EXTENDED RELEASE ORAL DAILY
Status: DISCONTINUED | OUTPATIENT
Start: 2024-03-19 | End: 2024-03-22 | Stop reason: HOSPADM

## 2024-03-18 RX ORDER — LEVOTHYROXINE SODIUM 0.07 MG/1
75 TABLET ORAL DAILY
Status: DISCONTINUED | OUTPATIENT
Start: 2024-03-19 | End: 2024-03-22 | Stop reason: HOSPADM

## 2024-03-18 RX ORDER — LOSARTAN POTASSIUM 50 MG/1
100 TABLET ORAL DAILY
Status: DISCONTINUED | OUTPATIENT
Start: 2024-03-19 | End: 2024-03-22 | Stop reason: HOSPADM

## 2024-03-18 RX ORDER — AMOXICILLIN 250 MG
2 CAPSULE ORAL EVERY EVENING
Status: DISCONTINUED | OUTPATIENT
Start: 2024-03-18 | End: 2024-03-22 | Stop reason: HOSPADM

## 2024-03-18 RX ORDER — VITAMIN B COMPLEX
2000 TABLET ORAL DAILY
Status: DISCONTINUED | OUTPATIENT
Start: 2024-03-19 | End: 2024-03-22 | Stop reason: HOSPADM

## 2024-03-18 RX ORDER — AZITHROMYCIN 250 MG/1
500 TABLET, FILM COATED ORAL ONCE
Status: COMPLETED | OUTPATIENT
Start: 2024-03-18 | End: 2024-03-18

## 2024-03-18 RX ORDER — FOLIC ACID 1 MG/1
1 TABLET ORAL
Status: DISCONTINUED | OUTPATIENT
Start: 2024-03-18 | End: 2024-03-22 | Stop reason: HOSPADM

## 2024-03-18 RX ORDER — GUAIFENESIN 600 MG/1
600 TABLET, EXTENDED RELEASE ORAL EVERY 12 HOURS PRN
Status: DISCONTINUED | OUTPATIENT
Start: 2024-03-18 | End: 2024-03-18

## 2024-03-18 RX ORDER — ACETAMINOPHEN 325 MG/1
650 TABLET ORAL EVERY 6 HOURS PRN
Status: DISCONTINUED | OUTPATIENT
Start: 2024-03-18 | End: 2024-03-22 | Stop reason: HOSPADM

## 2024-03-18 RX ORDER — PAROXETINE 10 MG/1
10 TABLET, FILM COATED ORAL DAILY
Status: DISCONTINUED | OUTPATIENT
Start: 2024-03-19 | End: 2024-03-22 | Stop reason: HOSPADM

## 2024-03-18 RX ORDER — CHOLECALCIFEROL (VITAMIN D3) 125 MCG
1000 CAPSULE ORAL DAILY
Status: DISCONTINUED | OUTPATIENT
Start: 2024-03-18 | End: 2024-03-22 | Stop reason: HOSPADM

## 2024-03-18 RX ORDER — POLYETHYLENE GLYCOL 3350 17 G/17G
1 POWDER, FOR SOLUTION ORAL
Status: DISCONTINUED | OUTPATIENT
Start: 2024-03-18 | End: 2024-03-22 | Stop reason: HOSPADM

## 2024-03-18 RX ORDER — TRAZODONE HYDROCHLORIDE 50 MG/1
50 TABLET ORAL
Status: DISCONTINUED | OUTPATIENT
Start: 2024-03-18 | End: 2024-03-22 | Stop reason: HOSPADM

## 2024-03-18 RX ORDER — CEPHALEXIN 500 MG/1
500 CAPSULE ORAL 3 TIMES DAILY
Status: ON HOLD | COMMUNITY
Start: 2024-03-09 | End: 2024-03-22

## 2024-03-18 RX ORDER — HYDRALAZINE HYDROCHLORIDE 25 MG/1
25 TABLET, FILM COATED ORAL 3 TIMES DAILY
Status: DISCONTINUED | OUTPATIENT
Start: 2024-03-18 | End: 2024-03-22 | Stop reason: HOSPADM

## 2024-03-18 RX ORDER — FLECAINIDE ACETATE 100 MG/1
100 TABLET ORAL DAILY
Status: DISCONTINUED | OUTPATIENT
Start: 2024-03-19 | End: 2024-03-22 | Stop reason: HOSPADM

## 2024-03-18 RX ORDER — FAMOTIDINE 20 MG/1
20 TABLET, FILM COATED ORAL DAILY
Status: DISCONTINUED | OUTPATIENT
Start: 2024-03-19 | End: 2024-03-19

## 2024-03-18 RX ORDER — AMLODIPINE BESYLATE 10 MG/1
10 TABLET ORAL DAILY
Status: DISCONTINUED | OUTPATIENT
Start: 2024-03-19 | End: 2024-03-22 | Stop reason: HOSPADM

## 2024-03-18 RX ORDER — METRONIDAZOLE 500 MG/1
500 TABLET ORAL 2 TIMES DAILY
Status: ON HOLD | COMMUNITY
Start: 2024-03-09 | End: 2024-03-22

## 2024-03-18 RX ADMIN — THERA TABS 1 TABLET: TAB at 17:34

## 2024-03-18 RX ADMIN — AMPICILLIN AND SULBACTAM 3 G: 1; 2 INJECTION, POWDER, FOR SOLUTION INTRAMUSCULAR; INTRAVENOUS at 17:59

## 2024-03-18 RX ADMIN — CYANOCOBALAMIN TAB 500 MCG 1000 MCG: 500 TAB at 17:35

## 2024-03-18 RX ADMIN — TRAZODONE HYDROCHLORIDE 50 MG: 50 TABLET ORAL at 20:55

## 2024-03-18 RX ADMIN — HYDRALAZINE HYDROCHLORIDE 25 MG: 25 TABLET ORAL at 20:55

## 2024-03-18 RX ADMIN — GABAPENTIN 100 MG: 100 CAPSULE ORAL at 20:55

## 2024-03-18 RX ADMIN — Medication 100 MG: at 17:35

## 2024-03-18 RX ADMIN — FOLIC ACID 1 MG: 1 TABLET ORAL at 17:34

## 2024-03-18 RX ADMIN — AZITHROMYCIN DIHYDRATE 500 MG: 250 TABLET, FILM COATED ORAL at 17:35

## 2024-03-18 RX ADMIN — HYDRALAZINE HYDROCHLORIDE 25 MG: 25 TABLET ORAL at 17:34

## 2024-03-18 RX ADMIN — AMPICILLIN AND SULBACTAM 3 G: 1; 2 INJECTION, POWDER, FOR SOLUTION INTRAMUSCULAR; INTRAVENOUS at 23:58

## 2024-03-18 ASSESSMENT — FIBROSIS 4 INDEX: FIB4 SCORE: 2.65

## 2024-03-18 ASSESSMENT — CHA2DS2 SCORE
CHF OR LEFT VENTRICULAR DYSFUNCTION: NO
PRIOR STROKE OR TIA OR THROMBOEMBOLISM: NO
DIABETES: NO
AGE 65 TO 74: NO
AGE 75 OR GREATER: YES
CHA2DS2 VASC SCORE: 4
VASCULAR DISEASE: NO
SEX: FEMALE
HYPERTENSION: YES

## 2024-03-18 ASSESSMENT — PAIN DESCRIPTION - PAIN TYPE: TYPE: ACUTE PAIN

## 2024-03-18 NOTE — PROGRESS NOTES
4 Eyes Skin Assessment Completed by DAMIAN Marrufo and DAMIAN Olmos.    Head WDL  Ears WDL  Nose WDL  Mouth WDL  Neck WDL  Breast/Chest Redness under right breast and in the pannus region   Shoulder Blades WDL  Spine WDL  (R) Arm/Elbow/Hand scabbing, old skin tears   (L) Arm/Elbow/Hand scabbing, old skin tears; open skin tear   Abdomen WDL  Groin Redness; moisture associated  Scrotum/Coccyx/Buttocks Redness and Moisture Fissure Sacral wound  (R) Leg Bruising and Swelling  (L) Leg Scar and Bruising wound back of upper leg  (R) Heel/Foot/Toe Redness and Discoloration; DTI, unstageable pressure ulcer  (L) Heel/Foot/Toe Redness and Discoloration; DTI. Unstageable pressure ulcer           Devices In Places Pulse Ox, Ortho Boot/Shoe, and Nasal Cannula, and purewick (containment device)       Interventions In Place Gray Ear Foams, Heel Mepilex, Heel Float Boots, TAP System, Pillows, Q2 Turns, and Barrier Cream, and heel mepilex    Possible Skin Injury Yes    Pictures Uploaded Into Epic Yes  Wound Consult Placed Yes  RN Wound Prevention Protocol Ordered Yes

## 2024-03-18 NOTE — ED NOTES
Telephone report to DAMIAN Olmos on [facility] [address]. Patient will be transported to [place] [address]. All questions answered. Patient remains resting in bed with family at bedside. She states she has no immediate needs and she is aware of the POC. Oral medications sent up with transport.

## 2024-03-18 NOTE — ED NOTES
Break RN: PT was O2 off for a trial on room air, pt O2 85-88% on room air, O2 placed back on PT sat improved to 91-93% on 2lpm

## 2024-03-18 NOTE — H&P
Hospital Medicine History & Physical Note    Date of Service  3/18/2024    Primary Care Physician  ASHLEY Silva    Consultants      Code Status  Prior    Chief Complaint  Chief Complaint   Patient presents with    Shortness of Breath     BIB EMS from [facility]. Per report patient has been dealing with a cough for the past 2 week.     Nausea/Vomiting/Diarrhea     Patient reports nausea for past 2 days with no episodes of vomiting.        History of Presenting Illness  Debra Rodrigues is a 89 y.o. female who presented 3/18/2024 with Shortness of Breath (BIB EMS from group [place]. Per report patient has been dealing with a cough for the past 2 week. ) and Nausea/Vomiting/Diarrhea (Patient reports nausea for past 2 days with no episodes of vomiting. )  89 year old female from a group home essentially bedridden with foot braces due to neuropathy and deformities, who drinks alcohol weekly with a history of Afib s/p pacer on flecainide and Eliquis (Jan2024 echo normal EF and RV function), hypothyroidism, hypertension, remote history of breast cancer now in remission presented 3/18/2024 with shortness of breath, fatigue, and cough. She has cognitive deficits. Her son and daughter inlaw at bedside. She has been having dry cough since Friday but became more productive and had a coughing fit last night, unable to sleep. No fevers, chills, chest pain,dizziness or palpitations. Her family was visiting her at the group home. I discussed code status and family says she is DNR.   At the ED, she is afebrile, normotensive but hypoxic, 8%% on room air. She was put on O2.   CXR looks clear, CTA Chest showed no PE but showed lingular infiltrate and trace pleural effusions  No leukocytosis. Mild hyponatremia, mild hypokalemia. Cr- 0.77. CoVID, flu and RSV negative x 1. D-dimer negative  She was started on antibiotics for presumptive pneumonia.  When I saw her at the ED, she is in no acute distress. Coughs occasionally and  it is dry but can be junky sounding. Family at bedside.    I discussed the plan of care with patient, family, and bedside RN.    Review of Systems  Review of Systems   Unable to perform ROS: Mental acuity       Past Medical History   has a past medical history of Abdominal distension (01/05/2024), Acid reflux disease, Acquired hypothyroidism (04/19/2023), Allergy, Anxiety, Arrhythmia (01/01/2009), Arthritis, At risk for sleep apnea, Azotemia (01/05/2024), Blood transfusion without reported diagnosis, Bowel habit changes, Breath shortness, Cancer ([facility]) (01/01/2009), CATARACT, Depression, GERD (gastroesophageal reflux disease), Head ache, Heart burn, Heart valve disease, Hemorrhagic disorder ([facility]), Hypertension, Hypokalemia (01/10/2024), Hypoxia (01/05/2024), IBD (inflammatory bowel disease), Macular degeneration, Muscle disorder, Open wound of right ankle (08/02/2023), Other thrombophilia ([facility]) (04/19/2023), Pacemaker (01/01/2009), Pneumonia due to infectious organism (01/02/2024), Stroke ([facility]), Unspecified urinary incontinence, Urinary bladder disorder, and Urinary incontinence.    Surgical History   has a past surgical history that includes tonsillectomy and adenoidectomy (1942); mastoidectomy (1943); hysterectomy, total abdominal (1972); knee arthroscopy (1995); julio by laparoscopy (1995); pacemaker insertion (2009); mastectomy (5/13/2009); cataract extraction with iol; breast reconstruction (4/20/2010); tissue expander place/remove (4/20/2010); breast reconstruction (11/23/2010); tissue expander place/remove (11/23/2010); and mastopexy (11/23/2010).     Family History  family history includes Cancer in her unknown relative; Heart Disease in her unknown relative; Hypertension in her unknown relative.   Family history reviewed with patient. There is no family history that is pertinent to the chief complaint.     Social History   reports that she has never smoked. She has never used smokeless tobacco. She reports  current alcohol use of about 3.5 oz of alcohol per week. She reports that she does not use drugs.    Allergies  Allergies   Allergen Reactions    Other Misc Anaphylaxis     x2 in 2005,pt tested,Cause unknown    Hydrocodone Vomiting    Oyster Extract Unspecified     Listed on MAR     Sulfa Drugs Rash     Rash        Medications  Prior to Admission Medications   Prescriptions Last Dose Informant Patient Reported? Taking?   ELIQUIS 5 MG Tab  MAR from Other Facility No No   Sig: Take 1 Tablet by mouth 2 times a day.   Menthol-Zinc Oxide (CALMOSEPTINE EX)  MAR from Other Facility Yes No   Sig: Apply 1 Dose topically 2 times a day. Apply thin layer of calmoseptine twice daily to groin buttocks area with brief changes. Use house supply.   PARoxetine (PAXIL) 10 MG Tab  MAR from Other Facility No No   Sig: Take 1 Tablet by mouth every day.   acetaminophen (TYLENOL) 325 MG Tab  MAR from Other Facility No No   Sig: Take 2 Tablets by mouth every four hours as needed (pain).   amLODIPine (NORVASC) 10 MG Tab  MAR from Other Facility No No   Sig: Take 1 Tablet by mouth every day.   famotidine (PEPCID) 20 MG Tab  MAR from Other Facility No No   Sig: Take 1 Tablet by mouth 2 times a day.   flecainide (TAMBOCOR) 100 MG Tab  MAR from Other Facility No No   Sig: Take 1 Tablet by mouth every day.   gabapentin (NEURONTIN) 100 MG Cap  MAR from Other Facility No No   Sig: Take 1 Capsule by mouth at bedtime.   guaiFENesin ER (MUCINEX) 600 MG TABLET SR 12 HR   No No   Sig: Take 1 Tablet by mouth every 12 hours as needed for Cough.   hydrALAZINE (APRESOLINE) 25 MG Tab  MAR from Other Facility No No   Sig: Take 1 Tablet by mouth 3 times a day.   levothyroxine (SYNTHROID) 75 MCG Tab  MAR from Other Facility No No   Sig: Take 1 Tablet by mouth every day.   losartan (COZAAR) 100 MG Tab  MAR from Other Facility Yes No   Sig: Take 100 mg by mouth every day.   metoprolol SR (TOPROL XL) 25 MG TABLET SR 24 HR  MAR from Other Facility No No   Sig: Take  0.5 Tablets by mouth every day.   metroNIDAZOLE (FLAGYL) 500 MG Tab   No No   Sig: Take 1 Tablet by mouth 2 times a day.   ondansetron (ZOFRAN) 4 MG Tab tablet  MAR from Other Facility Yes No   Sig: Take 4 mg by mouth every four hours as needed for Nausea/Vomiting.   oral electrolyte (EQUALYTE) solution  MAR from Other Facility Yes No   Sig: Take 240 mL by mouth as needed. Indications: Disorder of Electrolyte Loss   traZODone (DESYREL) 50 MG Tab  MAR from Other Facility No No   Sig: Take 1 Tablet by mouth at bedtime.   vitamin D3 (CHOLECALCIFEROL) 1000 Unit (25 mcg) Tab  MAR from Other Facility No No   Sig: Take 2 Tablets by mouth every day.      Facility-Administered Medications: None       Physical Exam  Pulse:  [65-66] 66  BP: (116-126)/(49-56) 126/56  SpO2:  [91 %-93 %] 91 %  Blood Pressure : 126/56       Pulse: 66       Pulse Oximetry: 91 %       Physical Exam  Vitals and nursing note reviewed.   Constitutional:       General: She is not in acute distress.     Comments: Elderly   HENT:      Head: Normocephalic and atraumatic.      Right Ear: External ear normal.      Left Ear: External ear normal.      Nose: Nose normal.      Mouth/Throat:      Mouth: Mucous membranes are moist.   Eyes:      General: No scleral icterus.     Conjunctiva/sclera: Conjunctivae normal.   Cardiovascular:      Rate and Rhythm: Normal rate and regular rhythm.      Pulses: Normal pulses.      Heart sounds: No murmur heard.     No friction rub. No gallop.   Pulmonary:      Effort: Pulmonary effort is normal. No respiratory distress.      Breath sounds: No stridor. Rhonchi (occ cough and rhonchi) present. No wheezing or rales.   Chest:      Chest wall: No tenderness.   Abdominal:      General: Abdomen is flat. Bowel sounds are normal. There is no distension.      Palpations: Abdomen is soft.      Tenderness: There is no abdominal tenderness. There is no guarding or rebound.   Musculoskeletal:         General: Deformity present. No  "swelling or tenderness. Normal range of motion.      Cervical back: Normal range of motion and neck supple. No rigidity.      Comments: Lower leg braces   Skin:     General: Skin is warm.      Coloration: Skin is not jaundiced.   Neurological:      General: No focal deficit present.      Mental Status: She is alert and oriented to person, place, and time. Mental status is at baseline.      Comments: Cognitive deficits   Psychiatric:         Mood and Affect: Mood normal.         Behavior: Behavior normal.         Thought Content: Thought content normal.         Judgment: Judgment normal.         Laboratory:  Recent Labs     03/18/24  1147   WBC 6.6   RBC 4.69   HEMOGLOBIN 14.3   HEMATOCRIT 42.4   MCV 90.4   MCH 30.5   MCHC 33.7   RDW 49.4   PLATELETCT 182   MPV 11.4     Recent Labs     03/18/24  1147   SODIUM 133*   POTASSIUM 3.2*   CHLORIDE 98   CO2 24   GLUCOSE 100*   BUN 14   CREATININE 0.77   CALCIUM 8.5     Recent Labs     03/18/24  1147   ALTSGPT 18   ASTSGOT 34   ALKPHOSPHAT 93   TBILIRUBIN 0.5   GLUCOSE 100*         Recent Labs     03/18/24  1147   NTPROBNP 512*         No results for input(s): \"TROPONINT\" in the last 72 hours.    Imaging:  DX-CHEST-PORTABLE (1 VIEW)   Final Result      No acute cardiopulmonary abnormality.          X-Ray:  I have personally reviewed the images and compared with prior images.    Assessment/Plan:  Justification for Admission Status  I anticipate this patient will require at least two midnights for appropriate medical management, necessitating inpatient admission because we are titrating O2 and treating pneumonia    Patient will need a Med/Surg bed on EMERGENCY service .  The need is secondary to as above.    * Acute respiratory failure with hypoxia, pneumonia (HCC)- (present on admission)  Assessment & Plan  Continue antibiotics. Procalcitonin PENDING  Being bedridden contributes, encourage deep breathing and ordered incentive spirometry  Titrate O2 and breathing " trmts.    Chronic atrial fibrillation (HCC)- (present on admission)  Assessment & Plan  Vitals:    03/18/24 1303   BP: 126/56   Pulse: 66   SpO2: 91%     HR stable  COntinue fleicanide and ELiquis    Advanced care planning/counseling discussion  Assessment & Plan  I spnet 15 minutes reviewing chart, speaking to family about code status  She is DNR    Age-related physical debility- (present on admission)  Assessment & Plan  PT/OT    Acquired hypothyroidism- (present on admission)  Assessment & Plan  Continue Synthroid    Polyneuropathy in other diseases classified elsewhere (HCC)- (present on admission)  Assessment & Plan  PT/OT    Hypertension- (present on admission)  Assessment & Plan  Vitals:    03/18/24 1303   BP: 126/56   Pulse: 66   SpO2: 91%     Stable        VTE prophylaxis Eliquis

## 2024-03-18 NOTE — ED NOTES
Patient remains resting in bed. Demonstrated active cough, but no production. She expresses no pain at this time. Patient trailed at RA and was shown to be at 88%. Patient placed on 2L to maintain SPO2 > 90% while resting.

## 2024-03-18 NOTE — ASSESSMENT & PLAN NOTE
3/18 Continue antibiotics. Procalcitonin PENDING  Being bedridden contributes, encourage deep breathing and ordered incentive spirometry  Titrate O2 and breathing trmts.    3/19 procal neg  Transition to po abx  O2 prn  From , had HH with [place] for wound care  ? Snf vs , pt / ot eval    Chest and back pain with cough  Add flexeril, symbicort, cont guafeniscin    3/20 improving shortness of breath  Will transition to p.o. antibiotics  Ongoing cough, recommend incentive spirometer use  Oxygen supplementation as needed    3/21 improving, intermittent cough  On Augmentin to complete 7 day course  On RA, pending return to  in 1-2 days  - hh ordered  Bedbound at baseline

## 2024-03-18 NOTE — ASSESSMENT & PLAN NOTE
Vitals:    03/18/24 1303   BP: 126/56   Pulse: 66   SpO2: 91%     HR stable  COntinue fleicanide and ELiquis

## 2024-03-18 NOTE — ED NOTES
Med rec updated and complete. Allergies reviewed.  Per CHAU from [facility]; ( [phone]).  Pt finished a 2 day course of cephalexin  on 03/10/24 and  finished a course of metronidazole on 03/13/24.  Last dose of eliquis 03/18/24.      Home pharmacy   [facility] = [phone]

## 2024-03-18 NOTE — ED PROVIDER NOTES
ED Provider Note    Scribed for Angel Luna M.D. by Ann Harvey. 3/18/2024  12:21 PM    Primary care provider: ASHLEY Silva  Means of arrival: EMS    CHIEF COMPLAINT  Chief Complaint   Patient presents with    Shortness of Breath     BIB EMS from [facility]. Per report patient has been dealing with a cough for the past 2 week.     Nausea/Vomiting/Diarrhea     Patient reports nausea for past 2 days with no episodes of vomiting.      HPI    Debra Rodrigues is a 89 y.o. female who presents to the Emergency Department via EMS for shortness of breath onset 2 weeks ago. The patient has also been experiencing nausea and diarrhea that began 2 days ago. The patient reports that she is feeling really tired. The patient notes that she had pneumonia last week and was treated with antibiotics. The patient also has a cough that has been ongoing for the last 2 weeks. The patient reports that she is not feeling better after receiving treatment. Denies being on oxygen. Denies any history of COPD, asthma, or heart failure. The patient does have a pace maker. The patient denies any vomiting, fever, vomiting blood, or having blood present in her stool. Denies having COVID. The patient states that she does not walk due to have neuropathy in her legs. She notes that her neuropathy was caused by nerve damage, but she is unsure what caused it. Denies having any history of blood clotes. Denies any abdominal pain. Denies any sores on her backside or legs. She notes that she did have a stroke in the past.    EXTERNAL RECORDS REVIEWED  Hospital discharge summary from March 5 show that the patient has chronic A-fib on Eliquis, a history of hypertension and remote breast cancer. She had a UTI and A-fib.     REVIEW OF SYSTEMS  Pertinent positives include: shortness of breath, nausea, diarrhea, and cough.      PAST MEDICAL HISTORY  Past Medical History:   Diagnosis Date    Abdominal distension 01/05/2024    Acid reflux  "disease     Acquired hypothyroidism 04/19/2023    Allergy     Anxiety     Arrhythmia 01/01/2009    Atrial fibrillation, resolved    Arthritis     At risk for sleep apnea     Azotemia 01/05/2024    Blood transfusion without reported diagnosis     Bowel habit changes     diarrhea    Breath shortness     Cancer ([facility]) 01/01/2009    left breast s/p mastectomy    CATARACT     surgically corrected, bilateral    Depression     GERD (gastroesophageal reflux disease)     Head ache     Heart burn     Heart valve disease     MVP    Hemorrhagic disorder ([facility])     anticoagulated on eliquis    Hypertension     Hypokalemia 01/10/2024    Hypoxia 01/05/2024    IBD (inflammatory bowel disease)     Macular degeneration     Muscle disorder     Open wound of right ankle 08/02/2023    Other thrombophilia ([facility]) 04/19/2023    Pacemaker 01/01/2009    Pneumonia due to infectious organism 01/02/2024    Stroke ([facility])     2019 - no deficits    Unspecified urinary incontinence     \"my bladder leaks when I lay down\"    Urinary bladder disorder     Urinary incontinence        FAMILY HISTORY  Family History   Problem Relation Age of Onset    Heart Disease Unknown     Hypertension Unknown     Cancer Unknown        SOCIAL HISTORY  Social History     Tobacco Use    Smoking status: Never    Smokeless tobacco: Never   Vaping Use    Vaping Use: Never used   Substance Use Topics    Alcohol use: Yes     Alcohol/week: 3.5 oz     Types: 7 Standard drinks or equivalent per week     Comment: one per day    Drug use: No     Social History     Substance and Sexual Activity   Drug Use No       SURGICAL HISTORY  Past Surgical History:   Procedure Laterality Date    BREAST RECONSTRUCTION  11/23/2010    Performed by ANNITA KELLEY at SURGERY [facility]    TISSUE EXPANDER PLACE/REMOVE  11/23/2010    Performed by ANNITA KELLEY at SURGERY [facility]    MASTOPEXY  11/23/2010    Performed by ANNITA KELLEY at [facility]    BREAST " RECONSTRUCTION  4/20/2010    Performed by ANNITA KELLEY at SURGERY [facility]    TISSUE EXPANDER PLACE/REMOVE  4/20/2010    Performed by ANNITA KELLEY at SURGERY [facility]    MASTECTOMY  5/13/2009    left    PACEMAKER INSERTION  2009    KNEE ARTHROSCOPY  1995    right    RAYNA BY LAPAROSCOPY  1995    HYSTERECTOMY, TOTAL ABDOMINAL  1972    BSO    MASTOIDECTOMY  1943    left    TONSILLECTOMY AND ADENOIDECTOMY  1942    CATARACT EXTRACTION WITH IOL      bilateral       CURRENT MEDICATIONS  No current facility-administered medications for this encounter.    Current Outpatient Medications:     guaiFENesin ER (MUCINEX) 600 MG TABLET SR 12 HR, Take 1 Tablet by mouth every 12 hours as needed for Cough., Disp: 60 Tablet, Rfl: 0    metroNIDAZOLE (FLAGYL) 500 MG Tab, Take 1 Tablet by mouth 2 times a day., Disp: 10 Tablet, Rfl: 0    ondansetron (ZOFRAN) 4 MG Tab tablet, Take 4 mg by mouth every four hours as needed for Nausea/Vomiting., Disp: , Rfl:     oral electrolyte (EQUALYTE) solution, Take 240 mL by mouth as needed. Indications: Disorder of Electrolyte Loss, Disp: , Rfl:     losartan (COZAAR) 100 MG Tab, Take 100 mg by mouth every day., Disp: , Rfl:     amLODIPine (NORVASC) 10 MG Tab, Take 1 Tablet by mouth every day., Disp: 30 Tablet, Rfl: 11    metoprolol SR (TOPROL XL) 25 MG TABLET SR 24 HR, Take 0.5 Tablets by mouth every day., Disp: 15 Tablet, Rfl: 6    Menthol-Zinc Oxide (CALMOSEPTINE EX), Apply 1 Dose topically 2 times a day. Apply thin layer of calmoseptine twice daily to groin buttocks area with brief changes. Use house supply., Disp: , Rfl:     acetaminophen (TYLENOL) 325 MG Tab, Take 2 Tablets by mouth every four hours as needed (pain)., Disp: 360 Tablet, Rfl: 6    ELIQUIS 5 MG Tab, Take 1 Tablet by mouth 2 times a day., Disp: 60 Tablet, Rfl: 2    famotidine (PEPCID) 20 MG Tab, Take 1 Tablet by mouth 2 times a day., Disp: 60 Tablet, Rfl: 2    flecainide (TAMBOCOR) 100 MG Tab, Take 1 Tablet  "by mouth every day., Disp: 30 Tablet, Rfl: 2    gabapentin (NEURONTIN) 100 MG Cap, Take 1 Capsule by mouth at bedtime., Disp: 30 Capsule, Rfl: 2    levothyroxine (SYNTHROID) 75 MCG Tab, Take 1 Tablet by mouth every day., Disp: 30 Tablet, Rfl: 2    PARoxetine (PAXIL) 10 MG Tab, Take 1 Tablet by mouth every day., Disp: 30 Tablet, Rfl: 2    traZODone (DESYREL) 50 MG Tab, Take 1 Tablet by mouth at bedtime., Disp: 30 Tablet, Rfl: 2    vitamin D3 (CHOLECALCIFEROL) 1000 Unit (25 mcg) Tab, Take 2 Tablets by mouth every day., Disp: 60 Tablet, Rfl: 2    hydrALAZINE (APRESOLINE) 25 MG Tab, Take 1 Tablet by mouth 3 times a day., Disp: 90 Tablet, Rfl: 2    ALLERGIES  Allergies   Allergen Reactions    Other Misc Anaphylaxis     x2 in 2005,pt tested,Cause unknown    Hydrocodone Vomiting    Oyster Extract Unspecified     Listed on MAR     Sulfa Drugs Rash     Rash        PHYSICAL EXAM  VITAL SIGNS: /49   Pulse 65   Ht 1.6 m (5' 3\")   Wt 72.6 kg (160 lb)   SpO2 93%   BMI 28.34 kg/m²   Reviewed and normal oxygenation on 3 L.  Hypoxic to 85% on room air.  Constitutional: Well developed, Well nourished, Elevated BMI.  HENT: Normocephalic, atraumatic, bilateral external ears normal, No intraoral erythema, edema, exudate  Eyes: PERRLA, conjunctiva pink, no scleral icterus.   Cardiovascular: Regular rate and rhythm. No murmurs, rubs or gallops.  No dependent edema or calf tenderness  Respiratory: Course breath sounds at the right base. Trace edema. No wheezes, rales, or rhonchi.  Abdominal:  Abdomen soft, non-tender, non distended. No rebound, or guarding.    Skin: No erythema, no rash. No wounds or bruising.  Genitourinary: No costovertebral angle tenderness.   Musculoskeletal: no deformities.   Neurologic: Alert & oriented x 3, cranial nerves 2-12 intact by passive exam.  No focal deficit noted.  Psychiatric: Affect normal, Judgment normal, Mood normal.     LABS Ordered and Reviewed by Me:  Results for orders placed or " performed during the hospital encounter of 03/18/24   CBC with Differential   Result Value Ref Range    WBC 6.6 4.8 - 10.8 K/uL    RBC 4.69 4.20 - 5.40 M/uL    Hemoglobin 14.3 12.0 - 16.0 g/dL    Hematocrit 42.4 37.0 - 47.0 %    MCV 90.4 81.4 - 97.8 fL    MCH 30.5 27.0 - 33.0 pg    MCHC 33.7 32.2 - 35.5 g/dL    RDW 49.4 35.9 - 50.0 fL    Platelet Count 182 164 - 446 K/uL    MPV 11.4 9.0 - 12.9 fL    Neutrophils-Polys 60.20 44.00 - 72.00 %    Lymphocytes 26.50 22.00 - 41.00 %    Monocytes 8.80 0.00 - 13.40 %    Eosinophils 2.90 0.00 - 6.90 %    Basophils 0.50 0.00 - 1.80 %    Immature Granulocytes 1.10 (H) 0.00 - 0.90 %    Nucleated RBC 0.00 0.00 - 0.20 /100 WBC    Neutrophils (Absolute) 3.98 1.82 - 7.42 K/uL    Lymphs (Absolute) 1.75 1.00 - 4.80 K/uL    Monos (Absolute) 0.58 0.00 - 0.85 K/uL    Eos (Absolute) 0.19 0.00 - 0.51 K/uL    Baso (Absolute) 0.03 0.00 - 0.12 K/uL    Immature Granulocytes (abs) 0.07 0.00 - 0.11 K/uL    NRBC (Absolute) 0.00 K/uL   Comp Metabolic Panel   Result Value Ref Range    Sodium 133 (L) 135 - 145 mmol/L    Potassium 3.2 (L) 3.6 - 5.5 mmol/L    Chloride 98 96 - 112 mmol/L    Co2 24 20 - 33 mmol/L    Anion Gap 11.0 7.0 - 16.0    Glucose 100 (H) 65 - 99 mg/dL    Bun 14 8 - 22 mg/dL    Creatinine 0.77 0.50 - 1.40 mg/dL    Calcium 8.5 8.5 - 10.5 mg/dL    Correct Calcium 9.3 8.5 - 10.5 mg/dL    AST(SGOT) 34 12 - 45 U/L    ALT(SGPT) 18 2 - 50 U/L    Alkaline Phosphatase 93 30 - 99 U/L    Total Bilirubin 0.5 0.1 - 1.5 mg/dL    Albumin 3.0 (L) 3.2 - 4.9 g/dL    Total Protein 6.1 6.0 - 8.2 g/dL    Globulin 3.1 1.9 - 3.5 g/dL    A-G Ratio 1.0 g/dL   ESTIMATED GFR   Result Value Ref Range    GFR (CKD-EPI) 74 >60 mL/min/1.73 m 2   D-DIMER   Result Value Ref Range    D-Dimer 0.28 0.00 - 0.50 ug/mL (FEU)   proBrain Natriuretic Peptide, NT   Result Value Ref Range    NT-proBNP 512 (H) 0 - 125 pg/mL   POC CoV-2, FLU A/B, RSV by PCR   Result Value Ref Range    POC Influenza A RNA, PCR Negative Negative     POC Influenza B RNA, PCR Negative Negative    POC RSV, by PCR Negative Negative    POC SARS-CoV-2, PCR NotDetected        Rhythm Strip: Interpretation by me Paced rhythm. She is hypoxic with 88% on room air.     EKG Interpretation by me    Indication: Atrial paced rhythm     Rhythm: normal sinus   Rate: butch  at 65  Axis: normal  Ectopy: none  Conduction: normal  ST/T Waves: no acute change  Q Waves: anterior septal Q wave  R Wave progression: normal  Hypertrophy changes: Absent    Comparison: none    Clinical Impression: paced rhythm with prior anterior ischemic     RADIOLOGY  X-ray images not available for me to review    Final Radiology Report  DX-CHEST-PORTABLE (1 VIEW)   Final Result      No acute cardiopulmonary abnormality.        Radiologist interpretation have been reviewed by me.       ED COURSE:  12:21 PM - Patient seen and examined at bedside. Patient presents to the ED with shortness of breath onset 2 weeks ago, along with nausea, diarrhea, and a cough. Denies any vomiting, fever, vomiting blood or having blood stools. See HPI for further details. Ordered for proBrain Natriuretic peptide, eGFR, CMP, CBC with diff, SARS-CoV-2, Flu A/B, and RSV,  D-dimer, DX-chest,  EKG to evaluate her symptoms.      2:32 PM - Patient was reevaluated at bedside. I turned off the oxygen as a test. The patient's son was present at bedside at this time. Obtained further history of the patient. He notes that the patient's severe cough is new and began last night. He also adds that she has not been on antibiotics and was not diagnosed.     3:04 PM - Patient was reevaluated at bedside. The patient became hypoxic with 85% on room air. I put her back on 3 L of oxygen. Discussed the plan for admission. Patient verbalizes understanding and agreement to this plan of care.      3:28 PM - I discussed the patient's case and the above findings with Dr. Anguiano (Hospitalist) and discussed care management.     INTERVENTIONS BY  ME:  Medications   ampicillin/sulbactam (Unasyn) 3 g in  mL IVPB (has no administration in time range)   azithromycin (Zithromax) tablet 500 mg (has no administration in time range)       I have discussed management of the patient with the following physicians and sources:    Dr. Anguiano (Hospitalist)    MEDICAL DECISION MAKING:  PROBLEMS EVALUATED THIS VISIT:    This patient presents with cough which kept her awake last night which is a new problem.  Despite what she said she had not been diagnosed with pneumonia prior to visiting here.  Here she was found to be hypoxic at rest.  Workup is negative for radiographic pneumonia but she probably has an occult pneumonia.  There is no evidence of RSV influenza or COVID on testing.  There is no heart failure based on absence of pulmonary edema and a a minor elevation of brain natruretic peptide.  She is unlikely to have PE with negative D-dimer and given that she is anticoagulated.    RISK:  High given need for escalation of care to include admission    PLAN:  The patient will be admitted for hypoxia and pneumonia due to infectious organism and will be observed on oxygen and antibiotics.     CONDITION: Fair.     FINAL IMPRESSION  1. Hypoxia    2. Pneumonia due to infectious organism, unspecified laterality, unspecified part of lung        IAnn (Thien), am scribing for, and in the presence of, Angel Luna M.D..    Electronically signed by: Ann Harvey (Thien), 3/18/2024    IAngel M.D. personally performed the services described in this documentation, as scribed by Ann Harvey in my presence, and it is both accurate and complete.    The note accurately reflects work and decisions made by me.  Angel Luna M.D.  3/18/2024  5:25 PM

## 2024-03-18 NOTE — ED TRIAGE NOTES
Chief Complaint   Patient presents with    Shortness of Breath     BIB EMS from [facility]. Per report patient has been dealing with a cough for the past 2 week.     Nausea/Vomiting/Diarrhea     Patient reports nausea for past 2 days with no episodes of vomiting.      Patient reports no O2 at baseline  EMS gave 4mg of Zofran during transport  Patient reports no pain    Patient was recent admission here for similar issue.   Patient from [facility]

## 2024-03-19 PROBLEM — L97.409 HEEL ULCER (HCC): Status: ACTIVE | Noted: 2024-03-19

## 2024-03-19 LAB
ANION GAP SERPL CALC-SCNC: 11 MMOL/L (ref 7–16)
BUN SERPL-MCNC: 13 MG/DL (ref 8–22)
CALCIUM SERPL-MCNC: 8.3 MG/DL (ref 8.5–10.5)
CHLORIDE SERPL-SCNC: 99 MMOL/L (ref 96–112)
CO2 SERPL-SCNC: 24 MMOL/L (ref 20–33)
CREAT SERPL-MCNC: 0.67 MG/DL (ref 0.5–1.4)
ERYTHROCYTE [DISTWIDTH] IN BLOOD BY AUTOMATED COUNT: 48.4 FL (ref 35.9–50)
GFR SERPLBLD CREATININE-BSD FMLA CKD-EPI: 83 ML/MIN/1.73 M 2
GLUCOSE SERPL-MCNC: 109 MG/DL (ref 65–99)
HCT VFR BLD AUTO: 39.2 % (ref 37–47)
HGB BLD-MCNC: 13.6 G/DL (ref 12–16)
MCH RBC QN AUTO: 31.3 PG (ref 27–33)
MCHC RBC AUTO-ENTMCNC: 34.7 G/DL (ref 32.2–35.5)
MCV RBC AUTO: 90.3 FL (ref 81.4–97.8)
PLATELET # BLD AUTO: 156 K/UL (ref 164–446)
PMV BLD AUTO: 11.4 FL (ref 9–12.9)
POTASSIUM SERPL-SCNC: 3.4 MMOL/L (ref 3.6–5.5)
RBC # BLD AUTO: 4.34 M/UL (ref 4.2–5.4)
SODIUM SERPL-SCNC: 134 MMOL/L (ref 135–145)
WBC # BLD AUTO: 6.4 K/UL (ref 4.8–10.8)

## 2024-03-19 PROCEDURE — 99232 SBSQ HOSP IP/OBS MODERATE 35: CPT | Performed by: INTERNAL MEDICINE

## 2024-03-19 PROCEDURE — 700102 HCHG RX REV CODE 250 W/ 637 OVERRIDE(OP): Performed by: INTERNAL MEDICINE

## 2024-03-19 PROCEDURE — 700105 HCHG RX REV CODE 258: Performed by: INTERNAL MEDICINE

## 2024-03-19 PROCEDURE — 94669 MECHANICAL CHEST WALL OSCILL: CPT

## 2024-03-19 PROCEDURE — 85027 COMPLETE CBC AUTOMATED: CPT

## 2024-03-19 PROCEDURE — 700111 HCHG RX REV CODE 636 W/ 250 OVERRIDE (IP): Performed by: INTERNAL MEDICINE

## 2024-03-19 PROCEDURE — 700101 HCHG RX REV CODE 250: Performed by: INTERNAL MEDICINE

## 2024-03-19 PROCEDURE — A9270 NON-COVERED ITEM OR SERVICE: HCPCS | Performed by: INTERNAL MEDICINE

## 2024-03-19 PROCEDURE — 770006 HCHG ROOM/CARE - MED/SURG/GYN SEMI*

## 2024-03-19 PROCEDURE — 97602 WOUND(S) CARE NON-SELECTIVE: CPT

## 2024-03-19 PROCEDURE — 80048 BASIC METABOLIC PNL TOTAL CA: CPT

## 2024-03-19 PROCEDURE — 36415 COLL VENOUS BLD VENIPUNCTURE: CPT

## 2024-03-19 RX ORDER — PREDNISONE 20 MG/1
20 TABLET ORAL DAILY
Status: DISCONTINUED | OUTPATIENT
Start: 2024-03-19 | End: 2024-03-21

## 2024-03-19 RX ORDER — BENZONATATE 100 MG/1
100 CAPSULE ORAL 3 TIMES DAILY PRN
Status: DISCONTINUED | OUTPATIENT
Start: 2024-03-19 | End: 2024-03-22 | Stop reason: HOSPADM

## 2024-03-19 RX ORDER — FAMOTIDINE 20 MG/1
20 TABLET, FILM COATED ORAL 2 TIMES DAILY
Status: DISCONTINUED | OUTPATIENT
Start: 2024-03-20 | End: 2024-03-21

## 2024-03-19 RX ORDER — LIDOCAINE 4 G/G
1 PATCH TOPICAL EVERY 24 HOURS
Status: DISCONTINUED | OUTPATIENT
Start: 2024-03-19 | End: 2024-03-22 | Stop reason: HOSPADM

## 2024-03-19 RX ORDER — CYCLOBENZAPRINE HCL 10 MG
10 TABLET ORAL 3 TIMES DAILY PRN
Status: DISCONTINUED | OUTPATIENT
Start: 2024-03-19 | End: 2024-03-22 | Stop reason: HOSPADM

## 2024-03-19 RX ADMIN — METOPROLOL SUCCINATE 12.5 MG: 25 TABLET, EXTENDED RELEASE ORAL at 05:57

## 2024-03-19 RX ADMIN — TRAZODONE HYDROCHLORIDE 50 MG: 50 TABLET ORAL at 20:23

## 2024-03-19 RX ADMIN — LEVOTHYROXINE SODIUM 75 MCG: 0.07 TABLET ORAL at 05:57

## 2024-03-19 RX ADMIN — Medication 2000 UNITS: at 05:58

## 2024-03-19 RX ADMIN — HYDRALAZINE HYDROCHLORIDE 25 MG: 25 TABLET ORAL at 20:23

## 2024-03-19 RX ADMIN — LIDOCAINE 1 PATCH: 4 PATCH TOPICAL at 18:02

## 2024-03-19 RX ADMIN — Medication 100 MG: at 15:55

## 2024-03-19 RX ADMIN — BENZONATATE 100 MG: 100 CAPSULE ORAL at 18:02

## 2024-03-19 RX ADMIN — LOSARTAN POTASSIUM 100 MG: 50 TABLET, FILM COATED ORAL at 05:57

## 2024-03-19 RX ADMIN — HYDRALAZINE HYDROCHLORIDE 25 MG: 25 TABLET ORAL at 15:55

## 2024-03-19 RX ADMIN — CYANOCOBALAMIN TAB 500 MCG 1000 MCG: 500 TAB at 05:57

## 2024-03-19 RX ADMIN — PAROXETINE HYDROCHLORIDE 10 MG: 10 TABLET, FILM COATED ORAL at 05:58

## 2024-03-19 RX ADMIN — AMPICILLIN AND SULBACTAM 3 G: 1; 2 INJECTION, POWDER, FOR SOLUTION INTRAMUSCULAR; INTRAVENOUS at 05:56

## 2024-03-19 RX ADMIN — AMPICILLIN AND SULBACTAM 3 G: 1; 2 INJECTION, POWDER, FOR SOLUTION INTRAMUSCULAR; INTRAVENOUS at 18:03

## 2024-03-19 RX ADMIN — APIXABAN 5 MG: 5 TABLET, FILM COATED ORAL at 18:02

## 2024-03-19 RX ADMIN — AMLODIPINE BESYLATE 10 MG: 10 TABLET ORAL at 05:57

## 2024-03-19 RX ADMIN — AMPICILLIN AND SULBACTAM 3 G: 1; 2 INJECTION, POWDER, FOR SOLUTION INTRAMUSCULAR; INTRAVENOUS at 12:02

## 2024-03-19 RX ADMIN — PREDNISONE 20 MG: 20 TABLET ORAL at 15:55

## 2024-03-19 RX ADMIN — ACETAMINOPHEN 650 MG: 325 TABLET, FILM COATED ORAL at 12:15

## 2024-03-19 RX ADMIN — APIXABAN 5 MG: 5 TABLET, FILM COATED ORAL at 05:57

## 2024-03-19 RX ADMIN — FOLIC ACID 1 MG: 1 TABLET ORAL at 15:55

## 2024-03-19 RX ADMIN — THERA TABS 1 TABLET: TAB at 05:57

## 2024-03-19 RX ADMIN — GABAPENTIN 100 MG: 100 CAPSULE ORAL at 20:23

## 2024-03-19 RX ADMIN — FLECAINIDE ACETATE 100 MG: 100 TABLET ORAL at 05:58

## 2024-03-19 RX ADMIN — FAMOTIDINE 20 MG: 20 TABLET, FILM COATED ORAL at 05:57

## 2024-03-19 ASSESSMENT — ENCOUNTER SYMPTOMS
SHORTNESS OF BREATH: 1
WHEEZING: 0
HEADACHES: 0
DEPRESSION: 0
CHILLS: 0
WEAKNESS: 1
PALPITATIONS: 0
DIZZINESS: 0
FOCAL WEAKNESS: 0
SPUTUM PRODUCTION: 1
DIAPHORESIS: 0
VOMITING: 0
SPEECH CHANGE: 0
SENSORY CHANGE: 0
FLANK PAIN: 0
STRIDOR: 0
ABDOMINAL PAIN: 0
COUGH: 1
BLURRED VISION: 0
SORE THROAT: 0
BACK PAIN: 0
NAUSEA: 0
MYALGIAS: 1
HEARTBURN: 0
MEMORY LOSS: 1
NERVOUS/ANXIOUS: 0
FEVER: 0

## 2024-03-19 ASSESSMENT — LIFESTYLE VARIABLES
TOTAL SCORE: 0
HOW MANY TIMES IN THE PAST YEAR HAVE YOU HAD 5 OR MORE DRINKS IN A DAY: 0
ALCOHOL_USE: NO
TOTAL SCORE: 0
EVER HAD A DRINK FIRST THING IN THE MORNING TO STEADY YOUR NERVES TO GET RID OF A HANGOVER: NO
ON A TYPICAL DAY WHEN YOU DRINK ALCOHOL HOW MANY DRINKS DO YOU HAVE: 0
TOTAL SCORE: 0
DOES PATIENT WANT TO STOP DRINKING: NO
HAVE PEOPLE ANNOYED YOU BY CRITICIZING YOUR DRINKING: NO
AVERAGE NUMBER OF DAYS PER WEEK YOU HAVE A DRINK CONTAINING ALCOHOL: 0
CONSUMPTION TOTAL: NEGATIVE
EVER FELT BAD OR GUILTY ABOUT YOUR DRINKING: NO
HAVE YOU EVER FELT YOU SHOULD CUT DOWN ON YOUR DRINKING: NO

## 2024-03-19 ASSESSMENT — COGNITIVE AND FUNCTIONAL STATUS - GENERAL
DRESSING REGULAR LOWER BODY CLOTHING: A LOT
MOVING TO AND FROM BED TO CHAIR: TOTAL
WALKING IN HOSPITAL ROOM: TOTAL
EATING MEALS: A LITTLE
SUGGESTED CMS G CODE MODIFIER DAILY ACTIVITY: CK
STANDING UP FROM CHAIR USING ARMS: TOTAL
TOILETING: A LOT
TURNING FROM BACK TO SIDE WHILE IN FLAT BAD: A LOT
DAILY ACTIVITIY SCORE: 14
CLIMB 3 TO 5 STEPS WITH RAILING: TOTAL
PERSONAL GROOMING: A LITTLE
DRESSING REGULAR UPPER BODY CLOTHING: A LOT
MOVING FROM LYING ON BACK TO SITTING ON SIDE OF FLAT BED: TOTAL
HELP NEEDED FOR BATHING: A LOT
SUGGESTED CMS G CODE MODIFIER MOBILITY: CM
MOBILITY SCORE: 7

## 2024-03-19 ASSESSMENT — COPD QUESTIONNAIRES
COPD SCREENING SCORE: 3
DURING THE PAST 4 WEEKS HOW MUCH DID YOU FEEL SHORT OF BREATH: NONE/LITTLE OF THE TIME
HAVE YOU SMOKED AT LEAST 100 CIGARETTES IN YOUR ENTIRE LIFE: NO/DON'T KNOW
DO YOU EVER COUGH UP ANY MUCUS OR PHLEGM?: NO/ONLY WITH OCCASIONAL COLDS OR INFECTIONS

## 2024-03-19 ASSESSMENT — PAIN DESCRIPTION - PAIN TYPE
TYPE: ACUTE PAIN

## 2024-03-19 ASSESSMENT — PATIENT HEALTH QUESTIONNAIRE - PHQ9
4. FEELING TIRED OR HAVING LITTLE ENERGY: NOT AT ALL
6. FEELING BAD ABOUT YOURSELF - OR THAT YOU ARE A FAILURE OR HAVE LET YOURSELF OR YOUR FAMILY DOWN: NOT AL ALL
9. THOUGHTS THAT YOU WOULD BE BETTER OFF DEAD, OR OF HURTING YOURSELF: NOT AT ALL
7. TROUBLE CONCENTRATING ON THINGS, SUCH AS READING THE NEWSPAPER OR WATCHING TELEVISION: NOT AT ALL
2. FEELING DOWN, DEPRESSED, IRRITABLE, OR HOPELESS: NOT AT ALL
SUM OF ALL RESPONSES TO PHQ9 QUESTIONS 1 AND 2: 0
1. LITTLE INTEREST OR PLEASURE IN DOING THINGS: NOT AT ALL
5. POOR APPETITE OR OVEREATING: NOT AT ALL
3. TROUBLE FALLING OR STAYING ASLEEP OR SLEEPING TOO MUCH: NOT AT ALL

## 2024-03-19 ASSESSMENT — FIBROSIS 4 INDEX: FIB4 SCORE: 3.92

## 2024-03-19 NOTE — PROGRESS NOTES
Assumed pt care with RN. Pt is A&OX3 (disoriented to time) and states she is in 0/10 pain but declines interventions at this time. Plan of care discussed, no further questions. Personal belongings and call light within reach, bed alarm on.

## 2024-03-19 NOTE — PROGRESS NOTES
[facility] Medicine Daily Progress Note    Date of Service  3/19/2024    Chief Complaint  Debra Rodrigues is a 89 y.o. female admitted 3/18/2024 with Acute respiratory failure and hypoxia with Pneumonia.    Hospital Course  Debra Rodrigues is a 89 y.o. female who presented 3/18/2024 with Shortness of Breath (BIB EMS from group home. Per report patient has been dealing with a cough for the past 2 week. ) and Nausea/Vomiting/Diarrhea (Patient reports nausea for past 2 days with no episodes of vomiting. )  89 year old female from a group home essentially bedridden with foot braces due to neuropathy and deformities, who drinks alcohol weekly with a history of Afib s/p pacer on flecainide and Eliquis (Jan2024 echo normal EF and RV function), hypothyroidism, hypertension, remote history of breast cancer now in remission presented 3/18/2024 with shortness of breath, fatigue, and cough. She has cognitive deficits. Her son and daughter inlaw at bedside. She has been having dry cough since Friday but became more productive and had a coughing fit last night, unable to sleep. No fevers, chills, chest pain,dizziness or palpitations. Her family was visiting her at the group home. I discussed code status and family says she is DNR.   At the ED, she is afebrile, normotensive but hypoxic, 8%% on room air. She was put on O2.   CXR looks clear, CTA Chest showed no PE but showed lingular infiltrate and trace pleural effusions  No leukocytosis. Mild hyponatremia, mild hypokalemia. Cr- 0.77. CoVID, flu and RSV negative x 1. D-dimer negative  She was started on antibiotics for presumptive pneumonia.  When I saw her at the ED, she is in no acute distress. Coughs occasionally and it is dry but can be junky sounding. Family at bedside.    Interval Problem Update  3/19 ongoing cough with Right flank and CP  - worse with cough  Axox2  Generalized weakness    I have discussed this patient's plan of care and discharge plan at IDT rounds  today with Case Management, Nursing, Nursing leadership, and other members of the IDT team.    Consultants/Specialty  none    Code Status  DNAR/DNI    Disposition  The patient is not medically cleared for discharge to home or a post-acute facility.      I have placed the appropriate orders for post-discharge needs.    Review of Systems  Review of Systems   Constitutional:  Positive for malaise/fatigue. Negative for chills, diaphoresis and fever.   HENT:  Negative for congestion, hearing loss and sore throat.    Eyes:  Negative for blurred vision.   Respiratory:  Positive for cough, sputum production and shortness of breath. Negative for wheezing and stridor.    Cardiovascular:  Positive for chest pain. Negative for palpitations and leg swelling.   Gastrointestinal:  Negative for abdominal pain, heartburn, nausea and vomiting.   Genitourinary:  Negative for dysuria and flank pain.   Musculoskeletal:  Positive for myalgias. Negative for back pain and joint pain.   Neurological:  Positive for weakness. Negative for dizziness, sensory change, speech change, focal weakness and headaches.   Psychiatric/Behavioral:  Positive for memory loss. Negative for depression. The patient is not nervous/anxious.         Physical Exam  Temp:  [35.9 °C (96.6 °F)-36.4 °C (97.5 °F)] 36.4 °C (97.5 °F)  Pulse:  [55-66] 60  Resp:  [17-19] 18  BP: ()/(50-60) 95/50  SpO2:  [90 %-96 %] 94 %    Physical Exam  Vitals and nursing note reviewed.   Constitutional:       General: She is in acute distress.      Appearance: She is ill-appearing. She is not toxic-appearing or diaphoretic.   HENT:      Head: Normocephalic and atraumatic.      Nose: Nose normal.      Mouth/Throat:      Mouth: Mucous membranes are dry.   Eyes:      General:         Right eye: No discharge.         Left eye: No discharge.      Extraocular Movements: Extraocular movements intact.      Pupils: Pupils are equal, round, and reactive to light.   Neck:      Thyroid: No  thyromegaly.      Vascular: No JVD.   Cardiovascular:      Rate and Rhythm: Normal rate.      Heart sounds: No murmur heard.  Pulmonary:      Breath sounds: No stridor. Rhonchi present. No wheezing or rales.      Comments: Decreased b/l  Chest:      Chest wall: Tenderness present.   Abdominal:      General: Bowel sounds are normal. There is no distension.      Palpations: Abdomen is soft. There is no mass.      Tenderness: There is no abdominal tenderness.   Musculoskeletal:         General: Swelling present. No tenderness.      Cervical back: Neck supple.   Skin:     General: Skin is warm and dry.      Coloration: Skin is pale. Skin is not jaundiced.      Findings: No erythema or rash.   Neurological:      Mental Status: She is alert and oriented to person, place, and time.      Cranial Nerves: No cranial nerve deficit.      Sensory: No sensory deficit.      Motor: Weakness present.      Coordination: Coordination normal.   Psychiatric:         Behavior: Behavior normal.         Thought Content: Thought content normal.         Fluids    Intake/Output Summary (Last 24 hours) at 3/19/2024 1507  Last data filed at 3/19/2024 1400  Gross per 24 hour   Intake 300 ml   Output --   Net 300 ml       Laboratory  Recent Labs     03/18/24  1147 03/19/24  0526   WBC 6.6 6.4   RBC 4.69 4.34   HEMOGLOBIN 14.3 13.6   HEMATOCRIT 42.4 39.2   MCV 90.4 90.3   MCH 30.5 31.3   MCHC 33.7 34.7   RDW 49.4 48.4   PLATELETCT 182 156*   MPV 11.4 11.4     Recent Labs     03/18/24  1147 03/19/24  0637   SODIUM 133* 134*   POTASSIUM 3.2* 3.4*   CHLORIDE 98 99   CO2 24 24   GLUCOSE 100* 109*   BUN 14 13   CREATININE 0.77 0.67   CALCIUM 8.5 8.3*                   Imaging  DX-CHEST-PORTABLE (1 VIEW)   Final Result      No acute cardiopulmonary abnormality.           Assessment/Plan  * Acute respiratory failure with hypoxia, pneumonia (HCC)- (present on admission)  Assessment & Plan  3/18 Continue antibiotics. Procalcitonin PENDING  Being bedridden  contributes, encourage deep breathing and ordered incentive spirometry  Titrate O2 and breathing trmts.    3/19 procal neg  Transition to po abx  O2 prn  From , had HH with [facility] [place] for wound care  ? Snf vs , pt / ot eval    Chest and back pain with cough  Add flexeril, symbicort, cont guafeniscin    Heel ulcer (HCC)  Assessment & Plan  3/19 cont wound care  HH if pt dc back to     Chronic atrial fibrillation (HCC)- (present on admission)  Assessment & Plan  Vitals:    03/18/24 1303   BP: 126/56   Pulse: 66   SpO2: 91%     HR stable  COntinue fleicanide and ELiquis    Advanced care planning/counseling discussion  Assessment & Plan  I spnet 15 minutes reviewing chart, speaking to family about code status  She is DNR    Age-related physical debility- (present on admission)  Assessment & Plan  PT/OT    Acquired hypothyroidism- (present on admission)  Assessment & Plan  Continue Synthroid    Polyneuropathy in other diseases classified elsewhere (HCC)- (present on admission)  Assessment & Plan  PT/OT    Hypertension- (present on admission)  Assessment & Plan  Vitals:    03/18/24 1303   BP: 126/56   Pulse: 66   SpO2: 91%     Stable         VTE prophylaxis: VTE Selection    I have performed a physical exam and reviewed and updated ROS and Plan today (3/19/2024). In review of yesterday's note (3/18/2024), there are no changes except as documented above.

## 2024-03-19 NOTE — DISCHARGE PLANNING
Care Transition Team Assessment    Emergency Contact is Daughter in law Miguelina Muñiz ([phone])     CM RN met with pt at bedside and spoke with son Maurice Muñiz ([phone]) on the phone to complete discharge assessment. Pt is A/Ox4 and agreeable to assessment. Pt verified information on face sheet.     - Prior to admission, pt lived at [facility] at address   [address] , [place] [address]  - Per pt, son and daughter in law are good support for discharge back into community   - Pt requires assistance with ADL/IADLs. Darin Richards pt is primarily bed ridden.   - Per pt, DME owned includes FWW, wheelchair, and toilet seat riser   - Per pt, no O2 was owned/used prior to admission   - Pt is retired and insured through Medicare and "[employer]  - PCP is ASHLEY Silva  - Per pt and son Maurice, plan is for pt to return to [facility] upon discharge.     Information Source  Orientation Level: Oriented to place, Oriented to situation, Oriented to person, Disoriented to time  Information Given By: Patient, Relative (son)  Informant's Name: MAURICE MUÑIZ  Who is responsible for making decisions for patient? : Patient    Readmission Evaluation  Is this a readmission?: Yes - unplanned readmission    Elopement Risk  Legal Hold: No  Ambulatory or Self Mobile in Wheelchair: No-Not an Elopement Risk  Disoriented: Time-At Risk for Elopement  Psychiatric Symptoms: None  History of Wandering: No  Elopement this Admit: No  Vocalizing Wanting to Leave: No  Displays Behaviors, Body Language Wanting to Leave: No-Not at Risk for Elopement  Elopement Risk: Not at Risk for Elopement    Interdisciplinary Discharge Planning  Lives with - Patient's Self Care Capacity: Attendant / Paid Care Giver  Patient or legal guardian wants to designate a caregiver: Yes  Caregiver name: Mario Guamanradha  ([facility]) Authorization for Release of Health Information has been completed: Yes  Support Systems: Friends / Neighbors, Children, Home Care Staff,  Family Member(s)  Housing / Facility: [facility]  Name of Care Facility: [facility]  Durable Medical Equipment: Walker    Discharge Preparedness  What is your plan after discharge?: intermediate  What are your discharge supports?: Child, Other (comment) (Daughter in law)  Prior Functional Level: Needs Assist with Activities of Daily Living, Needs Assist with Medication Management, Uses Wheelchair, Other (Comments) (bed bound)    Functional Assesment  Prior Functional Level: Needs Assist with Activities of Daily Living, Needs Assist with Medication Management, Uses Wheelchair, Other (Comments) (bed bound)    Finances  Financial Barriers to Discharge: No  Prescription Coverage: Yes    Vision / Hearing Impairment  Vision Impairment : Yes  Right Eye Vision: Impaired, Wears Glasses  Left Eye Vision: Impaired, Wears Glasses  Hearing Impairment : No    Advance Directive  Advance Directive?: None    Domestic Abuse  Have you ever been the victim of abuse or violence?: No  Physical Abuse or Sexual Abuse: No  Verbal Abuse or Emotional Abuse: No  Possible Abuse/Neglect Reported to:: Not Applicable    Discharge Risks or Barriers  Discharge risks or barriers?: Complex medical needs  Patient risk factors: Complex medical needs, Readmission    Anticipated Discharge Information  Discharge Disposition: Discharged to home/self care (01)  Discharge Address: [facility] [address], [place] [address]  Discharge Contact Phone Number: [phone]

## 2024-03-19 NOTE — CARE PLAN
The patient is Watcher - Medium risk of patient condition declining or worsening    Shift Goals  Clinical Goals: Eat meals/ IV ABX  Patient Goals: rest/comfort  Family Goals: ROB    Progress made toward(s) clinical / shift goals:  Pt is Aox3 this morning. On 2L NC. Low appetite, per family this is baseline. IV ABX administered per orders. No acute distress noted. Pt remains free of falls.     Patient is not progressing towards the following goals:

## 2024-03-19 NOTE — THERAPY
Physical Therapy Contact Note    Patient Name: Debra Rodrigues  Age:  89 y.o., Sex:  female  Medical Record #: [id number]  Today's Date: 3/19/2024    Discussed missed therapy with RN and CM       03/19/24 1400   Interdisciplinary Plan of Care Collaboration   Collaboration Comments PT orders received and chart review performed. Spoke with patient who reports that she doesn't get OOB at the group home. Her last several admits to the hospital she has been dependent for bed mobility and non-ambulatory.  Will sign off at this time, as she has been accepted back at her group home.  MD can always order HHPT if appropriate.  Please re-consult if there a change in patient status     Katie Nicholson, PT, DPT, GCS

## 2024-03-19 NOTE — DISCHARGE PLANNING
Case Management Discharge Planning    Admission Date: 3/18/2024  GMLOS: 4.1  ALOS: 1    6-Clicks ADL Score: 14  6-Clicks Mobility Score: 7    Anticipated Discharge Dispo: Discharge Disposition: Discharged to home/self care (01)  Discharge Address: [address], [place] [address]  Discharge Contact Phone Number: [phone]    DME Needed: No    Action(s) Taken:   GUSTAVO RN met with pt at bedside and spoke with son Maurice ([phone]) on the phone to discuss discharge plan.   Plan is for pt to return to [facility] upon discharge.     GUSTAVO RN spoke with Barnard owner of [facility], who stated pt will be accepted back into facility upon discharge.   Barnard requested a rapid COVID test be completed on day of discharge and clinical documents be faxed to [phone].     GUSTAVO RN spoke with Roopa at [facility] who stated pt is on service with them and have requested a resumption of care order upon discharge.     Escalations Completed: None    Medically Clear: No    Next Steps:  GUSTAVO RN to follow up with medical team to discuss discharge barriers or needs.     Barriers to Discharge: Medical clearance    Is the patient up for discharge tomorrow: No

## 2024-03-19 NOTE — CARE PLAN
Problem: Bronchopulmonary Hygiene  Goal: Increase mobilization of retained secretions  Description: Target End Date:  2 to 3 days    1.  Perform bronchopulmonary therapy as indicated by assessment  2.  Perform airway suctioning  3.  Perform actions to maintain patient airway  Outcome: Met

## 2024-03-19 NOTE — CARE PLAN
The patient is Stable - Low risk of patient condition declining or worsening    Shift Goals  Clinical Goals: IV abx, maintain skin integrity, pt will remain free from falls  Patient Goals: rest  Family Goals: ROB    Progress made toward(s) clinical / shift goals:        Problem: Knowledge Deficit - Standard  Goal: Patient and family/care givers will demonstrate understanding of plan of care, disease process/condition, diagnostic tests and medications  Outcome: Progressing     Problem: Skin Integrity  Goal: Skin integrity is maintained or improved  Outcome: Progressing     Problem: Fall Risk  Goal: Patient will remain free from falls  Outcome: Progressing       Patient is not progressing towards the following goals:

## 2024-03-20 LAB
ALBUMIN SERPL BCP-MCNC: 3 G/DL (ref 3.2–4.9)
ALBUMIN/GLOB SERPL: 1.1 G/DL
ALP SERPL-CCNC: 88 U/L (ref 30–99)
ALT SERPL-CCNC: 14 U/L (ref 2–50)
ANION GAP SERPL CALC-SCNC: 14 MMOL/L (ref 7–16)
AST SERPL-CCNC: 29 U/L (ref 12–45)
BASOPHILS # BLD AUTO: 0.3 % (ref 0–1.8)
BASOPHILS # BLD: 0.02 K/UL (ref 0–0.12)
BILIRUB SERPL-MCNC: 0.4 MG/DL (ref 0.1–1.5)
BUN SERPL-MCNC: 19 MG/DL (ref 8–22)
CALCIUM ALBUM COR SERPL-MCNC: 9.2 MG/DL (ref 8.5–10.5)
CALCIUM SERPL-MCNC: 8.4 MG/DL (ref 8.5–10.5)
CHLORIDE SERPL-SCNC: 98 MMOL/L (ref 96–112)
CO2 SERPL-SCNC: 22 MMOL/L (ref 20–33)
CREAT SERPL-MCNC: 0.95 MG/DL (ref 0.5–1.4)
EOSINOPHIL # BLD AUTO: 0.02 K/UL (ref 0–0.51)
EOSINOPHIL NFR BLD: 0.3 % (ref 0–6.9)
ERYTHROCYTE [DISTWIDTH] IN BLOOD BY AUTOMATED COUNT: 49.6 FL (ref 35.9–50)
GFR SERPLBLD CREATININE-BSD FMLA CKD-EPI: 57 ML/MIN/1.73 M 2
GLOBULIN SER CALC-MCNC: 2.8 G/DL (ref 1.9–3.5)
GLUCOSE SERPL-MCNC: 138 MG/DL (ref 65–99)
HCT VFR BLD AUTO: 40.4 % (ref 37–47)
HGB BLD-MCNC: 13.5 G/DL (ref 12–16)
IMM GRANULOCYTES # BLD AUTO: 0.08 K/UL (ref 0–0.11)
IMM GRANULOCYTES NFR BLD AUTO: 1.4 % (ref 0–0.9)
LYMPHOCYTES # BLD AUTO: 0.93 K/UL (ref 1–4.8)
LYMPHOCYTES NFR BLD: 16.2 % (ref 22–41)
MCH RBC QN AUTO: 30.4 PG (ref 27–33)
MCHC RBC AUTO-ENTMCNC: 33.4 G/DL (ref 32.2–35.5)
MCV RBC AUTO: 91 FL (ref 81.4–97.8)
MONOCYTES # BLD AUTO: 0.25 K/UL (ref 0–0.85)
MONOCYTES NFR BLD AUTO: 4.4 % (ref 0–13.4)
NEUTROPHILS # BLD AUTO: 4.44 K/UL (ref 1.82–7.42)
NEUTROPHILS NFR BLD: 77.4 % (ref 44–72)
NRBC # BLD AUTO: 0 K/UL
NRBC BLD-RTO: 0 /100 WBC (ref 0–0.2)
PLATELET # BLD AUTO: 165 K/UL (ref 164–446)
PMV BLD AUTO: 11.5 FL (ref 9–12.9)
POTASSIUM SERPL-SCNC: 3.7 MMOL/L (ref 3.6–5.5)
PROT SERPL-MCNC: 5.8 G/DL (ref 6–8.2)
RBC # BLD AUTO: 4.44 M/UL (ref 4.2–5.4)
SODIUM SERPL-SCNC: 134 MMOL/L (ref 135–145)
WBC # BLD AUTO: 5.7 K/UL (ref 4.8–10.8)

## 2024-03-20 PROCEDURE — 700105 HCHG RX REV CODE 258: Performed by: INTERNAL MEDICINE

## 2024-03-20 PROCEDURE — A9270 NON-COVERED ITEM OR SERVICE: HCPCS | Performed by: INTERNAL MEDICINE

## 2024-03-20 PROCEDURE — 700102 HCHG RX REV CODE 250 W/ 637 OVERRIDE(OP): Performed by: INTERNAL MEDICINE

## 2024-03-20 PROCEDURE — 36415 COLL VENOUS BLD VENIPUNCTURE: CPT

## 2024-03-20 PROCEDURE — 770006 HCHG ROOM/CARE - MED/SURG/GYN SEMI*

## 2024-03-20 PROCEDURE — 302106 OSTOMY POWDER: Performed by: INTERNAL MEDICINE

## 2024-03-20 PROCEDURE — 700111 HCHG RX REV CODE 636 W/ 250 OVERRIDE (IP): Performed by: INTERNAL MEDICINE

## 2024-03-20 PROCEDURE — 80053 COMPREHEN METABOLIC PANEL: CPT

## 2024-03-20 PROCEDURE — 99232 SBSQ HOSP IP/OBS MODERATE 35: CPT | Performed by: INTERNAL MEDICINE

## 2024-03-20 PROCEDURE — 700101 HCHG RX REV CODE 250: Performed by: INTERNAL MEDICINE

## 2024-03-20 PROCEDURE — 85025 COMPLETE CBC W/AUTO DIFF WBC: CPT

## 2024-03-20 RX ORDER — AMOXICILLIN AND CLAVULANATE POTASSIUM 875; 125 MG/1; MG/1
1 TABLET, FILM COATED ORAL EVERY 12 HOURS
Status: DISCONTINUED | OUTPATIENT
Start: 2024-03-20 | End: 2024-03-22 | Stop reason: HOSPADM

## 2024-03-20 RX ADMIN — AMPICILLIN AND SULBACTAM 3 G: 1; 2 INJECTION, POWDER, FOR SOLUTION INTRAMUSCULAR; INTRAVENOUS at 12:02

## 2024-03-20 RX ADMIN — AMLODIPINE BESYLATE 10 MG: 10 TABLET ORAL at 04:39

## 2024-03-20 RX ADMIN — HYDRALAZINE HYDROCHLORIDE 25 MG: 25 TABLET ORAL at 15:41

## 2024-03-20 RX ADMIN — PREDNISONE 20 MG: 20 TABLET ORAL at 04:39

## 2024-03-20 RX ADMIN — THERA TABS 1 TABLET: TAB at 04:39

## 2024-03-20 RX ADMIN — FOLIC ACID 1 MG: 1 TABLET ORAL at 15:41

## 2024-03-20 RX ADMIN — CYANOCOBALAMIN TAB 500 MCG 1000 MCG: 500 TAB at 04:39

## 2024-03-20 RX ADMIN — AMPICILLIN AND SULBACTAM 3 G: 1; 2 INJECTION, POWDER, FOR SOLUTION INTRAMUSCULAR; INTRAVENOUS at 04:40

## 2024-03-20 RX ADMIN — ACETAMINOPHEN 650 MG: 325 TABLET, FILM COATED ORAL at 21:38

## 2024-03-20 RX ADMIN — HYDRALAZINE HYDROCHLORIDE 25 MG: 25 TABLET ORAL at 08:26

## 2024-03-20 RX ADMIN — LOSARTAN POTASSIUM 100 MG: 50 TABLET, FILM COATED ORAL at 04:43

## 2024-03-20 RX ADMIN — PAROXETINE HYDROCHLORIDE 10 MG: 10 TABLET, FILM COATED ORAL at 04:39

## 2024-03-20 RX ADMIN — APIXABAN 5 MG: 5 TABLET, FILM COATED ORAL at 17:19

## 2024-03-20 RX ADMIN — FLECAINIDE ACETATE 100 MG: 100 TABLET ORAL at 04:41

## 2024-03-20 RX ADMIN — AMPICILLIN AND SULBACTAM 3 G: 1; 2 INJECTION, POWDER, FOR SOLUTION INTRAMUSCULAR; INTRAVENOUS at 00:18

## 2024-03-20 RX ADMIN — FAMOTIDINE 20 MG: 20 TABLET, FILM COATED ORAL at 05:43

## 2024-03-20 RX ADMIN — Medication 100 MG: at 15:41

## 2024-03-20 RX ADMIN — LIDOCAINE 1 PATCH: 4 PATCH TOPICAL at 15:42

## 2024-03-20 RX ADMIN — TRAZODONE HYDROCHLORIDE 50 MG: 50 TABLET ORAL at 21:40

## 2024-03-20 RX ADMIN — AMOXICILLIN AND CLAVULANATE POTASSIUM 1 TABLET: 875; 125 TABLET, FILM COATED ORAL at 17:19

## 2024-03-20 RX ADMIN — METOPROLOL SUCCINATE 12.5 MG: 25 TABLET, EXTENDED RELEASE ORAL at 04:38

## 2024-03-20 RX ADMIN — BENZONATATE 100 MG: 100 CAPSULE ORAL at 04:48

## 2024-03-20 RX ADMIN — GABAPENTIN 100 MG: 100 CAPSULE ORAL at 21:40

## 2024-03-20 RX ADMIN — APIXABAN 5 MG: 5 TABLET, FILM COATED ORAL at 04:39

## 2024-03-20 RX ADMIN — LEVOTHYROXINE SODIUM 75 MCG: 0.07 TABLET ORAL at 04:43

## 2024-03-20 RX ADMIN — GUAIFENESIN 200 MG: 100 SOLUTION ORAL at 04:48

## 2024-03-20 RX ADMIN — Medication 2000 UNITS: at 04:38

## 2024-03-20 RX ADMIN — FAMOTIDINE 20 MG: 20 TABLET, FILM COATED ORAL at 17:19

## 2024-03-20 ASSESSMENT — PAIN DESCRIPTION - PAIN TYPE
TYPE: ACUTE PAIN
TYPE: ACUTE PAIN

## 2024-03-20 ASSESSMENT — ENCOUNTER SYMPTOMS
SORE THROAT: 0
FOCAL WEAKNESS: 0
BLURRED VISION: 0
MYALGIAS: 1
FEVER: 0
MEMORY LOSS: 1
ABDOMINAL PAIN: 0
HEADACHES: 0
WHEEZING: 0
WEAKNESS: 1
COUGH: 1
DOUBLE VISION: 0
DIAPHORESIS: 0
SPUTUM PRODUCTION: 1
HEARTBURN: 0
BACK PAIN: 0
CHILLS: 0
SENSORY CHANGE: 0
NERVOUS/ANXIOUS: 0
SPEECH CHANGE: 0
SHORTNESS OF BREATH: 1
FLANK PAIN: 0
DIZZINESS: 0
DEPRESSION: 0
PALPITATIONS: 0

## 2024-03-20 NOTE — PROGRESS NOTES
[facility] Medicine Daily Progress Note    Date of Service  3/20/2024    Chief Complaint  Debra Rodrigues is a 89 y.o. female admitted 3/18/2024 with Acute respiratory failure and hypoxia with Pneumonia.    Hospital Course  Debra Rodrigues is a 89 y.o. female who presented 3/18/2024 with Shortness of Breath (BIB EMS from group home. Per report patient has been dealing with a cough for the past 2 week. ) and Nausea/Vomiting/Diarrhea (Patient reports nausea for past 2 days with no episodes of vomiting. )  89 year old female from a group home essentially bedridden with foot braces due to neuropathy and deformities, who drinks alcohol weekly with a history of Afib s/p pacer on flecainide and Eliquis (Jan2024 echo normal EF and RV function), hypothyroidism, hypertension, remote history of breast cancer now in remission presented 3/18/2024 with shortness of breath, fatigue, and cough. She has cognitive deficits. Her son and daughter inlaw at bedside. She has been having dry cough since Friday but became more productive and had a coughing fit last night, unable to sleep. No fevers, chills, chest pain,dizziness or palpitations. Her family was visiting her at the group home. I discussed code status and family says she is DNR.   At the ED, she is afebrile, normotensive but hypoxic, 8%% on room air. She was put on O2.   CXR looks clear, CTA Chest showed no PE but showed lingular infiltrate and trace pleural effusions  No leukocytosis. Mild hyponatremia, mild hypokalemia. Cr- 0.77. CoVID, flu and RSV negative x 1. D-dimer negative  She was started on antibiotics for presumptive pneumonia.  When I saw her at the ED, she is in no acute distress. Coughs occasionally and it is dry but can be junky sounding. Family at bedside.    Interval Problem Update  3/19 ongoing cough with Right flank and CP  - worse with cough  Axox2  Generalized weakness    3/20 improving shortness of breath  Intermittent use of oxygen  Intermittent  cough, oxygen as needed  Feels better  Decreasing back pain    I have discussed this patient's plan of care and discharge plan at IDT rounds today with Case Management, Nursing, Nursing leadership, and other members of the IDT team.    Consultants/Specialty  none    Code Status  DNAR/DNI    Disposition  The patient is not medically cleared for discharge to home or a post-acute facility.      I have placed the appropriate orders for post-discharge needs.    Review of Systems  Review of Systems   Constitutional:  Negative for chills, diaphoresis, fever and malaise/fatigue.   HENT:  Negative for congestion, hearing loss and sore throat.    Eyes:  Negative for blurred vision and double vision.   Respiratory:  Positive for cough, sputum production and shortness of breath. Negative for wheezing.    Cardiovascular:  Positive for chest pain. Negative for palpitations and leg swelling.   Gastrointestinal:  Negative for abdominal pain and heartburn.   Genitourinary:  Negative for dysuria and flank pain.   Musculoskeletal:  Positive for myalgias. Negative for back pain.   Neurological:  Positive for weakness. Negative for dizziness, sensory change, speech change, focal weakness and headaches.   Psychiatric/Behavioral:  Positive for memory loss. Negative for depression. The patient is not nervous/anxious.         Physical Exam  Temp:  [36 °C (96.8 °F)-36.2 °C (97.2 °F)] 36 °C (96.8 °F)  Pulse:  [65-67] 67  Resp:  [12-18] 12  BP: (101-131)/(52-80) 128/59  SpO2:  [92 %-100 %] 100 %    Physical Exam  Vitals and nursing note reviewed.   Constitutional:       General: She is in acute distress.      Appearance: She is ill-appearing. She is not diaphoretic.   HENT:      Head: Normocephalic and atraumatic.      Nose: Nose normal.      Mouth/Throat:      Mouth: Mucous membranes are dry.   Eyes:      General:         Left eye: No discharge.      Extraocular Movements: Extraocular movements intact.      Pupils: Pupils are equal, round,  and reactive to light.   Neck:      Thyroid: No thyromegaly.      Vascular: No JVD.   Cardiovascular:      Rate and Rhythm: Normal rate.      Heart sounds: No murmur heard.  Pulmonary:      Breath sounds: No stridor. Rhonchi present. No wheezing.      Comments: Decreased b/l  Chest:      Chest wall: Tenderness present.   Abdominal:      General: Bowel sounds are normal. There is no distension.      Palpations: Abdomen is soft.      Tenderness: There is no abdominal tenderness.   Musculoskeletal:         General: Swelling present. No tenderness.      Cervical back: Neck supple.   Skin:     General: Skin is warm and dry.      Coloration: Skin is pale. Skin is not jaundiced.      Findings: No erythema.   Neurological:      Mental Status: She is alert and oriented to person, place, and time.      Cranial Nerves: No cranial nerve deficit.      Sensory: No sensory deficit.      Motor: Weakness present.      Coordination: Coordination normal.   Psychiatric:         Behavior: Behavior normal.         Thought Content: Thought content normal.         Fluids    Intake/Output Summary (Last 24 hours) at 3/20/2024 1436  Last data filed at 3/20/2024 1029  Gross per 24 hour   Intake 460 ml   Output 300 ml   Net 160 ml       Laboratory  Recent Labs     03/18/24  1147 03/19/24  0526 03/20/24  0318   WBC 6.6 6.4 5.7   RBC 4.69 4.34 4.44   HEMOGLOBIN 14.3 13.6 13.5   HEMATOCRIT 42.4 39.2 40.4   MCV 90.4 90.3 91.0   MCH 30.5 31.3 30.4   MCHC 33.7 34.7 33.4   RDW 49.4 48.4 49.6   PLATELETCT 182 156* 165   MPV 11.4 11.4 11.5     Recent Labs     03/18/24  1147 03/19/24  0637 03/20/24  0318   SODIUM 133* 134* 134*   POTASSIUM 3.2* 3.4* 3.7   CHLORIDE 98 99 98   CO2 24 24 22   GLUCOSE 100* 109* 138*   BUN 14 13 19   CREATININE 0.77 0.67 0.95   CALCIUM 8.5 8.3* 8.4*                   Imaging  DX-CHEST-PORTABLE (1 VIEW)   Final Result      No acute cardiopulmonary abnormality.           Assessment/Plan  * Acute respiratory failure with  hypoxia, pneumonia (HCC)- (present on admission)  Assessment & Plan  3/18 Continue antibiotics. Procalcitonin PENDING  Being bedridden contributes, encourage deep breathing and ordered incentive spirometry  Titrate O2 and breathing trmts.    3/19 procal neg  Transition to po abx  O2 prn  From , had HH with [facility] for wound care  ? Snf vs , pt / ot eval    Chest and back pain with cough  Add flexeril, symbicort, cont guafeniscin    3/20 improving shortness of breath  Will transition to p.o. antibiotics  Ongoing cough, recommend incentive spirometer use  Oxygen supplementation as needed    Heel ulcer (HCC)  Assessment & Plan  3/19 cont wound care  HH if pt dc back to     Chronic atrial fibrillation (HCC)- (present on admission)  Assessment & Plan  Vitals:    03/18/24 1303   BP: 126/56   Pulse: 66   SpO2: 91%     HR stable  COntinue fleicanide and ELiquis    Advanced care planning/counseling discussion  Assessment & Plan  I spnet 15 minutes reviewing chart, speaking to family about code status  She is DNR    Age-related physical debility- (present on admission)  Assessment & Plan  PT/OT    Acquired hypothyroidism- (present on admission)  Assessment & Plan  Continue Synthroid    Polyneuropathy in other diseases classified elsewhere (HCC)- (present on admission)  Assessment & Plan  PT/OT    Hypertension- (present on admission)  Assessment & Plan  Vitals:    03/18/24 1303   BP: 126/56   Pulse: 66   SpO2: 91%     Stable         VTE prophylaxis:   SCDs/TEDs      I have performed a physical exam and reviewed and updated ROS and Plan today (3/20/2024). In review of yesterday's note (3/19/2024), there are no changes except as documented above.

## 2024-03-20 NOTE — WOUND TEAM
[facility]  Inpatient Services  Initial Wound and Skin Care Evaluation    Admission Date: 3/18/2024     Last order of IP CONSULT TO WOUND CARE was found on 3/18/2024 from Hospital Encounter on 3/18/2024     HPI, PMH, SH: Reviewed    Past Surgical History:   Procedure Laterality Date    BREAST RECONSTRUCTION  11/23/2010    Performed by ANNITA KELLEY at SURGERY [facility]    TISSUE EXPANDER PLACE/REMOVE  11/23/2010    Performed by ANNITA KELLEY at [facility]    MASTOPEXY  11/23/2010    Performed by ANNITA KELLEY at SURGERY [facility]    BREAST RECONSTRUCTION  4/20/2010    Performed by ANNITA KELLEY at [facility]    TISSUE EXPANDER PLACE/REMOVE  4/20/2010    Performed by ANINTA KELLEY at [facility]    MASTECTOMY  5/13/2009    left    PACEMAKER INSERTION  2009    KNEE ARTHROSCOPY  1995    right    RAYNA BY LAPAROSCOPY  1995    HYSTERECTOMY, TOTAL ABDOMINAL  1972    BSO    MASTOIDECTOMY  1943    left    TONSILLECTOMY AND ADENOIDECTOMY  1942    CATARACT EXTRACTION WITH IOL      bilateral     Social History     Tobacco Use    Smoking status: Never    Smokeless tobacco: Never   Substance Use Topics    Alcohol use: Yes     Alcohol/week: 3.5 oz     Types: 7 Standard drinks or equivalent per week     Comment: one per day     Chief Complaint   Patient presents with    Shortness of Breath     BIB EMS from longterm. Per report patient has been dealing with a cough for the past 2 week.     Nausea/Vomiting/Diarrhea     Patient reports nausea for past 2 days with no episodes of vomiting.      Diagnosis: Hypoxia [R09.02]    Unit where seen by Wound Team: S532/01     WOUND CONSULT RELATED TO:  heels, sacrococcygeal, L thigh posterior    WOUND TEAM PLAN OF CARE - Frequency of Follow-up:   Nursing to follow dressing orders written for wound care. Contact wound team if area fails to progress, deteriorates or with any questions/concerns if  something comes up before next scheduled follow up (See below as to whether wound is following and frequency of wound follow up)   Weekly -  heels, sacrococcygeal, L thigh posterior    WOUND HISTORY:   Pt was admitted earlier this month with POA DTI and she returned with unstageable pressure injuries.       WOUND ASSESSMENT/LDA  Wound 03/06/24 Pressure Injury Heel Bilateral POA Unstageable (Active)   Date First Assessed: 03/06/24   Present on Original Admission: Yes  Hand Hygiene Completed: Yes  Primary Wound Type: Pressure Injury  Location: Heel  Laterality: Bilateral  Wound Description (Comments): POA Unstageable      Assessments 3/19/2024  6:00 PM             Left                Right                  Site Assessment Black;Brown   Periwound Assessment Pink;Dry   Margins Defined edges;Attached edges   Closure Open to air   Drainage Amount None   Treatments Cleansed;Nonselective debridement   Offloading/DME Heel float boot   Wound Cleansing Normal Saline Irrigation   Periwound Protectant Not Applicable   Dressing Status Open to Air   Dressing Changed New   Dressing Cleansing/Solutions 3% Betadine   Dressing Options Open to Air   Dressing Change/Treatment Frequency Every Shift, and As Needed   NEXT Dressing Change/Treatment Date 03/20/24   NEXT Weekly Photo (Inpatient Only) 03/26/24   Wound Team Following Weekly   Pressure Injury Stage Unstageable   Wound Length (cm) 5 cm   Wound Width (cm) 3 cm   Wound Surface Area (cm^2) 15 cm^2   Shape oval   Wound Odor None   Pulses 1+;Thready   Exposed Structures ROB       Wound 02/15/24 Pressure Injury Sacrum;Thigh Upper Bilateral POA DTI revealed as unstageable 3/18/24 admit (Active)   Date First Assessed/Time First Assessed: 02/15/24 1900   Present on Original Admission: Yes  Hand Hygiene Completed: Yes  Primary Wound Type: Pressure Injury  Location: Sacrum;Thigh  Wound Orientation: Upper  Laterality: Bilateral  Wound Description (...      Assessments 3/19/2024  6:00 PM          Site Assessment Yellow;Red   Periwound Assessment Pink;Red;Denuded   Margins Defined edges   Closure Secondary intention   Drainage Amount None   Treatments Cleansed;Nonselective debridement   Wound Cleansing Foam Cleanser/Washcloth   Periwound Protectant Barrier Paste   Dressing Status Open to Air   Dressing Cleansing/Solutions Not Applicable   Dressing Options Open to Air   Dressing Change/Treatment Frequency Every Shift, and As Needed   NEXT Dressing Change/Treatment Date 24   NEXT Weekly Photo (Inpatient Only) 24   Pressure Injury Stage Unstageable   Wound Length (cm) 6 cm   Wound Width (cm) 3 cm   Wound Surface Area (cm^2) 18 cm^2   Shape irreular   Wound Odor None        Vascular:    ANDREW:   ANDREW Results, Last 30 Days US-ANDREW SINGLE LEVEL BILAT    Result Date: 3/6/2024  Narrative  Vascular Laboratory  Conclusions  Compared to the prior study on 2/15/24.  Bilateral-.  The ankle-brachial indices are normal.  CHRISTINE LANDA  Age:    89    Gender:     F  MRN:    [id number]  :    1935      BSA:  Exam Date:     2024 09:25  Room #:     Inpatient  Priority:     Routine  Ht (in):             Wt (lb):  Ordering Physician:        ZABRINA WATERMAN  Referring Physician:       [id number] Woodward  Sonographer:               Ranulfo Orozco                             RVTED  Study Type:                Complete Bilateral  Technical Quality:         Adequate  Indications:     Pain in Limb  CPT Codes:       88200  ICD Codes:       729.5  History:         Bilateral lower extremity pain.  Limitations:     Could not obtain right brachial blood pressure due to IV                   placement.                 RIGHT  Waveform            Systolic BPs (mmHg)                             0             Brachial  Biphasic                                 Common Femoral  Biphasic                                 Popliteal  Biphasic                   166           Posterior Tibial  Biphasic                    174           Dorsalis Pedis                                           Digit                             1.05          ANDREW                                           TBI                       LEFT  Waveform        Systolic BPs (mmHg)                             166           Brachial  Biphasic                                 Common Femoral  Biphasic                                 Popliteal  Biphasic                   173           Posterior Tibial  Biphasic                   176           Dorsalis Pedis                                           Digit                             1.06          ANDREW                                           TBI  Findings  Bilateral-  Doppler waveforms of the common femoral, popliteal, posterior tibial, and  dorsalis pedis arteries are of high amplitude and multiphasic.  The ankle-brachial indices are normal.  Additional testing was not performed in accordance with lower extremity  arterial evaluation protocol.  Sedrick Saucedo MD  (Electronically Signed)  Final Date:      06 March 2024                   19:56      Lab Values:    Lab Results   Component Value Date/Time    WBC 6.4 03/19/2024 05:26 AM    RBC 4.34 03/19/2024 05:26 AM    HEMOGLOBIN 13.6 03/19/2024 05:26 AM    HEMATOCRIT 39.2 03/19/2024 05:26 AM    CREACTPROT <0.30 03/05/2024 02:50 PM    SEDRATEWES 22 03/05/2024 02:50 PM         Culture Results show:  No results found for this or any previous visit (from the past 720 hour(s)).    Pain Level/Medicated:  None, Tolerated without pain medication       INTERVENTIONS BY WOUND TEAM:  Chart and images reviewed. Discussed with bedside RN. All areas of concern (based on picture review, LDA review and discussion with bedside RN) have been thoroughly assessed. Documentation of areas based on significant findings. This RN in to assess patient. Performed standard wound care which includes appropriate positioning, dressing removal and non-selective debridement. Pictures  and measurements obtained weekly if/when required.    Wound:  bilateral heels  Preparation for Dressing removal: Removed without difficulty  Cleansed/Non-selectively Debrided with:  No rinse foam soap and Moist warm washcloth  Mamie wound: Cleansed with No rinse foam soap and Moist warm washcloth, Prepped with Open to Air and iodine  Primary Dressing:  STACI heel float boots placed     Wound:  sacrococcygeal and L thigh  Preparation for Dressing removal: Open to air  Cleansed/Non-selectively Debrided with:  No rinse foam soap and Moist warm washcloth  Mamie wound: Cleansed with No rinse foam soap and Moist warm washcloth, Prepped with Barrier paste  Primary Dressing:  STACI    Advanced Wound Care Discharge Planning  Number of Clinicians necessary to complete wound care: 2 one needed to turn pt  Is patient requiring IV pain medications for dressing changes:  No   Length of time for dressing change 45 min. (This does not include chart review, pre-medication time, set up, clean up or time spent charting.)    Interdisciplinary consultation: Patient, Bedside RN (Maria Victoria). Alley PIKE for assistance    EVALUATION / RATIONALE FOR TREATMENT:     Date:  03/19/24  Wound Status:  Initial evaluation    Pt has DTI that have now revealed as unstageable to both heels and sacrococcygeal area and L thigh. Heels applied iodine.  Iodine used for antimicrobial and to keep eschar present for body to autolytic debride eschar on own timeframe. Sacrococcygeal area, pt incontinent of stool and urine. Purwick in use for urine. Barrier paste in use with viscopaste ordered. Viscopatch zinc impregnated gauze to  to provide a non-stick wound contact layer and protect wounds from stool.             Goals: Steady decrease in wound area and depth weekly.    NURSING PLAN OF CARE ORDERS:  Dressing changes: See Dressing Care orders  RN Prevention Protocol    NUTRITION RECOMMENDATIONS   Wound Team Recommendations:  N/A    DIET ORDERS (From admission to next 24h)        Start     Ordered    03/19/24 0821  Diet Order Diet: Cardiac  ALL MEALS        Question:  Diet:  Answer:  Cardiac    03/19/24 0823                    PREVENTATIVE INTERVENTIONS:    Q shift Larry - performed per nursing policy  Q shift pressure point assessments - performed per nursing policy    Surface/Positioning  Low Airloss - Ordered  Standard/trauma mattress - Currently in Place  Reposition q 2 hours - Currently in Place  TAPs Turning system - Currently in Place    Offloading/Redistribution  Heel float boots (Prevalon boot) - Applied this Visit      Respiratory  Silicone O2 tubing - Currently in Place  Gray Foam Ear protectors - Currently in Place    Containment/Moisture Prevention    Dri-eliana pad - Currently in Place  Purwick/Condom Cath - Currently in Place  Barrier paste - Currently in Place    Anticipated discharge plans:  TBD        Vac Discharge Needs:  Vac Discharge plan is purely a recommendation from wound team and not a requirement for discharge unless otherwise stated by physician.  Not Applicable Pt not on a wound vac

## 2024-03-20 NOTE — CARE PLAN
The patient is Watcher - Medium risk of patient condition declining or worsening    Shift Goals  Clinical Goals: 1:1 feed, IV ABX, pain control  Patient Goals: help with meals, comfort  Family Goals: ROB    Progress made toward(s) clinical / shift goals:  Pt is Aox3 this morning, disoriented to time. On RA, IS at bedside. Low appetite, per family this is baseline. Q2 turns completed. Wound care done per orders. IV ABX administered per orders. No acute distress noted. Pt remains free of falls.     Patient is not progressing towards the following goals:

## 2024-03-20 NOTE — CARE PLAN
The patient is Stable - Low risk of patient condition declining or worsening    Shift Goals  Clinical Goals: Help with meals, IV abx, maintain skin integrity,fall/ aspiration precation  Patient Goals: help with meals, rest  Family Goals: not present    Progress made toward(s) clinical / shift goals:    Problem: Respiratory  Goal: Patient will achieve/maintain optimum respiratory ventilation and gas exchange  Outcome: Progressing  Flowsheets (Taken 3/20/2024 0119)  O2 Delivery Device: Nasal Cannula  Note: Currently on 2 lpm nc sats at 93%.     Problem: Risk for Aspiration  Goal: Patient's risk for aspiration will be absent or decrease  Outcome: Progressing  Flowsheets  Taken 3/20/2024 0127  Aspiration Prevention:   Assessed for signs and symptoms of aspiration   Collaborated with SLP   Elevated head of bed to 90 degrees   Ensured patient consumed liquid of appropriate consistency   Supervised feedings   Implemented aspiration precautions   Encouraged small bites   Assisted patient up to chair for meals   Assessed breathsounds and vitals after oral intake  Head Of Bed: Greater than 30 degrees  Taken 3/19/2024 1934  Head of Bed Elevated: Greater or equal to 30 degrees     Problem: Skin Integrity  Goal: Skin integrity is maintained or improved  Outcome: Progressing  Note: Q2 turns, Wound care/dressing changes     Problem: Fall Risk  Goal: Patient will remain free from falls  Outcome: Progressing  Note: Fall precautions in placed, bed strip alarm is on     Problem: Pain - Standard  Goal: Alleviation of pain or a reduction in pain to the patient’s comfort goal  Note: Pain assessment and mgt     Received RN report, assumed care for this patient  AO x 2, drowsy confused with time and situation  Denies any pain at this time  Pain re-assessment Q4, Q2 turns  Mamie care, oral care done, purewick in placed  Wound care, offloading and dressing changes  IV site patent, IV abx given  Assisted with her dinner, consumed about 50%.    Fall precaution/Aspiration precaution in placed  Call light and personal items within reached.  Hourly rounding, Kept safe and continue monitoring.      Plan:  Pain mgt  Assist with meals  Q2 turns, 88% O2 sats and above, cont monitoring  Wound dressing changes  HH when medically cleared- per MD notes    Patient is not progressing towards the following goals:

## 2024-03-20 NOTE — PROGRESS NOTES
Received RN report, assumed care for this patient  AO x 2, drowsy confused with time and situation  Denies any pain at this time  Pain re-assessment Q4, Q2 turns  Mamie care, oral care done, purewick in placed  Wound care, offloading and dressing changes  IV site patent, IV abx given  Assisted with her dinner, consumed about 50%.   Fall precaution/Aspiration precaution in placed  Call light and personal items within reached.  Hourly rounding, Kept safe and continue monitoring.      Plan:  Pain mgt  Assist with meals  Q2 turns, 88% O2 sats and above, cont monitoring  Wound dressing changes  HH when medically cleared and Abx. P.O per MD notes

## 2024-03-20 NOTE — DOCUMENTATION QUERY
"                                                                         [facility]                                                                       Query Response Note      PATIENT:               CHRISTINE LANDA  ACCT #:                  [id number]  MRN:                     [id number]  :                      1935  ADMIT DATE:       3/18/2024 11:37 AM  DISCH DATE:          RESPONDING  PROVIDER #:        [id number]           QUERY TEXT:    Documentation in the medical record indicates that this patient is being treated for ulcer of the following site(s):   bilateral heels, sacrococcygeal area and L thigh    Can the diagnosis of ulcer be further clarified as to type/etiology of the wound, location, and if present on admission (POA) based on the clinical indicators and treatment?      The patient's Clinical Indicators include:  Wound Team : \"Pt has DTI that have now revealed as unstageable to both heels and sacrococcygeal area and L thigh.\"    (PN ) \"Heel ulcer - 3/19 cont wound care- HH if pt dc back to \"    Risk Factors: Acute respiratory failure with hypoxia, pneumonia, Age-related physical debility, Polyneuropathy    TX: Wound Care Consult, wound care, Heels applied iodine. Purwick in use for urine. Barrier paste in use with viscopaste ordered. Viscopatch zinc impregnated gauze    If you have any questions, please contact:  Khoa Nevarez RN CDI [facility]  [email or web address]  Khoa Nevarez [address]  Options provided:   -- **Pressure Ulcer/DTI** on **bilateral heels, sacrococcygeal area and L thigh** present on admission   -- **Pressure Ulcer/DTI** on ** bilateral heels, sacrococcygeal area and L thigh** not present on admission   -- Wound is not a Pressure Ulcer/DTI, (please specify type of wound)   -- Other explanation, (please specify other explanation)      Query created by: Khoa Nevarez on 3/20/2024 9:28 AM    RESPONSE TEXT:    **Pressure Ulcer/DTI** on **bilateral heels, " "sacrococcygeal area and L thigh** present on admission       QUERY TEXT:    Please provide additional clinical indicators supportive of the documented diagnosis of \"Pneumonia\".      The patient's Clinical Indicators include:  (H&P) \"Acute respiratory failure with hypoxia, pneumonia\"    (PN 03/19) \"Acute respiratory failure with hypoxia, pneumonia\"     Labs: WBC: 03/18: 6.6, 03/19: 6.4, 03/20: 5.7             Procalcitonin: 03/18: <0.05    CXR: No acute cardiopulmonary abnormality.    Pulmonary:     Effort: Pulmonary effort is normal. No respiratory distress.     Breath sounds: No stridor. Rhonchi (occ cough and rhonchi) present. No wheezing or rales.    Risk Factors: Acute respiratory failure with hypoxia, Age-related physical debility, Chest and back pain with cough, Chronic atrial fibrillation    Tx: Continue antibiotics, Titrate O2 and breathing trmts, incentive spirometry, symbicort, cont guafeniscin    If you have any questions, please contact:  Khoa Nevarez RN [facility]I [facility]  [email or web address]  Khoa Nevarez [address] [employer]  Options provided:   -- Pneumonia exists, (please document additional clinical indicators)   -- Pneumonia does not exist and amended documentation provided in the medical record   -- Other explanation, (please specify other explanation)      Query created by: Khoa Nevarez on 3/20/2024 9:34 AM    RESPONSE TEXT:    Pneumonia exists - CT with lingular infiltrate          Electronically signed by:  EMILY SELF DO 3/20/2024 9:51 AM              "

## 2024-03-20 NOTE — DIETARY
"Nutrition services: Day 2 of admit.  Debra Rodrigues is a 89 y.o. female with admitting DX of Hypoxia.  Consult received for notification of presence of wound per admit nutrition screen. Pt consumes Boost at home per screen.    Spoke with pt at bedside. She reports she does drink Boost and she is receptive to addition of Ensure. She would like to try the strawberry flavor. Per CNA at bedside, pt is eating well. She ate 100% this morning with assistance.    Assessment:  Height: 160 cm (5' 3\")  Weight: 79.8 kg (175 lb 14.8 oz)  Body mass index is 31.16 kg/m²., BMI classification: Class 1 Obesity  Diet/Intake: Cardiac    Evaluation:   Per wound team note, pt with unstageable wounds to bilateral heels, sacrococcygeal area, and left thigh.  Recorded PO intake % at breakfast today.  Medications include Thiamine, Folic Acid, Vitamin B12, Vitamin D.  Labs 3/20 include Na 134 (L), Alb 3 (L)    Malnutrition Risk: ASPEN criteria not met.    Recommendations/Plan:  Ensure Plus BID.   Encourage intake of meals >50%.  Document intake of all meals and supplements as % taken in ADL's to provide interdisciplinary communication across all shifts.   Monitor weight.  RD to monitor per department policy.          "

## 2024-03-21 PROCEDURE — 700101 HCHG RX REV CODE 250: Performed by: INTERNAL MEDICINE

## 2024-03-21 PROCEDURE — 99232 SBSQ HOSP IP/OBS MODERATE 35: CPT | Performed by: INTERNAL MEDICINE

## 2024-03-21 PROCEDURE — A9270 NON-COVERED ITEM OR SERVICE: HCPCS | Performed by: INTERNAL MEDICINE

## 2024-03-21 PROCEDURE — 770006 HCHG ROOM/CARE - MED/SURG/GYN SEMI*

## 2024-03-21 PROCEDURE — 700102 HCHG RX REV CODE 250 W/ 637 OVERRIDE(OP): Performed by: INTERNAL MEDICINE

## 2024-03-21 PROCEDURE — 700111 HCHG RX REV CODE 636 W/ 250 OVERRIDE (IP): Performed by: INTERNAL MEDICINE

## 2024-03-21 RX ORDER — PREDNISONE 10 MG/1
10 TABLET ORAL DAILY
Status: DISCONTINUED | OUTPATIENT
Start: 2024-03-22 | End: 2024-03-22 | Stop reason: HOSPADM

## 2024-03-21 RX ORDER — FAMOTIDINE 20 MG/1
20 TABLET, FILM COATED ORAL DAILY
Status: DISCONTINUED | OUTPATIENT
Start: 2024-03-22 | End: 2024-03-22 | Stop reason: HOSPADM

## 2024-03-21 RX ADMIN — APIXABAN 5 MG: 5 TABLET, FILM COATED ORAL at 05:13

## 2024-03-21 RX ADMIN — HYDRALAZINE HYDROCHLORIDE 25 MG: 25 TABLET ORAL at 21:47

## 2024-03-21 RX ADMIN — FLECAINIDE ACETATE 100 MG: 100 TABLET ORAL at 05:11

## 2024-03-21 RX ADMIN — LIDOCAINE 1 PATCH: 4 PATCH TOPICAL at 15:27

## 2024-03-21 RX ADMIN — PREDNISONE 20 MG: 20 TABLET ORAL at 05:13

## 2024-03-21 RX ADMIN — CYANOCOBALAMIN TAB 500 MCG 1000 MCG: 500 TAB at 05:12

## 2024-03-21 RX ADMIN — THERA TABS 1 TABLET: TAB at 05:11

## 2024-03-21 RX ADMIN — METOPROLOL SUCCINATE 12.5 MG: 25 TABLET, EXTENDED RELEASE ORAL at 05:16

## 2024-03-21 RX ADMIN — HYDRALAZINE HYDROCHLORIDE 25 MG: 25 TABLET ORAL at 14:38

## 2024-03-21 RX ADMIN — HYDRALAZINE HYDROCHLORIDE 25 MG: 25 TABLET ORAL at 08:21

## 2024-03-21 RX ADMIN — AMOXICILLIN AND CLAVULANATE POTASSIUM 1 TABLET: 875; 125 TABLET, FILM COATED ORAL at 05:14

## 2024-03-21 RX ADMIN — LEVOTHYROXINE SODIUM 75 MCG: 0.07 TABLET ORAL at 05:14

## 2024-03-21 RX ADMIN — APIXABAN 5 MG: 5 TABLET, FILM COATED ORAL at 17:11

## 2024-03-21 RX ADMIN — GABAPENTIN 100 MG: 100 CAPSULE ORAL at 21:47

## 2024-03-21 RX ADMIN — Medication 100 MG: at 14:38

## 2024-03-21 RX ADMIN — LOSARTAN POTASSIUM 100 MG: 50 TABLET, FILM COATED ORAL at 05:15

## 2024-03-21 RX ADMIN — AMOXICILLIN AND CLAVULANATE POTASSIUM 1 TABLET: 875; 125 TABLET, FILM COATED ORAL at 17:11

## 2024-03-21 RX ADMIN — AMLODIPINE BESYLATE 10 MG: 10 TABLET ORAL at 05:15

## 2024-03-21 RX ADMIN — DOCUSATE SODIUM 50 MG AND SENNOSIDES 8.6 MG 2 TABLET: 8.6; 5 TABLET, FILM COATED ORAL at 17:11

## 2024-03-21 RX ADMIN — PAROXETINE HYDROCHLORIDE 10 MG: 10 TABLET, FILM COATED ORAL at 05:14

## 2024-03-21 RX ADMIN — FOLIC ACID 1 MG: 1 TABLET ORAL at 14:38

## 2024-03-21 RX ADMIN — FAMOTIDINE 20 MG: 20 TABLET, FILM COATED ORAL at 05:14

## 2024-03-21 RX ADMIN — TRAZODONE HYDROCHLORIDE 50 MG: 50 TABLET ORAL at 21:47

## 2024-03-21 RX ADMIN — Medication 2000 UNITS: at 05:13

## 2024-03-21 ASSESSMENT — ENCOUNTER SYMPTOMS
FOCAL WEAKNESS: 0
NERVOUS/ANXIOUS: 0
MYALGIAS: 1
SPUTUM PRODUCTION: 1
FEVER: 0
ABDOMINAL PAIN: 0
SHORTNESS OF BREATH: 1
SENSORY CHANGE: 0
BACK PAIN: 0
FLANK PAIN: 0
SORE THROAT: 0
WEAKNESS: 1
PALPITATIONS: 0
SPEECH CHANGE: 0
NAUSEA: 0
COUGH: 1
WHEEZING: 0
DIZZINESS: 0
MEMORY LOSS: 1
CHILLS: 0
BLURRED VISION: 0

## 2024-03-21 ASSESSMENT — PAIN SCALES - WONG BAKER: WONGBAKER_NUMERICALRESPONSE: DOESN'T HURT AT ALL

## 2024-03-21 ASSESSMENT — PAIN DESCRIPTION - PAIN TYPE: TYPE: ACUTE PAIN

## 2024-03-21 ASSESSMENT — FIBROSIS 4 INDEX: FIB4 SCORE: 4.18

## 2024-03-21 NOTE — CARE PLAN
The patient is Watcher - Medium risk of patient condition declining or worsening    Shift Goals  Clinical Goals: IV ABX/ monitor oxygenation  Patient Goals: rest/comfort  Family Goals: ROB    Progress made toward(s) clinical / shift goals:  Pt is Aox3, disoriented to time but lethargic today. Satting at 84% this morning on RA, put on 1L of oxygen. Low appetite and intake today. Wound care done per orders. No acute distress noted. Pt remains free of falls.     Patient is not progressing towards the following goals:

## 2024-03-21 NOTE — CARE PLAN
The patient is Stable - Low risk of patient condition declining or worsening     Shift Goals  Clinical Goals: IV abx, pain control, comfort, rest, wound care  Patient Goals: rest, comfort, hydration  Family Goals: ROB    Progress made toward(s) clinical / shift goals:    Problem: Skin Integrity  Goal: Skin integrity is maintained or improved by turning patient q2 hours and cleaning/ changing dressings Q shift according to orders  Outcome: Progressing     Problem: Fall Risk  Goal: Patient will remain free from falls by end of shift  Outcome: Progressing       Patient is not progressing towards the following goals:

## 2024-03-21 NOTE — PROGRESS NOTES
[facility] Medicine Daily Progress Note    Date of Service  3/21/2024    Chief Complaint  Debra Rodrigues is a 89 y.o. female admitted 3/18/2024 with Acute respiratory failure and hypoxia with Pneumonia.    Hospital Course  Debra Rodrigues is a 89 y.o. female who presented 3/18/2024 with Shortness of Breath (BIB EMS from group home. Per report patient has been dealing with a cough for the past 2 week. ) and Nausea/Vomiting/Diarrhea (Patient reports nausea for past 2 days with no episodes of vomiting. )  89 year old female from a group home essentially bedridden with foot braces due to neuropathy and deformities, who drinks alcohol weekly with a history of Afib s/p pacer on flecainide and Eliquis (Jan2024 echo normal EF and RV function), hypothyroidism, hypertension, remote history of breast cancer now in remission presented 3/18/2024 with shortness of breath, fatigue, and cough. She has cognitive deficits. Her son and daughter inlaw at bedside. She has been having dry cough since Friday but became more productive and had a coughing fit last night, unable to sleep. No fevers, chills, chest pain,dizziness or palpitations. Her family was visiting her at the group home. I discussed code status and family says she is DNR.   At the ED, she is afebrile, normotensive but hypoxic, 8%% on room air. She was put on O2.   CXR looks clear, CTA Chest showed no PE but showed lingular infiltrate and trace pleural effusions  No leukocytosis. Mild hyponatremia, mild hypokalemia. Cr- 0.77. CoVID, flu and RSV negative x 1. D-dimer negative  She was started on antibiotics for presumptive pneumonia.  When I saw her at the ED, she is in no acute distress. Coughs occasionally and it is dry but can be junky sounding. Family at bedside.    Interval Problem Update  3/19 ongoing cough with Right flank and CP  - worse with cough  Axox2  Generalized weakness    3/20 improving shortness of breath  Intermittent use of oxygen  Intermittent  cough, oxygen as needed  Feels better  Decreasing back pain    3/21 per staff, increasing hypoxia last evening requiring 1 L oxygen supplementation with lethargy today  Arousable  Ongoing cough  Encourage incentive spirometer use  Continue oxygen supplementation as needed  On oral antibiotics  If respiratory status improves, plan for return to group home with home health in a.m.    I have discussed this patient's plan of care and discharge plan at IDT rounds today with Case Management, Nursing, Nursing leadership, and other members of the IDT team.    Consultants/Specialty  none    Code Status  DNAR/DNI    Disposition  The patient is not medically cleared for discharge to home or a post-acute facility.      I have placed the appropriate orders for post-discharge needs.    Review of Systems  Review of Systems   Constitutional:  Positive for malaise/fatigue. Negative for chills and fever.   HENT:  Negative for congestion, hearing loss and sore throat.    Eyes:  Negative for blurred vision.   Respiratory:  Positive for cough, sputum production and shortness of breath. Negative for wheezing.    Cardiovascular:  Positive for chest pain. Negative for palpitations and leg swelling.   Gastrointestinal:  Negative for abdominal pain and nausea.   Genitourinary:  Negative for dysuria and flank pain.   Musculoskeletal:  Positive for myalgias. Negative for back pain.   Neurological:  Positive for weakness. Negative for dizziness, sensory change, speech change and focal weakness.   Psychiatric/Behavioral:  Positive for memory loss. The patient is not nervous/anxious.         Physical Exam  Temp:  [36 °C (96.8 °F)-36.6 °C (97.8 °F)] 36.3 °C (97.4 °F)  Pulse:  [65-67] 65  Resp:  [16-19] 18  BP: (101-141)/(40-75) 110/59  SpO2:  [89 %-97 %] 91 %    Physical Exam  Vitals and nursing note reviewed.   Constitutional:       General: She is in acute distress.      Appearance: She is ill-appearing.   HENT:      Head: Normocephalic and  atraumatic.      Nose: Nose normal.      Mouth/Throat:      Mouth: Mucous membranes are dry.   Eyes:      Extraocular Movements: Extraocular movements intact.      Pupils: Pupils are equal, round, and reactive to light.   Neck:      Thyroid: No thyromegaly.      Vascular: No JVD.   Cardiovascular:      Rate and Rhythm: Normal rate.      Heart sounds: No murmur heard.  Pulmonary:      Breath sounds: No stridor. Rhonchi present. No wheezing.      Comments: Decreased b/l  Chest:      Chest wall: Tenderness present.   Abdominal:      General: Bowel sounds are normal. There is no distension.      Palpations: Abdomen is soft. There is no mass.      Tenderness: There is no abdominal tenderness.   Musculoskeletal:         General: Swelling present. No tenderness.      Cervical back: Neck supple.   Skin:     General: Skin is warm and dry.      Coloration: Skin is pale. Skin is not jaundiced.   Neurological:      Mental Status: She is alert and oriented to person, place, and time.      Cranial Nerves: No cranial nerve deficit.      Motor: Weakness present.      Coordination: Coordination normal.   Psychiatric:         Behavior: Behavior normal.         Thought Content: Thought content normal.         Fluids    Intake/Output Summary (Last 24 hours) at 3/21/2024 1327  Last data filed at 3/21/2024 0100  Gross per 24 hour   Intake 770 ml   Output --   Net 770 ml       Laboratory  Recent Labs     03/19/24  0526 03/20/24  0318   WBC 6.4 5.7   RBC 4.34 4.44   HEMOGLOBIN 13.6 13.5   HEMATOCRIT 39.2 40.4   MCV 90.3 91.0   MCH 31.3 30.4   MCHC 34.7 33.4   RDW 48.4 49.6   PLATELETCT 156* 165   MPV 11.4 11.5     Recent Labs     03/19/24  0637 03/20/24  0318   SODIUM 134* 134*   POTASSIUM 3.4* 3.7   CHLORIDE 99 98   CO2 24 22   GLUCOSE 109* 138*   BUN 13 19   CREATININE 0.67 0.95   CALCIUM 8.3* 8.4*                   Imaging  DX-CHEST-PORTABLE (1 VIEW)   Final Result      No acute cardiopulmonary abnormality.           Assessment/Plan  *  Acute respiratory failure with hypoxia, pneumonia (HCC)- (present on admission)  Assessment & Plan  3/18 Continue antibiotics. Procalcitonin PENDING  Being bedridden contributes, encourage deep breathing and ordered incentive spirometry  Titrate O2 and breathing trmts.    3/19 procal neg  Transition to po abx  O2 prn  From , had HH with [facility] for wound care  ? Snf vs , pt / ot eval    Chest and back pain with cough  Add flexeril, symbicort, cont guafeniscin    3/20 improving shortness of breath  Will transition to p.o. antibiotics  Ongoing cough, recommend incentive spirometer use  Oxygen supplementation as needed    3/21 improving, intermittent cough  On Augmentin to complete 7 day course  On RA, pending return to  in 1-2 days  - hh ordered  Bedbound at baseline    Heel ulcer (HCC)  Assessment & Plan  3/19 cont wound care  HH if pt dc back to     Chronic atrial fibrillation (HCC)- (present on admission)  Assessment & Plan  Vitals:    03/18/24 1303   BP: 126/56   Pulse: 66   SpO2: 91%     HR stable  COntinue fleicanide and ELiquis    Advanced care planning/counseling discussion  Assessment & Plan  I spnet 15 minutes reviewing chart, speaking to family about code status  She is DNR    Age-related physical debility- (present on admission)  Assessment & Plan  PT/OT    Acquired hypothyroidism- (present on admission)  Assessment & Plan  Continue Synthroid    Polyneuropathy in other diseases classified elsewhere (HCC)- (present on admission)  Assessment & Plan  PT/OT    Hypertension- (present on admission)  Assessment & Plan  Vitals:    03/18/24 1303   BP: 126/56   Pulse: 66   SpO2: 91%     Stable         VTE prophylaxis:   SCDs/TEDs      I have performed a physical exam and reviewed and updated ROS and Plan today (3/21/2024). In review of yesterday's note (3/20/2024), there are no changes except as documented above.

## 2024-03-21 NOTE — DISCHARGE PLANNING
Case Management Discharge Planning    Admission Date: 3/18/2024  GMLOS: 4.1  ALOS: 3    6-Clicks ADL Score: 14  6-Clicks Mobility Score: 7    Anticipated Discharge Dispo: Discharge Disposition: Discharged to home/self care (01)  Discharge Address: [address], [place] [address]  Discharge Contact Phone Number: [phone]    DME Needed: No    Action(s) Taken:   Per MD, pt not yet medically clear, potential discharge tomorrow.     CM RN informed COVID test will be required prior to discharge.     1456  CM RN requested walking O2 stats for potential O2 needs.     1546  CM RN contacted [place] with [facility] ([phone]) to inform of potential discharge tomorrow.  [place] requesting afternoon transport and clinical documents to be emailed to [email or web address].     Escalations Completed: None    Medically Clear: No    Next Steps:  CM RN to follow up with medical team to discuss discharge barriers or needs.     Barriers to Discharge: Medical clearance    Is the patient up for discharge tomorrow: No

## 2024-03-21 NOTE — PROGRESS NOTES
Received report and assumed care of patient at change of shift. Patient A&Ox3, disoriented to time , on RA , and reports 7/10 pain and requested tylenol at this time. Patient assessment completed, bed in lowest position, and call light and personal belongings within reach. Patient expressed no further needs at this time.

## 2024-03-22 VITALS
RESPIRATION RATE: 18 BRPM | BODY MASS INDEX: 31.17 KG/M2 | HEART RATE: 70 BPM | TEMPERATURE: 96.7 F | DIASTOLIC BLOOD PRESSURE: 55 MMHG | HEIGHT: 63 IN | SYSTOLIC BLOOD PRESSURE: 103 MMHG | OXYGEN SATURATION: 95 % | WEIGHT: 175.93 LBS

## 2024-03-22 LAB
ANION GAP SERPL CALC-SCNC: 9 MMOL/L (ref 7–16)
BASOPHILS # BLD AUTO: 0.3 % (ref 0–1.8)
BASOPHILS # BLD: 0.02 K/UL (ref 0–0.12)
BUN SERPL-MCNC: 35 MG/DL (ref 8–22)
CALCIUM SERPL-MCNC: 8.4 MG/DL (ref 8.5–10.5)
CHLORIDE SERPL-SCNC: 99 MMOL/L (ref 96–112)
CO2 SERPL-SCNC: 22 MMOL/L (ref 20–33)
CREAT SERPL-MCNC: 1.36 MG/DL (ref 0.5–1.4)
EOSINOPHIL # BLD AUTO: 0.01 K/UL (ref 0–0.51)
EOSINOPHIL NFR BLD: 0.1 % (ref 0–6.9)
ERYTHROCYTE [DISTWIDTH] IN BLOOD BY AUTOMATED COUNT: 47.6 FL (ref 35.9–50)
FLUAV RNA SPEC QL NAA+PROBE: NEGATIVE
FLUBV RNA SPEC QL NAA+PROBE: NEGATIVE
GFR SERPLBLD CREATININE-BSD FMLA CKD-EPI: 37 ML/MIN/1.73 M 2
GLUCOSE SERPL-MCNC: 108 MG/DL (ref 65–99)
HCT VFR BLD AUTO: 36.6 % (ref 37–47)
HGB BLD-MCNC: 12.5 G/DL (ref 12–16)
IMM GRANULOCYTES # BLD AUTO: 0.08 K/UL (ref 0–0.11)
IMM GRANULOCYTES NFR BLD AUTO: 1 % (ref 0–0.9)
LYMPHOCYTES # BLD AUTO: 1.33 K/UL (ref 1–4.8)
LYMPHOCYTES NFR BLD: 17.3 % (ref 22–41)
MCH RBC QN AUTO: 30.6 PG (ref 27–33)
MCHC RBC AUTO-ENTMCNC: 34.2 G/DL (ref 32.2–35.5)
MCV RBC AUTO: 89.5 FL (ref 81.4–97.8)
MONOCYTES # BLD AUTO: 0.63 K/UL (ref 0–0.85)
MONOCYTES NFR BLD AUTO: 8.2 % (ref 0–13.4)
NEUTROPHILS # BLD AUTO: 5.6 K/UL (ref 1.82–7.42)
NEUTROPHILS NFR BLD: 73.1 % (ref 44–72)
NRBC # BLD AUTO: 0 K/UL
NRBC BLD-RTO: 0 /100 WBC (ref 0–0.2)
PLATELET # BLD AUTO: 190 K/UL (ref 164–446)
PMV BLD AUTO: 11.4 FL (ref 9–12.9)
POTASSIUM SERPL-SCNC: 4.1 MMOL/L (ref 3.6–5.5)
RBC # BLD AUTO: 4.09 M/UL (ref 4.2–5.4)
RSV RNA SPEC QL NAA+PROBE: NEGATIVE
SARS-COV-2 RNA RESP QL NAA+PROBE: NOTDETECTED
SODIUM SERPL-SCNC: 130 MMOL/L (ref 135–145)
SPECIMEN SOURCE: NORMAL
WBC # BLD AUTO: 7.7 K/UL (ref 4.8–10.8)

## 2024-03-22 PROCEDURE — 700102 HCHG RX REV CODE 250 W/ 637 OVERRIDE(OP): Performed by: INTERNAL MEDICINE

## 2024-03-22 PROCEDURE — 99239 HOSP IP/OBS DSCHRG MGMT >30: CPT | Performed by: INTERNAL MEDICINE

## 2024-03-22 PROCEDURE — 80048 BASIC METABOLIC PNL TOTAL CA: CPT

## 2024-03-22 PROCEDURE — 85025 COMPLETE CBC W/AUTO DIFF WBC: CPT

## 2024-03-22 PROCEDURE — 700111 HCHG RX REV CODE 636 W/ 250 OVERRIDE (IP): Performed by: INTERNAL MEDICINE

## 2024-03-22 PROCEDURE — 0241U HCHG SARS-COV-2 COVID-19 NFCT DS RESP RNA 4 TRGT MIC: CPT

## 2024-03-22 PROCEDURE — A9270 NON-COVERED ITEM OR SERVICE: HCPCS | Performed by: INTERNAL MEDICINE

## 2024-03-22 PROCEDURE — 36415 COLL VENOUS BLD VENIPUNCTURE: CPT

## 2024-03-22 RX ORDER — BENZONATATE 100 MG/1
100 CAPSULE ORAL 3 TIMES DAILY PRN
Qty: 30 CAPSULE | Refills: 0 | Status: SHIPPED | OUTPATIENT
Start: 2024-03-22

## 2024-03-22 RX ORDER — GUAIFENESIN 200 MG/10ML
10 LIQUID ORAL EVERY 4 HOURS PRN
Qty: 840 ML | Refills: 0 | Status: SHIPPED | OUTPATIENT
Start: 2024-03-22 | End: 2024-03-27

## 2024-03-22 RX ORDER — FOLIC ACID 1 MG/1
1 TABLET ORAL
Qty: 30 TABLET | Refills: 0 | Status: SHIPPED | OUTPATIENT
Start: 2024-03-22 | End: 2024-03-22

## 2024-03-22 RX ORDER — BENZONATATE 100 MG/1
100 CAPSULE ORAL 3 TIMES DAILY PRN
Qty: 30 CAPSULE | Refills: 0 | Status: SHIPPED | OUTPATIENT
Start: 2024-03-22 | End: 2024-03-22

## 2024-03-22 RX ORDER — LIDOCAINE 4 G/G
1 PATCH TOPICAL EVERY 24 HOURS
Qty: 5 PATCH | Refills: 1 | Status: SHIPPED | OUTPATIENT
Start: 2024-03-22

## 2024-03-22 RX ORDER — LIDOCAINE 4 G/G
1 PATCH TOPICAL EVERY 24 HOURS
Qty: 5 PATCH | Refills: 1 | Status: SHIPPED | OUTPATIENT
Start: 2024-03-22 | End: 2024-03-22

## 2024-03-22 RX ORDER — METHYLPREDNISOLONE 4 MG/1
TABLET ORAL
Qty: 21 TABLET | Refills: 0 | Status: SHIPPED | OUTPATIENT
Start: 2024-03-22

## 2024-03-22 RX ORDER — GUAIFENESIN 200 MG/10ML
10 LIQUID ORAL EVERY 4 HOURS PRN
Qty: 840 ML | Refills: 0 | Status: SHIPPED | OUTPATIENT
Start: 2024-03-22 | End: 2024-03-22

## 2024-03-22 RX ORDER — FOLIC ACID 1 MG/1
1 TABLET ORAL
Qty: 30 TABLET | Refills: 0 | Status: SHIPPED | OUTPATIENT
Start: 2024-03-22 | End: 2024-03-28 | Stop reason: SDUPTHER

## 2024-03-22 RX ORDER — AMOXICILLIN AND CLAVULANATE POTASSIUM 875; 125 MG/1; MG/1
1 TABLET, FILM COATED ORAL EVERY 12 HOURS
Qty: 6 TABLET | Refills: 0 | Status: ACTIVE | OUTPATIENT
Start: 2024-03-22 | End: 2024-03-22

## 2024-03-22 RX ORDER — LANOLIN ALCOHOL/MO/W.PET/CERES
100 CREAM (GRAM) TOPICAL
Qty: 30 TABLET | Refills: 0 | Status: SHIPPED | OUTPATIENT
Start: 2024-03-22 | End: 2024-03-28 | Stop reason: SDUPTHER

## 2024-03-22 RX ORDER — AMOXICILLIN AND CLAVULANATE POTASSIUM 875; 125 MG/1; MG/1
1 TABLET, FILM COATED ORAL EVERY 12 HOURS
Qty: 6 TABLET | Refills: 0 | Status: ACTIVE | OUTPATIENT
Start: 2024-03-22 | End: 2024-03-25

## 2024-03-22 RX ORDER — LANOLIN ALCOHOL/MO/W.PET/CERES
100 CREAM (GRAM) TOPICAL
Qty: 30 TABLET | Refills: 0 | Status: SHIPPED | OUTPATIENT
Start: 2024-03-22 | End: 2024-03-22

## 2024-03-22 RX ADMIN — LOSARTAN POTASSIUM 100 MG: 50 TABLET, FILM COATED ORAL at 06:30

## 2024-03-22 RX ADMIN — Medication 2000 UNITS: at 06:31

## 2024-03-22 RX ADMIN — METOPROLOL SUCCINATE 12.5 MG: 25 TABLET, EXTENDED RELEASE ORAL at 06:31

## 2024-03-22 RX ADMIN — PAROXETINE HYDROCHLORIDE 10 MG: 10 TABLET, FILM COATED ORAL at 06:30

## 2024-03-22 RX ADMIN — FLECAINIDE ACETATE 100 MG: 100 TABLET ORAL at 06:30

## 2024-03-22 RX ADMIN — AMOXICILLIN AND CLAVULANATE POTASSIUM 1 TABLET: 875; 125 TABLET, FILM COATED ORAL at 06:30

## 2024-03-22 RX ADMIN — CYANOCOBALAMIN TAB 500 MCG 1000 MCG: 500 TAB at 06:31

## 2024-03-22 RX ADMIN — FAMOTIDINE 20 MG: 20 TABLET, FILM COATED ORAL at 06:36

## 2024-03-22 RX ADMIN — PREDNISONE 10 MG: 10 TABLET ORAL at 06:30

## 2024-03-22 RX ADMIN — THERA TABS 1 TABLET: TAB at 06:30

## 2024-03-22 RX ADMIN — APIXABAN 5 MG: 5 TABLET, FILM COATED ORAL at 06:30

## 2024-03-22 RX ADMIN — LEVOTHYROXINE SODIUM 75 MCG: 0.07 TABLET ORAL at 06:30

## 2024-03-22 RX ADMIN — AMLODIPINE BESYLATE 10 MG: 10 TABLET ORAL at 06:30

## 2024-03-22 RX ADMIN — HYDRALAZINE HYDROCHLORIDE 25 MG: 25 TABLET ORAL at 08:13

## 2024-03-22 ASSESSMENT — PAIN DESCRIPTION - PAIN TYPE
TYPE: ACUTE PAIN

## 2024-03-22 NOTE — CARE PLAN
The patient is Stable - Low risk of patient condition declining or worsening    Shift Goals  Clinical Goals: pt O2 will be monitored and pt will remain above 90% on RA, pt will remain free from falls  Patient Goals: sleep  Family Goals: ROB    Progress made toward(s) clinical / shift goals:  pt remains free from falls, bed alarm in place, bed in lowest and locked position, pt calls appropriately. Pt could not remain above 90% on RA, pt needed to be placed on 1L O2 to maintain saturation above 90%    Patient is not progressing towards the following goals:

## 2024-03-22 NOTE — DISCHARGE SUMMARY
Discharge Summary    CHIEF COMPLAINT ON ADMISSION  Chief Complaint   Patient presents with    Shortness of Breath     BIB EMS from [facility]. Per report patient has been dealing with a cough for the past 2 week.     Nausea/Vomiting/Diarrhea     Patient reports nausea for past 2 days with no episodes of vomiting.        Reason for Admission  EMS     Admission Date  3/18/2024    CODE STATUS  DNAR/DNI    HPI & HOSPITAL COURSE    Debra Rodrigues is a 89 y.o. female who presented 3/18/2024 with Shortness of Breath (BIB EMS from group home. Per report patient has been dealing with a cough for the past 2 week. ) and Nausea/Vomiting/Diarrhea (Patient reports nausea for past 2 days with no episodes of vomiting. )  89 year old female from a group home essentially bedridden with foot braces due to neuropathy and deformities, who drinks alcohol weekly with a history of Afib s/p pacer on flecainide and Eliquis (Jan2024 echo normal EF and RV function), hypothyroidism, hypertension, remote history of breast cancer now in remission presented 3/18/2024 with shortness of breath, fatigue, and cough. She has cognitive deficits. Her son and daughter inlaw at bedside. She has been having dry cough since Friday but became more productive and had a coughing fit last night, unable to sleep. No fevers, chills, chest pain,dizziness or palpitations. Her family was visiting her at the group home. I discussed code status and family says she is DNR.   At the ED, she is afebrile, normotensive but hypoxic, 8%% on room air. She was put on O2.   CXR looks clear, CTA Chest showed no PE but showed lingular infiltrate and trace pleural effusions  No leukocytosis. Mild hyponatremia, mild hypokalemia. Cr- 0.77. CoVID, flu and RSV negative x 1. D-dimer negative  She was started on antibiotics for presumptive pneumonia.    She has been admitted for pneumonia with acute respiratory failure requiring additional oxygen supplementation  She was  transitioned to po antibiotics to complete course of treatment.  She does have a residual cough and is encouraged to use incentive spirometer.    She is now requiring 2 L oxygen supplementation on discharge.  She may follow-up with her primary care provider to assess for further needs of oxygen supplementation once respiratory symptoms have improved.    She is to continue on regular diet with Soft and bite-sized with thin liquids.    Pt with generalized weakness which has improved.  She is now cleared for return to group home             Therefore, she is discharged in good and stable condition to home with organized home healthcare and close outpatient follow-up.    The patient met 2-midnight criteria for an inpatient stay at the time of discharge.    Discharge Date  3/22/24    FOLLOW UP ITEMS POST DISCHARGE  Pcp in 1 week    DISCHARGE DIAGNOSES  Principal Problem:    Acute respiratory failure with hypoxia, pneumonia (HCC) (POA: Yes)  Active Problems:    Hypertension (POA: Yes)    Polyneuropathy in other diseases classified elsewhere (HCC) (POA: Yes)    Cognitive impairment (POA: Yes)    Acquired hypothyroidism (POA: Yes)    Age-related physical debility (POA: Yes)    Advanced care planning/counseling discussion (POA: Unknown)    Chronic atrial fibrillation (HCC) (POA: Yes)    Hypoxia (POA: Yes)    Heel ulcer (HCC) (POA: Unknown)  Resolved Problems:    * No resolved hospital problems. *      FOLLOW UP  No future appointments.  No follow-up provider specified.    MEDICATIONS ON DISCHARGE     Medication List        START taking these medications        Instructions   amoxicillin-clavulanate 875-125 MG Tabs  Commonly known as: Augmentin   Take 1 Tablet by mouth every 12 hours for 3 days.  Dose: 1 Tablet     benzonatate 100 MG Caps  Commonly known as: Tessalon   Take 1 Capsule by mouth 3 times a day as needed for Cough.  Dose: 100 mg     cyanocobalamin 1000 MCG Tabs  Start taking on: March 23, 2024  Commonly known as:  Vitamin B12   Take 1 Tablet by mouth every day.  Dose: 1,000 mcg     folic acid 1 MG Tabs  Commonly known as: Folvite   Take 1 Tablet by mouth every day.  Dose: 1 mg     guaiFENesin 100 MG/5ML liquid  Commonly known as: Robitussin   Take 10 mL by mouth every four hours as needed for Cough.  Dose: 10 mL     lidocaine 4 % Ptch  Commonly known as: Asperflex   Place 1 Patch on the skin every 24 hours. (12 hours on, 12 hours off)  Dose: 1 Patch     methylPREDNISolone 4 MG Tbpk  Commonly known as: Medrol Dosepak   Follow schedule on package instructions.     multivitamin Tabs  Start taking on: March 23, 2024   Take 1 Tablet by mouth every day.  Dose: 1 Tablet     thiamine 100 MG tablet  Commonly known as: Thiamine   Take 1 Tablet by mouth every day.  Dose: 100 mg            CONTINUE taking these medications        Instructions   acetaminophen 325 MG Tabs  Commonly known as: Tylenol   Take 2 Tablets by mouth every four hours as needed (pain).  Dose: 650 mg     amLODIPine 10 MG Tabs  Commonly known as: Norvasc   Take 1 Tablet by mouth every day.  Dose: 10 mg     CALMOSEPTINE EX   Apply 1 Dose topically 2 times a day. Apply thin layer of calmoseptine twice daily to groin buttocks area with brief changes. Use house supply.  Dose: 1 Dose     Eliquis 5 MG Tabs  Generic drug: apixaban   Take 1 Tablet by mouth 2 times a day.  Dose: 5 mg     famotidine 20 MG Tabs  Commonly known as: Pepcid   Take 1 Tablet by mouth 2 times a day.  Dose: 20 mg     flecainide 100 MG Tabs  Commonly known as: Tambocor   Take 1 Tablet by mouth every day.  Dose: 100 mg     gabapentin 100 MG Caps  Commonly known as: Neurontin   Take 1 Capsule by mouth at bedtime.  Dose: 100 mg     hydrALAZINE 25 MG Tabs  Commonly known as: Apresoline   Take 1 Tablet by mouth 3 times a day.  Dose: 25 mg     levothyroxine 75 MCG Tabs  Commonly known as: Synthroid   Take 1 Tablet by mouth every day.  Dose: 75 mcg     losartan 100 MG Tabs  Commonly known as: Cozaar   Take 100  mg by mouth every day.  Dose: 100 mg     metoprolol SR 25 MG Tb24  Commonly known as: Toprol XL   Take 0.5 Tablets by mouth every day.  Dose: 12.5 mg     PARoxetine 10 MG Tabs  Commonly known as: Paxil   Take 1 Tablet by mouth every day.  Dose: 10 mg     traZODone 50 MG Tabs  Commonly known as: Desyrel   Take 1 Tablet by mouth at bedtime.  Dose: 50 mg     vitamin D3 1000 Unit (25 mcg) Tabs  Commonly known as: Cholecalciferol   Take 2 Tablets by mouth every day.  Dose: 2,000 Units            STOP taking these medications      cephALEXin 500 MG Caps  Commonly known as: Keflex     metroNIDAZOLE 500 MG Tabs  Commonly known as: Flagyl              Allergies  Allergies   Allergen Reactions    Other Misc Anaphylaxis     x2 in 2005,pt tested,Cause unknown    Hydrocodone Vomiting    Oyster Extract Unspecified     Listed on MAR     Sulfa Drugs Rash     Rash        DIET  Orders Placed This Encounter   Procedures    Diet Order Diet: Cardiac     Standing Status:   Standing     Number of Occurrences:   1     Order Specific Question:   Diet:     Answer:   Cardiac [6]       ACTIVITY  As tolerated.  Weight bearing as tolerated    CONSULTATIONS  none    PROCEDURES  none    LABORATORY  Lab Results   Component Value Date    SODIUM 130 (L) 03/22/2024    POTASSIUM 4.1 03/22/2024    CHLORIDE 99 03/22/2024    CO2 22 03/22/2024    GLUCOSE 108 (H) 03/22/2024    BUN 35 (H) 03/22/2024    CREATININE 1.36 03/22/2024    GLOMRATE 85 07/01/2022        Lab Results   Component Value Date    WBC 7.7 03/22/2024    HEMOGLOBIN 12.5 03/22/2024    HEMATOCRIT 36.6 (L) 03/22/2024    PLATELETCT 190 03/22/2024        Total time of the discharge process exceeds 37 minutes.

## 2024-03-22 NOTE — CARE PLAN
The patient is Stable - Low risk of patient condition declining or worsening    Shift Goals  Clinical Goals: Oxygen monitoring, safe discharge  Patient Goals: Comfort, decreased coughing  Family Goals: ROB    Progress made toward(s) clinical / shift goals:  Patient  educated on plan of care and verbalized understanding. Patient has remained free of falls this shift with appropriate fall precautions in place thorughout shift. Patient skin integrity maintained throughout shift. Pt has not complained of pain this shift and has been kept comfortable.       Problem: Knowledge Deficit - Standard  Goal: Patient and family/care givers will demonstrate understanding of plan of care, disease process/condition, diagnostic tests and medications  Outcome: Progressing     Problem: Fall Risk  Goal: Patient will remain free from falls  Outcome: Progressing     Problem: Pain - Standard  Goal: Alleviation of pain or a reduction in pain to the patient’s comfort goal  Outcome: Progressing     Patient is not progressing towards the following goals:

## 2024-03-22 NOTE — PROGRESS NOTES
Assumed care of pt  at 0700. Report received from NOC RN. Pt is A&O x 3 disoriented to time. Pt reoriented. Patient stated that their pain is 0/10. Pt's pain comfort goal is 0/10. Pt is on 2L NC and connected to .   Pt educated on how to use the call light, pt verbalized understanding. Bed in lowest locked position, call light within reach, hourly rounding in place. Labs reviewed, orders reviewed, communication board updated. Pt declines any further needs at this time

## 2024-03-22 NOTE — PROGRESS NOTES
Assumed care of patient at 1900. Received report from day RN. Patient A&Ox3 (disoriented to time), on RA, pt placed on , satting 86%, placed pt on 1L NC, now satting 93%. Reporting a pain level of 0/10. Call light within reach, belongings within reach, fall precautions in place, bed in lowest position. Patient does not have any other needs at this time.     0425: pt satting 88% on 1L, O2 increased to 2L, pt now satting at 92%  .

## 2024-03-22 NOTE — PROGRESS NOTES
Pt discharged with [facility] to group home. Pt belongings with pt. Script and report given to AUDELIA.

## 2024-03-22 NOTE — FACE TO FACE
"Face to Face Note  -  Durable Medical Equipment    Amanda Orlando D.O. - NPI: [id number]  I certify that this patient is under my care and that they had a durable medical equipment(DME)face to face encounter by myself that meets the physician DME face-to-face encounter requirements with this patient on:    Date of encounter:   Patient:                    MRN:                       YOB: 2024  Debra Rodrigues  [id number]  1935     The encounter with the patient was in whole, or in part, for the following medical condition, which is the primary reason for durable medical equipment:  Other - pneumonia    I certify that, based on my findings, the following durable medical equipment is medically necessary:    Oxygen   HOME O2 Saturation Measurements:(Values must be present for Home Oxygen orders)  Room air sat at rest: 85  Room air sat with amb: 83  With liters of O2: 2, O2 sat at rest with O2: 92  With Liters of O2: 2, O2 sat with amb with O2 : 92  Is the patient mobile?: No  If patient feels more short of breath, they can go up to 6 liters per minute and contact healthcare provider.    Supporting Symptoms: The patient requires supplemental oxygen, as the following interventions have been tried with limited or no improvement: \"Incentive spirometry.    My Clinical findings support the need for the above equipment due to:  Hypoxia  "

## 2024-03-22 NOTE — DISCHARGE PLANNING
DC Transport Scheduled    Transport Company Scheduled:  [employer]  Spoke with Roxann at [facility] to schedule transport.    Scheduled Date: 3/22/2024  Scheduled Time: 1500    Destination: [facility]   Destination address: [address], [place] [address]    Notified care team of scheduled transport via [employer].     If there are any changes needed to the DC transportation scheduled, please contact [facility] at ext. [phone] between the hours of 6691-7041 Mon-Fri. If outside those hours, contact the ED Case Manager at ext. [phone].

## 2024-03-22 NOTE — DISCHARGE PLANNING
Case Management Discharge Planning    Admission Date: 3/18/2024  GMLOS: 4.1  ALOS: 4    6-Clicks ADL Score: 14  6-Clicks Mobility Score: 7    Anticipated Discharge Dispo: Discharge Disposition: Discharged to home/self care (01)  [address] Address[address] [address]  Discharge Contact Phone Number: [phone]    DME Needed: No    Action(s) Taken:   CM RN requested walking O2 stats for potential O2 needs.   Bedside RN informed.     0932  Home O2 order placed by MD.     Verbal consent obtained from christopher Richards for O2 DME and faxed to [facility] for processing.   1) [employer] [facility]     1045  Per [facility], O2 order has been processed and is to be delivered to bedside.     [place] [facility] requesting pharmacies to be send to [facility] [employer] on [address], [place] [address].   MD informed.     1134  CM RN confirmed home O2 concentrator and O2 tank have been delivered to bedside.     CM RN contacted christopher Richards ([phone]) to provide update on discharge plan and he is agreeable.     CM RN requested transport via [facility] through [employer] [facility]. Pending confirmation.     1206  CM RN manually faxed ([phone]) and emailed to [email or web address]) clinical documents as requested by [employer]  owner.     Transport confirmed for 1500 via [facility].   Christopher Richards and [facility] owner informed of transport time.     1226  DNR/DNI hard script left with bedside RN.     Escalations Completed: None    Medically Clear: Yes    Next Steps:  CM RN to follow up with medical team to discuss discharge barriers or needs.     Barriers to Discharge: DME    Is the patient up for discharge tomorrow: No

## 2024-03-22 NOTE — DISCHARGE PLANNING
Referral sent per choice to [facility]    1028- Spoke To: Margy  Agency/Facility Name: [facility]  Plan or Request: Approved, 02 will be delivered to bedside.